# Patient Record
Sex: FEMALE | Race: WHITE
[De-identification: names, ages, dates, MRNs, and addresses within clinical notes are randomized per-mention and may not be internally consistent; named-entity substitution may affect disease eponyms.]

---

## 2019-03-12 ENCOUNTER — HOSPITAL ENCOUNTER (INPATIENT)
Dept: HOSPITAL 8 - ED | Age: 34
LOS: 9 days | Discharge: HOME | DRG: 644 | End: 2019-03-21
Attending: HOSPITALIST | Admitting: HOSPITALIST
Payer: MEDICAID

## 2019-03-12 VITALS — HEIGHT: 65 IN | BODY MASS INDEX: 28.03 KG/M2 | WEIGHT: 168.21 LBS

## 2019-03-12 DIAGNOSIS — F25.9: ICD-10-CM

## 2019-03-12 DIAGNOSIS — Z79.899: ICD-10-CM

## 2019-03-12 DIAGNOSIS — R45.851: ICD-10-CM

## 2019-03-12 DIAGNOSIS — I50.9: ICD-10-CM

## 2019-03-12 DIAGNOSIS — E05.90: Primary | ICD-10-CM

## 2019-03-12 DIAGNOSIS — F31.9: ICD-10-CM

## 2019-03-12 DIAGNOSIS — Z88.0: ICD-10-CM

## 2019-03-12 DIAGNOSIS — Z81.8: ICD-10-CM

## 2019-03-12 DIAGNOSIS — F41.9: ICD-10-CM

## 2019-03-12 LAB
ALBUMIN SERPL-MCNC: 3.4 G/DL (ref 3.4–5)
ALP SERPL-CCNC: 91 U/L (ref 45–117)
ALT SERPL-CCNC: 24 U/L (ref 12–78)
ANION GAP SERPL CALC-SCNC: 9 MMOL/L (ref 5–15)
APAP SERPL-MCNC: < 2 MCG/ML (ref 10–30)
BASOPHILS # BLD AUTO: 0.03 X10^3/UL (ref 0–0.1)
BASOPHILS NFR BLD AUTO: 0 % (ref 0–1)
BILIRUB SERPL-MCNC: 0.3 MG/DL (ref 0.2–1)
CALCIUM SERPL-MCNC: 9.2 MG/DL (ref 8.5–10.1)
CHLORIDE SERPL-SCNC: 109 MMOL/L (ref 98–107)
CREAT SERPL-MCNC: 0.76 MG/DL (ref 0.55–1.02)
EOSINOPHIL # BLD AUTO: 0.29 X10^3/UL (ref 0–0.4)
EOSINOPHIL NFR BLD AUTO: 2 % (ref 1–7)
ERYTHROCYTE [DISTWIDTH] IN BLOOD BY AUTOMATED COUNT: 11.7 % (ref 9.6–15.2)
EST. AVERAGE GLUCOSE BLD GHB EST-MCNC: 91 MG/DL (ref 0–126)
HBA1C MFR BLD: 4.8 % (ref 4.2–6.3)
LYMPHOCYTES # BLD AUTO: 2.54 X10^3/UL (ref 1–3.4)
LYMPHOCYTES NFR BLD AUTO: 22 % (ref 22–44)
MCH RBC QN AUTO: 30.2 PG (ref 27–34.8)
MCHC RBC AUTO-ENTMCNC: 34.3 G/DL (ref 32.4–35.8)
MCV RBC AUTO: 88.2 FL (ref 80–100)
MD: NO
MONOCYTES # BLD AUTO: 0.58 X10^3/UL (ref 0.2–0.8)
MONOCYTES NFR BLD AUTO: 5 % (ref 2–9)
NEUTROPHILS # BLD AUTO: 8.25 X10^3/UL (ref 1.8–6.8)
NEUTROPHILS NFR BLD AUTO: 71 % (ref 42–75)
PLATELET # BLD AUTO: 365 X10^3/UL (ref 130–400)
PMV BLD AUTO: 8.3 FL (ref 7.4–10.4)
PROT SERPL-MCNC: 7.1 G/DL (ref 6.4–8.2)
RBC # BLD AUTO: 3.97 X10^6/UL (ref 3.82–5.3)
T4 FREE SERPL-MCNC: 2.56 NG/DL (ref 0.76–1.46)
VANCOMYCIN TROUGH SERPL-MCNC: 2.3 MG/DL (ref 2.8–20)

## 2019-03-12 PROCEDURE — 80053 COMPREHEN METABOLIC PANEL: CPT

## 2019-03-12 PROCEDURE — 36415 COLL VENOUS BLD VENIPUNCTURE: CPT

## 2019-03-12 PROCEDURE — 84703 CHORIONIC GONADOTROPIN ASSAY: CPT

## 2019-03-12 PROCEDURE — 85025 COMPLETE CBC W/AUTO DIFF WBC: CPT

## 2019-03-12 PROCEDURE — 83036 HEMOGLOBIN GLYCOSYLATED A1C: CPT

## 2019-03-12 PROCEDURE — 82565 ASSAY OF CREATININE: CPT

## 2019-03-12 PROCEDURE — 84445 ASSAY OF TSI GLOBULIN: CPT

## 2019-03-12 PROCEDURE — 80178 ASSAY OF LITHIUM: CPT

## 2019-03-12 PROCEDURE — 80329 ANALGESICS NON-OPIOID 1 OR 2: CPT

## 2019-03-12 PROCEDURE — 83520 IMMUNOASSAY QUANT NOS NONAB: CPT

## 2019-03-12 PROCEDURE — 80307 DRUG TEST PRSMV CHEM ANLYZR: CPT

## 2019-03-12 PROCEDURE — A9516 IODINE I-123 SOD IODIDE MIC: HCPCS

## 2019-03-12 PROCEDURE — 84481 FREE ASSAY (FT-3): CPT

## 2019-03-12 PROCEDURE — 84443 ASSAY THYROID STIM HORMONE: CPT

## 2019-03-12 PROCEDURE — C9898 INPNT STAY RADIOLABELED ITEM: HCPCS

## 2019-03-12 PROCEDURE — 78013 THYROID IMAGING W/BLOOD FLOW: CPT

## 2019-03-12 PROCEDURE — 93306 TTE W/DOPPLER COMPLETE: CPT

## 2019-03-12 PROCEDURE — 93005 ELECTROCARDIOGRAM TRACING: CPT

## 2019-03-12 PROCEDURE — 84439 ASSAY OF FREE THYROXINE: CPT

## 2019-03-12 PROCEDURE — 76536 US EXAM OF HEAD AND NECK: CPT

## 2019-03-12 PROCEDURE — G0480 DRUG TEST DEF 1-7 CLASSES: HCPCS

## 2019-03-12 PROCEDURE — 99285 EMERGENCY DEPT VISIT HI MDM: CPT

## 2019-03-12 PROCEDURE — 83735 ASSAY OF MAGNESIUM: CPT

## 2019-03-12 RX ADMIN — PROPRANOLOL HYDROCHLORIDE SCH MG: 20 TABLET ORAL at 22:00

## 2019-03-12 RX ADMIN — LITHIUM CARBONATE SCH MG: 300 CAPSULE, GELATIN COATED ORAL at 22:39

## 2019-03-12 RX ADMIN — PROPRANOLOL HYDROCHLORIDE SCH MG: 20 TABLET ORAL at 18:00

## 2019-03-12 RX ADMIN — LOXAPINE SCH MG: 25 CAPSULE ORAL at 22:39

## 2019-03-12 RX ADMIN — ENOXAPARIN SODIUM SCH MG: 40 INJECTION SUBCUTANEOUS at 16:00

## 2019-03-12 RX ADMIN — SODIUM CHLORIDE SCH MLS/HR: 0.9 INJECTION, SOLUTION INTRAVENOUS at 22:40

## 2019-03-12 RX ADMIN — TOPIRAMATE SCH MG: 25 TABLET, FILM COATED ORAL at 22:39

## 2019-03-12 RX ADMIN — METHIMAZOLE SCH MG: 5 TABLET ORAL at 18:00

## 2019-03-12 RX ADMIN — DIVALPROEX SODIUM SCH MG: 250 TABLET, EXTENDED RELEASE ORAL at 22:39

## 2019-03-12 RX ADMIN — BENZTROPINE MESYLATE SCH MG: 1 TABLET ORAL at 22:39

## 2019-03-12 NOTE — NUR
PT. HAS BEEN MEDICATED PER MAR.  PT. REFUSIGN IV AND STATING "I WILL JUST DRINK 
WATER, I DON'T BELIEVE I NEED THAT AT THIS TIME."  PT. DOES HAVE CARDIAC 
MONITOR IN PLACE AND UNHOOKS IT AND HOOKS IT BACK UP WHEN GOING TO THE BR.  PT. 
AMBUALATE IN [place] WITH STEADY GAIT AND HAS BEEN COOPERATIVE WITH THIS NURSE 
THUS FAR.  PT. WAS GIVEN A PILLOW PER REQUEST.  PT. BRUSHED TEETH AND GAVE 
TOOTHBRUSH/PASTE BACK TO SITTER FOR SECUREMENT.  VS WERE UPDATED AND STABLE.  
PT. DENIES ANY OTHER NEEDS.  SITTER IS IN [place] FOR OBS.

## 2019-03-12 NOTE — NUR
BIBA on Legal 2000 from [facility] walk-in clinic for med clearance, pt reports SI 
("I want to swallow glass, my meds need to be changed but my Dr won't do 
anything"), AAO x4, pt reports she is med compliant; hx schitzoaffective, 
bipolar, panic d/o; pt change dinto gown, responds approp to staff, NAD, 
comfort measures provided, pt in safe environment, awaiting sitter, will 
continue to monitor frequently; personal belongings in bags x2 placed in 
locker; urine sample sent to lab.

## 2019-03-12 NOTE — NUR
pt upright on gurney awake, calm & comfortable, responds approp to staff, NAD, 
comfort measures provided, awaiting meal tray, pt remains in safe environment, 
awaiting sitter, will continue to monitor frequently.

## 2019-03-12 NOTE — NUR
placed call to dr. berumen, pts depakote med is different from home, at home 
she takes 1250mg at night, order is only for 250mg. dr berumen wants to keep 
dose at 250. pt also requesting medication to help her sleep but md declined.

## 2019-03-13 VITALS — DIASTOLIC BLOOD PRESSURE: 77 MMHG | SYSTOLIC BLOOD PRESSURE: 111 MMHG

## 2019-03-13 VITALS — DIASTOLIC BLOOD PRESSURE: 64 MMHG | SYSTOLIC BLOOD PRESSURE: 118 MMHG

## 2019-03-13 LAB
ALBUMIN SERPL-MCNC: 2.9 G/DL (ref 3.4–5)
ALP SERPL-CCNC: 77 U/L (ref 45–117)
ALT SERPL-CCNC: 21 U/L (ref 12–78)
ANION GAP SERPL CALC-SCNC: 5 MMOL/L (ref 5–15)
BASOPHILS # BLD AUTO: 0.02 X10^3/UL (ref 0–0.1)
BASOPHILS NFR BLD AUTO: 0 % (ref 0–1)
BILIRUB SERPL-MCNC: 0.2 MG/DL (ref 0.2–1)
CALCIUM SERPL-MCNC: 8.6 MG/DL (ref 8.5–10.1)
CHLORIDE SERPL-SCNC: 112 MMOL/L (ref 98–107)
CREAT SERPL-MCNC: 0.66 MG/DL (ref 0.55–1.02)
EOSINOPHIL # BLD AUTO: 0.3 X10^3/UL (ref 0–0.4)
EOSINOPHIL NFR BLD AUTO: 3 % (ref 1–7)
ERYTHROCYTE [DISTWIDTH] IN BLOOD BY AUTOMATED COUNT: 11.6 % (ref 9.6–15.2)
LYMPHOCYTES # BLD AUTO: 3.29 X10^3/UL (ref 1–3.4)
LYMPHOCYTES NFR BLD AUTO: 33 % (ref 22–44)
MCH RBC QN AUTO: 30.4 PG (ref 27–34.8)
MCHC RBC AUTO-ENTMCNC: 34.4 G/DL (ref 32.4–35.8)
MCV RBC AUTO: 88.4 FL (ref 80–100)
MD: NO
MONOCYTES # BLD AUTO: 0.74 X10^3/UL (ref 0.2–0.8)
MONOCYTES NFR BLD AUTO: 7 % (ref 2–9)
NEUTROPHILS # BLD AUTO: 5.68 X10^3/UL (ref 1.8–6.8)
NEUTROPHILS NFR BLD AUTO: 57 % (ref 42–75)
PLATELET # BLD AUTO: 328 X10^3/UL (ref 130–400)
PMV BLD AUTO: 8.1 FL (ref 7.4–10.4)
PROT SERPL-MCNC: 5.9 G/DL (ref 6.4–8.2)
RBC # BLD AUTO: 3.53 X10^6/UL (ref 3.82–5.3)

## 2019-03-13 RX ADMIN — METHIMAZOLE SCH MG: 5 TABLET ORAL at 12:31

## 2019-03-13 RX ADMIN — LOXAPINE SCH MG: 25 CAPSULE ORAL at 12:32

## 2019-03-13 RX ADMIN — LITHIUM CARBONATE SCH MG: 300 CAPSULE, GELATIN COATED ORAL at 20:55

## 2019-03-13 RX ADMIN — LITHIUM CARBONATE SCH MG: 300 CAPSULE, GELATIN COATED ORAL at 12:31

## 2019-03-13 RX ADMIN — ENOXAPARIN SODIUM SCH MG: 40 INJECTION SUBCUTANEOUS at 16:00

## 2019-03-13 RX ADMIN — PROPRANOLOL HYDROCHLORIDE SCH MG: 20 TABLET ORAL at 22:00

## 2019-03-13 RX ADMIN — DIVALPROEX SODIUM SCH MG: 250 TABLET, EXTENDED RELEASE ORAL at 20:44

## 2019-03-13 RX ADMIN — LOXAPINE SCH MG: 25 CAPSULE ORAL at 20:21

## 2019-03-13 RX ADMIN — PROPRANOLOL HYDROCHLORIDE SCH MG: 20 TABLET ORAL at 16:10

## 2019-03-13 RX ADMIN — BENZTROPINE MESYLATE SCH MG: 1 TABLET ORAL at 12:30

## 2019-03-13 RX ADMIN — SODIUM CHLORIDE SCH MLS/HR: 0.9 INJECTION, SOLUTION INTRAVENOUS at 05:14

## 2019-03-13 RX ADMIN — PROPRANOLOL HYDROCHLORIDE SCH MG: 20 TABLET ORAL at 12:32

## 2019-03-13 RX ADMIN — TOPIRAMATE SCH MG: 25 TABLET, FILM COATED ORAL at 12:31

## 2019-03-13 RX ADMIN — TOPIRAMATE SCH MG: 25 TABLET, FILM COATED ORAL at 20:45

## 2019-03-13 RX ADMIN — SODIUM CHLORIDE SCH MLS/HR: 0.9 INJECTION, SOLUTION INTRAVENOUS at 18:34

## 2019-03-13 RX ADMIN — BENZTROPINE MESYLATE SCH MG: 1 TABLET ORAL at 20:45

## 2019-03-13 RX ADMIN — LOXAPINE SCH MG: 25 CAPSULE ORAL at 20:44

## 2019-03-13 NOTE — NUR
PT RESTING CALMLY IN BED WITH EYES CLOSED.  NO NOTED DISSTRESS.  NO STATED 
NEEDS.  SITTER AT DOOR.  RESP EVEN AND NON-LABORED.

## 2019-03-13 NOTE — NUR
PT RESTING CALMLY IN BED WITH EYES CLOSED.  NO STATED NEEDS.  SITTER AT DOOR.  
RESP EVEN AND NON-LABORED.

## 2019-03-13 NOTE — NUR
PT RESTING CALMLY IN BED UNDER BED SHEET.  NO NOTED DISSTRESS.  NO STATED 
NEEDS.  SITTER AT DOOR.  RESP EVEN AND NON-LABORED.

## 2019-03-14 VITALS — SYSTOLIC BLOOD PRESSURE: 115 MMHG | DIASTOLIC BLOOD PRESSURE: 77 MMHG

## 2019-03-14 VITALS — DIASTOLIC BLOOD PRESSURE: 68 MMHG | SYSTOLIC BLOOD PRESSURE: 104 MMHG

## 2019-03-14 LAB
T4 FREE SERPL-MCNC: 2.11 NG/DL (ref 0.76–1.46)
TSH SERPL-ACNC: < 0.005 MIU/L (ref 0.36–3.74)

## 2019-03-14 RX ADMIN — SODIUM CHLORIDE SCH MLS/HR: 0.9 INJECTION, SOLUTION INTRAVENOUS at 19:42

## 2019-03-14 RX ADMIN — SODIUM CHLORIDE SCH MLS/HR: 0.9 INJECTION, SOLUTION INTRAVENOUS at 07:54

## 2019-03-14 RX ADMIN — DIVALPROEX SODIUM SCH MG: 250 TABLET, EXTENDED RELEASE ORAL at 20:48

## 2019-03-14 RX ADMIN — PROPRANOLOL HYDROCHLORIDE SCH MG: 20 TABLET ORAL at 20:48

## 2019-03-14 RX ADMIN — ENOXAPARIN SODIUM SCH MG: 40 INJECTION SUBCUTANEOUS at 16:00

## 2019-03-14 RX ADMIN — METHIMAZOLE SCH MG: 5 TABLET ORAL at 08:09

## 2019-03-14 RX ADMIN — TOPIRAMATE SCH MG: 25 TABLET, FILM COATED ORAL at 08:09

## 2019-03-14 RX ADMIN — LOXAPINE SCH MG: 25 CAPSULE ORAL at 20:47

## 2019-03-14 RX ADMIN — LITHIUM CARBONATE SCH MG: 300 CAPSULE, GELATIN COATED ORAL at 20:46

## 2019-03-14 RX ADMIN — PROPRANOLOL HYDROCHLORIDE SCH MG: 20 TABLET ORAL at 15:00

## 2019-03-14 RX ADMIN — TOPIRAMATE SCH MG: 25 TABLET, FILM COATED ORAL at 20:48

## 2019-03-14 RX ADMIN — PROPRANOLOL HYDROCHLORIDE SCH MG: 20 TABLET ORAL at 06:17

## 2019-03-14 RX ADMIN — BENZTROPINE MESYLATE SCH MG: 1 TABLET ORAL at 20:47

## 2019-03-14 RX ADMIN — BENZTROPINE MESYLATE SCH MG: 1 TABLET ORAL at 08:08

## 2019-03-14 RX ADMIN — LITHIUM CARBONATE SCH MG: 300 CAPSULE, GELATIN COATED ORAL at 08:09

## 2019-03-14 RX ADMIN — LOXAPINE SCH MG: 25 CAPSULE ORAL at 08:08

## 2019-03-14 RX ADMIN — LOXAPINE SCH MG: 25 CAPSULE ORAL at 15:00

## 2019-03-15 VITALS — DIASTOLIC BLOOD PRESSURE: 80 MMHG | SYSTOLIC BLOOD PRESSURE: 117 MMHG

## 2019-03-15 VITALS — SYSTOLIC BLOOD PRESSURE: 112 MMHG | DIASTOLIC BLOOD PRESSURE: 77 MMHG

## 2019-03-15 VITALS — SYSTOLIC BLOOD PRESSURE: 100 MMHG | DIASTOLIC BLOOD PRESSURE: 65 MMHG

## 2019-03-15 RX ADMIN — LOXAPINE SCH MG: 25 CAPSULE ORAL at 20:48

## 2019-03-15 RX ADMIN — SODIUM CHLORIDE SCH MLS/HR: 0.9 INJECTION, SOLUTION INTRAVENOUS at 10:34

## 2019-03-15 RX ADMIN — TOPIRAMATE SCH MG: 25 TABLET, FILM COATED ORAL at 08:59

## 2019-03-15 RX ADMIN — ENOXAPARIN SODIUM SCH MG: 40 INJECTION SUBCUTANEOUS at 16:35

## 2019-03-15 RX ADMIN — PROPRANOLOL HYDROCHLORIDE SCH MG: 20 TABLET ORAL at 06:35

## 2019-03-15 RX ADMIN — SODIUM CHLORIDE SCH MLS/HR: 0.9 INJECTION, SOLUTION INTRAVENOUS at 22:38

## 2019-03-15 RX ADMIN — DIVALPROEX SODIUM SCH MG: 250 TABLET, EXTENDED RELEASE ORAL at 20:49

## 2019-03-15 RX ADMIN — PROPRANOLOL HYDROCHLORIDE SCH MG: 20 TABLET ORAL at 20:49

## 2019-03-15 RX ADMIN — PROPRANOLOL HYDROCHLORIDE SCH MG: 20 TABLET ORAL at 14:07

## 2019-03-15 RX ADMIN — LOXAPINE SCH MG: 25 CAPSULE ORAL at 14:07

## 2019-03-15 RX ADMIN — BENZTROPINE MESYLATE SCH MG: 1 TABLET ORAL at 09:00

## 2019-03-15 RX ADMIN — BENZTROPINE MESYLATE SCH MG: 1 TABLET ORAL at 20:49

## 2019-03-15 RX ADMIN — TOPIRAMATE SCH MG: 25 TABLET, FILM COATED ORAL at 20:49

## 2019-03-15 RX ADMIN — METHIMAZOLE SCH MG: 5 TABLET ORAL at 08:59

## 2019-03-15 RX ADMIN — NICOTINE SCH PATCH: 14 PATCH, EXTENDED RELEASE TRANSDERMAL at 11:51

## 2019-03-15 RX ADMIN — LITHIUM CARBONATE SCH MG: 300 CAPSULE, GELATIN COATED ORAL at 20:48

## 2019-03-15 RX ADMIN — LOXAPINE SCH MG: 25 CAPSULE ORAL at 09:00

## 2019-03-15 RX ADMIN — NORGESTIMATE AND ETHINYL ESTRADIOL SCH EACH: KIT at 09:15

## 2019-03-15 RX ADMIN — LITHIUM CARBONATE SCH MG: 300 CAPSULE, GELATIN COATED ORAL at 09:00

## 2019-03-16 VITALS — DIASTOLIC BLOOD PRESSURE: 79 MMHG | SYSTOLIC BLOOD PRESSURE: 118 MMHG

## 2019-03-16 VITALS — DIASTOLIC BLOOD PRESSURE: 71 MMHG | SYSTOLIC BLOOD PRESSURE: 111 MMHG

## 2019-03-16 VITALS — SYSTOLIC BLOOD PRESSURE: 114 MMHG | DIASTOLIC BLOOD PRESSURE: 80 MMHG

## 2019-03-16 VITALS — DIASTOLIC BLOOD PRESSURE: 66 MMHG | SYSTOLIC BLOOD PRESSURE: 97 MMHG

## 2019-03-16 LAB
T4 FREE SERPL-MCNC: 2.31 NG/DL (ref 0.76–1.46)
TSH SERPL-ACNC: < 0.005 MIU/L (ref 0.36–3.74)

## 2019-03-16 RX ADMIN — LITHIUM CARBONATE SCH MG: 300 CAPSULE, GELATIN COATED ORAL at 20:11

## 2019-03-16 RX ADMIN — NICOTINE SCH PATCH: 14 PATCH, EXTENDED RELEASE TRANSDERMAL at 12:06

## 2019-03-16 RX ADMIN — ENOXAPARIN SODIUM SCH MG: 40 INJECTION SUBCUTANEOUS at 15:09

## 2019-03-16 RX ADMIN — TOPIRAMATE SCH MG: 25 TABLET, FILM COATED ORAL at 20:09

## 2019-03-16 RX ADMIN — TOPIRAMATE SCH MG: 25 TABLET, FILM COATED ORAL at 08:24

## 2019-03-16 RX ADMIN — LOXAPINE SCH MG: 25 CAPSULE ORAL at 20:11

## 2019-03-16 RX ADMIN — LITHIUM CARBONATE SCH MG: 300 CAPSULE, GELATIN COATED ORAL at 08:25

## 2019-03-16 RX ADMIN — SODIUM CHLORIDE SCH MLS/HR: 0.9 INJECTION, SOLUTION INTRAVENOUS at 19:58

## 2019-03-16 RX ADMIN — LOXAPINE SCH MG: 25 CAPSULE ORAL at 08:24

## 2019-03-16 RX ADMIN — NORGESTIMATE AND ETHINYL ESTRADIOL SCH EACH: KIT at 08:25

## 2019-03-16 RX ADMIN — BENZTROPINE MESYLATE SCH MG: 1 TABLET ORAL at 20:11

## 2019-03-16 RX ADMIN — PROPRANOLOL HYDROCHLORIDE SCH MG: 20 TABLET ORAL at 20:10

## 2019-03-16 RX ADMIN — LOXAPINE SCH MG: 25 CAPSULE ORAL at 13:06

## 2019-03-16 RX ADMIN — METHIMAZOLE SCH MG: 5 TABLET ORAL at 08:24

## 2019-03-16 RX ADMIN — PROPRANOLOL HYDROCHLORIDE SCH MG: 20 TABLET ORAL at 08:24

## 2019-03-16 RX ADMIN — DIVALPROEX SODIUM SCH MG: 250 TABLET, EXTENDED RELEASE ORAL at 20:12

## 2019-03-16 RX ADMIN — BENZTROPINE MESYLATE SCH MG: 1 TABLET ORAL at 08:24

## 2019-03-16 RX ADMIN — SODIUM CHLORIDE SCH MLS/HR: 0.9 INJECTION, SOLUTION INTRAVENOUS at 13:03

## 2019-03-16 RX ADMIN — PROPRANOLOL HYDROCHLORIDE SCH MG: 20 TABLET ORAL at 13:06

## 2019-03-17 VITALS — SYSTOLIC BLOOD PRESSURE: 118 MMHG | DIASTOLIC BLOOD PRESSURE: 79 MMHG

## 2019-03-17 VITALS — SYSTOLIC BLOOD PRESSURE: 103 MMHG | DIASTOLIC BLOOD PRESSURE: 66 MMHG

## 2019-03-17 VITALS — SYSTOLIC BLOOD PRESSURE: 104 MMHG | DIASTOLIC BLOOD PRESSURE: 70 MMHG

## 2019-03-17 VITALS — DIASTOLIC BLOOD PRESSURE: 76 MMHG | SYSTOLIC BLOOD PRESSURE: 113 MMHG

## 2019-03-17 LAB
CREAT SERPL-MCNC: 0.62 MG/DL (ref 0.55–1.02)
T4 FREE SERPL-MCNC: 1.99 NG/DL (ref 0.76–1.46)
TSH SERPL-ACNC: < 0.005 MIU/L (ref 0.36–3.74)

## 2019-03-17 RX ADMIN — TOPIRAMATE SCH MG: 25 TABLET, FILM COATED ORAL at 20:38

## 2019-03-17 RX ADMIN — NORGESTIMATE AND ETHINYL ESTRADIOL SCH EACH: KIT at 08:57

## 2019-03-17 RX ADMIN — ENOXAPARIN SODIUM SCH MG: 40 INJECTION SUBCUTANEOUS at 15:58

## 2019-03-17 RX ADMIN — SODIUM CHLORIDE SCH MLS/HR: 0.9 INJECTION, SOLUTION INTRAVENOUS at 15:54

## 2019-03-17 RX ADMIN — DIVALPROEX SODIUM SCH MG: 250 TABLET, EXTENDED RELEASE ORAL at 20:38

## 2019-03-17 RX ADMIN — NICOTINE SCH PATCH: 14 PATCH, EXTENDED RELEASE TRANSDERMAL at 12:55

## 2019-03-17 RX ADMIN — PROPRANOLOL HYDROCHLORIDE SCH MG: 20 TABLET ORAL at 08:56

## 2019-03-17 RX ADMIN — PROPRANOLOL HYDROCHLORIDE SCH MG: 20 TABLET ORAL at 20:38

## 2019-03-17 RX ADMIN — BENZTROPINE MESYLATE SCH MG: 1 TABLET ORAL at 08:57

## 2019-03-17 RX ADMIN — LITHIUM CARBONATE SCH MG: 300 CAPSULE, GELATIN COATED ORAL at 20:39

## 2019-03-17 RX ADMIN — LOXAPINE SCH MG: 25 CAPSULE ORAL at 08:56

## 2019-03-17 RX ADMIN — LOXAPINE SCH MG: 25 CAPSULE ORAL at 13:49

## 2019-03-17 RX ADMIN — BENZTROPINE MESYLATE SCH MG: 1 TABLET ORAL at 20:39

## 2019-03-17 RX ADMIN — PROPRANOLOL HYDROCHLORIDE SCH MG: 20 TABLET ORAL at 13:49

## 2019-03-17 RX ADMIN — LOXAPINE SCH MG: 25 CAPSULE ORAL at 20:38

## 2019-03-17 RX ADMIN — TOPIRAMATE SCH MG: 25 TABLET, FILM COATED ORAL at 08:57

## 2019-03-17 RX ADMIN — METHIMAZOLE SCH MG: 5 TABLET ORAL at 08:57

## 2019-03-17 RX ADMIN — LITHIUM CARBONATE SCH MG: 300 CAPSULE, GELATIN COATED ORAL at 08:57

## 2019-03-18 VITALS — SYSTOLIC BLOOD PRESSURE: 89 MMHG | DIASTOLIC BLOOD PRESSURE: 58 MMHG

## 2019-03-18 VITALS — DIASTOLIC BLOOD PRESSURE: 64 MMHG | SYSTOLIC BLOOD PRESSURE: 99 MMHG

## 2019-03-18 VITALS — SYSTOLIC BLOOD PRESSURE: 119 MMHG | DIASTOLIC BLOOD PRESSURE: 84 MMHG

## 2019-03-18 VITALS — SYSTOLIC BLOOD PRESSURE: 109 MMHG | DIASTOLIC BLOOD PRESSURE: 40 MMHG

## 2019-03-18 VITALS — DIASTOLIC BLOOD PRESSURE: 77 MMHG | SYSTOLIC BLOOD PRESSURE: 111 MMHG

## 2019-03-18 LAB
ALBUMIN SERPL-MCNC: 3.2 G/DL (ref 3.4–5)
ALP SERPL-CCNC: 86 U/L (ref 45–117)
ALT SERPL-CCNC: 22 U/L (ref 12–78)
ANION GAP SERPL CALC-SCNC: 5 MMOL/L (ref 5–15)
BASOPHILS # BLD AUTO: 0.03 X10^3/UL (ref 0–0.1)
BASOPHILS NFR BLD AUTO: 0 % (ref 0–1)
BILIRUB SERPL-MCNC: 0.3 MG/DL (ref 0.2–1)
CALCIUM SERPL-MCNC: 9.1 MG/DL (ref 8.5–10.1)
CHLORIDE SERPL-SCNC: 112 MMOL/L (ref 98–107)
CREAT SERPL-MCNC: 0.68 MG/DL (ref 0.55–1.02)
EOSINOPHIL # BLD AUTO: 0.25 X10^3/UL (ref 0–0.4)
EOSINOPHIL NFR BLD AUTO: 3 % (ref 1–7)
ERYTHROCYTE [DISTWIDTH] IN BLOOD BY AUTOMATED COUNT: 11.4 % (ref 9.6–15.2)
LYMPHOCYTES # BLD AUTO: 2.7 X10^3/UL (ref 1–3.4)
LYMPHOCYTES NFR BLD AUTO: 28 % (ref 22–44)
MCH RBC QN AUTO: 29.4 PG (ref 27–34.8)
MCHC RBC AUTO-ENTMCNC: 33.5 G/DL (ref 32.4–35.8)
MCV RBC AUTO: 87.7 FL (ref 80–100)
MD: NO
MONOCYTES # BLD AUTO: 0.52 X10^3/UL (ref 0.2–0.8)
MONOCYTES NFR BLD AUTO: 5 % (ref 2–9)
NEUTROPHILS # BLD AUTO: 6.09 X10^3/UL (ref 1.8–6.8)
NEUTROPHILS NFR BLD AUTO: 64 % (ref 42–75)
PLATELET # BLD AUTO: 386 X10^3/UL (ref 130–400)
PMV BLD AUTO: 7.9 FL (ref 7.4–10.4)
PROT SERPL-MCNC: 6.7 G/DL (ref 6.4–8.2)
RBC # BLD AUTO: 3.96 X10^6/UL (ref 3.82–5.3)
T4 FREE SERPL-MCNC: 2.32 NG/DL (ref 0.76–1.46)
TSH SERPL-ACNC: < 0.005 MIU/L (ref 0.36–3.74)

## 2019-03-18 RX ADMIN — TOPIRAMATE SCH MG: 25 TABLET, FILM COATED ORAL at 08:53

## 2019-03-18 RX ADMIN — DIVALPROEX SODIUM SCH MG: 250 TABLET, EXTENDED RELEASE ORAL at 20:18

## 2019-03-18 RX ADMIN — LOXAPINE SCH MG: 25 CAPSULE ORAL at 20:20

## 2019-03-18 RX ADMIN — TOPIRAMATE SCH MG: 25 TABLET, FILM COATED ORAL at 20:19

## 2019-03-18 RX ADMIN — BENZTROPINE MESYLATE SCH MG: 1 TABLET ORAL at 08:50

## 2019-03-18 RX ADMIN — METHIMAZOLE SCH MG: 5 TABLET ORAL at 08:51

## 2019-03-18 RX ADMIN — NICOTINE SCH PATCH: 14 PATCH, EXTENDED RELEASE TRANSDERMAL at 13:00

## 2019-03-18 RX ADMIN — LITHIUM CARBONATE SCH MG: 300 CAPSULE, GELATIN COATED ORAL at 08:52

## 2019-03-18 RX ADMIN — ENOXAPARIN SODIUM SCH MG: 40 INJECTION SUBCUTANEOUS at 16:00

## 2019-03-18 RX ADMIN — LITHIUM CARBONATE SCH MG: 300 CAPSULE, GELATIN COATED ORAL at 20:21

## 2019-03-18 RX ADMIN — LOXAPINE SCH MG: 25 CAPSULE ORAL at 14:56

## 2019-03-18 RX ADMIN — PROPRANOLOL HYDROCHLORIDE SCH MG: 20 TABLET ORAL at 08:52

## 2019-03-18 RX ADMIN — LOXAPINE SCH MG: 25 CAPSULE ORAL at 08:48

## 2019-03-18 RX ADMIN — SODIUM CHLORIDE SCH MLS/HR: 0.9 INJECTION, SOLUTION INTRAVENOUS at 18:34

## 2019-03-18 RX ADMIN — NORGESTIMATE AND ETHINYL ESTRADIOL SCH EACH: KIT at 08:54

## 2019-03-18 RX ADMIN — PROPRANOLOL HYDROCHLORIDE SCH MG: 20 TABLET ORAL at 16:27

## 2019-03-18 RX ADMIN — SODIUM CHLORIDE SCH MLS/HR: 0.9 INJECTION, SOLUTION INTRAVENOUS at 05:14

## 2019-03-18 RX ADMIN — PROPRANOLOL HYDROCHLORIDE SCH MG: 20 TABLET ORAL at 20:19

## 2019-03-18 RX ADMIN — BENZTROPINE MESYLATE SCH MG: 1 TABLET ORAL at 20:19

## 2019-03-19 VITALS — DIASTOLIC BLOOD PRESSURE: 75 MMHG | SYSTOLIC BLOOD PRESSURE: 114 MMHG

## 2019-03-19 VITALS — SYSTOLIC BLOOD PRESSURE: 96 MMHG | DIASTOLIC BLOOD PRESSURE: 60 MMHG

## 2019-03-19 VITALS — DIASTOLIC BLOOD PRESSURE: 65 MMHG | SYSTOLIC BLOOD PRESSURE: 101 MMHG

## 2019-03-19 VITALS — DIASTOLIC BLOOD PRESSURE: 58 MMHG | SYSTOLIC BLOOD PRESSURE: 95 MMHG

## 2019-03-19 VITALS — DIASTOLIC BLOOD PRESSURE: 73 MMHG | SYSTOLIC BLOOD PRESSURE: 105 MMHG

## 2019-03-19 RX ADMIN — LOXAPINE SCH MG: 25 CAPSULE ORAL at 14:41

## 2019-03-19 RX ADMIN — NORGESTIMATE AND ETHINYL ESTRADIOL SCH EACH: KIT at 08:20

## 2019-03-19 RX ADMIN — PROPRANOLOL HYDROCHLORIDE SCH MG: 20 TABLET ORAL at 05:36

## 2019-03-19 RX ADMIN — BENZTROPINE MESYLATE SCH MG: 1 TABLET ORAL at 08:18

## 2019-03-19 RX ADMIN — LITHIUM CARBONATE SCH MG: 300 CAPSULE, GELATIN COATED ORAL at 08:20

## 2019-03-19 RX ADMIN — PROPRANOLOL HYDROCHLORIDE SCH MG: 10 TABLET ORAL at 14:41

## 2019-03-19 RX ADMIN — NICOTINE SCH PATCH: 14 PATCH, EXTENDED RELEASE TRANSDERMAL at 12:31

## 2019-03-19 RX ADMIN — LITHIUM CARBONATE SCH MG: 300 CAPSULE, GELATIN COATED ORAL at 23:55

## 2019-03-19 RX ADMIN — LOXAPINE SCH MG: 25 CAPSULE ORAL at 08:19

## 2019-03-19 RX ADMIN — DIVALPROEX SODIUM SCH MG: 250 TABLET, EXTENDED RELEASE ORAL at 20:01

## 2019-03-19 RX ADMIN — SODIUM CHLORIDE SCH MLS/HR: 0.9 INJECTION, SOLUTION INTRAVENOUS at 20:03

## 2019-03-19 RX ADMIN — DOCUSATE SODIUM PRN MG: 100 CAPSULE, LIQUID FILLED ORAL at 20:01

## 2019-03-19 RX ADMIN — TOPIRAMATE SCH MG: 25 TABLET, FILM COATED ORAL at 08:19

## 2019-03-19 RX ADMIN — BENZTROPINE MESYLATE SCH MG: 1 TABLET ORAL at 20:01

## 2019-03-19 RX ADMIN — SODIUM CHLORIDE SCH MLS/HR: 0.9 INJECTION, SOLUTION INTRAVENOUS at 07:54

## 2019-03-19 RX ADMIN — LOXAPINE SCH MG: 25 CAPSULE ORAL at 20:01

## 2019-03-19 RX ADMIN — METHIMAZOLE SCH MG: 5 TABLET ORAL at 08:18

## 2019-03-19 RX ADMIN — TOPIRAMATE SCH MG: 25 TABLET, FILM COATED ORAL at 20:01

## 2019-03-19 RX ADMIN — PROPRANOLOL HYDROCHLORIDE SCH MG: 10 TABLET ORAL at 23:57

## 2019-03-19 RX ADMIN — ENOXAPARIN SODIUM SCH MG: 40 INJECTION SUBCUTANEOUS at 15:42

## 2019-03-20 VITALS — DIASTOLIC BLOOD PRESSURE: 63 MMHG | SYSTOLIC BLOOD PRESSURE: 94 MMHG

## 2019-03-20 VITALS — SYSTOLIC BLOOD PRESSURE: 113 MMHG | DIASTOLIC BLOOD PRESSURE: 78 MMHG

## 2019-03-20 VITALS — DIASTOLIC BLOOD PRESSURE: 66 MMHG | SYSTOLIC BLOOD PRESSURE: 95 MMHG

## 2019-03-20 VITALS — DIASTOLIC BLOOD PRESSURE: 60 MMHG | SYSTOLIC BLOOD PRESSURE: 95 MMHG

## 2019-03-20 VITALS — DIASTOLIC BLOOD PRESSURE: 81 MMHG | SYSTOLIC BLOOD PRESSURE: 117 MMHG

## 2019-03-20 LAB — CREAT SERPL-MCNC: 0.65 MG/DL (ref 0.55–1.02)

## 2019-03-20 RX ADMIN — DOCUSATE SODIUM PRN MG: 100 CAPSULE, LIQUID FILLED ORAL at 11:18

## 2019-03-20 RX ADMIN — LOXAPINE SCH MG: 25 CAPSULE ORAL at 09:09

## 2019-03-20 RX ADMIN — LOXAPINE SCH MG: 25 CAPSULE ORAL at 21:11

## 2019-03-20 RX ADMIN — ENOXAPARIN SODIUM SCH MG: 40 INJECTION SUBCUTANEOUS at 16:00

## 2019-03-20 RX ADMIN — PROPRANOLOL HYDROCHLORIDE SCH MG: 10 TABLET ORAL at 06:14

## 2019-03-20 RX ADMIN — LOXAPINE SCH MG: 25 CAPSULE ORAL at 14:46

## 2019-03-20 RX ADMIN — NICOTINE SCH PATCH: 14 PATCH, EXTENDED RELEASE TRANSDERMAL at 14:46

## 2019-03-20 RX ADMIN — BENZTROPINE MESYLATE SCH MG: 1 TABLET ORAL at 09:09

## 2019-03-20 RX ADMIN — SODIUM CHLORIDE SCH MLS/HR: 0.9 INJECTION, SOLUTION INTRAVENOUS at 10:34

## 2019-03-20 RX ADMIN — LITHIUM CARBONATE SCH MG: 300 CAPSULE, GELATIN COATED ORAL at 09:08

## 2019-03-20 RX ADMIN — LITHIUM CARBONATE SCH MG: 300 CAPSULE, GELATIN COATED ORAL at 21:12

## 2019-03-20 RX ADMIN — PROPRANOLOL HYDROCHLORIDE SCH MG: 10 TABLET ORAL at 21:12

## 2019-03-20 RX ADMIN — PROPRANOLOL HYDROCHLORIDE SCH MG: 10 TABLET ORAL at 14:46

## 2019-03-20 RX ADMIN — NORGESTIMATE AND ETHINYL ESTRADIOL SCH EACH: KIT at 11:18

## 2019-03-20 RX ADMIN — METHIMAZOLE SCH MG: 5 TABLET ORAL at 09:08

## 2019-03-20 RX ADMIN — TOPIRAMATE SCH MG: 25 TABLET, FILM COATED ORAL at 21:11

## 2019-03-20 RX ADMIN — TOPIRAMATE SCH MG: 25 TABLET, FILM COATED ORAL at 09:09

## 2019-03-20 RX ADMIN — BENZTROPINE MESYLATE SCH MG: 1 TABLET ORAL at 21:12

## 2019-03-20 RX ADMIN — DIVALPROEX SODIUM SCH MG: 250 TABLET, EXTENDED RELEASE ORAL at 21:11

## 2019-03-21 VITALS — SYSTOLIC BLOOD PRESSURE: 94 MMHG | DIASTOLIC BLOOD PRESSURE: 58 MMHG

## 2019-03-21 RX ADMIN — PROPRANOLOL HYDROCHLORIDE SCH MG: 10 TABLET ORAL at 14:19

## 2019-03-21 RX ADMIN — DOCUSATE SODIUM PRN MG: 100 CAPSULE, LIQUID FILLED ORAL at 12:05

## 2019-03-21 RX ADMIN — LOXAPINE SCH MG: 25 CAPSULE ORAL at 08:07

## 2019-03-21 RX ADMIN — NICOTINE SCH PATCH: 14 PATCH, EXTENDED RELEASE TRANSDERMAL at 12:05

## 2019-03-21 RX ADMIN — NORGESTIMATE AND ETHINYL ESTRADIOL SCH EACH: KIT at 08:06

## 2019-03-21 RX ADMIN — TOPIRAMATE SCH MG: 25 TABLET, FILM COATED ORAL at 08:06

## 2019-03-21 RX ADMIN — LITHIUM CARBONATE SCH MG: 300 CAPSULE, GELATIN COATED ORAL at 08:07

## 2019-03-21 RX ADMIN — BENZTROPINE MESYLATE SCH MG: 1 TABLET ORAL at 08:07

## 2019-03-21 RX ADMIN — LOXAPINE SCH MG: 25 CAPSULE ORAL at 14:19

## 2019-03-21 RX ADMIN — PROPRANOLOL HYDROCHLORIDE SCH MG: 10 TABLET ORAL at 08:06

## 2019-03-21 RX ADMIN — METHIMAZOLE SCH MG: 5 TABLET ORAL at 08:07

## 2019-04-06 ENCOUNTER — HOSPITAL ENCOUNTER (EMERGENCY)
Facility: MEDICAL CENTER | Age: 34
End: 2019-04-06
Attending: EMERGENCY MEDICINE
Payer: MEDICAID

## 2019-04-06 VITALS
HEART RATE: 79 BPM | TEMPERATURE: 98.1 F | RESPIRATION RATE: 16 BRPM | SYSTOLIC BLOOD PRESSURE: 147 MMHG | DIASTOLIC BLOOD PRESSURE: 73 MMHG | WEIGHT: 160 LBS | BODY MASS INDEX: 25.05 KG/M2

## 2019-04-06 DIAGNOSIS — R44.3 HALLUCINATIONS: ICD-10-CM

## 2019-04-06 LAB
AMPHET UR QL SCN: NEGATIVE
BARBITURATES UR QL SCN: NEGATIVE
BENZODIAZ UR QL SCN: NEGATIVE
BZE UR QL SCN: NEGATIVE
CANNABINOIDS UR QL SCN: NEGATIVE
METHADONE UR QL SCN: NEGATIVE
OPIATES UR QL SCN: NEGATIVE
OXYCODONE UR QL SCN: NEGATIVE
PCP UR QL SCN: NEGATIVE
POC BREATHALIZER: 0 PERCENT (ref 0–0.01)
PROPOXYPH UR QL SCN: NEGATIVE
TSH SERPL DL<=0.005 MIU/L-ACNC: <0.005 UIU/ML (ref 0.38–5.33)

## 2019-04-06 PROCEDURE — 99284 EMERGENCY DEPT VISIT MOD MDM: CPT

## 2019-04-06 PROCEDURE — 90791 PSYCH DIAGNOSTIC EVALUATION: CPT

## 2019-04-06 PROCEDURE — 80307 DRUG TEST PRSMV CHEM ANLYZR: CPT

## 2019-04-06 PROCEDURE — 84443 ASSAY THYROID STIM HORMONE: CPT

## 2019-04-06 PROCEDURE — 302970 POC BREATHALIZER: Performed by: EMERGENCY MEDICINE
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
## 2019-04-06 NOTE — ED NOTES
"Pt brought in by EMS, per EMS pt c/o visual hallucinations and SI x 2 months. Pt states she has been compliant with medications for schizophrenia but states \"I keep asking my psychiatrist to change them because they do not work.\"     Pt states visual hallucinations are \"demons talking to me, getting into my face.\" Denies command visual hallucinations but states after she done seeing them she feels SI, denies specific plan. Pt scores high risk on CCSR.     Pt cooperative.   "

## 2019-04-07 NOTE — ED NOTES
Pt refused to sign d/c paperwork or have d/c VS. Pt escorted out of ED by security. Took all belongings.

## 2019-04-07 NOTE — ED NOTES
"Pt yelling at sitter who is watching room 20. Pt stating \"are you a zombie?\" Continues to yell at her. Pt told to stop yelling and talking to sitter by this RN, pt states \"What are you going to do?\" Security called.   "

## 2019-04-07 NOTE — ED NOTES
"Pt up to nurses station, states \"can I get some worm poison?\" pt than states \"I need something to de-worm.\"   "
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
## 2019-04-22 ENCOUNTER — HOSPITAL ENCOUNTER (INPATIENT)
Dept: HOSPITAL 8 - ED | Age: 34
LOS: 7 days | Discharge: PSYCHIATRIC OR SUBSTANCE USE FACILITY | DRG: 918 | End: 2019-04-29
Attending: HOSPITALIST | Admitting: HOSPITALIST
Payer: MEDICAID

## 2019-04-22 VITALS — HEIGHT: 66 IN | BODY MASS INDEX: 25.51 KG/M2 | WEIGHT: 158.73 LBS

## 2019-04-22 DIAGNOSIS — E05.90: ICD-10-CM

## 2019-04-22 DIAGNOSIS — F25.9: ICD-10-CM

## 2019-04-22 DIAGNOSIS — F31.9: ICD-10-CM
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
## 2019-04-22 DIAGNOSIS — Z63.8: ICD-10-CM

## 2019-04-22 DIAGNOSIS — T56.892A: Primary | ICD-10-CM

## 2019-04-22 LAB
ALBUMIN SERPL-MCNC: 3.9 G/DL (ref 3.4–5)
ANION GAP SERPL CALC-SCNC: 7 MMOL/L (ref 5–15)
APAP SERPL-MCNC: < 2 MCG/ML (ref 10–30)
BASOPHILS # BLD AUTO: 0.02 X10^3/UL (ref 0–0.1)
BASOPHILS NFR BLD AUTO: 0 % (ref 0–1)
CALCIUM SERPL-MCNC: 9.6 MG/DL (ref 8.5–10.1)
CHLORIDE SERPL-SCNC: 109 MMOL/L (ref 98–107)
CREAT SERPL-MCNC: 0.61 MG/DL (ref 0.55–1.02)
EOSINOPHIL # BLD AUTO: 0.27 X10^3/UL (ref 0–0.4)
EOSINOPHIL NFR BLD AUTO: 2 % (ref 1–7)
ERYTHROCYTE [DISTWIDTH] IN BLOOD BY AUTOMATED COUNT: 12.5 % (ref 9.6–15.2)
LYMPHOCYTES # BLD AUTO: 3.92 X10^3/UL (ref 1–3.4)
LYMPHOCYTES NFR BLD AUTO: 28 % (ref 22–44)
MCH RBC QN AUTO: 29.4 PG (ref 27–34.8)
MCHC RBC AUTO-ENTMCNC: 34 G/DL (ref 32.4–35.8)
MCV RBC AUTO: 86.4 FL (ref 80–100)
MD: NO
MONOCYTES # BLD AUTO: 0.95 X10^3/UL (ref 0.2–0.8)
MONOCYTES NFR BLD AUTO: 7 % (ref 2–9)
NEUTROPHILS # BLD AUTO: 9.1 X10^3/UL (ref 1.8–6.8)
NEUTROPHILS NFR BLD AUTO: 64 % (ref 42–75)
PLATELET # BLD AUTO: 450 X10^3/UL (ref 130–400)
PMV BLD AUTO: 7.8 FL (ref 7.4–10.4)
RBC # BLD AUTO: 4.33 X10^6/UL (ref 3.82–5.3)
VANCOMYCIN TROUGH SERPL-MCNC: 2.3 MG/DL (ref 2.8–20)

## 2019-04-22 PROCEDURE — 84439 ASSAY OF FREE THYROXINE: CPT

## 2019-04-22 PROCEDURE — 36415 COLL VENOUS BLD VENIPUNCTURE: CPT

## 2019-04-22 PROCEDURE — 80048 BASIC METABOLIC PNL TOTAL CA: CPT

## 2019-04-22 PROCEDURE — 84443 ASSAY THYROID STIM HORMONE: CPT

## 2019-04-22 PROCEDURE — 80329 ANALGESICS NON-OPIOID 1 OR 2: CPT

## 2019-04-22 PROCEDURE — 82040 ASSAY OF SERUM ALBUMIN: CPT

## 2019-04-22 PROCEDURE — 99285 EMERGENCY DEPT VISIT HI MDM: CPT

## 2019-04-22 PROCEDURE — 84703 CHORIONIC GONADOTROPIN ASSAY: CPT

## 2019-04-22 PROCEDURE — 80178 ASSAY OF LITHIUM: CPT

## 2019-04-22 PROCEDURE — 80164 ASSAY DIPROPYLACETIC ACD TOT: CPT

## 2019-04-22 PROCEDURE — 85025 COMPLETE CBC W/AUTO DIFF WBC: CPT

## 2019-04-22 PROCEDURE — 80307 DRUG TEST PRSMV CHEM ANLYZR: CPT

## 2019-04-22 PROCEDURE — G0480 DRUG TEST DEF 1-7 CLASSES: HCPCS

## 2019-04-22 SDOH — SOCIAL STABILITY - SOCIAL INSECURITY: OTHER SPECIFIED PROBLEMS RELATED TO PRIMARY SUPPORT GROUP: Z63.8

## 2019-04-23 VITALS — SYSTOLIC BLOOD PRESSURE: 110 MMHG | DIASTOLIC BLOOD PRESSURE: 67 MMHG

## 2019-04-23 VITALS — SYSTOLIC BLOOD PRESSURE: 109 MMHG | DIASTOLIC BLOOD PRESSURE: 70 MMHG

## 2019-04-23 VITALS — DIASTOLIC BLOOD PRESSURE: 76 MMHG | SYSTOLIC BLOOD PRESSURE: 121 MMHG

## 2019-04-23 LAB
T4 FREE SERPL-MCNC: 1.59 NG/DL (ref 0.76–1.46)
TSH SERPL-ACNC: < 0.005 MIU/L (ref 0.36–3.74)

## 2019-04-23 RX ADMIN — LOXAPINE SCH MG: 25 CAPSULE ORAL at 08:20

## 2019-04-23 RX ADMIN — OLANZAPINE PRN MG: 5 TABLET, FILM COATED ORAL at 20:26

## 2019-04-23 RX ADMIN — LITHIUM CARBONATE SCH MG: 300 TABLET, FILM COATED, EXTENDED RELEASE ORAL at 20:10

## 2019-04-23 RX ADMIN — BENZTROPINE MESYLATE SCH MG: 1 TABLET ORAL at 20:11

## 2019-04-23 RX ADMIN — METHIMAZOLE SCH MG: 5 TABLET ORAL at 08:33

## 2019-04-23 RX ADMIN — PROPRANOLOL HYDROCHLORIDE SCH MG: 10 TABLET ORAL at 12:10

## 2019-04-23 RX ADMIN — MELATONIN TAB 3 MG SCH MG: 3 TAB at 20:11

## 2019-04-23 RX ADMIN — BENZTROPINE MESYLATE SCH MG: 1 TABLET ORAL at 08:20

## 2019-04-23 RX ADMIN — TOPIRAMATE SCH MG: 25 TABLET, FILM COATED ORAL at 09:27

## 2019-04-23 RX ADMIN — PROPRANOLOL HYDROCHLORIDE SCH MG: 10 TABLET ORAL at 17:33

## 2019-04-23 RX ADMIN — NICOTINE SCH PATCH: 14 PATCH, EXTENDED RELEASE TRANSDERMAL at 20:10

## 2019-04-23 RX ADMIN — LOXAPINE SCH MG: 25 CAPSULE ORAL at 20:11

## 2019-04-23 RX ADMIN — PROPRANOLOL HYDROCHLORIDE SCH MG: 10 TABLET ORAL at 08:18

## 2019-04-23 RX ADMIN — TOPIRAMATE SCH MG: 25 TABLET, FILM COATED ORAL at 20:10

## 2019-04-23 RX ADMIN — OLANZAPINE PRN MG: 5 TABLET, FILM COATED ORAL at 15:10

## 2019-04-24 VITALS — SYSTOLIC BLOOD PRESSURE: 124 MMHG | DIASTOLIC BLOOD PRESSURE: 83 MMHG

## 2019-04-24 RX ADMIN — MELATONIN TAB 3 MG SCH MG: 3 TAB at 20:08

## 2019-04-24 RX ADMIN — OLANZAPINE PRN MG: 5 TABLET, FILM COATED ORAL at 18:52

## 2019-04-24 RX ADMIN — LITHIUM CARBONATE SCH MG: 300 TABLET, FILM COATED, EXTENDED RELEASE ORAL at 20:22

## 2019-04-24 RX ADMIN — TOPIRAMATE SCH MG: 25 TABLET, FILM COATED ORAL at 08:48

## 2019-04-24 RX ADMIN — TOPIRAMATE SCH MG: 25 TABLET, FILM COATED ORAL at 20:09

## 2019-04-24 RX ADMIN — LITHIUM CARBONATE SCH MG: 300 CAPSULE, GELATIN COATED ORAL at 08:47

## 2019-04-24 RX ADMIN — BENZTROPINE MESYLATE SCH MG: 1 TABLET ORAL at 20:09

## 2019-04-24 RX ADMIN — PROPRANOLOL HYDROCHLORIDE SCH MG: 10 TABLET ORAL at 18:22

## 2019-04-24 RX ADMIN — METHIMAZOLE SCH MG: 5 TABLET ORAL at 08:53

## 2019-04-24 RX ADMIN — OLANZAPINE PRN MG: 5 TABLET, FILM COATED ORAL at 08:59

## 2019-04-24 RX ADMIN — DIVALPROEX SODIUM SCH MG: 250 TABLET, DELAYED RELEASE ORAL at 20:09

## 2019-04-24 RX ADMIN — LOXAPINE SCH MG: 25 CAPSULE ORAL at 08:47

## 2019-04-24 RX ADMIN — PROPRANOLOL HYDROCHLORIDE SCH MG: 10 TABLET ORAL at 08:46

## 2019-04-24 RX ADMIN — BENZTROPINE MESYLATE SCH MG: 1 TABLET ORAL at 08:46

## 2019-04-24 RX ADMIN — PROPRANOLOL HYDROCHLORIDE SCH MG: 10 TABLET ORAL at 12:24

## 2019-04-24 RX ADMIN — LOXAPINE SCH MG: 25 CAPSULE ORAL at 20:23

## 2019-04-24 RX ADMIN — NICOTINE SCH PATCH: 14 PATCH, EXTENDED RELEASE TRANSDERMAL at 10:45

## 2019-04-25 VITALS — DIASTOLIC BLOOD PRESSURE: 71 MMHG | SYSTOLIC BLOOD PRESSURE: 103 MMHG

## 2019-04-25 RX ADMIN — LITHIUM CARBONATE SCH MG: 300 CAPSULE, GELATIN COATED ORAL at 09:06

## 2019-04-25 RX ADMIN — LOXAPINE SCH MG: 25 CAPSULE ORAL at 19:57

## 2019-04-25 RX ADMIN — LOXAPINE SCH MG: 25 CAPSULE ORAL at 08:51

## 2019-04-25 RX ADMIN — TOPIRAMATE SCH MG: 25 TABLET, FILM COATED ORAL at 08:51

## 2019-04-25 RX ADMIN — PROPRANOLOL HYDROCHLORIDE SCH MG: 10 TABLET ORAL at 08:51

## 2019-04-25 RX ADMIN — PROPRANOLOL HYDROCHLORIDE SCH MG: 10 TABLET ORAL at 16:56

## 2019-04-25 RX ADMIN — BENZTROPINE MESYLATE SCH MG: 1 TABLET ORAL at 19:58

## 2019-04-25 RX ADMIN — DIVALPROEX SODIUM SCH MG: 500 TABLET, DELAYED RELEASE ORAL at 08:55

## 2019-04-25 RX ADMIN — OLANZAPINE PRN MG: 5 TABLET, FILM COATED ORAL at 19:59

## 2019-04-25 RX ADMIN — TOPIRAMATE SCH MG: 25 TABLET, FILM COATED ORAL at 19:58

## 2019-04-25 RX ADMIN — NICOTINE SCH PATCH: 14 PATCH, EXTENDED RELEASE TRANSDERMAL at 12:08

## 2019-04-25 RX ADMIN — BENZTROPINE MESYLATE SCH MG: 1 TABLET ORAL at 08:55

## 2019-04-25 RX ADMIN — LOXAPINE SCH MG: 25 CAPSULE ORAL at 09:00

## 2019-04-25 RX ADMIN — DIVALPROEX SODIUM SCH MG: 250 TABLET, DELAYED RELEASE ORAL at 19:59

## 2019-04-25 RX ADMIN — LITHIUM CARBONATE SCH MG: 300 TABLET, FILM COATED, EXTENDED RELEASE ORAL at 19:58

## 2019-04-25 RX ADMIN — MELATONIN TAB 3 MG SCH MG: 3 TAB at 19:59

## 2019-04-25 RX ADMIN — METHIMAZOLE SCH MG: 5 TABLET ORAL at 09:00

## 2019-04-25 RX ADMIN — PROPRANOLOL HYDROCHLORIDE SCH MG: 10 TABLET ORAL at 11:47

## 2019-04-26 VITALS — SYSTOLIC BLOOD PRESSURE: 100 MMHG | DIASTOLIC BLOOD PRESSURE: 69 MMHG

## 2019-04-26 RX ADMIN — LOXAPINE SCH MG: 25 CAPSULE ORAL at 20:33

## 2019-04-26 RX ADMIN — NICOTINE SCH PATCH: 14 PATCH, EXTENDED RELEASE TRANSDERMAL at 12:54

## 2019-04-26 RX ADMIN — LITHIUM CARBONATE SCH MG: 300 CAPSULE, GELATIN COATED ORAL at 08:27

## 2019-04-26 RX ADMIN — OLANZAPINE PRN MG: 5 TABLET, FILM COATED ORAL at 20:32

## 2019-04-26 RX ADMIN — TOPIRAMATE SCH MG: 25 TABLET, FILM COATED ORAL at 20:31

## 2019-04-26 RX ADMIN — TOPIRAMATE SCH MG: 25 TABLET, FILM COATED ORAL at 08:24

## 2019-04-26 RX ADMIN — BENZTROPINE MESYLATE SCH MG: 1 TABLET ORAL at 20:32

## 2019-04-26 RX ADMIN — BENZTROPINE MESYLATE SCH MG: 1 TABLET ORAL at 08:26

## 2019-04-26 RX ADMIN — MELATONIN TAB 3 MG SCH MG: 3 TAB at 20:32

## 2019-04-26 RX ADMIN — LOXAPINE SCH MG: 25 CAPSULE ORAL at 08:27

## 2019-04-26 RX ADMIN — PROPRANOLOL HYDROCHLORIDE SCH MG: 10 TABLET ORAL at 08:26

## 2019-04-26 RX ADMIN — PROPRANOLOL HYDROCHLORIDE SCH MG: 10 TABLET ORAL at 17:47

## 2019-04-26 RX ADMIN — LITHIUM CARBONATE SCH MG: 300 TABLET, FILM COATED, EXTENDED RELEASE ORAL at 20:32

## 2019-04-26 RX ADMIN — DIVALPROEX SODIUM SCH MG: 250 TABLET, DELAYED RELEASE ORAL at 20:33

## 2019-04-26 RX ADMIN — METHIMAZOLE SCH MG: 5 TABLET ORAL at 08:24

## 2019-04-26 RX ADMIN — DIVALPROEX SODIUM SCH MG: 500 TABLET, DELAYED RELEASE ORAL at 08:24

## 2019-04-26 RX ADMIN — PROPRANOLOL HYDROCHLORIDE SCH MG: 10 TABLET ORAL at 12:50

## 2019-04-27 VITALS — SYSTOLIC BLOOD PRESSURE: 118 MMHG | DIASTOLIC BLOOD PRESSURE: 80 MMHG

## 2019-04-27 VITALS — DIASTOLIC BLOOD PRESSURE: 68 MMHG | SYSTOLIC BLOOD PRESSURE: 104 MMHG

## 2019-04-27 RX ADMIN — LITHIUM CARBONATE SCH MG: 300 TABLET, FILM COATED, EXTENDED RELEASE ORAL at 20:14

## 2019-04-27 RX ADMIN — PROPRANOLOL HYDROCHLORIDE SCH MG: 10 TABLET ORAL at 17:38

## 2019-04-27 RX ADMIN — OLANZAPINE PRN MG: 5 TABLET, FILM COATED ORAL at 17:42

## 2019-04-27 RX ADMIN — LOXAPINE SCH MG: 25 CAPSULE ORAL at 20:13

## 2019-04-27 RX ADMIN — LOXAPINE SCH MG: 25 CAPSULE ORAL at 08:55

## 2019-04-27 RX ADMIN — NICOTINE SCH PATCH: 14 PATCH, EXTENDED RELEASE TRANSDERMAL at 12:00

## 2019-04-27 RX ADMIN — PROPRANOLOL HYDROCHLORIDE SCH MG: 10 TABLET ORAL at 08:54

## 2019-04-27 RX ADMIN — LITHIUM CARBONATE SCH MG: 300 CAPSULE, GELATIN COATED ORAL at 08:53

## 2019-04-27 RX ADMIN — DIVALPROEX SODIUM SCH MG: 250 TABLET, DELAYED RELEASE ORAL at 20:14

## 2019-04-27 RX ADMIN — NICOTINE SCH PATCH: 14 PATCH, EXTENDED RELEASE TRANSDERMAL at 12:57

## 2019-04-27 RX ADMIN — OLANZAPINE PRN MG: 5 TABLET, FILM COATED ORAL at 18:38

## 2019-04-27 RX ADMIN — BENZTROPINE MESYLATE SCH MG: 1 TABLET ORAL at 08:54

## 2019-04-27 RX ADMIN — TOPIRAMATE SCH MG: 25 TABLET, FILM COATED ORAL at 08:54

## 2019-04-27 RX ADMIN — TOPIRAMATE SCH MG: 25 TABLET, FILM COATED ORAL at 20:14

## 2019-04-27 RX ADMIN — METHIMAZOLE SCH MG: 5 TABLET ORAL at 08:55

## 2019-04-27 RX ADMIN — BENZTROPINE MESYLATE SCH MG: 1 TABLET ORAL at 20:15

## 2019-04-27 RX ADMIN — MELATONIN TAB 3 MG SCH MG: 3 TAB at 20:14

## 2019-04-27 RX ADMIN — PROPRANOLOL HYDROCHLORIDE SCH MG: 10 TABLET ORAL at 12:56

## 2019-04-27 RX ADMIN — DIVALPROEX SODIUM SCH MG: 500 TABLET, DELAYED RELEASE ORAL at 08:54

## 2019-04-28 RX ADMIN — TOPIRAMATE SCH MG: 25 TABLET, FILM COATED ORAL at 20:22

## 2019-04-28 RX ADMIN — OLANZAPINE PRN MG: 5 TABLET, FILM COATED ORAL at 17:54

## 2019-04-28 RX ADMIN — TOPIRAMATE SCH MG: 25 TABLET, FILM COATED ORAL at 10:07

## 2019-04-28 RX ADMIN — LOXAPINE SCH MG: 25 CAPSULE ORAL at 10:04

## 2019-04-28 RX ADMIN — LITHIUM CARBONATE SCH MG: 300 TABLET, FILM COATED, EXTENDED RELEASE ORAL at 20:25

## 2019-04-28 RX ADMIN — PROPRANOLOL HYDROCHLORIDE SCH MG: 10 TABLET ORAL at 12:13

## 2019-04-28 RX ADMIN — DIVALPROEX SODIUM SCH MG: 250 TABLET, DELAYED RELEASE ORAL at 20:25

## 2019-04-28 RX ADMIN — PROPRANOLOL HYDROCHLORIDE SCH MG: 10 TABLET ORAL at 16:50

## 2019-04-28 RX ADMIN — DIVALPROEX SODIUM SCH MG: 500 TABLET, DELAYED RELEASE ORAL at 10:04

## 2019-04-28 RX ADMIN — PROPRANOLOL HYDROCHLORIDE SCH MG: 10 TABLET ORAL at 10:04

## 2019-04-28 RX ADMIN — BENZTROPINE MESYLATE SCH MG: 1 TABLET ORAL at 20:25

## 2019-04-28 RX ADMIN — MELATONIN TAB 3 MG SCH MG: 3 TAB at 20:25

## 2019-04-28 RX ADMIN — BENZTROPINE MESYLATE SCH MG: 1 TABLET ORAL at 10:04

## 2019-04-28 RX ADMIN — NICOTINE SCH PATCH: 14 PATCH, EXTENDED RELEASE TRANSDERMAL at 12:13

## 2019-04-28 RX ADMIN — LOXAPINE SCH MG: 25 CAPSULE ORAL at 20:22

## 2019-04-28 RX ADMIN — LITHIUM CARBONATE SCH MG: 300 CAPSULE, GELATIN COATED ORAL at 10:05

## 2019-04-29 VITALS — SYSTOLIC BLOOD PRESSURE: 118 MMHG | DIASTOLIC BLOOD PRESSURE: 80 MMHG

## 2019-04-29 VITALS — DIASTOLIC BLOOD PRESSURE: 76 MMHG | SYSTOLIC BLOOD PRESSURE: 106 MMHG

## 2019-04-29 RX ADMIN — LOXAPINE SCH MG: 25 CAPSULE ORAL at 10:39

## 2019-04-29 RX ADMIN — NICOTINE SCH PATCH: 14 PATCH, EXTENDED RELEASE TRANSDERMAL at 10:39

## 2019-04-29 RX ADMIN — PROPRANOLOL HYDROCHLORIDE SCH MG: 10 TABLET ORAL at 12:21

## 2019-04-29 RX ADMIN — PROPRANOLOL HYDROCHLORIDE SCH MG: 10 TABLET ORAL at 16:27

## 2019-04-29 RX ADMIN — DIVALPROEX SODIUM SCH MG: 500 TABLET, DELAYED RELEASE ORAL at 10:39

## 2019-04-29 RX ADMIN — PROPRANOLOL HYDROCHLORIDE SCH MG: 10 TABLET ORAL at 10:40

## 2019-04-29 RX ADMIN — METHIMAZOLE SCH MG: 5 TABLET ORAL at 16:27

## 2019-04-29 RX ADMIN — BENZTROPINE MESYLATE SCH MG: 1 TABLET ORAL at 10:39

## 2019-04-29 RX ADMIN — LITHIUM CARBONATE SCH MG: 300 CAPSULE, GELATIN COATED ORAL at 10:40

## 2019-04-29 RX ADMIN — METHIMAZOLE SCH MG: 5 TABLET ORAL at 10:39

## 2019-04-29 RX ADMIN — TOPIRAMATE SCH MG: 25 TABLET, FILM COATED ORAL at 10:47

## 2019-06-02 ENCOUNTER — HOSPITAL ENCOUNTER (EMERGENCY)
Dept: HOSPITAL 8 - ED | Age: 34
Discharge: LEFT BEFORE DISCHARGE | End: 2019-06-02
Payer: MEDICAID

## 2019-06-02 DIAGNOSIS — Z53.21: Primary | ICD-10-CM

## 2019-06-05 ENCOUNTER — HOSPITAL ENCOUNTER (INPATIENT)
Dept: HOSPITAL 8 - 3E | Age: 34
LOS: 3 days | Discharge: HOME | DRG: 885 | End: 2019-06-08
Attending: PSYCHIATRY & NEUROLOGY | Admitting: PSYCHIATRY & NEUROLOGY
Payer: MEDICAID

## 2019-06-05 VITALS — WEIGHT: 159.17 LBS | HEIGHT: 67 IN | BODY MASS INDEX: 24.98 KG/M2

## 2019-06-05 VITALS — DIASTOLIC BLOOD PRESSURE: 65 MMHG | SYSTOLIC BLOOD PRESSURE: 96 MMHG

## 2019-06-05 DIAGNOSIS — Z88.8: ICD-10-CM

## 2019-06-05 DIAGNOSIS — F32.9: ICD-10-CM

## 2019-06-05 DIAGNOSIS — Z91.19: ICD-10-CM

## 2019-06-05 DIAGNOSIS — D72.829: ICD-10-CM

## 2019-06-05 DIAGNOSIS — F25.0: Primary | ICD-10-CM

## 2019-06-05 DIAGNOSIS — R45.851: ICD-10-CM

## 2019-06-05 DIAGNOSIS — Z79.899: ICD-10-CM

## 2019-06-05 DIAGNOSIS — Z88.0: ICD-10-CM

## 2019-06-05 DIAGNOSIS — F17.200: ICD-10-CM

## 2019-06-05 PROCEDURE — 71045 X-RAY EXAM CHEST 1 VIEW: CPT

## 2019-06-06 VITALS — DIASTOLIC BLOOD PRESSURE: 71 MMHG | SYSTOLIC BLOOD PRESSURE: 106 MMHG

## 2019-06-06 VITALS — SYSTOLIC BLOOD PRESSURE: 114 MMHG | DIASTOLIC BLOOD PRESSURE: 76 MMHG

## 2019-06-06 RX ADMIN — OXCARBAZEPINE SCH MG: 150 TABLET, FILM COATED ORAL at 09:35

## 2019-06-06 RX ADMIN — NICOTINE SCH PATCH: 14 PATCH, EXTENDED RELEASE TRANSDERMAL at 09:33

## 2019-06-06 RX ADMIN — ARIPIPRAZOLE SCH MG: 10 TABLET ORAL at 09:36

## 2019-06-06 RX ADMIN — OXCARBAZEPINE SCH MG: 150 TABLET, FILM COATED ORAL at 21:55

## 2019-06-07 VITALS — DIASTOLIC BLOOD PRESSURE: 86 MMHG | SYSTOLIC BLOOD PRESSURE: 120 MMHG

## 2019-06-07 VITALS — DIASTOLIC BLOOD PRESSURE: 63 MMHG | SYSTOLIC BLOOD PRESSURE: 98 MMHG

## 2019-06-07 RX ADMIN — ARIPIPRAZOLE SCH MG: 10 TABLET ORAL at 08:57

## 2019-06-07 RX ADMIN — OXCARBAZEPINE SCH MG: 150 TABLET, FILM COATED ORAL at 08:57

## 2019-06-07 RX ADMIN — OXCARBAZEPINE SCH MG: 150 TABLET, FILM COATED ORAL at 20:21

## 2019-06-07 RX ADMIN — NICOTINE SCH PATCH: 14 PATCH, EXTENDED RELEASE TRANSDERMAL at 09:02

## 2019-06-08 VITALS — SYSTOLIC BLOOD PRESSURE: 159 MMHG | DIASTOLIC BLOOD PRESSURE: 105 MMHG

## 2019-06-08 RX ADMIN — OXCARBAZEPINE SCH MG: 150 TABLET, FILM COATED ORAL at 08:50

## 2019-06-08 RX ADMIN — NICOTINE SCH PATCH: 14 PATCH, EXTENDED RELEASE TRANSDERMAL at 08:50

## 2019-06-08 RX ADMIN — ARIPIPRAZOLE SCH MG: 10 TABLET ORAL at 08:50

## 2019-06-30 ENCOUNTER — HOSPITAL ENCOUNTER (EMERGENCY)
Dept: HOSPITAL 8 - ED | Age: 34
Discharge: LEFT BEFORE DISCHARGE | End: 2019-06-30
Payer: MEDICAID

## 2019-06-30 VITALS — HEIGHT: 64 IN | BODY MASS INDEX: 29.81 KG/M2 | WEIGHT: 174.61 LBS

## 2019-06-30 VITALS — SYSTOLIC BLOOD PRESSURE: 125 MMHG | DIASTOLIC BLOOD PRESSURE: 86 MMHG

## 2019-06-30 DIAGNOSIS — Z53.21: Primary | ICD-10-CM

## 2019-06-30 LAB — HCG UR SG: 1.01 (ref 1–1.03)

## 2019-06-30 PROCEDURE — 80307 DRUG TEST PRSMV CHEM ANLYZR: CPT

## 2019-06-30 PROCEDURE — 81025 URINE PREGNANCY TEST: CPT
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
## 2019-07-09 ENCOUNTER — HOSPITAL ENCOUNTER (EMERGENCY)
Facility: MEDICAL CENTER | Age: 34
End: 2019-07-18
Attending: EMERGENCY MEDICINE
Payer: MEDICAID

## 2019-07-09 DIAGNOSIS — F20.9 SCHIZOPHRENIA, UNSPECIFIED TYPE (HCC): ICD-10-CM

## 2019-07-09 DIAGNOSIS — F29 PSYCHOSIS, UNSPECIFIED PSYCHOSIS TYPE (HCC): ICD-10-CM

## 2019-07-09 LAB
AMPHET UR QL SCN: NEGATIVE
BARBITURATES UR QL SCN: NEGATIVE
BENZODIAZ UR QL SCN: NEGATIVE
BZE UR QL SCN: NEGATIVE
CANNABINOIDS UR QL SCN: NEGATIVE
HCG UR QL: NEGATIVE
METHADONE UR QL SCN: NEGATIVE
OPIATES UR QL SCN: NEGATIVE
OXYCODONE UR QL SCN: NEGATIVE
PCP UR QL SCN: NEGATIVE
POC BREATHALIZER: 0 PERCENT (ref 0–0.01)
PROPOXYPH UR QL SCN: NEGATIVE
SP GR UR REFRACTOMETRY: 1.01

## 2019-07-09 PROCEDURE — 90791 PSYCH DIAGNOSTIC EVALUATION: CPT

## 2019-07-09 PROCEDURE — 700111 HCHG RX REV CODE 636 W/ 250 OVERRIDE (IP): Performed by: EMERGENCY MEDICINE

## 2019-07-09 PROCEDURE — 99285 EMERGENCY DEPT VISIT HI MDM: CPT

## 2019-07-09 PROCEDURE — 96372 THER/PROPH/DIAG INJ SC/IM: CPT

## 2019-07-09 PROCEDURE — 81025 URINE PREGNANCY TEST: CPT

## 2019-07-09 PROCEDURE — 302970 POC BREATHALIZER: Performed by: EMERGENCY MEDICINE

## 2019-07-09 PROCEDURE — 80307 DRUG TEST PRSMV CHEM ANLYZR: CPT

## 2019-07-09 RX ORDER — ARIPIPRAZOLE 20 MG/1
20 TABLET ORAL DAILY
COMMUNITY
End: 2019-07-18

## 2019-07-09 RX ORDER — BENZTROPINE MESYLATE 0.5 MG/1
0.5 TABLET ORAL 2 TIMES DAILY
COMMUNITY
End: 2021-01-20

## 2019-07-09 RX ORDER — LITHIUM CARBONATE 300 MG/1
900 TABLET, FILM COATED, EXTENDED RELEASE ORAL
COMMUNITY
End: 2021-01-20

## 2019-07-09 RX ORDER — MIRTAZAPINE 15 MG/1
15 TABLET, FILM COATED ORAL
COMMUNITY
End: 2019-07-18

## 2019-07-09 RX ORDER — LORAZEPAM 2 MG/ML
2 INJECTION INTRAMUSCULAR ONCE
Status: COMPLETED | OUTPATIENT
Start: 2019-07-09 | End: 2019-07-09

## 2019-07-09 RX ORDER — HALOPERIDOL 5 MG/ML
5 INJECTION INTRAMUSCULAR ONCE
Status: COMPLETED | OUTPATIENT
Start: 2019-07-09 | End: 2019-07-09

## 2019-07-09 RX ORDER — PROPRANOLOL HYDROCHLORIDE 10 MG/1
10 TABLET ORAL 3 TIMES DAILY
COMMUNITY
End: 2020-12-09

## 2019-07-09 RX ORDER — TOPIRAMATE 100 MG/1
100 TABLET, FILM COATED ORAL 2 TIMES DAILY
COMMUNITY
End: 2020-12-09

## 2019-07-09 RX ORDER — ZIPRASIDONE MESYLATE 20 MG/ML
20 INJECTION, POWDER, LYOPHILIZED, FOR SOLUTION INTRAMUSCULAR ONCE
Status: COMPLETED | OUTPATIENT
Start: 2019-07-09 | End: 2019-07-09

## 2019-07-09 RX ORDER — DIPHENHYDRAMINE HYDROCHLORIDE 50 MG/ML
50 INJECTION INTRAMUSCULAR; INTRAVENOUS ONCE
Status: DISCONTINUED | OUTPATIENT
Start: 2019-07-09 | End: 2019-07-09

## 2019-07-09 RX ORDER — DIPHENHYDRAMINE HYDROCHLORIDE 50 MG/ML
50 INJECTION INTRAMUSCULAR; INTRAVENOUS ONCE
Status: COMPLETED | OUTPATIENT
Start: 2019-07-09 | End: 2019-07-09

## 2019-07-09 RX ORDER — DIVALPROEX SODIUM 500 MG/1
1000 TABLET, EXTENDED RELEASE ORAL 2 TIMES DAILY
COMMUNITY
End: 2019-07-18

## 2019-07-09 RX ADMIN — ZIPRASIDONE MESYLATE 20 MG: 20 INJECTION, POWDER, LYOPHILIZED, FOR SOLUTION INTRAMUSCULAR at 18:18

## 2019-07-09 RX ADMIN — LORAZEPAM 2 MG: 2 INJECTION INTRAMUSCULAR; INTRAVENOUS at 13:45

## 2019-07-09 RX ADMIN — HALOPERIDOL LACTATE 5 MG: 5 INJECTION, SOLUTION INTRAMUSCULAR at 13:44

## 2019-07-09 RX ADMIN — DIPHENHYDRAMINE HYDROCHLORIDE 50 MG: 50 INJECTION INTRAMUSCULAR; INTRAVENOUS at 13:46
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
## 2019-07-09 NOTE — DISCHARGE PLANNING
Alert Team  Noted consult order.  Pt not currently ready for evaluation.    Pt will need an alcohol level, either breathalizer or BAL.     She will need to be medically cleared per the ERP.     Bedside RN to call AT once pt ready for consult.

## 2019-07-09 NOTE — ED NOTES
Pt is slamming door and running to the restroom, with out telling RN. PT notified that she is not to use the restroom without an escort.
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
## 2019-07-09 NOTE — ED NOTES
Med Rec complete per [facility]  Allergies Reviewed  No ABX filled in the last 14 days    Pt unwilling to participate interview    Medications on med rec prescribed and filled 5/21/2019  Pt will not say if she uses another pharmacy or when she last took medications.
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
## 2019-07-09 NOTE — ED NOTES
For staff safety, all unnecessary medical equipment removed from room.     Belongings checked by security and placed into locker 5, pt is in hospital gown.    Denied SI/HI

## 2019-07-09 NOTE — ED NOTES
Charge notified for need of sitter, as pt will run to the restroom next to her room without notifying staff and locking the door. Pt as been notified several times that she is to tell the nurse that she is to use the restroom and wait until she is escorted to the restroom.

## 2019-07-10 LAB
ALBUMIN SERPL BCP-MCNC: 3.9 G/DL (ref 3.2–4.9)
ALBUMIN/GLOB SERPL: 1.5 G/DL
ALP SERPL-CCNC: 82 U/L (ref 30–99)
ALT SERPL-CCNC: 10 U/L (ref 2–50)
ANION GAP SERPL CALC-SCNC: 9 MMOL/L (ref 0–11.9)
AST SERPL-CCNC: 14 U/L (ref 12–45)
BASOPHILS # BLD AUTO: 0.4 % (ref 0–1.8)
BASOPHILS # BLD: 0.06 K/UL (ref 0–0.12)
BILIRUB SERPL-MCNC: 0.3 MG/DL (ref 0.1–1.5)
BUN SERPL-MCNC: 20 MG/DL (ref 8–22)
CALCIUM SERPL-MCNC: 9.2 MG/DL (ref 8.5–10.5)
CHLORIDE SERPL-SCNC: 109 MMOL/L (ref 96–112)
CO2 SERPL-SCNC: 21 MMOL/L (ref 20–33)
CREAT SERPL-MCNC: 0.9 MG/DL (ref 0.5–1.4)
EOSINOPHIL # BLD AUTO: 0.28 K/UL (ref 0–0.51)
EOSINOPHIL NFR BLD: 2 % (ref 0–6.9)
ERYTHROCYTE [DISTWIDTH] IN BLOOD BY AUTOMATED COUNT: 47.5 FL (ref 35.9–50)
GLOBULIN SER CALC-MCNC: 2.6 G/DL (ref 1.9–3.5)
GLUCOSE SERPL-MCNC: 99 MG/DL (ref 65–99)
HCT VFR BLD AUTO: 38.5 % (ref 37–47)
HGB BLD-MCNC: 12.2 G/DL (ref 12–16)
IMM GRANULOCYTES # BLD AUTO: 0.05 K/UL (ref 0–0.11)
IMM GRANULOCYTES NFR BLD AUTO: 0.4 % (ref 0–0.9)
LYMPHOCYTES # BLD AUTO: 3.74 K/UL (ref 1–4.8)
LYMPHOCYTES NFR BLD: 26.3 % (ref 22–41)
MCH RBC QN AUTO: 29.3 PG (ref 27–33)
MCHC RBC AUTO-ENTMCNC: 31.7 G/DL (ref 33.6–35)
MCV RBC AUTO: 92.5 FL (ref 81.4–97.8)
MONOCYTES # BLD AUTO: 0.89 K/UL (ref 0–0.85)
MONOCYTES NFR BLD AUTO: 6.3 % (ref 0–13.4)
NEUTROPHILS # BLD AUTO: 9.22 K/UL (ref 2–7.15)
NEUTROPHILS NFR BLD: 64.6 % (ref 44–72)
NRBC # BLD AUTO: 0 K/UL
NRBC BLD-RTO: 0 /100 WBC
PLATELET # BLD AUTO: 319 K/UL (ref 164–446)
PMV BLD AUTO: 9.8 FL (ref 9–12.9)
POTASSIUM SERPL-SCNC: 3.7 MMOL/L (ref 3.6–5.5)
PROT SERPL-MCNC: 6.5 G/DL (ref 6–8.2)
RBC # BLD AUTO: 4.16 M/UL (ref 4.2–5.4)
SODIUM SERPL-SCNC: 139 MMOL/L (ref 135–145)
T4 FREE SERPL-MCNC: 0.53 NG/DL (ref 0.53–1.43)
TSH SERPL DL<=0.005 MIU/L-ACNC: 13.17 UIU/ML (ref 0.38–5.33)
VIT B12 SERPL-MCNC: 514 PG/ML (ref 211–911)
WBC # BLD AUTO: 14.2 K/UL (ref 4.8–10.8)

## 2019-07-10 PROCEDURE — 90792 PSYCH DIAG EVAL W/MED SRVCS: CPT | Performed by: PSYCHIATRY & NEUROLOGY

## 2019-07-10 PROCEDURE — 700111 HCHG RX REV CODE 636 W/ 250 OVERRIDE (IP): Performed by: EMERGENCY MEDICINE

## 2019-07-10 PROCEDURE — 700111 HCHG RX REV CODE 636 W/ 250 OVERRIDE (IP)

## 2019-07-10 PROCEDURE — 96372 THER/PROPH/DIAG INJ SC/IM: CPT | Mod: XU

## 2019-07-10 PROCEDURE — 700111 HCHG RX REV CODE 636 W/ 250 OVERRIDE (IP): Performed by: PSYCHIATRY & NEUROLOGY

## 2019-07-10 PROCEDURE — 96374 THER/PROPH/DIAG INJ IV PUSH: CPT

## 2019-07-10 PROCEDURE — 84439 ASSAY OF FREE THYROXINE: CPT

## 2019-07-10 PROCEDURE — 96375 TX/PRO/DX INJ NEW DRUG ADDON: CPT

## 2019-07-10 PROCEDURE — 93005 ELECTROCARDIOGRAM TRACING: CPT | Performed by: PSYCHIATRY & NEUROLOGY

## 2019-07-10 PROCEDURE — 36415 COLL VENOUS BLD VENIPUNCTURE: CPT

## 2019-07-10 PROCEDURE — 82607 VITAMIN B-12: CPT

## 2019-07-10 PROCEDURE — 84443 ASSAY THYROID STIM HORMONE: CPT

## 2019-07-10 PROCEDURE — A9270 NON-COVERED ITEM OR SERVICE: HCPCS | Performed by: PSYCHIATRY & NEUROLOGY

## 2019-07-10 PROCEDURE — 700102 HCHG RX REV CODE 250 W/ 637 OVERRIDE(OP): Performed by: PSYCHIATRY & NEUROLOGY

## 2019-07-10 PROCEDURE — 85025 COMPLETE CBC W/AUTO DIFF WBC: CPT

## 2019-07-10 PROCEDURE — 80053 COMPREHEN METABOLIC PANEL: CPT

## 2019-07-10 RX ORDER — HALOPERIDOL 5 MG/ML
5 INJECTION INTRAMUSCULAR ONCE
Status: COMPLETED | OUTPATIENT
Start: 2019-07-10 | End: 2019-07-10

## 2019-07-10 RX ORDER — HALOPERIDOL 5 MG/ML
5 INJECTION INTRAMUSCULAR EVERY 6 HOURS PRN
Status: DISCONTINUED | OUTPATIENT
Start: 2019-07-10 | End: 2019-07-18 | Stop reason: HOSPADM

## 2019-07-10 RX ORDER — LORAZEPAM 2 MG/ML
2 INJECTION INTRAMUSCULAR EVERY 4 HOURS PRN
Status: DISCONTINUED | OUTPATIENT
Start: 2019-07-10 | End: 2019-07-18 | Stop reason: HOSPADM

## 2019-07-10 RX ORDER — DIPHENHYDRAMINE HYDROCHLORIDE 50 MG/ML
50 INJECTION INTRAMUSCULAR; INTRAVENOUS ONCE
Status: COMPLETED | OUTPATIENT
Start: 2019-07-10 | End: 2019-07-10

## 2019-07-10 RX ORDER — LORAZEPAM 2 MG/ML
2 INJECTION INTRAMUSCULAR ONCE
Status: COMPLETED | OUTPATIENT
Start: 2019-07-10 | End: 2019-07-10

## 2019-07-10 RX ORDER — HALOPERIDOL 5 MG/1
5 TABLET ORAL EVERY 6 HOURS PRN
Status: DISCONTINUED | OUTPATIENT
Start: 2019-07-10 | End: 2019-07-18 | Stop reason: HOSPADM

## 2019-07-10 RX ORDER — LORAZEPAM 2 MG/1
2 TABLET ORAL EVERY 4 HOURS PRN
Status: DISCONTINUED | OUTPATIENT
Start: 2019-07-10 | End: 2019-07-18 | Stop reason: HOSPADM

## 2019-07-10 RX ORDER — MIRTAZAPINE 15 MG/1
15 TABLET, FILM COATED ORAL
Status: DISCONTINUED | OUTPATIENT
Start: 2019-07-10 | End: 2019-07-18 | Stop reason: HOSPADM

## 2019-07-10 RX ORDER — ARIPIPRAZOLE 10 MG/1
20 TABLET ORAL DAILY
Status: DISCONTINUED | OUTPATIENT
Start: 2019-07-10 | End: 2019-07-12

## 2019-07-10 RX ORDER — DIVALPROEX SODIUM 500 MG/1
500 TABLET, DELAYED RELEASE ORAL EVERY 12 HOURS
Status: DISCONTINUED | OUTPATIENT
Start: 2019-07-10 | End: 2019-07-16

## 2019-07-10 RX ADMIN — HALOPERIDOL 5 MG: 5 TABLET ORAL at 14:18

## 2019-07-10 RX ADMIN — LORAZEPAM 2 MG: 2 INJECTION INTRAMUSCULAR; INTRAVENOUS at 23:21

## 2019-07-10 RX ADMIN — LORAZEPAM 2 MG: 2 INJECTION INTRAMUSCULAR; INTRAVENOUS at 15:17

## 2019-07-10 RX ADMIN — LORAZEPAM 2 MG: 2 INJECTION INTRAMUSCULAR; INTRAVENOUS at 07:33

## 2019-07-10 RX ADMIN — DIVALPROEX SODIUM 500 MG: 500 TABLET, DELAYED RELEASE ORAL at 18:00

## 2019-07-10 RX ADMIN — HALOPERIDOL LACTATE 5 MG: 5 INJECTION, SOLUTION INTRAMUSCULAR at 07:33

## 2019-07-10 RX ADMIN — LORAZEPAM 2 MG: 2 TABLET ORAL at 14:18

## 2019-07-10 RX ADMIN — DIPHENHYDRAMINE HYDROCHLORIDE 50 MG: 50 INJECTION INTRAMUSCULAR; INTRAVENOUS at 07:34

## 2019-07-10 RX ADMIN — DIPHENHYDRAMINE HYDROCHLORIDE 50 MG: 50 INJECTION INTRAMUSCULAR; INTRAVENOUS at 15:17

## 2019-07-10 RX ADMIN — ARIPIPRAZOLE 20 MG: 10 TABLET ORAL at 14:18

## 2019-07-10 RX ADMIN — MIRTAZAPINE 15 MG: 15 TABLET, FILM COATED ORAL at 21:52

## 2019-07-10 NOTE — PSYCHIATRY
"BRIEF PSYCHIATRIC CONSULT NOTE: patient seen, full note to follow.    Pt has disorganized TP, with paranoid delusions. Her mood is labile, and has been aggressive/agitated, threatening to staff. Pt became very loud during evaluation, and refused to further participate in eval, screaming \"get out!\".    Schizophrenia by hx  Amphetamine use dso, remission unspecified (UDS neg)  Opioid use dso, remission unspecified (UDS neg)    -Legal Hold:extended  -Sitter:yes, recommned  due to aggressive behavior towards staff   -Restrictions:   -phone:no   -visitors:no   -personal items: no   -finger foods required: yes   -personal/undergarments clothes:no     -Orders Placed:restart Abilify 20mg PO daily for psychosis, Depakote 500mg PO BID for mood (pregnancy test negative), and remeron 15mg Po QHS for mood/insoamina(home med)  -Assessment: Schizophrenia by hx, currently grossly psychotic, unable to care for herself, danger to self and others.   -Plan:continue to follow            "

## 2019-07-10 NOTE — DISCHARGE PLANNING
Alert Team  Pt noted to be irritable when asked by bedside RN about her apparent urinary frequency, (pt had been frequently to this bathroom this morning.)  Pt seemed to not recall that she had just been to the bathroom 5 minutes prior to asking again.  Pt noted to swear at bedside RN and become even more irritable when told the door needed to stay open a crack for safety.    Pt waiting for a bed at [facility].  There are approx 5 other [facility] patients ahead of her on our waiting list.  Unknown what spot on the master list at [facility].

## 2019-07-10 NOTE — ED PROVIDER NOTES
ED Provider Note    Patient denies any medical complaints.  Her vital signs are stable.  After being told that I was not releasing her she became very agitated and started yelling at me.  Medication orders have already been placed by the psychiatry team who is seen the patient.  We will continue with plan    308pm addendum  Patient began to escalate yelling with erratic behavior.  She required additional sedation.  I ordered Ativan and Benadryl.  Patient received oral Haldol shortly before escalation.
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
## 2019-07-10 NOTE — ED NOTES
Pt resting comfortably in bed. Breathing is even and unlabored. No s/s of distress noted. Sitter at bedside.

## 2019-07-10 NOTE — ED NOTES
Received report from Dayne DAN. Pt care responsibilities assumed. Pt resting in bed. Sitter at bedside.

## 2019-07-10 NOTE — ED NOTES
Security called to bedside for med assist. 3 security officers at bedside. Pt medicated per ERP order for aggressive behavior.

## 2019-07-10 NOTE — ED NOTES
Received report and assumed care. Pt agitated and threatening to staff. Continuously comes in and out of room screaming.

## 2019-07-10 NOTE — ED NOTES
Continues to come out of room and yell at staff and scream in her room. Limits set and asked to stay in room with door shut for safety. Currently in room however continue to scream.

## 2019-07-10 NOTE — ED NOTES
"Ambu;ated pt to bathroom and back to room. Pt calling this RN a \"Fucking cunt\" and continuously shutting bathroom door despite instructions to leave door cracked per policy.   "

## 2019-07-10 NOTE — ED NOTES
Pt verbally aggressive towards staff, Pt jumped out of bed aggressively when staff tried to enter room to give her coffee that she requested. Pt making racial slurs towards sitters. ERP notified, orders received.

## 2019-07-11 LAB — EKG IMPRESSION: NORMAL

## 2019-07-11 PROCEDURE — A9270 NON-COVERED ITEM OR SERVICE: HCPCS | Performed by: PSYCHIATRY & NEUROLOGY

## 2019-07-11 PROCEDURE — 96372 THER/PROPH/DIAG INJ SC/IM: CPT

## 2019-07-11 PROCEDURE — 99282 EMERGENCY DEPT VISIT SF MDM: CPT | Mod: GC | Performed by: PSYCHIATRY & NEUROLOGY

## 2019-07-11 PROCEDURE — 700111 HCHG RX REV CODE 636 W/ 250 OVERRIDE (IP): Performed by: PSYCHIATRY & NEUROLOGY

## 2019-07-11 PROCEDURE — 700102 HCHG RX REV CODE 250 W/ 637 OVERRIDE(OP): Performed by: PSYCHIATRY & NEUROLOGY

## 2019-07-11 RX ORDER — LEVOTHYROXINE SODIUM 0.1 MG/1
100 TABLET ORAL
Status: DISCONTINUED | OUTPATIENT
Start: 2019-07-11 | End: 2019-07-18 | Stop reason: HOSPADM

## 2019-07-11 RX ADMIN — DIVALPROEX SODIUM 500 MG: 500 TABLET, DELAYED RELEASE ORAL at 17:21

## 2019-07-11 RX ADMIN — DIVALPROEX SODIUM 500 MG: 500 TABLET, DELAYED RELEASE ORAL at 09:27

## 2019-07-11 RX ADMIN — HALOPERIDOL LACTATE 5 MG: 5 INJECTION, SOLUTION INTRAMUSCULAR at 12:47

## 2019-07-11 RX ADMIN — LORAZEPAM 2 MG: 2 TABLET ORAL at 17:21

## 2019-07-11 RX ADMIN — ARIPIPRAZOLE 20 MG: 10 TABLET ORAL at 06:00

## 2019-07-11 RX ADMIN — LORAZEPAM 2 MG: 2 INJECTION INTRAMUSCULAR; INTRAVENOUS at 12:47

## 2019-07-11 ASSESSMENT — ENCOUNTER SYMPTOMS
FEVER: 0
BLURRED VISION: 0
CONSTIPATION: 0
SHORTNESS OF BREATH: 0
CHILLS: 0
ABDOMINAL PAIN: 0
HEARTBURN: 0
DIZZINESS: 0
HALLUCINATIONS: 1
COUGH: 0
SORE THROAT: 0

## 2019-07-11 NOTE — ED NOTES
Pt appears to be resting quietly on gurney, occasionally repositions self . Even, easy respirations noted. Does not appear to be distressed, will continue to monitor. Sitter in direct observation.
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
## 2019-07-11 NOTE — ED NOTES
Pt laying on gurney quietly, sitter outside door in direct observation. Will continue to monitor.

## 2019-07-11 NOTE — ED NOTES
Pt outside room yelling for additional food and coffee, stating she wants to be discharged. Security escorted Pt back to room. This RN explained to Pt no more additional food will be provided until breakfast, no additional coffee but water okay. Pt continues to yell that he hasn't had any coffee or food. Security at door.

## 2019-07-11 NOTE — ED NOTES
Pt continues to reposition herself occasionally, NAD noted. Resting quietly on gurney, will continue to monitor.

## 2019-07-11 NOTE — ED NOTES
Given orange juice and assisted with going to the bathroom and back to room. Sitter remains outside of room watching pt at all times.

## 2019-07-11 NOTE — ED NOTES
"Pt up to bathroom, pt cursing at staff states \"you are fucking plastic get the fuck out of my way\". Pt slammed bathroom door, opened door on pt, pt states \"you fucking ugly bitch\" pt now to room.   "

## 2019-07-11 NOTE — ED NOTES
Pt appears to be resting quietly. Even, easy respirations noted. Does not appear to be distressed, will continue to monitor. Sitter outside Pt door in direct observation.

## 2019-07-11 NOTE — ED NOTES
"Medicated Pt per MAR. Pt reusing EKG, states \"the last time you did that I was diagnosed with cancer and you couldn't cure it.\" Tried to explain to Pt it was not that kind if test, Pt continued to yell no and I refuse.   "

## 2019-07-11 NOTE — ED PROVIDER NOTES
ED Provider Note Addendum    Scribed for Christopher Stockton M.D. by Pk Estes on 7/11/2019 at 1:08 PM.     This is an addendum to the note on Tierney Mayer.  For further details and full chart information, see patient's initial note.       6:08 AM - I discussed the patient's case with Dr. Garcia ([facility]) who will transfer care of the patient to me at this time.        1:08 PM - The patient has done well during my period of observation. They are resting comfortably. They continue to await transfer to an inpatient psychiatric facility.     Disposition:  Patient will be transferred to an appropriate psychiatric facility in stable condition for further psychiatric care and evaluation.  Patient will be placed under the care of my partner awaiting transfer.    FINAL IMPRESSION  1. Schizophrenia, unspecified type (HCC)    2. Psychosis, unspecified psychosis type (HCC)         IPk (Scribe), am scribing for, and in the presence of, Christopher Stockton M.D..    Electronically signed by: Pk Estes (Scribe), 7/11/2019    IChristopher M.D. personally performed the services described in this documentation, as scribed by Pk Estes in my presence, and it is both accurate and complete.    The note accurately reflects work and decisions made by me.  Christopher Stockton  7/11/2019  1:48 PM

## 2019-07-11 NOTE — ED PROVIDER NOTES
11:12 PM  I was called by the RN who reports that the patient is once again agitated, coming out of her room, demanding meals and something to drink every hour.  RN requested to give the patient something for her agitation.  Patient has received Haldol.  Patient has declined/refused EKG.  I recommended giving the patient Ativan.

## 2019-07-11 NOTE — ED NOTES
Pt repositions self occasionally, appears to be resting quietly. Even, easy respirations noted. Does not appear to be distressed, will continue to monitor.

## 2019-07-11 NOTE — ED NOTES
Patient's home medications have been reviewed by the pharmacy team.     Evaluated in the ER for acute psychosis and agitation.     No past medical history on file.    Patient's Medications   New Prescriptions    No medications on file   Previous Medications    ARIPIPRAZOLE (ABILIFY) 20 MG TABLET    Take 20 mg by mouth every day.    BENZTROPINE (COGENTIN) 0.5 MG TAB    Take 0.5 mg by mouth 2 times a day.    DIVALPROEX ER (DEPAKOTE ER) 500 MG TABLET SR 24 HR    Take 1,000 mg by mouth 2 Times a Day.    LITHIUM CR (LITHOBID) 300 MG TAB CR    Take 900 mg by mouth every bedtime.    MIRTAZAPINE (REMERON) 15 MG TAB    Take 15 mg by mouth every bedtime.    PROPRANOLOL (INDERAL) 10 MG TAB    Take 10 mg by mouth 3 times a day.    TOPIRAMATE (TOPAMAX) 100 MG TAB    Take 100 mg by mouth 2 times a day.   Modified Medications    No medications on file   Discontinued Medications    No medications on file        7/10/2019 16:38   TSH 13.170 (H)       A:  Medications do not appear to be contributing to current complaints. Patient is not treated for hypothyroidism.       Plan:  Patient has been evaluated by psychiatry and restarted on Abilify, Mirtazepine and Depakote. Will discuss thyroid supplementation with ERP.    Cari West
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
## 2019-07-11 NOTE — ED NOTES
"Pt standing at door demanding additional food. Explained to Pt that she was given dinner and a snack, may consider additional snacks later if remains calm and cooperative. Pt states \"I have not eaten in 3 days!\" Explained to Pt she was given dinner earlier tonight, Pt states \"okay you are old and sassy.\" Upon which she slammed her door.  "

## 2019-07-11 NOTE — ED NOTES
"Pt once again refusing EKG, pt requesting more water given multiple cups throughout the night and at breakfast. Pt states \"well im hallucinating water\".   "

## 2019-07-11 NOTE — ED NOTES
"Gave Pt cup of coffee with evening dose of medication. Pt has removed wrist band, unable to locate. New band printed. Pt states \"these meds make me so tired, just when I start to wake up I get more.\" Pt cooperative and took medication without difficulty.   "

## 2019-07-12 PROCEDURE — 700102 HCHG RX REV CODE 250 W/ 637 OVERRIDE(OP): Performed by: PSYCHIATRY & NEUROLOGY

## 2019-07-12 PROCEDURE — A9270 NON-COVERED ITEM OR SERVICE: HCPCS | Performed by: PSYCHIATRY & NEUROLOGY

## 2019-07-12 PROCEDURE — 700111 HCHG RX REV CODE 636 W/ 250 OVERRIDE (IP): Performed by: PSYCHIATRY & NEUROLOGY

## 2019-07-12 PROCEDURE — A9270 NON-COVERED ITEM OR SERVICE: HCPCS | Performed by: EMERGENCY MEDICINE

## 2019-07-12 PROCEDURE — 96372 THER/PROPH/DIAG INJ SC/IM: CPT

## 2019-07-12 PROCEDURE — 99283 EMERGENCY DEPT VISIT LOW MDM: CPT | Mod: GC | Performed by: PSYCHIATRY & NEUROLOGY

## 2019-07-12 PROCEDURE — 700102 HCHG RX REV CODE 250 W/ 637 OVERRIDE(OP): Performed by: EMERGENCY MEDICINE

## 2019-07-12 RX ORDER — ARIPIPRAZOLE 10 MG/1
25 TABLET ORAL DAILY
Status: DISCONTINUED | OUTPATIENT
Start: 2019-07-13 | End: 2019-07-18 | Stop reason: HOSPADM

## 2019-07-12 RX ADMIN — LEVOTHYROXINE SODIUM 100 MCG: 100 TABLET ORAL at 06:14

## 2019-07-12 RX ADMIN — LORAZEPAM 2 MG: 2 INJECTION INTRAMUSCULAR; INTRAVENOUS at 02:49

## 2019-07-12 RX ADMIN — DIVALPROEX SODIUM 500 MG: 500 TABLET, DELAYED RELEASE ORAL at 17:52

## 2019-07-12 RX ADMIN — LORAZEPAM 2 MG: 2 INJECTION INTRAMUSCULAR; INTRAVENOUS at 13:26

## 2019-07-12 RX ADMIN — ARIPIPRAZOLE 20 MG: 10 TABLET ORAL at 06:14

## 2019-07-12 RX ADMIN — DIVALPROEX SODIUM 500 MG: 500 TABLET, DELAYED RELEASE ORAL at 06:14

## 2019-07-12 RX ADMIN — HALOPERIDOL LACTATE 5 MG: 5 INJECTION, SOLUTION INTRAMUSCULAR at 13:26

## 2019-07-12 NOTE — DISCHARGE PLANNING
ALERT team  note:  34 year old female admitted 7/9/19, legal hold, inability to care for self; Medicaid FFS insurance plan; pt cont delusional, with impaired judgment and insight; cont impulsive, verbally agitated towards staff; received Ativan 2 mg IM this AM at 0249, Depakote  mg PO and Abilify 20 mg PO this AM at 0614; evaluated by [facility] ED psychiatry staff, legal hold cont; alteration in thoughts r/t psychosis, cont; [facility]

## 2019-07-12 NOTE — ED NOTES
Pt restless, continues to be verbally aggressive with staff. Medicated per MAR for aggression. Agreed to vital signs. VSS on room air. Provided pt juice. No other needs at this time.

## 2019-07-12 NOTE — ED NOTES
Pt sleeping on gurney. Observed even, unlabored chest rise and fall. 1: sitter within direct view of pt.

## 2019-07-12 NOTE — ED NOTES
Pt sleeping in supine position. Observed even unlabored rise and fall of chest. 1:1 sitter within direct view of pt.

## 2019-07-12 NOTE — ED NOTES
Pt out of room asking for electric razor. Pt educated on why electric razor is not available, no evidence of learning. Asked to go back to room, pt refusing. Asking why she has to go back to room and why she cannot be sent somewhere else such a intermediate. Pt educated on poc, again no evidence of learning.     Attempted to redirect pt back to room. Pt started to walk back to room while this RN escorted her back. At this time pt slammed door on RN while being verbally abusive and threatening.     Security called to bedside.
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
## 2019-07-12 NOTE — ED NOTES
"Pt requesting shower. Currently polite and cooperative. Spoke to security who states will try and find someone to escort pt to shower with ED tech.   Pt also requesting \"clean room\" and clean linens.     Psych currently at bedside. Will have room cleaned.   "

## 2019-07-12 NOTE — ED PROVIDER NOTES
ED Provider Note    I was asked by prior provider to follow-up with laboratory studies that were performed.  Patient does have a slightly elevated TSH however normal thyroxine level.  Consistent with subclinical hypothyroidism.  No indication for emergency interventions at this time.  Should follow-up with a primary care physician regarding her abnormal TSH findings.  In the meantime, will await transfer to inpatient psychiatric facility for further management.      1. Schizophrenia, unspecified type (HCC)    2. Psychosis, unspecified psychosis type (HCC)

## 2019-07-12 NOTE — ED NOTES
"Refusing medication, vital signs. Continues to yell at staff to \"get out\". Sitter within direct view of pt  "

## 2019-07-12 NOTE — ED NOTES
"Pt out of room stating \"my watch was left in the shower.\" pt did not have a watch on this AM. Told pt this and pt continued to argue with this RN that she had a watch on and wants it back. Pt redirected back to room.   "

## 2019-07-12 NOTE — ED NOTES
Pt given toothbrush/paste and comb. Pt to bathroom. While brushing teeth pt vomited in sink.     Toothbrush/paste and comb given back by pt, placed into bag at nurses station. Pt back to martina davila 1:1.

## 2019-07-12 NOTE — ED NOTES
Pt sleeping in right lateral side position. Observed even, unlabored rise and fall of chest. NAD. 1:1 sitter within direct view of pt.

## 2019-07-12 NOTE — ED NOTES
Pt arguing that she has not eaten any meals. Pt given box for 3rd meal of the day and multiple snacks.

## 2019-07-12 NOTE — ED NOTES
"Pt resting on gurney. Occasionally paces throughout room. Yelled at RN to \"leave me alone\". Refusing vital signs. 1:1 sitter within direct view of pt, situated in pt doorway  "

## 2019-07-12 NOTE — ED NOTES
"Pt refusing VS. Tells this RN, \"I just want to eat and I want to know where my watch is.\" Again pt educated that she was not wearing a watch this AM. Pt continues to argue with this RN about watch. Pt than states \"ok just get out.\"     Pt updated that lunch should be here in approx. 30 minutes to one hour.   "

## 2019-07-12 NOTE — ED NOTES
"This RN went into room to medicate pt for agitation. Pt initially refusing. Security remains at bedside.     Pt asked repeatedly to move blanket so this RN can give her an IM shot. Pt states \"why don't you just suck some issa and put it in your mouth!\" pt than proceeded to move blanket so this writer could give medication.     Pt given medication. Pt currently lying in stretcher. Sitter 1:1.   "

## 2019-07-12 NOTE — ED NOTES
Pt sleeping on gurney. Observed even, unlabored chest rise and fall. 1:1 sitter within direct view of pt.

## 2019-07-13 PROCEDURE — 700102 HCHG RX REV CODE 250 W/ 637 OVERRIDE(OP): Performed by: EMERGENCY MEDICINE

## 2019-07-13 PROCEDURE — A9270 NON-COVERED ITEM OR SERVICE: HCPCS | Performed by: PSYCHIATRY & NEUROLOGY

## 2019-07-13 PROCEDURE — 700111 HCHG RX REV CODE 636 W/ 250 OVERRIDE (IP): Performed by: PSYCHIATRY & NEUROLOGY

## 2019-07-13 PROCEDURE — 700102 HCHG RX REV CODE 250 W/ 637 OVERRIDE(OP): Performed by: PSYCHIATRY & NEUROLOGY

## 2019-07-13 PROCEDURE — 96372 THER/PROPH/DIAG INJ SC/IM: CPT

## 2019-07-13 PROCEDURE — 99283 EMERGENCY DEPT VISIT LOW MDM: CPT | Performed by: PSYCHIATRY & NEUROLOGY

## 2019-07-13 PROCEDURE — 700102 HCHG RX REV CODE 250 W/ 637 OVERRIDE(OP): Performed by: STUDENT IN AN ORGANIZED HEALTH CARE EDUCATION/TRAINING PROGRAM

## 2019-07-13 PROCEDURE — A9270 NON-COVERED ITEM OR SERVICE: HCPCS | Performed by: STUDENT IN AN ORGANIZED HEALTH CARE EDUCATION/TRAINING PROGRAM

## 2019-07-13 PROCEDURE — A9270 NON-COVERED ITEM OR SERVICE: HCPCS | Performed by: EMERGENCY MEDICINE

## 2019-07-13 RX ADMIN — LORAZEPAM 2 MG: 2 TABLET ORAL at 18:01

## 2019-07-13 RX ADMIN — HALOPERIDOL LACTATE 5 MG: 5 INJECTION, SOLUTION INTRAMUSCULAR at 03:35

## 2019-07-13 RX ADMIN — HALOPERIDOL 5 MG: 5 TABLET ORAL at 12:24

## 2019-07-13 RX ADMIN — LORAZEPAM 2 MG: 2 TABLET ORAL at 12:24

## 2019-07-13 RX ADMIN — HALOPERIDOL 5 MG: 5 TABLET ORAL at 18:01

## 2019-07-13 RX ADMIN — DIVALPROEX SODIUM 500 MG: 500 TABLET, DELAYED RELEASE ORAL at 18:46

## 2019-07-13 RX ADMIN — LORAZEPAM 2 MG: 2 INJECTION INTRAMUSCULAR; INTRAVENOUS at 03:35

## 2019-07-13 RX ADMIN — DIVALPROEX SODIUM 500 MG: 500 TABLET, DELAYED RELEASE ORAL at 06:30

## 2019-07-13 RX ADMIN — LEVOTHYROXINE SODIUM 100 MCG: 100 TABLET ORAL at 06:30

## 2019-07-13 RX ADMIN — ARIPIPRAZOLE 25 MG: 10 TABLET ORAL at 06:30

## 2019-07-13 NOTE — ED PROVIDER NOTES
ED Provider Note    Patient continues her medications, with some level of agitation.  She was seen this morning by psychiatry, plan to continue medications and is awaiting transfer to psychiatric facility when available.

## 2019-07-13 NOTE — ED NOTES
Pt given meal tray. Pt remains anxious frequently pacing in halls. Redirects to room. Observer at [place].

## 2019-07-13 NOTE — ED NOTES
Pt becoming increasingly agitated, refusing to return to room after using restroom, grabbed sitters arm. Security to bedside, pt allowing IM injections with security at bedside.

## 2019-07-13 NOTE — ED NOTES
Pt woke and given meal tray she became very agitated yelling at staff for waking her. apologized to pt and informed her we will not wake her for her lunch meal tray. Pt returned to bed and sitter at [place].

## 2019-07-13 NOTE — ED NOTES
Pt increasingly agitated pacing to bathroom frequently. She is agreeable to take oral medications see mar. Pt given hot meal. Observer remains at [place].

## 2019-07-13 NOTE — DISCHARGE PLANNING
"Alert Team    Patient pacing inside room at time of rounding; this clinician explained the legal hold process to the patient, however it was unclear if the patient understood as she appeared confused, making delusional statements in regards to staff, stating \"The people here are haunting me\". The patient is currently on a legal hold; Alert Team to continue to monitor as patient awaits transfer to an inpatient psychiatric facility.   "

## 2019-07-13 NOTE — ED NOTES
"Pt refusing vital signs. Allows BP cuff to be placed, but yells \"I'm done!\" when it starts inflating. Skin pink, warm, dry.   "

## 2019-07-13 NOTE — ED NOTES
RN hourly rounding. Pt resting comfortably, respirations even and non-labored, no requests at this time.

## 2019-07-14 LAB — VALPROATE SERPL-MCNC: 40.7 UG/ML (ref 50–100)

## 2019-07-14 PROCEDURE — 80164 ASSAY DIPROPYLACETIC ACD TOT: CPT

## 2019-07-14 PROCEDURE — 700111 HCHG RX REV CODE 636 W/ 250 OVERRIDE (IP): Performed by: PSYCHIATRY & NEUROLOGY

## 2019-07-14 PROCEDURE — A9270 NON-COVERED ITEM OR SERVICE: HCPCS | Performed by: STUDENT IN AN ORGANIZED HEALTH CARE EDUCATION/TRAINING PROGRAM

## 2019-07-14 PROCEDURE — A9270 NON-COVERED ITEM OR SERVICE: HCPCS | Performed by: EMERGENCY MEDICINE

## 2019-07-14 PROCEDURE — A9270 NON-COVERED ITEM OR SERVICE: HCPCS | Performed by: PSYCHIATRY & NEUROLOGY

## 2019-07-14 PROCEDURE — 96372 THER/PROPH/DIAG INJ SC/IM: CPT

## 2019-07-14 PROCEDURE — 36415 COLL VENOUS BLD VENIPUNCTURE: CPT

## 2019-07-14 PROCEDURE — 700102 HCHG RX REV CODE 250 W/ 637 OVERRIDE(OP): Performed by: EMERGENCY MEDICINE

## 2019-07-14 PROCEDURE — 700102 HCHG RX REV CODE 250 W/ 637 OVERRIDE(OP): Performed by: STUDENT IN AN ORGANIZED HEALTH CARE EDUCATION/TRAINING PROGRAM

## 2019-07-14 PROCEDURE — 700102 HCHG RX REV CODE 250 W/ 637 OVERRIDE(OP): Performed by: PSYCHIATRY & NEUROLOGY

## 2019-07-14 RX ADMIN — HALOPERIDOL LACTATE 5 MG: 5 INJECTION, SOLUTION INTRAMUSCULAR at 16:32

## 2019-07-14 RX ADMIN — LORAZEPAM 2 MG: 2 INJECTION INTRAMUSCULAR; INTRAVENOUS at 16:32

## 2019-07-14 RX ADMIN — HALOPERIDOL 5 MG: 5 TABLET ORAL at 10:40

## 2019-07-14 RX ADMIN — DIVALPROEX SODIUM 500 MG: 500 TABLET, DELAYED RELEASE ORAL at 07:40

## 2019-07-14 RX ADMIN — LORAZEPAM 2 MG: 2 TABLET ORAL at 10:40

## 2019-07-14 RX ADMIN — ARIPIPRAZOLE 25 MG: 10 TABLET ORAL at 06:46

## 2019-07-14 RX ADMIN — LEVOTHYROXINE SODIUM 100 MCG: 100 TABLET ORAL at 06:47

## 2019-07-14 NOTE — ED NOTES
Pt requesting Seroquel, pt told Doctor would be notified, ERP would like to hold off on more meds, pt got abilify this morning and Haldol at 10:40. Pt moved to room 37 for television and to keep pt out of the busy area by 33.

## 2019-07-14 NOTE — ED NOTES
Pt walked out of room, asking to weigh herself and for a comb. Pt given comb to brush hair and scale brought so pt could weigh herself.

## 2019-07-14 NOTE — ED NOTES
Pt agitated up in [place] yelling at staff, demanding to leave. Security to bedside to assist. Pt agreeable to oral medications. Medicated as ordered.

## 2019-07-14 NOTE — ED NOTES
"Pt requesting to use the phone again. Pt told she does not have phone privileges because she tried to run away today. Pt said \"but that was yesterday\" pt told that it was in fact today. Pt would like us to call her mother at [phone] (Valerie) and ask her if she can take [employer] birthday shopping in the next couple of weeks when she gets out of here because her birthday passed in April.  "

## 2019-07-14 NOTE — DISCHARGE PLANNING
ALERT team  note:  34 year old female admitted 7/9/19, legal hold, inability to care for self; Medicaid FFS insurance plan; pt cont delusional, with impaired judgment and insight; cont impulsive, verbally agitated towards staff; demanding to leave, security at bedside; pt  received  Abilify 20 mg PO this AM.Depakote  mg PO this evening, Haldol 5 mg PO and Ativan 2 mg PO tonight at 1801; evaluated by [facility] ED psychiatry staff, legal hold cont; alteration in thoughts r/t psychosis, cont; [facility]

## 2019-07-14 NOTE — ED NOTES
Assumed care of pt. Pt up at door, ambulating around room, easily redirectable back to bed. 1:1 sitter in place.

## 2019-07-14 NOTE — DISCHARGE PLANNING
"Alert Team: Patient now in [address]. Patient is very agitated and has been given haldol and ativan about 50 minutes ago. Patient is requesting to go to the restroom.  Rambling speech about \"wanting to eat peoples heads\".  Remains psychotic and will wait for bed at mental [facility] hospital.   "

## 2019-07-14 NOTE — ED NOTES
Break RN- pt up out of bed, walked to BR,  Pt informed someone in BR and needed to wait in room.  Pt back to room, lunch given.

## 2019-07-14 NOTE — ED NOTES
Pt given Haldol, and Ativan injection in left vastis lateralis, due to increasing agitation, and lack of response from the PO ativan and haldol given earlier today.

## 2019-07-14 NOTE — ED NOTES
Pt continues to sleep on stretcher, rr even and unlabored. Moving at will for comfort. 1:1 sitter remains in place.

## 2019-07-14 NOTE — ED NOTES
Pt continues to sleep on stretcher, moving at will for comfort. rr even and unlabored. 1:1 sitter in place.
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
## 2019-07-14 NOTE — ED NOTES
Pt is requesting to use the phone, there is no note in the chart indicating that pt has phone privileges, pt has attempted to elope once today, and has gone to the bathroom without telling anyone she is leaving the room 3 times today, phone privileges are not warranted at this time.

## 2019-07-14 NOTE — ED NOTES
Depakote was refused by pt earlier this morning, blood draw was also refused. Pt allowed this nurse to take blood and took medication without incident.

## 2019-07-14 NOTE — ED NOTES
Pt attempted to leave, New sitter assigned. Pt wants to go, but she is on a hold. Pt brought back to room by security.
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
## 2019-07-14 NOTE — ED PROVIDER NOTES
ER Provider Addendum Note     Scribed for Altaf Hall M.D. by Matthias May. 2019, 6:39 AM.    This is an addendum to the note on Tierney Mayer. For further details and full chart entry, see the previously signed ED Provider Note written by Dr. Askew ([facility]).     DIAGNOSTIC STUDIES:  Labs:     MEDICAL DECISION MAKIN:20 AM - Patient's case was transferred into my care. See previously signed note for further details.     Disposition:  Patient will be transferred to an appropriate psychiatric facility in stable condition for further psychiatric care and evaluation.  Patient will be placed under the care of my partner awaiting transfer.    FINAL IMPRESSION   1. Schizophrenia, unspecified type (HCC)    2. Psychosis, unspecified psychosis type (HCC)         Matthias DONG (Chika), am scribing for, and in the presence of, Altaf Hall M.D..    Electronically signed by: Matthias May (Yosiibsanjeev), 2019    Altaf DONG M.D. personally performed the services described in this documentation, as scribed by Matthias May in my presence, and it is both accurate and complete.    The note accurately reflects work and decisions made by me.  Altaf Hall  2019  1:02 PM

## 2019-07-14 NOTE — DISCHARGE PLANNING
Alert Team    Patient lying in bed resting at time of rounding; The patient presented as drowsy (Per chart history she was medicated earlier in the evening for agitation) and with calm behaviors. Patient understands she is on a legal hold at this time, noting she is aware of the transfer process to an inpatient psychiatric facility; legal hold extended by psychiatry on 7/13/2019. Alert Team to continue to monitor, nothing further to add at this time.

## 2019-07-14 NOTE — ED NOTES
Pt requested shower prior to breakfast, coffee was provided, security and tech available, so pt showered. Bed remade, and mess on floor cleaned.

## 2019-07-14 NOTE — ED NOTES
"Pt refusing depakote at this time. States \"I don't need my blood drawn, i'm just going to stop taking it.\" cooperative with vitals and other morning meds.  "

## 2019-07-15 LAB
ANION GAP SERPL CALC-SCNC: 10 MMOL/L (ref 0–11.9)
BUN SERPL-MCNC: 15 MG/DL (ref 8–22)
CALCIUM SERPL-MCNC: 8.7 MG/DL (ref 8.5–10.5)
CHLORIDE SERPL-SCNC: 106 MMOL/L (ref 96–112)
CO2 SERPL-SCNC: 23 MMOL/L (ref 20–33)
CREAT SERPL-MCNC: 0.84 MG/DL (ref 0.5–1.4)
GLUCOSE SERPL-MCNC: 136 MG/DL (ref 65–99)
OSMOLALITY UR: 461 MOSM/KG H2O (ref 300–900)
POTASSIUM SERPL-SCNC: 3.8 MMOL/L (ref 3.6–5.5)
SODIUM SERPL-SCNC: 139 MMOL/L (ref 135–145)
SODIUM UR-SCNC: 49 MMOL/L

## 2019-07-15 PROCEDURE — A9270 NON-COVERED ITEM OR SERVICE: HCPCS | Performed by: STUDENT IN AN ORGANIZED HEALTH CARE EDUCATION/TRAINING PROGRAM

## 2019-07-15 PROCEDURE — 84300 ASSAY OF URINE SODIUM: CPT

## 2019-07-15 PROCEDURE — 83935 ASSAY OF URINE OSMOLALITY: CPT

## 2019-07-15 PROCEDURE — 700102 HCHG RX REV CODE 250 W/ 637 OVERRIDE(OP): Performed by: EMERGENCY MEDICINE

## 2019-07-15 PROCEDURE — 700102 HCHG RX REV CODE 250 W/ 637 OVERRIDE(OP): Performed by: STUDENT IN AN ORGANIZED HEALTH CARE EDUCATION/TRAINING PROGRAM

## 2019-07-15 PROCEDURE — 700102 HCHG RX REV CODE 250 W/ 637 OVERRIDE(OP): Performed by: PSYCHIATRY & NEUROLOGY

## 2019-07-15 PROCEDURE — 96372 THER/PROPH/DIAG INJ SC/IM: CPT

## 2019-07-15 PROCEDURE — 80048 BASIC METABOLIC PNL TOTAL CA: CPT

## 2019-07-15 PROCEDURE — 99283 EMERGENCY DEPT VISIT LOW MDM: CPT | Mod: GC | Performed by: PSYCHIATRY & NEUROLOGY

## 2019-07-15 PROCEDURE — A9270 NON-COVERED ITEM OR SERVICE: HCPCS | Performed by: EMERGENCY MEDICINE

## 2019-07-15 PROCEDURE — A9270 NON-COVERED ITEM OR SERVICE: HCPCS | Performed by: PSYCHIATRY & NEUROLOGY

## 2019-07-15 PROCEDURE — 700111 HCHG RX REV CODE 636 W/ 250 OVERRIDE (IP): Performed by: PSYCHIATRY & NEUROLOGY

## 2019-07-15 RX ADMIN — LORAZEPAM 2 MG: 2 INJECTION INTRAMUSCULAR; INTRAVENOUS at 20:17

## 2019-07-15 RX ADMIN — HALOPERIDOL 5 MG: 5 TABLET ORAL at 02:55

## 2019-07-15 RX ADMIN — ARIPIPRAZOLE 25 MG: 10 TABLET ORAL at 07:57

## 2019-07-15 RX ADMIN — LEVOTHYROXINE SODIUM 100 MCG: 100 TABLET ORAL at 07:57

## 2019-07-15 RX ADMIN — MIRTAZAPINE 15 MG: 15 TABLET, FILM COATED ORAL at 20:16

## 2019-07-15 RX ADMIN — LORAZEPAM 2 MG: 2 INJECTION INTRAMUSCULAR; INTRAVENOUS at 15:50

## 2019-07-15 RX ADMIN — LORAZEPAM 2 MG: 2 TABLET ORAL at 02:55

## 2019-07-15 RX ADMIN — DIVALPROEX SODIUM 500 MG: 500 TABLET, DELAYED RELEASE ORAL at 07:57

## 2019-07-15 RX ADMIN — HALOPERIDOL LACTATE 5 MG: 5 INJECTION, SOLUTION INTRAMUSCULAR at 20:17

## 2019-07-15 RX ADMIN — DIVALPROEX SODIUM 500 MG: 500 TABLET, DELAYED RELEASE ORAL at 20:16

## 2019-07-15 RX ADMIN — LORAZEPAM 2 MG: 2 INJECTION INTRAMUSCULAR; INTRAVENOUS at 11:18

## 2019-07-15 RX ADMIN — HALOPERIDOL LACTATE 5 MG: 5 INJECTION, SOLUTION INTRAMUSCULAR at 11:18

## 2019-07-15 NOTE — DISCHARGE PLANNING
Alert Team  Confirmed with psychiatry that pt is not to have visitors or phone calls per the legal hold protocol.  She still remains too labile and easily agitated to allow any exceptions to the hold.

## 2019-07-15 NOTE — ED NOTES
START OF SHIFT- Report received from Mile DAN . White board updated. Pt updated on POC. Pending: transfer. PT in no distress at this time. Will continue to monitor.

## 2019-07-15 NOTE — PSYCHIATRY
"PSYCHIATRIC FOLLOW-UP:(established)  *Reason for admission:      Acute Psychosis and Agitation  *Legal Hold Status on Admission:   L2K  Supervising Psychiatrist:       Dr. Lemus     *HPI:    Patient is a 34 year old female with a history of schizophrenia who presented to the ED with acute psychosis and agitation. She reports feeling \"Not great\" today. She was screaming much of the night and continues to be aggressive with staff, requiring frequent PRN medication for sedation. She denies SI, saying \"That was a teenager thing that went away.\" Denies HI. She has been drinking water frequently and also urinating more than 1x/hour. This morning she tried to pee in a cup on the floor of her room and missed, but did clean up the urine from the floor. She continues to endorse delusional paranoia about the motives of staff. Denies AH or VH. She continues to take her scheduled medications and denies side effects. She is AAOx2 today, cannot guess the date, and is unable to remember how she got here. She continues to struggle with describing an appropriate plan if she was discharged.     Medical ROS (as pertinent):                    Review of Systems   Constitutional: Negative for chills and fever.   HENT: Negative for sore throat.    Eyes: Negative for blurred vision.   Respiratory: Negative for cough and shortness of breath.    Cardiovascular: Negative for chest pain.   Gastrointestinal: Negative for abdominal pain, constipation and heartburn.   Genitourinary: Negative for dysuria.   Musculoskeletal: Negative for joint pain.   Skin: Negative for rash.   Neurological: Negative for dizziness.   Psychiatric/Behavioral: Negative for suicidal ideas.      *Psychiatric Examination:  Vitals:    07/15/19 0954   BP: 114/73   Pulse: 88   Resp: 18   Temp: 37.1 °C (98.8 °F)   SpO2: 99%     General Appearance:  Appears older than her stated age, unkempt, but fair hygiene.  Abnormal Movements: No tremor, dystonia, or dyskinesia " "present.  Gait and Posture: Normal gait.  Speech: Regular rate and rhythm, not pressured, paucity of spontaneous speech.  Thought processes: Tangential.  Associations:  Loosening of associations, no clang or neologisms.  Abnormal or Psychotic Thoughts: Denies AH, VH or paranoia. Delusions and disorganized thinking continue.  Judgement and Insight: Poor / Poor  Orientation: AAOx2. Patient does not know the date.  Recent and Remote Memory: grossly impaired.  Attention Span and Concentration: Impaired.  Language: fluent in English.  Fund of Knowledge: adequate.  Mood and Affect: \"Not Good,\" congruent with affect, Labile, alternating between expansive and irritable, dysthymic, frequently agitated.   SI/HI: Denies SI and HI.  Current Facility-Administered Medications   Medication Dose Route Frequency Provider Last Rate Last Dose   • ARIPiprazole (ABILIFY) tablet 25 mg  25 mg Oral DAILY Salvador Mireles M.D.   25 mg at 07/15/19 0757   • levothyroxine (SYNTHROID) tablet 100 mcg  100 mcg Oral AM  Jose Larkin M.D.   100 mcg at 07/15/19 0757   • divalproex (DEPAKOTE) delayed-release tablet 500 mg  500 mg Oral Q12HRS Wendy Lemus M.D.   500 mg at 07/15/19 0757   • mirtazapine (REMERON) tablet 15 mg  15 mg Oral QHS Wendy Lemus M.D.   Stopped at 07/13/19 2100   • haloperidol lactate (HALDOL) injection 5 mg  5 mg Intramuscular Q6HRS PRSYLVIA Lemus M.D.   5 mg at 07/14/19 1632    Or   • haloperidol (HALDOL) tablet 5 mg  5 mg Oral Q6HRS PRN Wendy Lemus M.D.   5 mg at 07/15/19 0255   • LORazepam (ATIVAN) injection 2 mg  2 mg Intramuscular Q4HRS PRN Wendy Lemus M.D.   2 mg at 07/14/19 1632    Or   • LORazepam (ATIVAN) tablet 2 mg  2 mg Oral Q4HRS PRN Wendy Lemus M.D.   2 mg at 07/15/19 0255     Current Outpatient Prescriptions   Medication Sig Dispense Refill   • aripiprazole (ABILIFY) 20 MG tablet Take 20 mg by mouth every day.     • benztropine (COGENTIN) 0.5 MG " Tab Take 0.5 mg by mouth 2 times a day.     • mirtazapine (REMERON) 15 MG Tab Take 15 mg by mouth every bedtime.     • divalproex ER (DEPAKOTE ER) 500 MG TABLET SR 24 HR Take 1,000 mg by mouth 2 Times a Day.     • topiramate (TOPAMAX) 100 MG Tab Take 100 mg by mouth 2 times a day.     • propranolol (INDERAL) 10 MG Tab Take 10 mg by mouth 3 times a day.     • lithium CR (LITHOBID) 300 MG Tab CR Take 900 mg by mouth every bedtime.           EKG from 7/11:  QTc was 462    Valproic Acid level from 7/14: 40.7      *ASSESSMENT:    Patient is a 34 year old female with a history of schizophrenia who presented to the ED with acute psychosis and agitation. Patient continues to be acutely psychotic with paranoid delusions, disorganized thinking, and frequent aggression requiring PRN sedation. Patient continues to be at risk of safety to self and others and should remain on legal hold. She is also displaying polyuria and polydipsia, possibly due [facility]. She will need inpatient psychiatric hospitalization and is awaiting transfer.     Dx:  Schizophrenia by history.  Polysubstance use disorder by history.   R/O SIADH     PLAN:  1. Patient currently on a legal hold due to danger to self or others.  2. Continue Abilify to 25mg PO qam for psychosis.  3. Continue Depakote 500mg PO BID for mood. Valproic acid level on 7/14 was 40.7. Will consider increasing to 500mg PO qam and 750mg PO qhs if polydipsia and polyuria resolve.  4. Continue Mirtazapine 15 mg PO qhs for mood and insomnia.  5. Continue Benztropine .5mg PO BID PRN for EPS.  6. Continue PRNs for agitation.  7. Labs pending for polyuria and polydipsia, including Chem7, Urine sodium and urine osmolality.  8. Will transfer patient to inpatient hospital.  9. Will continue to follow.     Thank You for the consult.     Patient was seen on rounds with Dr. Lemus, Attending Psychiatrist, and treatment plan was discussed.

## 2019-07-15 NOTE — ED PROVIDER NOTES
ED Provider Note    2:22 PM-this is a 34-year-old female on a legal hold.  I discussed the case with psychiatry.  They have reevaluated the patient today.  Legal hold is still in effect.  She is complaining of polydipsia and polyuria.  Chemistries and serum osmole's will be ordered by the psychiatry team.  ERP to follow-up on the results if abnormal.

## 2019-07-15 NOTE — ED NOTES
Pt has been taking her cup and continually drinking water from the sink with sugar packs from her coffee.  Cup was taken from room. Pt was told she can not go to the bathroom every 10 mins. Pt then peed on the floor in her room then cleaned it up with paper towel.

## 2019-07-15 NOTE — ED NOTES
Pt pacing in room, talking to self, asking for food and coffee, pt given decaf coffee and a new blanket

## 2019-07-15 NOTE — CONSULTS
"This consult order was completed 7/9/19.  Pt continues to await inpt placement at [facility].  There are, unfortunately, over a dozen people on their waiting list before her.  Ordered 24hr I+O d/t complaints of frequent urination.  She continues to have mood lability and is easily agitated when staff is confrontational in any way.  Per PM AT, pt responds better to a \"kill them with kindness\" approach rather than an authoritative tone.  Pt urinated on the floor in her room when the unit bathroom was occupied.  She did, however, clean it up without prompting from staff.  Sitter noted that pt filling up cups of water in her room and drinking multiple cups.  Requested sitter notify RN of all liquid intake.  Requested RN limit intake if pt appears to be hyperhydrating.  "

## 2019-07-15 NOTE — ED NOTES
Assumed care of patient, pt resting in bed with eyes closed, breathing even and unlabored, no s/s of distressed noted, sitter at bedside

## 2019-07-15 NOTE — ED NOTES
Pt awake and speaking unintelligible, pt ambulated with this nurse to restroom, direct observation, ambulated back to room, pt pacing in room

## 2019-07-15 NOTE — DISCHARGE PLANNING
Alert Team    Patient lying in bed asleep at time of rounding; Per chart history and nursing staff, patient continues to present with agitation, psychosis and confusion, requiring sedation earlier in the day. Patient continues on an extended legal hold while awaiting transfer to an inpatient psychiatric facility.

## 2019-07-15 NOTE — ED NOTES
Pt resting in bed with eyes closed, breathing even and unlabored, no s/s of distress noted, sitter at bedside

## 2019-07-16 PROCEDURE — A9270 NON-COVERED ITEM OR SERVICE: HCPCS | Performed by: STUDENT IN AN ORGANIZED HEALTH CARE EDUCATION/TRAINING PROGRAM

## 2019-07-16 PROCEDURE — 700102 HCHG RX REV CODE 250 W/ 637 OVERRIDE(OP): Performed by: EMERGENCY MEDICINE

## 2019-07-16 PROCEDURE — 96372 THER/PROPH/DIAG INJ SC/IM: CPT

## 2019-07-16 PROCEDURE — 700102 HCHG RX REV CODE 250 W/ 637 OVERRIDE(OP): Performed by: PSYCHIATRY & NEUROLOGY

## 2019-07-16 PROCEDURE — 700102 HCHG RX REV CODE 250 W/ 637 OVERRIDE(OP): Performed by: STUDENT IN AN ORGANIZED HEALTH CARE EDUCATION/TRAINING PROGRAM

## 2019-07-16 PROCEDURE — A9270 NON-COVERED ITEM OR SERVICE: HCPCS | Performed by: EMERGENCY MEDICINE

## 2019-07-16 PROCEDURE — A9270 NON-COVERED ITEM OR SERVICE: HCPCS | Performed by: PSYCHIATRY & NEUROLOGY

## 2019-07-16 PROCEDURE — 99283 EMERGENCY DEPT VISIT LOW MDM: CPT | Mod: GC | Performed by: PSYCHIATRY & NEUROLOGY

## 2019-07-16 PROCEDURE — 700111 HCHG RX REV CODE 636 W/ 250 OVERRIDE (IP): Performed by: PSYCHIATRY & NEUROLOGY

## 2019-07-16 RX ORDER — DIVALPROEX SODIUM 500 MG/1
500 TABLET, DELAYED RELEASE ORAL
Status: DISCONTINUED | OUTPATIENT
Start: 2019-07-17 | End: 2019-07-18 | Stop reason: HOSPADM

## 2019-07-16 RX ADMIN — DIVALPROEX SODIUM 500 MG: 500 TABLET, DELAYED RELEASE ORAL at 08:47

## 2019-07-16 RX ADMIN — LEVOTHYROXINE SODIUM 100 MCG: 100 TABLET ORAL at 08:48

## 2019-07-16 RX ADMIN — DIVALPROEX SODIUM 750 MG: 500 TABLET, DELAYED RELEASE ORAL at 18:57

## 2019-07-16 RX ADMIN — ARIPIPRAZOLE 25 MG: 10 TABLET ORAL at 08:47

## 2019-07-16 RX ADMIN — LORAZEPAM 2 MG: 2 INJECTION INTRAMUSCULAR; INTRAVENOUS at 08:54

## 2019-07-16 RX ADMIN — MIRTAZAPINE 15 MG: 15 TABLET, FILM COATED ORAL at 20:24

## 2019-07-16 RX ADMIN — LORAZEPAM 2 MG: 2 INJECTION INTRAMUSCULAR; INTRAVENOUS at 20:27

## 2019-07-16 RX ADMIN — HALOPERIDOL LACTATE 5 MG: 5 INJECTION, SOLUTION INTRAMUSCULAR at 08:54

## 2019-07-16 NOTE — ED TRIAGE NOTES
Pt found to be clogging the toilet with paper towels, pads, etc. Pt asked to use the commode from now on. Pt urinated approx 450ml of urine.

## 2019-07-16 NOTE — ED NOTES
Pt extremely agitated and pacing in the room, pt ambulated to the restroom with direct observation by this nurse, pt has pressure speech and needed redirected multiple times, pt ambulated back to room with this nurse, pt appears anxious and is laughing at no stimuli, sitter at bedside

## 2019-07-16 NOTE — PSYCHIATRY
BRIEF PSYCHIATRIC CONSULT NOTE: patient seen, full note to follow.  Labs reviewed. 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 Will continue to monitor.     -Legal Hold:extended  -Sitter:yes  -Restrictions:   -phone:no   -visitors:no   -personal items: no   -finger foods required: yes   -personal/undergarments clothes:no    -Orders Placed: yes  -Assessment:Schizophrenia by hx  -Plan:Increase depakote to 500/750mg for mood  Continue abilify 25mg Po daily for psychosis  Continue remeron 15mg PO QHS for insomnia/mood  Continue levothyroxine  Continue PRN for severe agitation/agresive behavior/threat to self or others.   Please transfer pt to inpatient psychiatric hospital when bed is available  Will continue to follow

## 2019-07-16 NOTE — DISCHARGE PLANNING
Legal Hold    Referral: Legal Hold Court     Intervention: Pt presented for legal hold meeting with .  advised pt will meet with court MD's via telemedicine monitor to contest the legal hold.      Plan: Pt will present to telemedicine mental health to meet with court physicians 7/17. Will call bedside RN once time has been determined
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
## 2019-07-16 NOTE — ED PROVIDER NOTES
ED Provider Note Addendum    Scribed for Reynold Lama DO by Pk Estes on 7/16/2019 at 10:48 AM.     This is an addendum to the note on Tierney Mayer.  For further details and full chart information, see patient's initial note.       6:48 AM - I discussed the patient's case with Dr. Riddle ([facility]) who will transfer care of the patient to me at this time.        10:48 AM - Patient evaluated by myself.  Patient is resting comfortably at this time with no complaints.  Patient did become agitated at approximately 9:00 this morning resulting in Ativan, Haldol and Benadryl ministration.  She is now resting comfortably.    Disposition:  Patient will be transferred to an appropriate psychiatric facility in stable condition for further psychiatric care and evaluation.  Patient will be placed under the care of my partner awaiting transfer.    FINAL IMPRESSION  1. Schizophrenia, unspecified type (HCC)    2. Psychosis, unspecified psychosis type (HCC)         IPk (Chika)florencia scribing for, and in the presence of, Reynold Lama DO.    Electronically signed by: Pk Estes (Scribe), 7/16/2019    IReynold DO personally performed the services described in this documentation, as scribed by Pk Estes in my presence, and it is both accurate and complete.    The note accurately reflects work and decisions made by me.  Reynold Lama  7/16/2019  10:57 AM
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
## 2019-07-16 NOTE — ED NOTES
Pt was stating to the sitter that she is tired of going to the restroom in the commode, stating that she never put her pads in the toilet. She then repeated this to me. I tried to explain to the patient that arguing would not change her having to use the commode. I was speaking with my hands moving while trying to explain this to her. Soon after the pt told the nurse that I made a motion like I was going to choke her. This was not the case. I made no gestures with a threatening or harmful intent.

## 2019-07-17 PROCEDURE — A9270 NON-COVERED ITEM OR SERVICE: HCPCS | Performed by: STUDENT IN AN ORGANIZED HEALTH CARE EDUCATION/TRAINING PROGRAM

## 2019-07-17 PROCEDURE — A9270 NON-COVERED ITEM OR SERVICE: HCPCS | Performed by: EMERGENCY MEDICINE

## 2019-07-17 PROCEDURE — 700102 HCHG RX REV CODE 250 W/ 637 OVERRIDE(OP): Performed by: STUDENT IN AN ORGANIZED HEALTH CARE EDUCATION/TRAINING PROGRAM

## 2019-07-17 PROCEDURE — 700102 HCHG RX REV CODE 250 W/ 637 OVERRIDE(OP): Performed by: EMERGENCY MEDICINE

## 2019-07-17 PROCEDURE — 700102 HCHG RX REV CODE 250 W/ 637 OVERRIDE(OP): Performed by: PSYCHIATRY & NEUROLOGY

## 2019-07-17 PROCEDURE — A9270 NON-COVERED ITEM OR SERVICE: HCPCS | Performed by: PSYCHIATRY & NEUROLOGY

## 2019-07-17 RX ADMIN — LORAZEPAM 2 MG: 2 TABLET ORAL at 08:23

## 2019-07-17 RX ADMIN — LEVOTHYROXINE SODIUM 100 MCG: 100 TABLET ORAL at 06:09

## 2019-07-17 RX ADMIN — DIVALPROEX SODIUM 500 MG: 500 TABLET, DELAYED RELEASE ORAL at 12:57

## 2019-07-17 RX ADMIN — DIVALPROEX SODIUM 750 MG: 500 TABLET, DELAYED RELEASE ORAL at 17:58

## 2019-07-17 RX ADMIN — HALOPERIDOL 5 MG: 5 TABLET ORAL at 08:23

## 2019-07-17 RX ADMIN — LORAZEPAM 2 MG: 2 TABLET ORAL at 17:56

## 2019-07-17 RX ADMIN — ARIPIPRAZOLE 25 MG: 10 TABLET ORAL at 06:09

## 2019-07-17 RX ADMIN — HALOPERIDOL 5 MG: 5 TABLET ORAL at 17:55

## 2019-07-17 NOTE — PSYCHIATRY
"PSYCHIATRIC FOLLOW-UP:(established)  *Reason for admission:      Acute Psychosis and Agitation  *Legal Hold Status on Admission:   L2K  Supervising Psychiatrist:       Dr. Lemus     *HPI:    Patient is a 34 year old female with a history of schizophrenia who presented to the ED with acute psychosis and agitation. She reports feeling \"Good\" today. However, she reports VH of hearing her mother's and sister's voices in her head talking to her about \"plotting child molesters\" and said they may be \"out to get me.\" Paranoia and delusions of staff continue. She continues to be labile, irritable, and agressive with staff, requiring frequent PRN medication for sedation. She denies SI and denies HI. She had been displaying polydipsia, and her polyuria has improved with fluid restriction. She denies VH. She cried at times during interview and appears to respond to internal stimuli. She continues to take her scheduled medications and denies side effects. She is AAOx2 today, cannot guess the date, and is unable to remember how she got here. She continues to struggle with describing an appropriate plan if she was discharged.     Medical ROS (as pertinent):                    Review of Systems   Constitutional: Negative for chills and fever.   HENT: Negative for sore throat.    Eyes: Negative for blurred vision.   Respiratory: Negative for cough and shortness of breath.    Cardiovascular: Negative for chest pain.   Gastrointestinal: Negative for abdominal pain, constipation and heartburn.   Genitourinary: Negative for dysuria.   Musculoskeletal: Negative for joint pain.   Skin: Negative for rash.   Neurological: Negative for dizziness.   Psychiatric/Behavioral: Negative for suicidal ideas.      *Psychiatric Examination:  Vitals:    07/16/19 1726   BP: 116/75   Pulse: 81   Resp: 15   Temp: 36.4 °C (97.5 °F)   SpO2:      General Appearance:  Appears older than her stated age, unkempt, but fair hygiene. Crying at times.  Abnormal " "Movements: No tremor, dystonia, or dyskinesia present.  Gait and Posture: Normal gait.  Speech: Regular rate and rhythm, not pressured, paucity of spontaneous speech.  Thought processes: Tangential.  Associations:  Loosening of associations, no clang or neologisms.  Abnormal or Psychotic Thoughts: Denies AH. Endorses VH of mother and sister talking in her head about child molesters and exhibits paranoia. Delusions and disorganized thinking continue. Appears to respond to internal stimuli.  Judgement and Insight: Poor / Poor  Orientation: AAOx2. Patient does not know the date.  Recent and Remote Memory: grossly impaired.  Attention Span and Concentration: Impaired.  Language: fluent in English.  Fund of Knowledge: adequate.  Mood and Affect: \"Good,\" incongruent with affect, Labile, alternating between expansive, irritable, and dysthymic, frequently agitated.   SI/HI: Denies SI and HI.    Current Facility-Administered Medications   Medication Dose Route Frequency Provider Last Rate Last Dose   • [START ON 7/17/2019] divalproex (DEPAKOTE) delayed-release tablet 500 mg  500 mg Oral QDAY with Breakfast Salvador Mireles M.D.       • divalproex (DEPAKOTE) delayed-release tablet 750 mg  750 mg Oral Q EVENING Salvador Mireles M.D.       • ARIPiprazole (ABILIFY) tablet 25 mg  25 mg Oral DAILY Salvador Mireles M.D.   25 mg at 07/16/19 0847   • levothyroxine (SYNTHROID) tablet 100 mcg  100 mcg Oral AM ES Jose Larkin M.D.   100 mcg at 07/16/19 0848   • mirtazapine (REMERON) tablet 15 mg  15 mg Oral QHS Wendy Lemus M.D.   15 mg at 07/15/19 2016   • haloperidol lactate (HALDOL) injection 5 mg  5 mg Intramuscular Q6HRS PRN Wendy Lemus M.D.   5 mg at 07/16/19 0854    Or   • haloperidol (HALDOL) tablet 5 mg  5 mg Oral Q6HRS PRN Wendy Lemus M.D.   5 mg at 07/15/19 0255   • LORazepam (ATIVAN) injection 2 mg  2 mg Intramuscular Q4HRS PRN SHA CantuD.   2 mg at 07/16/19 0854    Or "   • LORazepam (ATIVAN) tablet 2 mg  2 mg Oral Q4HRS PRN Wendy Lemus M.D.   2 mg at 07/15/19 0255     Current Outpatient Prescriptions   Medication Sig Dispense Refill   • aripiprazole (ABILIFY) 20 MG tablet Take 20 mg by mouth every day.     • benztropine (COGENTIN) 0.5 MG Tab Take 0.5 mg by mouth 2 times a day.     • mirtazapine (REMERON) 15 MG Tab Take 15 mg by mouth every bedtime.     • divalproex ER (DEPAKOTE ER) 500 MG TABLET SR 24 HR Take 1,000 mg by mouth 2 Times a Day.     • topiramate (TOPAMAX) 100 MG Tab Take 100 mg by mouth 2 times a day.     • propranolol (INDERAL) 10 MG Tab Take 10 mg by mouth 3 times a day.     • lithium CR (LITHOBID) 300 MG Tab CR Take 900 mg by mouth every bedtime.          EKG from 7/11:  QTc was 462     Valproic Acid level from 7/14: 40.7     Labs from 7/15:  Component Value Ref Range & Units Status   Sodium 139  135 - 145 mmol/L Final   Potassium 3.8  3.6 - 5.5 mmol/L Final   Chloride 106  96 - 112 mmol/L Final   Co2 23  20 - 33 mmol/L Final   Glucose 136   65 - 99 mg/dL Final   Bun 15  8 - 22 mg/dL Final   Creatinine 0.84  0.50 - 1.40 mg/dL Final   Calcium 8.7  8.5 - 10.5 mg/dL Final   Anion Gap 10.0  0.0 - 11.9 Final     Osmolality Urine 461      Sodium, Urine -per volume 49  mmol/L      *ASSESSMENT:    Patient is a 34 year old female with a history of schizophrenia who presented to the ED with acute psychosis and agitation. Patient continues to be acutely psychotic with paranoid delusions, disorganized thinking, and frequent aggression requiring PRN sedation. Patient's polyuria is likely due to polydipsia and has improved with fluid restriction. Likely not SIADH given eunatremia. Patient continues to be at risk of safety to self and others and should remain on legal hold. She will need inpatient psychiatric hospitalization and is awaiting transfer.     Dx:  Schizophrenia by history.  Polysubstance use disorder by history.   R/O SIADH     PLAN:  1. Patient currently on  a legal hold due to danger to self or others.  2. Continue Abilify to 25mg PO qam for psychosis.  3. Increase Depakote to 500mg PO qam and 750mg PO qpm for mood. Valproic acid level on 7/14 was 40.7.   4. Continue Mirtazapine 15 mg PO qhs for mood and insomnia.  5. Continue Benztropine .5mg PO BID PRN for EPS.  6. Continue PRNs for agitation.  7. Continue to monitor fluid intake to limit polydipsia.  8. Will transfer patient to inpatient hospital.  9. Will continue to follow.     Thank You for the consult.     Patient was seen on rounds with Dr. Lemus, Attending Psychiatrist, and treatment plan was discussed.

## 2019-07-17 NOTE — ED NOTES
Pt was provided a shower. She brushed her teeth, washed her hair and body. Was given a new gown and underwear. Pt refused to have her linen changed.

## 2019-07-17 NOTE — ED NOTES
Assumed care. Patient asked for comb for hair. Given. Patient reoriented to time & informed that breakfast will be served in a few hours. Sitter present.
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
## 2019-07-17 NOTE — ED NOTES
Patient resting in bed, sleeping, no signs of pain or distress, unlabored breathing noted, repositons self occasionally, sitter in line of sight performing direct observation, bed in low position, no cough noted, on room air, frequent rounding performed, safety room features in place, will continue to assess patient.

## 2019-07-17 NOTE — ED NOTES
Patient resting in bed, sleeping, no signs of pain or distress, repositions self occasionally, sitter in hallway performing direct observation, on room air, unlabored breathing noted, safety room features in place, frequent rounding performed, will continue to assess patient.

## 2019-07-17 NOTE — ED NOTES
Patient resting in bed, sleeping, no signs of pain or distress, unlabored breathing noted, repositons self occasionally, sitter in line of sight performing direct observation, bed in low position, no cough noted, on room air, frequent rounding performed, safety room features in place.

## 2019-07-17 NOTE — DISCHARGE PLANNING
Medical Social Work:    SW Notified by Elida Foy  Legal Hold [facility] that Pt was released from her Legal Hold from the Court.    SW has notified [facility] that Pt hold was DC and she will not be [facility].     Transportation with [facility] was cancelled . DAYSI spoke to Doe. RN updated that Pt was DC'd from her hold but can not be discharged from Hospital until the court papers are received.     Original Legal Hold placed back into Pt chart.

## 2019-07-17 NOTE — DISCHARGE PLANNING
Legal Hold    Referral: Legal Hold Court     Intervention: Pt met with court MD's via telemedicine monitor to contest the legal hold.     Court MD's released pt from legal hold. Pt is aware that cannot leave the hospital until Stipulation and Order releasing from hold is received. (24-48 hrs)

## 2019-07-17 NOTE — ED NOTES
Patient resting in bed, sleeping, no signs of pain or distress, unlabored breathing noted on room air, sitter in hallway performing direct observation, repositions self occasionally, bed in low position, safety room features in place, no cough noted, frequent rounding performed, will continue to assess patient.

## 2019-07-17 NOTE — ED NOTES
Patient requested to speak with nurse, broke glasses while sleeping, one arm snapped, offered to fix glasses with tape, patient refused, patient now sleeping.

## 2019-07-17 NOTE — ED NOTES
Patient yelling out, repeatedly asking same questions. Still following commands. Given prns for agitation. Sitter present.

## 2019-07-17 NOTE — DISCHARGE PLANNING
Spoke to  Xander regarding patient's meeting with the court doctors today. Patient has been scheduled to meet with court doctors via telemedicine at the [address] at 1335. Let NI Serrano know of upcoming meeting this afternoon.

## 2019-07-18 VITALS
BODY MASS INDEX: 28.04 KG/M2 | HEART RATE: 105 BPM | RESPIRATION RATE: 22 BRPM | OXYGEN SATURATION: 98 % | DIASTOLIC BLOOD PRESSURE: 76 MMHG | SYSTOLIC BLOOD PRESSURE: 133 MMHG | HEIGHT: 68 IN | WEIGHT: 185 LBS | TEMPERATURE: 97.5 F

## 2019-07-18 PROCEDURE — 700102 HCHG RX REV CODE 250 W/ 637 OVERRIDE(OP): Performed by: STUDENT IN AN ORGANIZED HEALTH CARE EDUCATION/TRAINING PROGRAM

## 2019-07-18 PROCEDURE — A9270 NON-COVERED ITEM OR SERVICE: HCPCS | Performed by: EMERGENCY MEDICINE

## 2019-07-18 PROCEDURE — A9270 NON-COVERED ITEM OR SERVICE: HCPCS | Performed by: STUDENT IN AN ORGANIZED HEALTH CARE EDUCATION/TRAINING PROGRAM

## 2019-07-18 PROCEDURE — 700102 HCHG RX REV CODE 250 W/ 637 OVERRIDE(OP): Performed by: EMERGENCY MEDICINE

## 2019-07-18 RX ORDER — MIRTAZAPINE 15 MG/1
15 TABLET, FILM COATED ORAL
Qty: 30 TAB | Refills: 0 | Status: SHIPPED | OUTPATIENT
Start: 2019-07-18 | End: 2021-10-29

## 2019-07-18 RX ORDER — DIVALPROEX SODIUM 500 MG/1
500 TABLET, DELAYED RELEASE ORAL
Qty: 28 TAB | Refills: 0 | Status: SHIPPED | OUTPATIENT
Start: 2019-07-19 | End: 2021-10-29

## 2019-07-18 RX ORDER — ARIPIPRAZOLE 5 MG/1
25 TABLET ORAL DAILY
Qty: 30 TAB | Refills: 0 | Status: SHIPPED | OUTPATIENT
Start: 2019-07-19 | End: 2021-10-29

## 2019-07-18 RX ORDER — DIVALPROEX SODIUM 250 MG/1
750 TABLET, DELAYED RELEASE ORAL EVERY EVENING
Qty: 28 TAB | Refills: 0 | Status: SHIPPED | OUTPATIENT
Start: 2019-07-18 | End: 2019-07-18

## 2019-07-18 RX ORDER — DIVALPROEX SODIUM 500 MG/1
500 TABLET, DELAYED RELEASE ORAL
Qty: 30 TAB | Refills: 0 | Status: SHIPPED | OUTPATIENT
Start: 2019-07-19 | End: 2019-07-18

## 2019-07-18 RX ORDER — DIVALPROEX SODIUM 250 MG/1
750 TABLET, DELAYED RELEASE ORAL EVERY EVENING
Qty: 90 TAB | Refills: 0 | Status: SHIPPED | OUTPATIENT
Start: 2019-07-18 | End: 2021-10-29

## 2019-07-18 RX ORDER — DIVALPROEX SODIUM 250 MG/1
750 TABLET, DELAYED RELEASE ORAL EVERY EVENING
Qty: 30 TAB | Refills: 0 | Status: SHIPPED | OUTPATIENT
Start: 2019-07-18 | End: 2019-07-18

## 2019-07-18 RX ADMIN — LEVOTHYROXINE SODIUM 100 MCG: 100 TABLET ORAL at 06:20

## 2019-07-18 RX ADMIN — ARIPIPRAZOLE 25 MG: 10 TABLET ORAL at 06:20

## 2019-07-18 NOTE — ED NOTES
Patient returned from court. To stay until released by court, which could be later on today or tomorrow per Alert team.

## 2019-07-18 NOTE — ED NOTES
Pt coming out of room requesting eliazar crackers and juice.    Provided to pt and informed again about fluid restriction and no more food or drink until breakfast.  Pt verbalized understanding and back to room.

## 2019-07-18 NOTE — ED NOTES
"Pt up to sink and drinking water out of sink.  Cups removed from room and pt educated on fluid restriction again.  RN offered to empty commode in room and pt declined \"It's not full\".  As RN closing door pt reports \"stop touching my private parts\"  Pt in room and door closed.   "

## 2019-07-18 NOTE — ED NOTES
Report rec'd from NI Pacheco.  Pt requesting coffee, provided and discussed fluid restriction with pt and pt verbalized understanding.  Pt back to room.

## 2019-07-18 NOTE — ED NOTES
Patient continues to stand at doorway and ask for food and drinks. Informed that dinner will be served around 7pm. Sitter present.

## 2019-07-18 NOTE — ED NOTES
All belongings returned. Pt dressed self. Pt left ambulatory with [facility] rep. D/C papers reviewed and given to rep along with rx.

## 2019-07-18 NOTE — ED PROVIDER NOTES
ED Provider Note    Patient continues to await transfer to an inpatient psychiatric facility.  She has been cooperative with nursing staff and sleeping during the time I was assigned to her care.  No further complaints at this time    July 17, 2019 at 10:56 PM

## 2019-07-18 NOTE — ED NOTES
Pt resting in [facility].  No needs voiced at this time.    Sitter at bedside.

## 2019-07-18 NOTE — ED NOTES
Pt coming out of room again and requesting a shower.  Pt informed again that showers are in the afternoon.  Pt updated on time again and back to room.

## 2019-07-18 NOTE — DISCHARGE PLANNING
ALERT team  note:  34 year old female admitted 7/9/19, legal hold, inability to care for self; Medicaid FFS insurance plan; per court decision today, legal hold DC'd by the court, but pt will remain in the ED until legal order received from the court; this was explained to pt several times as she became verbally agitated towards staff, demanding to leave the ED immediately; able to redirect behaviors with verbal prompting and redirection given by staff and PRN medication given to pt;  pt received Abilify 25 mg PO today at 0609, Depakote  mg PO today at 1257, Haldol 5 mg PO and Ativan 2 mg PO today at 1755; alteration in thoughts r/t psychosis, resolving; pt to DC when court legal hold DC paperwork in pt's chart

## 2019-07-18 NOTE — ED PROVIDER NOTES
"ED Provider Note Addendum    Scribed for Reynold Lama DO by Pk Estes on 7/18/2019 at 10:04 AM.     This is an addendum to the note on Tierney Mayer.  For further details and full chart information, see patient's initial note.       6:04 AM - I discussed the patient's case with Dr. Vargas ([facility]) who will transfer care of the patient to me at this time.        11:17 AM - Patient has done well under my care. They have been consulted with [employer] who has cleared them for discharge. Patient has been established for outpatient care. Patient has been provided with these written instructions and strict return precautions for any new or worsening symptoms. Signed discontinuation of legal hold.     Discharge vitals: /79   Pulse 66   Temp 36.4 °C (97.5 °F) (Temporal)   Resp 16   Ht 1.727 m (5' 8\")   Wt 83.9 kg (185 lb)   SpO2 99%   BMI 28.13 kg/m²      The patient will return for new or worsening symptoms and is stable at the time of discharge.    The patient is referred to a primary physician for blood pressure management, diabetic screening, and for all other preventative health concerns.    DISPOSITION:  Patient will be discharged home in stable condition.    FOLLOW UP:  [facility], Emergency Dept  [address] [facility] [address] [address] [phone]    If symptoms worsen      FINAL IMPRESSION  1. Schizophrenia, unspecified type (HCC)    2. Psychosis, unspecified psychosis type (HCC)         Pk DONG (florencia Pickering scribing for, and in the presence of, Reynold Lama DO.    Electronically signed by: Pk Estes (Scribe), 7/18/2019    Reynold DONG DO personally performed the services described in this documentation, as scribed by Pk Estes in my presence, and it is both accurate and complete.    The note accurately reflects work and decisions made by me.  Reynold Lama  7/18/2019  2:56 PM      "

## 2019-07-18 NOTE — DISCHARGE PLANNING
ALERT team  note:  34 year old female admitted 7/9/19, legal hold, inability to care for self; Medicaid FFS insurance plan; per court decision today, legal hold DC'd by the court and pt to DC today to self; pt with organized thoughts and behaviors; denies SI, HI, self-harm ideation; writer RN reviewed community MH resources with pt, including [facility], with written information given; pt to f/u at [facility] outpt MH services today, with transport by [facility]; pt verbalized understanding  Referral information faxed to [facility], fax [phone]

## 2019-07-18 NOTE — DISCHARGE PLANNING
Received Stipulation and Order from the court releasing pt from legal hold. Gave copy to Alert Team MARYAN Pryor.    Removed pt from legal hold as of 1009 at 7/18.

## 2019-07-18 NOTE — ED NOTES
"Pt coming out of room, yelling at staff and reporting that we are not telling her the right time.  Pt demanding food and water and pt again informed of fluid restriction and no more food or drink until breakfast at 930, pt continue to yell at RN.  Calling RN names and saying \"I'm wasting away and you are starving me, I'm a size 3 or 5 and used to be size 12,\"  Pt again educated on fluid restriction and breakfast will be here at 930.  Pt continuing to yell about leaving here and that \"you told me that the paper work will be here at 900 and I will be able to leave.\"  Pt again told that the time and pt continues to yell at staff refusing to go back into her room, security called for assistance.  Pt back to room.    "

## 2019-07-18 NOTE — ED NOTES
Pt requesting to change her underware and wash her face.  Pt escorted to restroom.  Pt back to room.

## 2019-07-18 NOTE — ED NOTES
Set up with security pt shower at 1200 prior to transportation to [facility] at 1330.

## 2019-07-18 NOTE — ED NOTES
Paperwork rec'd from court, pt up for discharge after transportation set-up for transfer to [facility] for safe discharge.    Alert team working on transportation.  Sitter remains at bedside.

## 2019-07-18 NOTE — ED NOTES
Called alert team concerning [facility] transfer transportation, reports that they are running late and will be here in 10-15 mins.  Lunch tray provided.

## 2019-07-18 NOTE — ED NOTES
Pt coming out of room and requesting more food and juice.  And requesting a shower.    Pt again informed of no more food or juice and informed that showers are in the afternoon.    Per  pt legal possibly dc'd when pt went to court yesterday, no paper work at this time in pt chart.  Pt aware of court decision and need for paperwork for discharge, pt verbalized understanding.  Pt back to room.

## 2019-07-18 NOTE — ED NOTES
"Pt refusing to take Depakote, pt reports \"I feel like I'm overdosing on it.\"  Pt resting in [place], no other needs voiced.  Sitter at bedside.  "

## 2019-07-18 NOTE — ED NOTES
Pt updated on time again per request and requesting a shower.  Pt informed that showers are in the afternoon.  Pt back to room.

## 2019-07-22 ENCOUNTER — HOSPITAL ENCOUNTER (EMERGENCY)
Facility: MEDICAL CENTER | Age: 34
End: 2019-07-29
Attending: EMERGENCY MEDICINE
Payer: MEDICAID

## 2019-07-22 DIAGNOSIS — F22 DELUSION (HCC): ICD-10-CM

## 2019-07-22 DIAGNOSIS — F30.9 MANIA (HCC): ICD-10-CM

## 2019-07-22 LAB
AMPHET UR QL SCN: NEGATIVE
ANION GAP SERPL CALC-SCNC: 8 MMOL/L (ref 0–11.9)
BARBITURATES UR QL SCN: NEGATIVE
BENZODIAZ UR QL SCN: NEGATIVE
BUN SERPL-MCNC: 19 MG/DL (ref 8–22)
BZE UR QL SCN: NEGATIVE
CALCIUM SERPL-MCNC: 8.9 MG/DL (ref 8.5–10.5)
CANNABINOIDS UR QL SCN: NEGATIVE
CHLORIDE SERPL-SCNC: 107 MMOL/L (ref 96–112)
CO2 SERPL-SCNC: 21 MMOL/L (ref 20–33)
CREAT SERPL-MCNC: 0.72 MG/DL (ref 0.5–1.4)
ERYTHROCYTE [DISTWIDTH] IN BLOOD BY AUTOMATED COUNT: 47.5 FL (ref 35.9–50)
GLUCOSE SERPL-MCNC: 99 MG/DL (ref 65–99)
HCG SERPL QL: NEGATIVE
HCT VFR BLD AUTO: 42.2 % (ref 37–47)
HGB BLD-MCNC: 13.4 G/DL (ref 12–16)
LITHIUM SERPL-MCNC: 0.9 MMOL/L (ref 0.6–1.2)
MCH RBC QN AUTO: 29.6 PG (ref 27–33)
MCHC RBC AUTO-ENTMCNC: 31.8 G/DL (ref 33.6–35)
MCV RBC AUTO: 93.2 FL (ref 81.4–97.8)
METHADONE UR QL SCN: NEGATIVE
OPIATES UR QL SCN: NEGATIVE
OXYCODONE UR QL SCN: NEGATIVE
PCP UR QL SCN: NEGATIVE
PLATELET # BLD AUTO: 314 K/UL (ref 164–446)
PMV BLD AUTO: 10.2 FL (ref 9–12.9)
POC BREATHALIZER: 0 PERCENT (ref 0–0.01)
POTASSIUM SERPL-SCNC: 3.9 MMOL/L (ref 3.6–5.5)
PROPOXYPH UR QL SCN: NEGATIVE
RBC # BLD AUTO: 4.53 M/UL (ref 4.2–5.4)
SODIUM SERPL-SCNC: 136 MMOL/L (ref 135–145)
VALPROATE SERPL-MCNC: 17.9 UG/ML (ref 50–100)
WBC # BLD AUTO: 13.5 K/UL (ref 4.8–10.8)

## 2019-07-22 PROCEDURE — 80164 ASSAY DIPROPYLACETIC ACD TOT: CPT

## 2019-07-22 PROCEDURE — 700111 HCHG RX REV CODE 636 W/ 250 OVERRIDE (IP): Performed by: EMERGENCY MEDICINE

## 2019-07-22 PROCEDURE — 99284 EMERGENCY DEPT VISIT MOD MDM: CPT | Performed by: PSYCHIATRY & NEUROLOGY

## 2019-07-22 PROCEDURE — 85027 COMPLETE CBC AUTOMATED: CPT

## 2019-07-22 PROCEDURE — 302970 POC BREATHALIZER

## 2019-07-22 PROCEDURE — 84703 CHORIONIC GONADOTROPIN ASSAY: CPT

## 2019-07-22 PROCEDURE — 99285 EMERGENCY DEPT VISIT HI MDM: CPT

## 2019-07-22 PROCEDURE — 302970 POC BREATHALIZER: Performed by: EMERGENCY MEDICINE

## 2019-07-22 PROCEDURE — 80307 DRUG TEST PRSMV CHEM ANLYZR: CPT

## 2019-07-22 PROCEDURE — 96372 THER/PROPH/DIAG INJ SC/IM: CPT

## 2019-07-22 PROCEDURE — 90791 PSYCH DIAGNOSTIC EVALUATION: CPT

## 2019-07-22 PROCEDURE — 84443 ASSAY THYROID STIM HORMONE: CPT

## 2019-07-22 PROCEDURE — 80178 ASSAY OF LITHIUM: CPT

## 2019-07-22 PROCEDURE — 80048 BASIC METABOLIC PNL TOTAL CA: CPT

## 2019-07-22 RX ORDER — TOPIRAMATE 100 MG/1
100 TABLET, FILM COATED ORAL 2 TIMES DAILY
COMMUNITY
End: 2021-03-19

## 2019-07-22 RX ORDER — DIPHENHYDRAMINE HYDROCHLORIDE 50 MG/ML
25 INJECTION INTRAMUSCULAR; INTRAVENOUS ONCE
Status: COMPLETED | OUTPATIENT
Start: 2019-07-22 | End: 2019-07-22

## 2019-07-22 RX ORDER — ARIPIPRAZOLE 10 MG/1
5 TABLET ORAL DAILY
Status: DISCONTINUED | OUTPATIENT
Start: 2019-07-22 | End: 2019-07-23

## 2019-07-22 RX ORDER — BENZTROPINE MESYLATE 0.5 MG/1
0.5 TABLET ORAL 2 TIMES DAILY
COMMUNITY
End: 2020-12-09

## 2019-07-22 RX ORDER — LORAZEPAM 2 MG/ML
2 INJECTION INTRAMUSCULAR ONCE
Status: COMPLETED | OUTPATIENT
Start: 2019-07-22 | End: 2019-07-22

## 2019-07-22 RX ORDER — BENZTROPINE MESYLATE 0.5 MG/1
0.5 TABLET ORAL 2 TIMES DAILY
Status: DISCONTINUED | OUTPATIENT
Start: 2019-07-22 | End: 2019-07-22

## 2019-07-22 RX ORDER — LITHIUM CARBONATE 450 MG
900 TABLET, EXTENDED RELEASE ORAL
Status: DISCONTINUED | OUTPATIENT
Start: 2019-07-22 | End: 2019-07-22

## 2019-07-22 RX ORDER — LORAZEPAM 2 MG/ML
1 INJECTION INTRAMUSCULAR
Status: DISCONTINUED | OUTPATIENT
Start: 2019-07-22 | End: 2019-07-23

## 2019-07-22 RX ORDER — ARIPIPRAZOLE 5 MG/1
5 TABLET ORAL DAILY
COMMUNITY
End: 2020-12-09

## 2019-07-22 RX ORDER — LITHIUM CARBONATE 450 MG
900 TABLET, EXTENDED RELEASE ORAL
COMMUNITY
End: 2021-01-20

## 2019-07-22 RX ORDER — PROPRANOLOL HYDROCHLORIDE 10 MG/1
10 TABLET ORAL 3 TIMES DAILY
COMMUNITY
End: 2021-03-19

## 2019-07-22 RX ORDER — TOPIRAMATE 100 MG/1
100 TABLET, FILM COATED ORAL 2 TIMES DAILY
Status: DISCONTINUED | OUTPATIENT
Start: 2019-07-22 | End: 2019-07-29 | Stop reason: HOSPADM

## 2019-07-22 RX ORDER — HALOPERIDOL 5 MG/ML
5 INJECTION INTRAMUSCULAR ONCE
Status: COMPLETED | OUTPATIENT
Start: 2019-07-22 | End: 2019-07-22

## 2019-07-22 RX ORDER — PROPRANOLOL HYDROCHLORIDE 10 MG/1
10 TABLET ORAL 3 TIMES DAILY
Status: DISCONTINUED | OUTPATIENT
Start: 2019-07-22 | End: 2019-07-26

## 2019-07-22 RX ORDER — LITHIUM CARBONATE 300 MG
900 TABLET ORAL
COMMUNITY
End: 2019-07-22

## 2019-07-22 RX ORDER — MIRTAZAPINE 15 MG/1
15 TABLET, FILM COATED ORAL NIGHTLY
Status: DISCONTINUED | OUTPATIENT
Start: 2019-07-22 | End: 2019-07-29 | Stop reason: HOSPADM

## 2019-07-22 RX ORDER — BENZTROPINE MESYLATE 0.5 MG/1
0.5 TABLET ORAL 2 TIMES DAILY PRN
Status: DISCONTINUED | OUTPATIENT
Start: 2019-07-22 | End: 2019-07-29 | Stop reason: HOSPADM

## 2019-07-22 RX ORDER — DIVALPROEX SODIUM 250 MG/1
750 TABLET, DELAYED RELEASE ORAL
COMMUNITY
End: 2020-12-09

## 2019-07-22 RX ORDER — DIVALPROEX SODIUM 500 MG/1
500 TABLET, DELAYED RELEASE ORAL EVERY MORNING
Status: DISCONTINUED | OUTPATIENT
Start: 2019-07-22 | End: 2019-07-23

## 2019-07-22 RX ORDER — MIRTAZAPINE 15 MG/1
15 TABLET, FILM COATED ORAL NIGHTLY
COMMUNITY
End: 2020-12-09

## 2019-07-22 RX ORDER — DIVALPROEX SODIUM 500 MG/1
500 TABLET, DELAYED RELEASE ORAL EVERY MORNING
COMMUNITY
End: 2020-12-09

## 2019-07-22 RX ADMIN — DIPHENHYDRAMINE HYDROCHLORIDE 25 MG: 50 INJECTION INTRAMUSCULAR; INTRAVENOUS at 06:03

## 2019-07-22 RX ADMIN — HALOPERIDOL LACTATE 5 MG: 5 INJECTION, SOLUTION INTRAMUSCULAR at 17:38

## 2019-07-22 RX ADMIN — LORAZEPAM 2 MG: 2 INJECTION INTRAMUSCULAR; INTRAVENOUS at 06:02

## 2019-07-22 RX ADMIN — LORAZEPAM 2 MG: 2 INJECTION INTRAMUSCULAR; INTRAVENOUS at 17:38

## 2019-07-22 RX ADMIN — DIPHENHYDRAMINE HYDROCHLORIDE 25 MG: 50 INJECTION INTRAMUSCULAR; INTRAVENOUS at 17:39

## 2019-07-22 RX ADMIN — HALOPERIDOL LACTATE 5 MG: 5 INJECTION, SOLUTION INTRAMUSCULAR at 06:03

## 2019-07-22 ASSESSMENT — LIFESTYLE VARIABLES
EVER FELT BAD OR GUILTY ABOUT YOUR DRINKING: NO
TOTAL SCORE: 0
CONSUMPTION TOTAL: INCOMPLETE
HAVE YOU EVER FELT YOU SHOULD CUT DOWN ON YOUR DRINKING: NO
HAVE PEOPLE ANNOYED YOU BY CRITICIZING YOUR DRINKING: NO
TOTAL SCORE: 0
TOTAL SCORE: 0
EVER HAD A DRINK FIRST THING IN THE MORNING TO STEADY YOUR NERVES TO GET RID OF A HANGOVER: NO
DO YOU DRINK ALCOHOL: YES

## 2019-07-22 NOTE — ED NOTES
Patient's home medications have been reviewed by the pharmacy team.     Seen in the ED for bizarre behavior.     Past Medical History:   Diagnosis Date   • Hypertension    • Psychiatric disorder     Schizophrenia       Patient's Medications   New Prescriptions    No medications on file   Previous Medications    ARIPIPRAZOLE (ABILIFY) 5 MG TABLET    Take 5 mg by mouth every day.    BENZTROPINE (COGENTIN) 0.5 MG TAB    Take 0.5 mg by mouth 2 times a day.    DIVALPROEX (DEPAKOTE) 250 MG TABLET DELAYED RESPONSE    Take 750 mg by mouth every bedtime.    DIVALPROEX (DEPAKOTE) 500 MG TABLET DELAYED RESPONSE    Take 500 mg by mouth every morning.    LITHIUM CR (ESKALITH CR) 450 MG TAB CR    Take 900 mg by mouth every bedtime.    MIRTAZAPINE (REMERON) 15 MG TAB    Take 15 mg by mouth every evening.    PROPRANOLOL (INDERAL) 10 MG TAB    Take 10 mg by mouth 3 times a day.    TOPIRAMATE (TOPAMAX) 100 MG TAB    Take 100 mg by mouth 2 times a day.   Modified Medications    No medications on file   Discontinued Medications    ASENAPINE MALEATE (SAPHRIS) 10 MG SL TAB    Place 20 mg under tongue every bedtime.    LITHIUM (ESKALITH) 300 MG TAB    Take 900 mg by mouth every bedtime.    LITHIUM CR (ESKALITH CR) 450 MG TAB CR    Take 450 mg by mouth 2 times a day.    MEMANTINE (NAMENDA) 10 MG TAB    Take 10 mg by mouth 2 times a day.          A:  Medications prescribed by psychiatry team during recent visit 7/19/19.     Medications do not appear to be contributing to current complaints. Unclear how compliant patient is with medications; however, lithium level is 0.9 showing some compliance.         P:    Home medications have been reordered.        Cari West

## 2019-07-22 NOTE — ED NOTES
Security called to bedside to switch restraint location. Restraints switched to right wrist and left ankle. Pt cont to be agitated and restless. Sitter 1:1 at bedside

## 2019-07-22 NOTE — ED NOTES
Med rec completed historically from visit on 7/9/19  Pt unable to participate in an interview    Note from med rec on 7/9/19:  Med Rec complete per [facility]  Allergies Reviewed  No ABX filled in the last 14 days     Pt unwilling to participate interview     Medications on med rec prescribed and filled 5/21/2019  Pt will not say if she uses another pharmacy or when she last took medications.

## 2019-07-22 NOTE — ED NOTES
Pt agitated. Pt restless in bed. Dr. Gansert notified of aggressive and violent history and cont agitation. OK to continue violent restraint order. Sitter 1:1 at bedside

## 2019-07-22 NOTE — ED NOTES
"Pt cont to stay agitated and restless. Pt \"I do not want to be in here. Get me out of here.\" For safety reasons of staff for past violence and cont agitation, restraint order to be renewed. Sitter 1:1 at bedside  "

## 2019-07-22 NOTE — ED PROVIDER NOTES
"ED Provider Note    Scribed for Christopher Stockton M.D. by Pk Estes. 7/22/2019  5:29 AM    Primary care provider: Pcp Pt States None  Means of arrival: EMS  History obtained from: patient  History limited by: poor historian    CHIEF COMPLAINT  Chief Complaint   Patient presents with   • Psych Eval       HPI  Tierney Mayer is a 34 y.o. female who presents to the Emergency Department for psychiatric evaluation. Patient reports that she is in the Emergency Department because she \"had a miscarriage of a full term baby a few minutes ago\". She remains tangential and delusional throughout the encounter. Patient reports that she gets medication injections every 3 weeks.     Further HPI cannot be obtained as the patient is a poor historian.      REVIEW OF SYSTEMS  Pertinent positives include psychiatric evaluation.     See HPI for further details.     Further ROS cannot be obtained as the patient is a poor historian.     PAST MEDICAL HISTORY   has a past medical history of Hypertension and Psychiatric disorder.    SURGICAL HISTORY   has a past surgical history that includes pelviscopy (2/22/2011) and incision and drainage general (2/22/2011).    SOCIAL HISTORY  Social History   Substance Use Topics   • Smoking status: Current Every Day Smoker     Packs/day: 1.00     Types: Cigarettes   • Smokeless tobacco: Never Used      Comment: knows she is suppose to quit but hasn't   • Alcohol use No      History   Drug Use No       FAMILY HISTORY  None noted    CURRENT MEDICATIONS  Home Medications     Reviewed by Amanda Matson R.N. (Registered Nurse) on 07/22/19 at 0504  Med List Status: <None>   Medication Last Dose Status   Asenapine Maleate (SAPHRIS) 10 MG SL Tab  Active   lithium CR (ESKALITH CR) 450 MG Tab CR  Active   memantine (NAMENDA) 10 MG Tab  Active                ALLERGIES  Allergies   Allergen Reactions   • Aripiprazole      Twitches and paranoia.   • Clonazepam      Pt reports getting a stroke.  Per " historical: Twitches and paranoia.   • Invega Fidencioenna [Paliperidone Palmitate]    • Olanzapine      Pt reports worsening and threatening voices.   • Paliperidone      Twitches and paranoia .       PHYSICAL EXAM  VITAL SIGNS: /89   Pulse 89   Temp 36.9 °C (98.4 °F) (Temporal)   Resp 18   Wt 72.6 kg (160 lb)   SpO2 94%   BMI 25.05 kg/m²     Nursing note and vitals reviewed.  Constitutional: Well-developed and well-nourished. Moderate distress.   HENT: Head is normocephalic and atraumatic. Oropharynx is clear and moist without exudate or erythema.   Eyes: Pupils are equal, round, and reactive to light. Conjunctiva are normal.   Cardiovascular: Normal rate and regular rhythm. No murmur heard. Normal radial pulses.  Pulmonary/Chest: Breath sounds normal. No wheezes or rales.   Abdominal: Soft and non-tender. No distention    Musculoskeletal: Extremities exhibit normal range of motion without edema or tenderness.   Neurological: Awake, alert. No focal deficits noted.  Skin: Skin is warm and dry. No rash.   Psychiatric: Delusional, tangential, flights of ideas.     DIAGNOSTIC STUDIES / PROCEDURES    LABS  Results for orders placed or performed during the hospital encounter of 07/22/19   URINE DRUG SCREEN   Result Value Ref Range    Amphetamines Urine Negative Negative    Barbiturates Negative Negative    Benzodiazepines Negative Negative    Cocaine Metabolite Negative Negative    Methadone Negative Negative    Opiates Negative Negative    Oxycodone Negative Negative    Phencyclidine -Pcp Negative Negative    Propoxyphene Negative Negative    Cannabinoid Metab Negative Negative   BASIC METABOLIC PANEL   Result Value Ref Range    Sodium 136 135 - 145 mmol/L    Potassium 3.9 3.6 - 5.5 mmol/L    Chloride 107 96 - 112 mmol/L    Co2 21 20 - 33 mmol/L    Glucose 99 65 - 99 mg/dL    Bun 19 8 - 22 mg/dL    Creatinine 0.72 0.50 - 1.40 mg/dL    Calcium 8.9 8.5 - 10.5 mg/dL    Anion Gap 8.0 0.0 - 11.9   CBC WITHOUT  DIFFERENTIAL   Result Value Ref Range    WBC 13.5 (H) 4.8 - 10.8 K/uL    RBC 4.53 4.20 - 5.40 M/uL    Hemoglobin 13.4 12.0 - 16.0 g/dL    Hematocrit 42.2 37.0 - 47.0 %    MCV 93.2 81.4 - 97.8 fL    MCH 29.6 27.0 - 33.0 pg    MCHC 31.8 (L) 33.6 - 35.0 g/dL    RDW 47.5 35.9 - 50.0 fL    Platelet Count 314 164 - 446 K/uL    MPV 10.2 9.0 - 12.9 fL   HCG QUAL SERUM   Result Value Ref Range    Beta-Hcg Qualitative Serum Negative Negative   LITHIUM   Result Value Ref Range    Lithium 0.9 0.6 - 1.2 mmol/L   ESTIMATED GFR   Result Value Ref Range    GFR If African American >60 >60 mL/min/1.73 m 2    GFR If Non African American >60 >60 mL/min/1.73 m 2   POC BREATHALIZER   Result Value Ref Range    POC Breathalizer 0.000 0.00 - 0.01 Percent   All labs reviewed by me.    RADIOLOGY  No orders to display   The radiologist's interpretation of all radiological studies have been reviewed by me.    COURSE & MEDICAL DECISION MAKING  Nursing notes, VS, PMSFHx reviewed in chart.     Review of past medical records shows the patient was last seen in the ED 4/6/19 for psychiatric evaluation. She is prescribed Lithium.    5:29 AM - Patient seen and examined at bedside. Ordered HCG qual serum, BMP, CBC with differential, Urine drug screen, POC breathalyzer, Lithium to evaluate her symptoms. The differential diagnoses include but are not limited to: psychosis, delusional, shaila    5:49 AM - Patient is becoming violent with staff. Ordered for Haldol 5 mg, Ativan 2 mg, Benadryl 25 mg, and violent restraints.     6:36 AM - Patient has been evaluated by psychiatry and is recommended for legal hold. I signed the legal 2000.       DISPOSITION:  Patient will be transferred to an appropriate psychiatric facility in stable condition for further psychiatric care and evaluation. Patient will be placed under the care of my partner awaiting transfer.    FINAL IMPRESSION  1. Shaila (HCC)    2. Delusion (HCC)          IPk (Chika), am  scribing for, and in the presence of, Christopher Stockton M.D..    Electronically signed by: Pk Estes (Scribe), 7/22/2019    I, Christopher Stockton M.D. personally performed the services described in this documentation, as scribed by Pk Estes in my presence, and it is both accurate and complete. C    The note accurately reflects work and decisions made by me.  Christopher Stockton  7/22/2019  12:36 PM

## 2019-07-22 NOTE — DISCHARGE PLANNING
ALERT team  note:  34 year old female admitted this AM, legal hold, inability to care for self; with increased physical aggression, agitation towards [facility] ED staff, wanting to leave; security at bedside and place in restraints; received IM Haldol 5 mg, Benadryl 25 mg, Ativan 2 mg today at 0603; evaluated by [facility] ED psychiatry team, legal hold cont; alteration in thoughts r/t psychosis, cont; [facility]    Legal hold to [facility] ED Jackie TAVAREZ, today, at 1345, initiated today at 0606

## 2019-07-22 NOTE — PSYCHIATRY
"PSYCHIATRIC INTAKE EVALUATION    *Reason for admission:  Delusion and inability to care for self.                  *Reason for consult:       Delusion and inability to care for self.  *Requesting Physician/APN: Dr. Stokcton        Supervising Psychiatrist:     Dr. Lopez     Legal Hold on admission: L2K           *Chief Complaint: \"I just had a miscarriage.\"      *HPI (includes Psychiatric ROS):   Patient is a 34 year old female with a history of Schizophrenia and multiple hospital admissions who presented to the ED with delusions and inability to care for self. She presented to the ED emotionally labile and anxious, believing that she had been pregnant. Patient was recently in the ED from 7/09 to 7/18 with a similar presentation and was discharged to [facility] at that time. She has two charts that will be merged, second MRN# [id number]. She was somnolent during this interview as she had been given PRN sedation for agitation at 6am today. He is known to be a poor historian. Unable to complete Psych ROS. She did continue to endorse that belief that she was recently pregnant and miscarried.        *Medical Review Of Symptoms (not dx conditions):       Review of Systems   Unable to perform ROS: Mental status change       All other systems reviewed and are negative.       *Psychiatric Examination:   Vitals:    07/22/19 0929   BP: 105/62   Pulse: 81   Resp: 18   Temp: 36.5 °C (97.7 °F)   SpO2: 99%     General Appearance: Patient appears older than her stated age, unkempt, poor hygiene, somnolent and difficult to arouse.  Abnormal Movements: No tremor, dyskinesia, or dystonia noted.  Gait and Posture: Unable to assess.  Speech: Paucity of spontaneous speech, slow rate, normal volume.  Thought Process: Disorganized.  Associations: Loose Associations present, no clang or neologisms.  Abnormal or Psychotic Thoughts: Delusions present, patient believes that she recently miscarried a child. Unable to assess VH or AH.  Judgement " and Insight: Poor / Judgement.  Orientation: AAOx2.  Recent and Remote Memory: Grossly Impaired.  Attention Span and Concentration: Grossly Impaired.  Language: Fluent in English.  Fund of Knowledge: Adequate.  Mood and Affect: Unable to assess mood, affect is anxious, dysthymic, Labile.  SI/HI: Unable to assess.     *PAST MEDICAL/PSYCH/FAMILY/SOCIAL(as reported by patient):       *medical hx:        TBI: Unable to assess.  SZ: Unable to assess.  Stroke:  Unable to assess.  Past Medical History:   Diagnosis Date   • Hypertension    • Psychiatric disorder     Schizophrenia     Past Surgical History:   Procedure Laterality Date   • PELVISCOPY  2/22/2011    Performed by BABITA SHEETS at SURGERY SAME DAY [facility] ORS   • INCISION AND DRAINAGE GENERAL  2/22/2011    Performed by BABITA SHEETS at SURGERY SAME DAY [facility] ORS        *psychiatric hx:   SAs: Unable to assess.  Guns: Unable to assess.  Hx of Violence:Unable to assess.  Hospitalizations: Unable to assess.  Med Hx:Unable to assess.  Dx Hx:Unable to assess.  Other:     *family Psych hx:       *social hx: Unable to assess.  Alcohol: Unable to assess.  Drugs:Unable to assess.     *MEDICAL HX: labs, MARS, medications, etc were reviewed. Only those findings of potential interest to psychiatry are noted below:    *Current Medical issues:        *Allergies:  Allergies   Allergen Reactions   • Aripiprazole      Twitches and paranoia.   • Clonazepam      Pt reports getting a stroke.  Per historical: Twitches and paranoia.   • Invega Sustenna [Paliperidone Palmitate]    • Olanzapine      Pt reports worsening and threatening voices.   • Paliperidone      Twitches and paranoia .     *Current Medications:    Current Facility-Administered Medications:   •  benztropine (COGENTIN) tablet 0.5 mg, 0.5 mg, Oral, BID, Christopher Stockton M.D.  •  divalproex (DEPAKOTE) delayed-release tablet 750 mg, 750 mg, Oral, QHS, Christopher Stockton M.D.  •  divalproex (DEPAKOTE)  delayed-release tablet 500 mg, 500 mg, Oral, QAM, Christopher Stockton M.D.  •  lithium CR (ESKALITH CR) tablet 900 mg, 900 mg, Oral, QHS, Christopher Stockton M.D.  •  mirtazapine (REMERON) tablet 15 mg, 15 mg, Oral, Nightly, Christopher Stockton M.D.  •  propranolol (INDERAL) tablet 10 mg, 10 mg, Oral, TID, Christopher Stockton M.D.  •  topiramate (TOPAMAX) tablet 100 mg, 100 mg, Oral, BID, Christopher Stockton M.D.  •  ARIPiprazole (ABILIFY) tablet 5 mg, 5 mg, Oral, DAILY, Christopher Stockton M.D.    Current Outpatient Prescriptions:   •  aripiprazole (ABILIFY) 5 MG tablet, Take 5 mg by mouth every day., Disp: , Rfl:   •  benztropine (COGENTIN) 0.5 MG Tab, Take 0.5 mg by mouth 2 times a day., Disp: , Rfl:   •  divalproex (DEPAKOTE) 250 MG Tablet Delayed Response, Take 750 mg by mouth every bedtime., Disp: , Rfl:   •  divalproex (DEPAKOTE) 500 MG Tablet Delayed Response, Take 500 mg by mouth every morning., Disp: , Rfl:   •  mirtazapine (REMERON) 15 MG Tab, Take 15 mg by mouth every evening., Disp: , Rfl:   •  propranolol (INDERAL) 10 MG Tab, Take 10 mg by mouth 3 times a day., Disp: , Rfl:   •  topiramate (TOPAMAX) 100 MG Tab, Take 100 mg by mouth 2 times a day., Disp: , Rfl:   •  lithium CR (ESKALITH CR) 450 MG Tab CR, Take 900 mg by mouth every bedtime., Disp: , Rfl:   *EKG:  QTc from 7/11/2019 was 462  *Imaging: N/A   EEG:  N/A     *Labs:  Recent Labs      07/22/19   0540   WBC  13.5*   RBC  4.53   HEMOGLOBIN  13.4   HEMATOCRIT  42.2   MCV  93.2   MCH  29.6   RDW  47.5   PLATELETCT  314   MPV  10.2     Lab Results   Component Value Date/Time    SODIUM 136 07/22/2019 05:40 AM    POTASSIUM 3.9 07/22/2019 05:40 AM    CHLORIDE 107 07/22/2019 05:40 AM    CO2 21 07/22/2019 05:40 AM    GLUCOSE 99 07/22/2019 05:40 AM    BUN 19 07/22/2019 05:40 AM    CREATININE 0.72 07/22/2019 05:40 AM           Lab Results  Component Value Date/Time   BREATHALIZER 0.000 07/22/2019 0511     No components found for: BLOODALCOHOL     Lab  Results  Component Value Date/Time   AMPHUR Negative 07/22/2019 0516   BARBSURINE Negative 07/22/2019 0516   BENZODIAZU Negative 07/22/2019 0516   COCAINEMET Negative 07/22/2019 0516   METHADONE Negative 07/22/2019 0516   ECSTASY Negative 10/26/2015 0415   OPIATES Negative 07/22/2019 0516   OXYCODN Negative 07/22/2019 0516   PCPURINE Negative 07/22/2019 0516   PROPOXY Negative 07/22/2019 0516   CANNABINOID Negative 07/22/2019 0516     No results found for: TSH, FREET4      *ASSESSMENT:  Patient is a 34 year old female with a history of Schizophrenia and multiple hospital admissions who presented to the ED with delusions and inability to care for self. She continues to be delusional and appears to be unable to care for herself. Will order a depakote level to assess her medication adherence. Lithium should be discontinued at this time as she had polydipsia during last hospitalization and may not be capable of maintaining proper levels. Will continue to assess when less somnolent.      Dx:  Unspecified psychosis.  Schizophrenia by history.  R/O BPAD with psychotic features.  R/O Schizoaffective    PLAN:  1. Continue legal hold as patient presents an inability to care for herself.  2. Continue Depakote 500mg PO qam and Depakote 750mg PO qhs for mood stabilization.  3. Valproic acid level is pending, patient does report that she has been taking her medications before admission.  4. Continue Abilify 5mg PO qam for psychosis, will increase if well tolerated as patient was discharged on 25mg on 7/18.  5. Continue Mirtazapine for sleep.  6. Discontinued Lithium as patient had polydipsia during last hospitalization, currently on two other mood stabilizers.  7. Continue 1:1 observation for safety.  8. Restrict personal effects.  9. Will transfer to inpatient psychiatric hospitalization.  10. Will continue to follow.   Thank You for the Consult.    Patient was seen on Rounds with Dr. Lopez, Attending Psychiatrist, and  treatment plan was discussed.

## 2019-07-22 NOTE — DISCHARGE PLANNING
Medical Social Work    Referral: Legal Hold    Intervention: Legal Hold Paperwork given to SW by Life Skills RN Taty Pryor    Legal Hold Initiated: Date: 07/22/19 Time: 0606    Patient’s Insurance Listed on Face Sheet: Medicaid FFS    Referrals sent to: [facility]    This referral contains the following information:  1) Face sheet __x__  2) Page 1 and Page 2 of Legal Hold __x__  3) Alert Team Assessment/Psych Assessment __x__  4) Head to toe physical exam _x___  5) Urine Drug Screen __x__  6) Blood Alcohol __x__  7) Vital signs __x__  8) Pregnancy test when applicable _x__  9) Medications list _x___    Plan: Patient will transfer to mental health facility once acceptance is obtained.

## 2019-07-22 NOTE — ED NOTES
"Pt agitated and restless. Pt states \"Get me out of these fucking restraints.\" pt notified on safety awareness and educated on why restraints need to be on at this time. Sitter at bedside. Cont to monitor.  "

## 2019-07-22 NOTE — ED NOTES
"Pt assisted to bed pan. Pt agitated. Pt stated \"When am I going to [place]. I want to get out of here.\" martina 1:1 at bedside. Martina stated \"Pt told me that she wanted to walk to bathroom and then get up out of here.\" pt cont to stay in restraints.  "

## 2019-07-22 NOTE — DISCHARGE PLANNING
Pt is asleep in two point restraints.arousable but quickly back to sleep.remains on close observation and not taking po psy meds at this time.

## 2019-07-22 NOTE — CONSULTS
"[facility]   TRIAGE ASSESSMENT    Name: Tierney Mayer  MRN: [id number]  : 1985  Age: 34 y.o.  Date of assessment: 2019  PCP: Pcp Pt States None  Persons in attendance: Patient    CHIEF COMPLAINT/PRESENTING ISSUE (as stated by Tierney Mayer): The patient presents as a 34 year-old female, BIBA for impulsive, bizarre behaviors alongside delusional/paranoid statements. The patient reports \"I am 3 years pregnant\" among other nonsensical statements. The patient was unable to answer the majority of the questions asked during the evaluation, becoming angry at times (screaming at this writer) and then shifting into laughter moments later. The patient was noted as easily agitated, screaming and threatening ER staff when asked benign questions. It is unclear at this time if she is experiencing hallucinations as the patient stated \"I like the catacombs\" when asked if she was experiencing any; The patient has a history of schizophrenia as well as SI and has been hospitalized numerous times at various inpatient psychiatric facilities in the area; she denies SI/HI at this time, however she presents with safety concerns regarding ability to care for self due to her aggressive behaviors and altered mental state.   Chief Complaint   Patient presents with   • Psych Eval        CURRENT LIVING SITUATION/SOCIAL SUPPORT: Per chart history, the patient was living at the [facility] and was seeing Dr. Jimenez at [facility] (Psychiatrist) and had a , Silke, there as well according to the last consult note written on 2019.     BEHAVIORAL HEALTH TREATMENT HISTORY  Does patient/parent report a history of prior behavioral health treatment for patient?   Yes:  The patient presents with numerous admits to [facility] and [facility] historically; she has been previously diagnosed with Schizophrenia. The patient was able to report that she is currently taking Abilify and Loxapine as prescribed; however " it is unclear if the patient has been compliant with her medication as this has been a well documented issue historically.         SAFETY ASSESSMENT - SELF  Does patient acknowledge current or past symptoms of dangerousness to self? no  Does parent/significant other report patient has current or past symptoms of dangerousness to self? N\A  Does presenting problem suggest symptoms of dangerousness to self? The patient denies SI, however she presents as a danger to self due to her altered mental state and capacity to care for self.    SAFETY ASSESSMENT - OTHERS  Does patient acknowledge current or past symptoms of aggressive behavior or risk to others? No, however patient is noted as having a history of aggressive behavior towards staff that has required sedation and restraints in the past.   Does parent/significant other report patient has current or past symptoms of aggressive behavior or risk to others?  N\A  Does presenting problem suggest symptoms of dangerousness to others? No    Crisis Safety Plan completed and copy given to patient? N\A    ABUSE/NEGLECT SCREENING  Does patient report feeling “unsafe” in his/her home, or afraid of anyone?  no  Does patient report any history of physical, sexual, or emotional abuse?  no  Does parent or significant other report any of the above? no  Is there evidence of neglect by self?  no  Is there evidence of neglect by a caregiver? no  Does the patient/parent report any history of CPS/APS/police involvement related to suspected abuse/neglect or domestic violence? no  Based on the information provided during the current assessment, is a mandated report of suspected abuse/neglect being made?  No    SUBSTANCE USE SCREENING  Yes:  Sam all substances used in the past 30 days:      Last Use Amount   []   Alcohol     []   Marijuana     []   Heroin     []   Prescription Opioids  (used without prescription, for    recreation, or in excess of prescribed amount)     []   Other  "Prescription  (used without prescription, for    recreation, or in excess of prescribed amount)     []   Cocaine      []   Methamphetamine     []   \"\" drugs (ectasy, MDMA)     []   Other substances        UDS results: Negative  Breathalyzer results: 0.00    What consequences does the patient associate with any of the above substance use and or addictive behaviors? None    Risk factors for detox (check all that apply):  []  Seizures   []  Diaphoretic (sweating)   []  Tremors   []  Hallucinations   []  Increased blood pressure   []  Decreased blood pressure   []  Other   []  None      [] Patient education on risk factors for detoxification and instructed to return to ER as needed.      MENTAL STATUS   Participation: Limited verbal participation, Guarded, Defensive and Resistant  Grooming: Disheveled  Orientation: Alert and Evidence of delusions present  Behavior: Agitated, Tense and Unusual behaviors noted  Eye contact: Indirect  Mood: Anxious, Angry and Irritable  Affect: Labile, Sad, Anxious, Tearful, Angry and Happy  Thought process: Flight of ideas  Thought content: Evidence of delusion and Paranoia  Speech: Loud and Rapid  Perception: Evidence of auditory hallucination  Memory:  Poor memory for chronology of events  Insight: Poor  Judgment:  Poor  Other:    Collateral information:   Source:  [] Significant other present in person:   [] Significant other by telephone  [] [facility]   [x] [facility] Nursing Staff  [x] [facility]  [] Other:     [] Unable to complete full assessment due to:  [] Acute intoxication  [] Patient declined to participate/engage  [] Patient verbally unresponsive  [] Significant cognitive deficits  [x] Significant perceptual distortions or behavioral disorganization  [] Other:      CLINICAL IMPRESSIONS:  Primary:  Schizophrenia  Secondary:         IDENTIFIED NEEDS/PLAN:  [Trigger DISPOSITION list for any items marked]    [x]  Imminent safety risk - self [] Imminent " safety risk - others   []  Acute substance withdrawal [x]  Psychosis/Impaired reality testing   []  Mood/anxiety []  Substance use/Addictive behavior   [x]  Maladaptive behaviro []  Parent/child conflict   []  Family/Couples conflict []  Biomedical   []  Housing []  Financial   []   Legal  Occupational/Educational   []  Domestic violence []  Other:     Disposition: Refer to Legal Hold    Does patient express agreement with the above plan? no    Referral appointment(s) scheduled? no    Alert team only:   I have discussed findings and recommendations with Dr. Stockton who is in agreement with these recommendations.     Referral information sent to the following community providers :    If applicable : Referred to : [facility] for legal hold follow up at (time): Legal hold currently pending certification/medical clearance by STELLA Greco  7/22/2019

## 2019-07-22 NOTE — ED NOTES
"Pt tried to elope, security called. Pt brought back near room. Pt yelling out & extremely aggressive, angry to staff saying \"I am not staying you! you take me to penitentiary or let me leave now!\" When security arrived pt continued to refuse to leave hallway & return to room. Alert team stated pt is being placed on legal hold. Due to pt continued tries to leave, restraints initiated for pt safety. MD to order IM medications.   "

## 2019-07-22 NOTE — ED NOTES
"Pt escalating in agitation & aggression- becoming uncooperative with ED staff. Pt getting herself dressed, ripped off monitor cords, screaming 'I'm not talking to you dead corpses, I can't have babies. Get this dead baby off of me!\" Pt then asking \"how do you talk to fire.\" To notify MD. Ghosh at bedside. Jef with Alert team at bedside.   "

## 2019-07-22 NOTE — ED NOTES
"Pt sleeping. When entering room to assess pt, pt wakes up and screams \"Get me out of here.\" pt cont to stay agitated toward staff. Sitter 1:1 at bedside  "

## 2019-07-22 NOTE — ED TRIAGE NOTES
"Pt BIBA stating \"I am 3 years pregnant\" and then asking \"did I give birth yet?!\" pt screaming out that she is in labor. pt acting impulsive & bizarre. Hx DID, anxiety, bipolar, drug related psychosis. Denies SI/HI  "

## 2019-07-22 NOTE — ED NOTES
Pt restless. resp equal unlabored. Pt demanding things by yelling out the door at staff. Sitter 1:1 at bedside. Cont to have restraints.

## 2019-07-23 LAB — TSH SERPL DL<=0.005 MIU/L-ACNC: 4.15 UIU/ML (ref 0.38–5.33)

## 2019-07-23 PROCEDURE — 96374 THER/PROPH/DIAG INJ IV PUSH: CPT

## 2019-07-23 PROCEDURE — 99282 EMERGENCY DEPT VISIT SF MDM: CPT | Mod: GC | Performed by: PSYCHIATRY & NEUROLOGY

## 2019-07-23 PROCEDURE — 700102 HCHG RX REV CODE 250 W/ 637 OVERRIDE(OP): Performed by: PSYCHIATRY & NEUROLOGY

## 2019-07-23 PROCEDURE — 700102 HCHG RX REV CODE 250 W/ 637 OVERRIDE(OP): Performed by: EMERGENCY MEDICINE

## 2019-07-23 PROCEDURE — 700111 HCHG RX REV CODE 636 W/ 250 OVERRIDE (IP): Performed by: EMERGENCY MEDICINE

## 2019-07-23 PROCEDURE — 700101 HCHG RX REV CODE 250: Performed by: EMERGENCY MEDICINE

## 2019-07-23 PROCEDURE — 700102 HCHG RX REV CODE 250 W/ 637 OVERRIDE(OP): Performed by: STUDENT IN AN ORGANIZED HEALTH CARE EDUCATION/TRAINING PROGRAM

## 2019-07-23 PROCEDURE — A9270 NON-COVERED ITEM OR SERVICE: HCPCS | Performed by: STUDENT IN AN ORGANIZED HEALTH CARE EDUCATION/TRAINING PROGRAM

## 2019-07-23 PROCEDURE — A9270 NON-COVERED ITEM OR SERVICE: HCPCS | Performed by: EMERGENCY MEDICINE

## 2019-07-23 PROCEDURE — A9270 NON-COVERED ITEM OR SERVICE: HCPCS | Performed by: PSYCHIATRY & NEUROLOGY

## 2019-07-23 RX ORDER — HALOPERIDOL 5 MG/ML
5 INJECTION INTRAMUSCULAR EVERY 6 HOURS PRN
Status: DISCONTINUED | OUTPATIENT
Start: 2019-07-23 | End: 2019-07-29 | Stop reason: HOSPADM

## 2019-07-23 RX ORDER — LEVOTHYROXINE SODIUM 0.1 MG/1
100 TABLET ORAL
Status: DISCONTINUED | OUTPATIENT
Start: 2019-07-23 | End: 2019-07-23

## 2019-07-23 RX ORDER — HALOPERIDOL 5 MG/1
5 TABLET ORAL EVERY 6 HOURS PRN
Status: DISCONTINUED | OUTPATIENT
Start: 2019-07-23 | End: 2019-07-29 | Stop reason: HOSPADM

## 2019-07-23 RX ORDER — ARIPIPRAZOLE 10 MG/1
25 TABLET ORAL DAILY
Status: DISCONTINUED | OUTPATIENT
Start: 2019-07-24 | End: 2019-07-26

## 2019-07-23 RX ORDER — LORAZEPAM 2 MG/ML
2 INJECTION INTRAMUSCULAR EVERY 4 HOURS PRN
Status: DISCONTINUED | OUTPATIENT
Start: 2019-07-23 | End: 2019-07-24

## 2019-07-23 RX ORDER — LORAZEPAM 2 MG/1
2 TABLET ORAL EVERY 4 HOURS PRN
Status: DISCONTINUED | OUTPATIENT
Start: 2019-07-23 | End: 2019-07-24

## 2019-07-23 RX ADMIN — TOPIRAMATE 100 MG: 100 TABLET, FILM COATED ORAL at 08:46

## 2019-07-23 RX ADMIN — LORAZEPAM 2 MG: 2 TABLET ORAL at 13:06

## 2019-07-23 RX ADMIN — PROPRANOLOL HYDROCHLORIDE 10 MG: 10 TABLET ORAL at 12:42

## 2019-07-23 RX ADMIN — DIVALPROEX SODIUM 750 MG: 500 TABLET, DELAYED RELEASE ORAL at 17:15

## 2019-07-23 RX ADMIN — ARIPIPRAZOLE 5 MG: 10 TABLET ORAL at 08:46

## 2019-07-23 RX ADMIN — HALOPERIDOL 5 MG: 5 TABLET ORAL at 13:05

## 2019-07-23 RX ADMIN — LORAZEPAM 1 MG: 2 INJECTION INTRAMUSCULAR; INTRAVENOUS at 01:32

## 2019-07-23 RX ADMIN — PROPRANOLOL HYDROCHLORIDE 10 MG: 10 TABLET ORAL at 08:46

## 2019-07-23 NOTE — ED NOTES
"Pt refused ativan and stated \"I'm already asleep, leave me alone\". Will continue to monitor. Pt currently resting quietly. Sitter in direct view of pt.   "

## 2019-07-23 NOTE — ED PROVIDER NOTES
ED Provider Note Addendum    Scribed for Christopher Stockton M.D. by Pk Estes on 7/23/2019 at 8:59 AM.     This is an addendum to the note on Tierney Mayer.  For further details and full chart information, see patient's initial note.       5:59 AM - I discussed the patient's case with Dr. Perez ([facility]) who will transfer care of the patient to me at this time.        10:00 AM - The patient has done well during my period of observation. They are resting comfortably. They continue to await transfer to an inpatient psychiatric facility.      Disposition:  Patient will be transferred to an appropriate psychiatric facility in stable condition for further psychiatric care and evaluation.  Patient will be placed under the care of my partner awaiting transfer.    FINAL IMPRESSION  1. Shaila (HCC)    2. Delusion (HCC)         I, Pk Estes (Scribe), am scribing for, and in the presence of, Christopher Stockton M.D..    Electronically signed by: Pk Estes (Scribe), 7/23/2019    IChristopher M.D. personally performed the services described in this documentation, as scribed by Pk Estes in my presence, and it is both accurate and complete.    The note accurately reflects work and decisions made by me.  Christopher Stockton  7/23/2019  11:16 AM

## 2019-07-23 NOTE — ED NOTES
Pt is resting comfortably after meal and coffee      Sitter in direct visual     Pt is refusing all mediation

## 2019-07-23 NOTE — DISCHARGE PLANNING
Alert Team  Pt irritable with staff but cooperative.  Noted to be responding to internal stimuli while alone in her room.

## 2019-07-23 NOTE — ED NOTES
Pt up to the BR with steady gait.  Pt agitated and appears to be aggressive toward staff.  Pt moved to GRN 30.

## 2019-07-23 NOTE — ED NOTES
Report rec'd from Dian DAN.  Patient care assumed at this time.  Pt sleeping, respirations even/unlabored.  Sitter outside the room for 1:1 observation.

## 2019-07-23 NOTE — ED NOTES
Pt screaming in the room. Went into room and pt extremely agitated and yelling at staff. Pt peed on the floor next to the bed. MD notified. Pt refusing oral meds.

## 2019-07-23 NOTE — ED NOTES
Pt agreeable to take medications w/ offer of snack/coffee.  Patient medicated per MAR.  Pt calm/cooperative but rambling.

## 2019-07-23 NOTE — ED NOTES
Pt began to get anxious and verbally aggressive. Dr. Perez was notified and I was verbally ordered to give 1 mg of Ativan PRN if necessary.

## 2019-07-23 NOTE — ED NOTES
"security called to bedside to assist w medication administration. Pt arguing and yelling that she has not been fed and no one has helped her use the bathroom. Informed pt I have assisted her to bedpan 3+ times and she received a lunch box a few hours ago. Pt yelling \"this never happened.\" pt medicated.  "

## 2019-07-23 NOTE — PSYCHIATRY
"PSYCHIATRIC FOLLOW-UP:(established)  *Reason for admission:      Acute Psychosis and Agitation  *Legal Hold Status on Admission:   L2K  Supervising Psychiatrist:       Dr. Lemus     *HPI:    Patient is a 34 year old female with a history of schizophrenia who presented to the ED with acute psychosis and agitation. She reports feeling \"Good\" today. She continues to be acutely psychotic, with delusion of a full term  two days ago. She said \"People keep touching my vagina.\" \"My mom is possessing me. And so is Tala Yang. They need to find their own bodies.\" She was agitated yesterday and required ativan for sedation. She denies AH or VH today. She reports \"some\" suicidal ideation with no plan. Denies HI. She is AAOx2 today, cannot guess the date, and is unable to remember how she got here. She refused her pm medications last night, but would not say why. She continues to struggle with describing an appropriate plan if she was discharged.     Medical ROS (as pertinent):                    Review of Systems   Constitutional: Negative for chills and fever.   HENT: Negative for sore throat.    Eyes: Negative for blurred vision.   Respiratory: Negative for cough and shortness of breath.    Cardiovascular: Negative for chest pain.   Gastrointestinal: Negative for abdominal pain, constipation and heartburn.   Genitourinary: Negative for dysuria.   Musculoskeletal: Negative for joint pain.   Skin: Negative for rash.   Neurological: Negative for dizziness.   Psychiatric/Behavioral: Negative for suicidal ideas.      *Psychiatric Examination:  Vitals:    19 0800   BP: 106/76   Pulse: 91   Resp: 14   Temp: 36.8 °C (98.2 °F)   SpO2: 98%     General Appearance:  Appears older than her stated age, unkempt, but fair hygiene. Limited eye contact.  Abnormal Movements: No tremor, dystonia, or dyskinesia present.  Gait and Posture: Normal gait.  Speech: Regular rate and rhythm, not pressured, paucity of spontaneous " "speech.  Thought processes: Tangential.  Associations:  Loosening of associations, no clang or neologisms.  Abnormal or Psychotic Thoughts: Denies AH and VH. Delusions and disorganized thinking continue \"People keep touching my vagina.\" \"My mom is possessing me. And so is Tala Yang. They need to find their own bodies.\"  Judgement and Insight: Poor / Poor  Orientation: AAOx2. Patient does not know the date or how she got here.  Recent and Remote Memory: grossly impaired.  Attention Span and Concentration: Impaired.  Language: fluent in English.  Fund of Knowledge: adequate.  Mood and Affect: \"Good,\" incongruent with affect,  irritable, and dysthymic.   SI/HI: Endorses SI and denies HI.     Recent Labs      07/22/19   0540   SODIUM  136   POTASSIUM  3.9   CHLORIDE  107   CO2  21   GLUCOSE  99   BUN  19       EKG from 7/11:  QTc was 462     Valproic Acid level from 7/22: 17.9        *ASSESSMENT:    Patient is a 34 year old female with a history of schizophrenia who presented to the ED with acute psychosis and agitation. Patient continues to be acutely psychotic with paranoid delusions, disorganized thinking, and frequent aggression requiring PRN sedation. Patient continues to be at risk of safety to self and others and should remain on legal hold. She will need inpatient psychiatric hospitalization and is awaiting transfer.     Dx:  Psychotic Disorder Unspecified  Schizophrenia by history.  Polysubstance use disorder by history.      PLAN:  1. Patient currently on a legal hold due to danger to self.  2. Increase Abilify to 25mg PO qam for psychosis.  3. Continue Depakote 750mg PO BID for mood. Valproic acid level on 7/22 was 17.9.   4. Continue Mirtazapine 15 mg PO qhs for mood and insomnia.  5. Continue Benztropine .5mg PO BID PRN for EPS.  6. Continue PRN Haldol and Ativan for agitation.  7. Consider Levothyroxine 100mcg, TSH pending.   8. Will transfer patient to inpatient hospital.  9. Will continue to " follow.     Thank You for the consult.     Patient was seen on rounds with Dr. Lemus, Attending Psychiatrist, and treatment plan was discussed.

## 2019-07-23 NOTE — ED NOTES
Patient demanding to use the bathroom, screaming at security and staff angry that bathroom door couldn't be shut all of the way (which she knows is our protocol).  On way back to her room, she was verbally threatening to staff.  Commode and toilet paper placed in her room to avoid her having to leave and reducing the chance of her becoming physical with staff.

## 2019-07-23 NOTE — ED NOTES
Pt assisted to bed pan. New sheets put on bed w albert. Instructed pt to ask for assistance to pee each time and to not pee in bed. Notified pt she has sitter outside room at all times. Sitter 1:1 at bedside.

## 2019-07-23 NOTE — ED NOTES
Pt up and down to BR, gait steady.  Pt given juice and crackers.  Pt still yelling and aggressive but directable at this time.

## 2019-07-23 NOTE — ED NOTES
Pt becoming increasingly aggressive, slamming door into frame repeatedly.  Pt talked into taking PO PRN medication.
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
## 2019-07-23 NOTE — ED NOTES
Pt verbally made an agreement to be respectful if she was able to use the rest room and receive some food. Restraints were taken off pt but sitter is in direct view of pt one on one. Will continue to monitor. Steady gait to restroom to urinate

## 2019-07-24 PROCEDURE — 700102 HCHG RX REV CODE 250 W/ 637 OVERRIDE(OP): Performed by: PSYCHIATRY & NEUROLOGY

## 2019-07-24 PROCEDURE — 304561 HCHG STAT O2

## 2019-07-24 PROCEDURE — 700102 HCHG RX REV CODE 250 W/ 637 OVERRIDE(OP): Performed by: EMERGENCY MEDICINE

## 2019-07-24 PROCEDURE — 700101 HCHG RX REV CODE 250: Performed by: EMERGENCY MEDICINE

## 2019-07-24 PROCEDURE — 700102 HCHG RX REV CODE 250 W/ 637 OVERRIDE(OP): Performed by: STUDENT IN AN ORGANIZED HEALTH CARE EDUCATION/TRAINING PROGRAM

## 2019-07-24 PROCEDURE — 99282 EMERGENCY DEPT VISIT SF MDM: CPT | Mod: GC | Performed by: PSYCHIATRY & NEUROLOGY

## 2019-07-24 PROCEDURE — A9270 NON-COVERED ITEM OR SERVICE: HCPCS | Performed by: EMERGENCY MEDICINE

## 2019-07-24 PROCEDURE — A9270 NON-COVERED ITEM OR SERVICE: HCPCS | Performed by: PSYCHIATRY & NEUROLOGY

## 2019-07-24 PROCEDURE — A9270 NON-COVERED ITEM OR SERVICE: HCPCS | Performed by: STUDENT IN AN ORGANIZED HEALTH CARE EDUCATION/TRAINING PROGRAM

## 2019-07-24 RX ORDER — CLONAZEPAM 0.5 MG/1
0.5 TABLET ORAL 2 TIMES DAILY
Status: DISCONTINUED | OUTPATIENT
Start: 2019-07-24 | End: 2019-07-29 | Stop reason: HOSPADM

## 2019-07-24 RX ADMIN — PROPRANOLOL HYDROCHLORIDE 10 MG: 10 TABLET ORAL at 12:00

## 2019-07-24 RX ADMIN — HALOPERIDOL 5 MG: 5 TABLET ORAL at 12:02

## 2019-07-24 RX ADMIN — CLONAZEPAM 0.5 MG: 0.5 TABLET ORAL at 18:35

## 2019-07-24 RX ADMIN — ARIPIPRAZOLE 25 MG: 10 TABLET ORAL at 05:22

## 2019-07-24 RX ADMIN — LORAZEPAM 2 MG: 2 TABLET ORAL at 05:23

## 2019-07-24 RX ADMIN — DIVALPROEX SODIUM 750 MG: 500 TABLET, DELAYED RELEASE ORAL at 17:40

## 2019-07-24 RX ADMIN — PROPRANOLOL HYDROCHLORIDE 10 MG: 10 TABLET ORAL at 17:40

## 2019-07-24 RX ADMIN — TOPIRAMATE 100 MG: 100 TABLET, FILM COATED ORAL at 17:40

## 2019-07-24 RX ADMIN — MIRTAZAPINE 15 MG: 15 TABLET, FILM COATED ORAL at 22:21

## 2019-07-24 RX ADMIN — HALOPERIDOL 5 MG: 5 TABLET ORAL at 12:05

## 2019-07-24 NOTE — ED NOTES
Walked with pt to restroom and then walked a few laps around ER corridors.  Pt says it feels good to walk.  Now to shower with ED tech and . Pt has been very pleasant and cooperative at this moment.

## 2019-07-24 NOTE — ED NOTES
Pt has been getting water from the sink, throwing water at window, water all over the floor.  Yells that she is thristy.  Cup taken away for inappropriate behavior.

## 2019-07-24 NOTE — ED NOTES
Pt appears to be sleeping with eyes closed, resp even and unlabored.  Sitter is providing direct observation.

## 2019-07-24 NOTE — PSYCHIATRY
"BRIEF PSYCHIATRIC CONSULT NOTE: patient seen, full note to follow.  On Approach pt yelled \"What are you doing? Get out!\" Pt was irritable, and refused to participate in evaluation with this provider, stating she has not slept since she arrived.   As per chart review, pt has been refusing her medications, but took Abilify 25mg PO this moring.     Dx: Schizophrenia by hx    Plan:  Legal hold extended  Continue current regimen. Will continue to titrate her meds as tolerated.  Encourage patient to take her medications  Please transfer pt to inpatient psychiatric hospital when bed is available    Will continue to follow          "

## 2019-07-24 NOTE — DISCHARGE PLANNING
Alert Team  Pt continues to present with irritability, lability, frequently defiant with staff and verbally aggressive.  She awaits a bed at [facility].

## 2019-07-24 NOTE — ED NOTES
Pt now awake, eating breakfast and is requesting to speak to her doctor.  States she had been sleeping for the first time in 2 weeks.

## 2019-07-24 NOTE — ED PROVIDER NOTES
ED Provider Note Addendum    Scribed for Christopher Stockton M.D. by Pk Estes on 7/24/2019 at 10:06 AM.     This is an addendum to the note on Tierney Mayer.  For further details and full chart information, see patient's initial note.       4:06 AM - I discussed the patient's case with Dr. Perez ([facility]) who will transfer care of the patient to me at this time.        10:07 AM  - The patient has done well during my period of observation. They are resting comfortably. They continue to await transfer to an inpatient psychiatric facility.     Disposition:  Patient will be transferred to an appropriate psychiatric facility in stable condition for further psychiatric care and evaluation.  Patient will be placed under the care of my partner awaiting transfer.    FINAL IMPRESSION  1. Shaila (HCC)    2. Delusion (HCC)         I, Pk Estes (Scribe), am scribing for, and in the presence of, Christopher Stockton M.D..    Electronically signed by: Pk Estes (Scribe), 7/24/2019    IChristopher M.D. personally performed the services described in this documentation, as scribed by Pk Estes in my presence, and it is both accurate and complete.    The note accurately reflects work and decisions made by me.  Christopher Stockton  7/24/2019  11:15 AM

## 2019-07-24 NOTE — ED NOTES
Pt woke up and ambulated to the rest room. Pt became agitated by the security that was surrounding her door for another patient.

## 2019-07-24 NOTE — PSYCHIATRY
"PSYCHIATRIC FOLLOW-UP:(established)  *Reason for admission:      Acute Psychosis and Agitation  *Legal Hold Status on Admission:   L2K  Supervising Psychiatrist:     Dr. Lemus     *HPI:    Patient is a 34 year old female with a history of schizophrenia who presented to the ED with acute psychosis and agitation. She reports feeling \"Not too good\" today. Patient was uncooperative with interview and refused to answer many questions. She refused scheduled medications yesterday and this morning. She continues to be frequently agitated and requiring PRN Ativan for sedation. She moved her bed into the corner of her room, and has trash scattered about. She frequently asks to talk to staff. She continues to be acutely psychotic, with delusion. She denies AH or VH today. She denies SI or HI. She refused her pm medications last night, but would not say why. She continues to struggle with describing an appropriate plan if she was discharged.     Medical ROS (as pertinent):                    Review of Systems   Constitutional: Negative for chills and fever.   HENT: Negative for sore throat.    Eyes: Negative for blurred vision.   Respiratory: Negative for cough and shortness of breath.    Cardiovascular: Negative for chest pain.   Gastrointestinal: Negative for abdominal pain, constipation and heartburn.   Genitourinary: Negative for dysuria.   Musculoskeletal: Negative for joint pain.   Skin: Negative for rash.   Neurological: Negative for dizziness.   Psychiatric/Behavioral: Negative for suicidal ideas.      *Psychiatric Examination:  Vitals:    07/24/19 0650   BP: 126/82   Pulse: (!) 108   Resp: (!) 38   Temp:    SpO2: 97%       General Appearance:  Appears older than her stated age, unkempt, but fair hygiene. Limited eye contact.  Abnormal Movements: No tremor, dystonia, or dyskinesia present.  Gait and Posture: Normal gait.  Speech: Regular rate and rhythm, not pressured, paucity of spontaneous speech.  Thought " "processes: Tangential.  Associations:  Loosening of associations, no clang or neologisms.  Abnormal or Psychotic Thoughts: Denies AH and VH. Delusions and disorganized thinking continue \"People keep touching my vagina.\" \"My mom is possessing me. And so is Tala Yang. They need to find their own bodies.\"  Judgement and Insight: Poor / Poor  Orientation: AAOx2. Patient does not know the date or how she got here.  Recent and Remote Memory: grossly impaired.  Attention Span and Concentration: Impaired.  Language: fluent in English.  Fund of Knowledge: adequate.  Mood and Affect: \"Not too good,\" congruent with affect,  irritable, and dysthymic.   SI/HI: Denies SI and denies HI.         Recent Labs      07/22/19   0540   SODIUM  136   POTASSIUM  3.9   CHLORIDE  107   CO2  21   GLUCOSE  99   BUN  19      EKG from 7/11:  QTc was 462     Valproic Acid level from 7/22: 17.9        *ASSESSMENT:    Patient is a 34 year old female with a history of schizophrenia who presented to the ED with acute psychosis and agitation. Patient continues to be acutely psychotic with paranoid delusions, disorganized thinking, and frequent aggression requiring PRN sedation. Since she has been receiving PRN ativan regularly, she should be switched to scheduled klonopin BID. Will consider adding Risperidone if psychosis continues. Patient continues to be at risk of safety to self and others and should remain on legal hold. She will need inpatient psychiatric hospitalization and is awaiting transfer.     Dx:  Psychotic Disorder Unspecified  Schizophrenia by history.  Polysubstance use disorder by history.      PLAN:  1. Patient currently on a legal hold due to danger to self, extended today.  2. Continue Abilify to 25mg PO qam for psychosis.  3. Continue Depakote 750mg PO BID for mood. Valproic acid level on 7/22 was 17.9.   4. Continue Mirtazapine 15 mg PO qhs for mood and insomnia.  5. Continue Benztropine .5mg PO BID PRN for EPS.  6. Continue " PRN Haldol  for violence or extreme agitation.  7. Started Clonazepam .5mg BID for anxiety and agitation.  8. Discontinue Ativan PRN.  9. Consider Levothyroxine 100mcg, TSH pending.   10. Will transfer patient to inpatient hospital.  11. Will continue to follow.     Thank You for the consult.     Patient was seen on rounds with Dr. Lemus, Attending Psychiatrist, and treatment plan was discussed.

## 2019-07-24 NOTE — ED NOTES
Steady gait to restroom pt requesting more food and coffee. Very argumentative. But easily redirected.

## 2019-07-25 PROCEDURE — 700101 HCHG RX REV CODE 250: Performed by: EMERGENCY MEDICINE

## 2019-07-25 PROCEDURE — A9270 NON-COVERED ITEM OR SERVICE: HCPCS | Performed by: STUDENT IN AN ORGANIZED HEALTH CARE EDUCATION/TRAINING PROGRAM

## 2019-07-25 PROCEDURE — A9270 NON-COVERED ITEM OR SERVICE: HCPCS | Performed by: PSYCHIATRY & NEUROLOGY

## 2019-07-25 PROCEDURE — 99282 EMERGENCY DEPT VISIT SF MDM: CPT | Mod: GC | Performed by: PSYCHIATRY & NEUROLOGY

## 2019-07-25 PROCEDURE — 700102 HCHG RX REV CODE 250 W/ 637 OVERRIDE(OP): Performed by: EMERGENCY MEDICINE

## 2019-07-25 PROCEDURE — A9270 NON-COVERED ITEM OR SERVICE: HCPCS | Performed by: EMERGENCY MEDICINE

## 2019-07-25 PROCEDURE — 700102 HCHG RX REV CODE 250 W/ 637 OVERRIDE(OP): Performed by: STUDENT IN AN ORGANIZED HEALTH CARE EDUCATION/TRAINING PROGRAM

## 2019-07-25 PROCEDURE — 700102 HCHG RX REV CODE 250 W/ 637 OVERRIDE(OP): Performed by: PSYCHIATRY & NEUROLOGY

## 2019-07-25 RX ADMIN — DIVALPROEX SODIUM 750 MG: 500 TABLET, DELAYED RELEASE ORAL at 18:08

## 2019-07-25 RX ADMIN — PROPRANOLOL HYDROCHLORIDE 10 MG: 10 TABLET ORAL at 13:13

## 2019-07-25 RX ADMIN — TOPIRAMATE 100 MG: 100 TABLET, FILM COATED ORAL at 18:09

## 2019-07-25 RX ADMIN — PROPRANOLOL HYDROCHLORIDE 10 MG: 10 TABLET ORAL at 06:34

## 2019-07-25 RX ADMIN — CLONAZEPAM 0.5 MG: 0.5 TABLET ORAL at 18:08

## 2019-07-25 RX ADMIN — MIRTAZAPINE 15 MG: 15 TABLET, FILM COATED ORAL at 22:57

## 2019-07-25 RX ADMIN — ARIPIPRAZOLE 25 MG: 10 TABLET ORAL at 06:34

## 2019-07-25 RX ADMIN — DIVALPROEX SODIUM 750 MG: 500 TABLET, DELAYED RELEASE ORAL at 07:32

## 2019-07-25 RX ADMIN — HALOPERIDOL 5 MG: 5 TABLET ORAL at 07:32

## 2019-07-25 RX ADMIN — CLONAZEPAM 0.5 MG: 0.5 TABLET ORAL at 06:34

## 2019-07-25 RX ADMIN — PROPRANOLOL HYDROCHLORIDE 10 MG: 10 TABLET ORAL at 18:08

## 2019-07-25 RX ADMIN — TOPIRAMATE 100 MG: 100 TABLET, FILM COATED ORAL at 07:33

## 2019-07-25 NOTE — ED NOTES
Pt awake in room, commode emptied,  Asked for and was given 1 cup of ice water, agrees verbally to behave with water, which is given with caution as pt has history of drinking too much.

## 2019-07-25 NOTE — ED NOTES
Pt has been up and down from [facility] to [facility] for last hour,  Yelling to see her doctor.

## 2019-07-25 NOTE — ED NOTES
"ERP at bedside, pt yelling \"stop pissing in my food\" \"get out\".  Then goes back to sleep after MD leaves room.   "
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
## 2019-07-25 NOTE — ED NOTES
"Pt yelling and refusing to take the rest of am medication, \"you're trying to overdose me\", \"I need breakfast\", \"you're starving me\"- pt only agrees to take meds if food given,  Once she takes medication, rolls over on gurney and covers self with blanket.  Sitter in view of pt,  Will cont to monitor.   "

## 2019-07-25 NOTE — ED NOTES
Breakfast given, pt requests that it be placed on floor, pt cont to rest on gurney with eyes closed

## 2019-07-25 NOTE — ED PROVIDER NOTES
ED PROVIDER NOTE    Scribed for Saurabh Fernandes M.D. by Hardy Khoury. 7/25/2019, 9:01 AM.    This is an addendum to the note on Tierney Mayer. For further details and full chart entry, see the previously signed ED Provider Note written by Dr. Shukla (ERP).      9:01 AM - I discussed the patient's case with Dr. Shukla (ERP) who will transfer care of the patient to me at this time.        9:18 AM - The patient's continuing management included a reevaluation of the patient. The patient denies complaint, but would like me to leave the room. She does not wish to be evaluated.     FINAL IMPRESSION   Acute psychosis       I, Hardy Khoury (Scribe), am scribing for, and in the presence of, Saurabh Fernandes M.D..    Electronically signed by: Hardy Khoury (Scribe), 7/25/2019    ISaurabh M.D. personally performed the services described in this documentation, as scribed by Hardy Khoury in my presence, and it is both accurate and complete.    The note accurately reflects work and decisions made by me.  Saurabh Fernandes  7/25/2019  12:20 PM

## 2019-07-25 NOTE — ED NOTES
No assessment changes.  Pt continually up to door or [facility] then back to [facility].  Pt now requesting juice- given

## 2019-07-25 NOTE — ED NOTES
Pt given evening dose of remeron, taken without conflict.  Pt went back to bed after, denies current needs.
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
## 2019-07-25 NOTE — ED NOTES
Pt took morning meds and allowed vitals to be taken, with intermittent yelling and tangential rambling.  After meds is now resting quietly.

## 2019-07-25 NOTE — ED NOTES
Viral Gastroenteritis (Child)    Most diarrhea and vomiting in children is caused by a virus. This is called viral gastroenteritis. Many people call it the stomach flu, but it has nothing to do with influenza. This virus affects the stomach and intestinal tract. It usually lasts 2 to 7 days. Diarrhea means passing loose watery stools 3 or more times a day.  Your child may also have these symptoms:  · Abdominal pain and cramping  · Nausea  · Vomiting  · Loss of bowel control  · Fever and chills  · Bloody stools  The main danger from this illness is dehydration. This is the loss of too much water and minerals from the body. When this occurs, body fluids must be replaced. This can be done with oral rehydration solution. Oral rehydration solution is available at drugstores and most grocery stores.  Antibiotics are not effective for this illness.  Home care  Follow all instructions given by your childs healthcare provider.  If giving medicines to your child:  · Dont give over-the-counter diarrhea medicines unless your childs healthcare provider tells you to.  · You can use acetaminophen or ibuprofen to control pain and fever. Or, you can use other medicine as prescribed.  · Dont give aspirin to anyone under 18 years of age who has a fever. This may cause liver damage and a life-threatening condition called Reye syndrome.  To prevent the spread of illness:  · Remember that washing with soap and water and using alcohol-based  is the best way to prevent the spread of infection.  · Wash your hands before and after caring for your sick child.  · Clean the toilet after each use.  · Dispose of soiled diapers in a sealed container.  · Keep your child out of day care until he or she is cleared by the healthcare provider.  · Wash your hands before and after preparing food.  · Wash your hands and utensils after using cutting boards, countertops and knives that have been in contact with raw foods.  · Keep uncooked  Pt resting in bed, breathing regular. Watch in place.    meats away from cooked and ready-to-eat foods.  · Keep in mind that people with diarrhea or vomiting should not prepare food for others.  Giving liquids and food  The main goal while treating vomiting or diarrhea is to prevent dehydration. This is done by giving small amounts of liquids often.  · Keep in mind that liquids are more important than food right now. Give small amounts of liquids at a time, especially if your child is having stomach cramps or vomiting.  · For diarrhea: If you are giving milk to your child and the diarrhea is not going away, stop the milk. In some cases, milk can make diarrhea worse. If that happens, use oral rehydration solution instead. Do not give apple juice, soda, or other sweetened drinks. Drinks with sugar can make diarrhea worse.  · For vomiting: Begin with oral rehydration solution at room temperature. Give 1 teaspoon (5 ml) every 1 to 2 minutes. Even if your child vomits, continue to give the solution. Much of the liquid will be absorbed, despite the vomiting. After 2 hours with no vomiting, begin with small amounts of milk or formula and other fluids. Increase the amount as tolerated. Do not give your child plain water, milk, formula, or other liquids until vomiting stops. As vomiting decreases, try giving larger amounts of oral rehydration solution. Space this out with more time in between. Continue this until your child is making urine and is no longer thirsty (has no interest in drinking). After 4 hours with no vomiting, restart solid foods. After 24 hours with no vomiting, resume a normal diet.  · You can resume your child's normal diet over time as he or she feels better. Dont force your child to eat, especially if he or she is having stomach pain or cramping. Dont feed your child large amounts at a time, even if he or she is hungry. This can make your child feel worse. You can give your child more food over time if he or she can tolerate it. Foods you can give include  cereal, mashed potatoes, applesauce, mashed bananas, crackers, dry toast, rice, oatmeal, bread, noodles, pretzels, soups with rice or noodles, and cooked vegetables.  · If the symptoms come back, go back to a simple diet or clear liquids.  Follow-up care  Follow up with your childs healthcare provider, or as advised. If a stool sample was taken or cultures were done, call the healthcare provider for the results as instructed.  Call 911  Call 911 if your child has any of these symptoms:  · Trouble breathing  · Confusion  · Extreme drowsiness or trouble walking  · Loss of consciousness  · Rapid heart rate  · Chest pain  · Stiff neck  · Seizure  When to seek medical advice  Call your childs healthcare provider right away if any of these occur:  · Abdominal pain that gets worse  · Constant lower right abdominal pain  · Repeated vomiting after the first 2 hours on liquids  · Occasional vomiting for more than 24 hours  · Continued severe diarrhea for more than 24 hours  · Blood in vomit or stool  · Reduced oral intake  · Dark urine or no urine for 6 to 8 hours in older children, 4 to 6 hours for babies and young children  · Fussiness or crying that cannot be soothed  · Unusual drowsiness  · New rash  · More than 8 diarrhea stools within 8 hours  · Diarrhea lasts more than 10 days  · A child 2 years or older has a fever for more than 3 days  · A child of any age has repeated fevers above 104°F (40°C)  Date Last Reviewed: 12/13/2015  © 3855-1070 [employer]. [address] [place] [address], [place] [address]. All rights reserved. This information is not intended as a substitute for professional medical care. Always follow your healthcare professional's instructions.

## 2019-07-25 NOTE — ED NOTES
Water has been turned off to the sink in her room.  Pt denies she is drinking from sink but has been observed to frequently getting water from there.

## 2019-07-26 PROCEDURE — A9270 NON-COVERED ITEM OR SERVICE: HCPCS | Performed by: EMERGENCY MEDICINE

## 2019-07-26 PROCEDURE — A9270 NON-COVERED ITEM OR SERVICE: HCPCS | Performed by: PSYCHIATRY & NEUROLOGY

## 2019-07-26 PROCEDURE — 99282 EMERGENCY DEPT VISIT SF MDM: CPT | Mod: GC | Performed by: PSYCHIATRY & NEUROLOGY

## 2019-07-26 PROCEDURE — 700102 HCHG RX REV CODE 250 W/ 637 OVERRIDE(OP): Performed by: EMERGENCY MEDICINE

## 2019-07-26 PROCEDURE — A9270 NON-COVERED ITEM OR SERVICE: HCPCS | Performed by: STUDENT IN AN ORGANIZED HEALTH CARE EDUCATION/TRAINING PROGRAM

## 2019-07-26 PROCEDURE — 700101 HCHG RX REV CODE 250: Performed by: EMERGENCY MEDICINE

## 2019-07-26 PROCEDURE — 700102 HCHG RX REV CODE 250 W/ 637 OVERRIDE(OP): Performed by: STUDENT IN AN ORGANIZED HEALTH CARE EDUCATION/TRAINING PROGRAM

## 2019-07-26 PROCEDURE — 700102 HCHG RX REV CODE 250 W/ 637 OVERRIDE(OP): Performed by: PSYCHIATRY & NEUROLOGY

## 2019-07-26 RX ORDER — HALOPERIDOL 5 MG/1
5 TABLET ORAL ONCE
Status: COMPLETED | OUTPATIENT
Start: 2019-07-26 | End: 2019-07-26

## 2019-07-26 RX ORDER — PROPRANOLOL HYDROCHLORIDE 10 MG/1
10 TABLET ORAL 3 TIMES DAILY
Status: DISCONTINUED | OUTPATIENT
Start: 2019-07-26 | End: 2019-07-29 | Stop reason: HOSPADM

## 2019-07-26 RX ORDER — ARIPIPRAZOLE 10 MG/1
30 TABLET ORAL DAILY
Status: DISCONTINUED | OUTPATIENT
Start: 2019-07-27 | End: 2019-07-29 | Stop reason: HOSPADM

## 2019-07-26 RX ADMIN — CLONAZEPAM 0.5 MG: 0.5 TABLET ORAL at 06:40

## 2019-07-26 RX ADMIN — CLONAZEPAM 0.5 MG: 0.5 TABLET ORAL at 19:07

## 2019-07-26 RX ADMIN — PROPRANOLOL HYDROCHLORIDE 10 MG: 10 TABLET ORAL at 06:41

## 2019-07-26 RX ADMIN — PROPRANOLOL HYDROCHLORIDE 10 MG: 10 TABLET ORAL at 19:07

## 2019-07-26 RX ADMIN — DIVALPROEX SODIUM 750 MG: 500 TABLET, DELAYED RELEASE ORAL at 19:07

## 2019-07-26 RX ADMIN — ARIPIPRAZOLE 25 MG: 10 TABLET ORAL at 06:39

## 2019-07-26 RX ADMIN — TOPIRAMATE 100 MG: 100 TABLET, FILM COATED ORAL at 19:07

## 2019-07-26 RX ADMIN — HALOPERIDOL 5 MG: 5 TABLET ORAL at 10:45

## 2019-07-26 RX ADMIN — TOPIRAMATE 100 MG: 100 TABLET, FILM COATED ORAL at 06:41

## 2019-07-26 RX ADMIN — DIVALPROEX SODIUM 750 MG: 500 TABLET, DELAYED RELEASE ORAL at 06:40

## 2019-07-26 RX ADMIN — HALOPERIDOL 5 MG: 5 TABLET ORAL at 15:14

## 2019-07-26 RX ADMIN — MIRTAZAPINE 15 MG: 15 TABLET, FILM COATED ORAL at 21:54

## 2019-07-26 NOTE — ED NOTES
Patient ambulating around room.  Bedside commode emptied, given warm blankets.  Patient denies needs.

## 2019-07-26 NOTE — PSYCHIATRY
"PSYCHIATRIC FOLLOW-UP:(established)   *Reason for admission:   Acute Psychosis and Agitation   *Legal Hold Status on Admission:     L2K   Supervising Psychiatrist:          Dr. Lemus     *HPI:   Patient is a 35 y/o female with a history of schizophrenia who presented to the ED with acute psychosis and agitation. She is lying in bed with trash all over the floor. She reports feeling \"I need to go to court\" today. Patient was cooperative with interview and claimed that she did not sleep well due to the distractions. She took all of her scheduled medications this morning. She denies AVH, SI, and HI. She denies any side effects from medications. She refused vital signs initially this morning. She is frequently intrusive and approaching staff, perseverative on going to court. Fluids have been restricted and she is no longer having polyuria.    Medical ROS (as pertinent):                     Review of systems:   Constitutional: negative for chills and fever.   HEENT: negative for sore throat   Eyes: negative for blurred vision   Respiratory: negative for cough and SOB   GI: negative for abdominal pain, nausea, vomiting or diarrhea   Neuro: negative for dizziness     *Psychiatric Examination:     Vitals:   07/26/19 1024   BP: 120/85   Pulse: 87   Resp: 18   Temp:   SpO2: 95%     General Appearance:  Appears older than her stated age, unkempt, but fair hygiene. Limited eye contact.   Abnormal Movements: No tremor, dystonia, or dyskinesia present.   Gait and Posture: Normal gait.   Speech: increased rate and volume. Pressured   Thought processes: Tangential.   Associations:  Loosening of associations, no clang or neologisms.   Abnormal or Psychotic Thoughts: Denies AVH. Delusions and disorganized thinking continue   Judgement and Insight: Poor / Poor   Recent and Remote Memory: grossly impaired.   Attention Span and Concentration: Impaired.   Language: fluent in English.   Fund of Knowledge: adequate.   Mood and Affect: " "\"I need to go to court,\"   irritable, expansive, and dysthymic, labile   SI/HI: Denies SI and denies HI.     Current Facility-Administered Medications   Medication Dose Route Frequency Provider Last Rate Last Dose   • propranolol (INDERAL) tablet 10 mg  10 mg Oral TID Salvador Mireles M.D.       • [START ON 7/27/2019] ARIPiprazole (ABILIFY) tablet 30 mg  30 mg Oral DAILY Salvador Mireles M.D.       • clonazePAM (KLONOPIN) tablet 0.5 mg  0.5 mg Oral BID Salvador Mireles M.D.   0.5 mg at 07/26/19 0640   • divalproex (DEPAKOTE) delayed-release tablet 750 mg  750 mg Oral Q12HRS Melvi Rosales M.D.   750 mg at 07/26/19 0640   • haloperidol (HALDOL) tablet 5 mg  5 mg Oral Q6HRS PRN Salvador Mireles M.D.   5 mg at 07/26/19 1045    Or   • haloperidol lactate (HALDOL) injection 5 mg  5 mg Intramuscular Q6HRS PRN Salvador Mireles M.D.       • mirtazapine (REMERON) tablet 15 mg  15 mg Oral Nightly Christopher Stockton M.D.   15 mg at 07/25/19 2257   • topiramate (TOPAMAX) tablet 100 mg  100 mg Oral BID Christopher Stockton M.D.   100 mg at 07/26/19 0641   • benztropine (COGENTIN) tablet 0.5 mg  0.5 mg Oral BID PRN Salvador Mireles M.D.         Current Outpatient Prescriptions   Medication Sig Dispense Refill   • aripiprazole (ABILIFY) 5 MG tablet Take 5 mg by mouth every day.     • benztropine (COGENTIN) 0.5 MG Tab Take 0.5 mg by mouth 2 times a day.     • divalproex (DEPAKOTE) 250 MG Tablet Delayed Response Take 750 mg by mouth every bedtime.     • divalproex (DEPAKOTE) 500 MG Tablet Delayed Response Take 500 mg by mouth every morning.     • mirtazapine (REMERON) 15 MG Tab Take 15 mg by mouth every evening.     • propranolol (INDERAL) 10 MG Tab Take 10 mg by mouth 3 times a day.     • topiramate (TOPAMAX) 100 MG Tab Take 100 mg by mouth 2 times a day.     • lithium CR (ESKALITH CR) 450 MG Tab CR Take 900 mg by mouth every bedtime.            *ASSESSMENT:  Patient is a 34 year old female with a history of " schizophrenia who presented to the ED with acute psychosis and agitation. Patient continues to be acutely psychotic with paranoid delusions, disorganized thinking, and frequent aggression requiring PRN sedation. Since then she has been receiving klonopin BID for the past two days. Patient continues to be at risk of safety to self and others and should remain on legal hold. She will need inpatient psychiatric hospitalization and is awaiting transfer.     Dx:   Psychotic Disorder Unspecified   Schizophrenia by history.   Polysubstance use disorder by history.     PLAN:   1. Patient currently on a legal hold due to danger to self, extended today.   2. Increase Abilify to 30mg PO qam for psychosis.   3. Continue Depakote 750mg PO BID for mood. Valproic acid level on 7/22 was 17.9, will retest next week.   4. Continue Mirtazapine 15 mg PO qhs for mood and insomnia.   5. Continue Benztropine .5mg PO BID PRN for EPS.   6. Continue PRN Haldol  for violence or extreme agitation.   7. Continue Clonazepam .5mg BID for anxiety and agitation.   8. Continue Propranolol for anxiety, but hold if blood pressure is below 110/70.   10. Will transfer patient to inpatient hospital when bed is available.   11. Continue to restrict water for possible polydipsia.   12. Will continue to follow.       Thank You for the consult.       Patient seen on rounds with Dr. Lemus, Attending Psychiatrist, and treatment plan discussed.

## 2019-07-26 NOTE — ED NOTES
Patient sleeping in bed, equal rise and fall of the chest, sitter in place, will continue to monitor

## 2019-07-26 NOTE — ED NOTES
Received report from Randolph DAN    Patient sleeping in bed, equal rise and fall of the chest.  Sitter in place, room free of hazards.

## 2019-07-26 NOTE — DISCHARGE PLANNING
Resting quietly at this time. Has continued to present with delusional and paranoid thinking. Cooperative and needing redirection through the evening. Continues to be monitored. Plan to place in a psychiatric facility.

## 2019-07-26 NOTE — ED PROVIDER NOTES
ED Provider Note    Patient seen at the bedside.  Signed out from nighttime ER P.  She is also been seen by psychiatry, still acutely psychotic, will not be cleared from her hold.  No events under my care.  Patient care will be signed out to oncoming ERP at the close of my shift.

## 2019-07-26 NOTE — ED NOTES
Patient took medication per MAR, patient was uncooperative however compliant.  Upon asking patient permission to take vitals signs patient began to scream and yell at this RN.
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
## 2019-07-26 NOTE — ED NOTES
Shower completed wo incident. Pt compliant with instructions. Pt requesting that for further showers she would like a female ED tech. Pt returned to [facility]. Pts NI Khoury notified of pts request.

## 2019-07-26 NOTE — ED NOTES
"Patient requesting medication. Pt states \" I feel like my brain is racing \". ED provider notified  "

## 2019-07-26 NOTE — ED PROVIDER NOTES
ED PROVIDER NOTE    Scribed for SANKET Hernandez* by Hardy Khoury. 7/26/2019, 3:03 PM.    This is an addendum to the note on Tierney Mayer. For further details and full chart entry, see the previously signed ED Provider Note written by Dr. Stockton (ERP).      2:00 PM - I discussed the patient's case with Dr. Saleh (ERP) who will transfer care of the patient to me at this time.        3:06 PM - The patient's continuing management included treating her with Haldol 5mg.       I, Hardy Khoury (Scribe), am scribing for, and in the presence of, Reynold Lama DO    Electronically signed by: Hardy Khoury (Scribe), 7/26/2019    Reynold DONG DO personally performed the services described in this documentation, as scribed by Hardy Khoury in my presence, and it is both accurate and complete.    The note accurately reflects work and decisions made by me.  Reynold Lama  7/26/2019  8:39 PM

## 2019-07-26 NOTE — ED NOTES
Patient medicated with PRN Haldol as ordered. Trashed picked up from room. Bedside commode emptied.

## 2019-07-27 PROCEDURE — 700102 HCHG RX REV CODE 250 W/ 637 OVERRIDE(OP): Performed by: STUDENT IN AN ORGANIZED HEALTH CARE EDUCATION/TRAINING PROGRAM

## 2019-07-27 PROCEDURE — 700102 HCHG RX REV CODE 250 W/ 637 OVERRIDE(OP): Performed by: EMERGENCY MEDICINE

## 2019-07-27 PROCEDURE — A9270 NON-COVERED ITEM OR SERVICE: HCPCS | Performed by: EMERGENCY MEDICINE

## 2019-07-27 PROCEDURE — 700101 HCHG RX REV CODE 250: Performed by: EMERGENCY MEDICINE

## 2019-07-27 PROCEDURE — A9270 NON-COVERED ITEM OR SERVICE: HCPCS | Performed by: STUDENT IN AN ORGANIZED HEALTH CARE EDUCATION/TRAINING PROGRAM

## 2019-07-27 PROCEDURE — 700102 HCHG RX REV CODE 250 W/ 637 OVERRIDE(OP): Performed by: PSYCHIATRY & NEUROLOGY

## 2019-07-27 PROCEDURE — A9270 NON-COVERED ITEM OR SERVICE: HCPCS | Performed by: PSYCHIATRY & NEUROLOGY

## 2019-07-27 RX ORDER — LORAZEPAM 1 MG/1
1 TABLET ORAL ONCE
Status: COMPLETED | OUTPATIENT
Start: 2019-07-27 | End: 2019-07-27

## 2019-07-27 RX ADMIN — TOPIRAMATE 100 MG: 100 TABLET, FILM COATED ORAL at 18:34

## 2019-07-27 RX ADMIN — CLONAZEPAM 0.5 MG: 0.5 TABLET ORAL at 18:32

## 2019-07-27 RX ADMIN — LORAZEPAM 1 MG: 1 TABLET ORAL at 14:51

## 2019-07-27 RX ADMIN — ARIPIPRAZOLE 30 MG: 10 TABLET ORAL at 09:24

## 2019-07-27 RX ADMIN — BENZTROPINE MESYLATE 0.5 MG: 0.5 TABLET ORAL at 20:39

## 2019-07-27 RX ADMIN — PROPRANOLOL HYDROCHLORIDE 10 MG: 10 TABLET ORAL at 09:25

## 2019-07-27 RX ADMIN — CLONAZEPAM 0.5 MG: 0.5 TABLET ORAL at 09:25

## 2019-07-27 RX ADMIN — DIVALPROEX SODIUM 500 MG: 500 TABLET, DELAYED RELEASE ORAL at 18:34

## 2019-07-27 RX ADMIN — TOPIRAMATE 100 MG: 100 TABLET, FILM COATED ORAL at 09:25

## 2019-07-27 RX ADMIN — HALOPERIDOL 5 MG: 5 TABLET ORAL at 09:25

## 2019-07-27 RX ADMIN — HALOPERIDOL 5 MG: 5 TABLET ORAL at 20:39

## 2019-07-27 RX ADMIN — PROPRANOLOL HYDROCHLORIDE 10 MG: 10 TABLET ORAL at 13:49

## 2019-07-27 RX ADMIN — MIRTAZAPINE 15 MG: 15 TABLET, FILM COATED ORAL at 20:39
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
## 2019-07-27 NOTE — ED NOTES
"Pt requesting ativan states she feels \"her mind racing\". Medicated as ordered. Pt more cooperative this afternoon.   Observer remains at [facility]on.   "
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
## 2019-07-27 NOTE — ED NOTES
Pt continues to sleep comfortably. Pt is in no distress. Pt respirations are equal and non labored.

## 2019-07-27 NOTE — ED NOTES
RN at bedside; Pt refusing all needs at this time. Pt rolled over and returned to sleeping RN will continue to monitor

## 2019-07-27 NOTE — ED NOTES
Pt asleep on gurney; respirations are equal and non labored. Rn will continue to monitor.    Sitter within view of patient for pt safety

## 2019-07-27 NOTE — ED NOTES
Pt asleep on gurney; respirations are equal and non labored.     Sitter within view of pt room for pt safety

## 2019-07-27 NOTE — ED NOTES
Pt given meal tray. Voiding without difficulty. Pt easily irritable and verbally aggressive with staff. Will cont to monitor. Observer at [place]on.

## 2019-07-27 NOTE — ED NOTES
Observer remains at carson. Pt ambulates to bathroom steady gait. Agitated yells out intermittently.

## 2019-07-27 NOTE — ED PROVIDER NOTES
ED PROVIDER NOTE    Scribed for Nithya Wilkerson M.D. by Alok Villegas. 7/27/2019, 9:40 PM.    This is an addendum to the note on Tierney Mayer. For further details and full chart entry, see the previously signed ED Provider Note written by Dr. Stockton (ERP).      9:40 PM - I discussed the patient's case with Dr. Stockton (ERP) who will transfer care of the patient to me at this time.    2:40 PM I reevaluated the patient at bedside. She does not have any medical complaints, still awaiting transfer. She is cooperating. Nurse reports that she occasionally refuses to have her blood pressure taken. Her blood pressure has been stable and she is taking her blood pressure medication.     2:56 PM.  Patient was requesting an Ativan she felt a little bit agitated.  She had Haldol earlier this morning and has not had any issues throughout the day.  She has a listed allergy clonazepam but it does not sound like a true allergy.  She is taken Ativan in the past and tolerated without problems.    FINAL IMPRESSION   Shaila  Delusion     I, Alok Villegas (Scribe), am scribing for, and in the presence of, Nithya Wilkerson M.D..    Electronically signed by: Alok Villegas (Scribe), 7/27/2019    INithya M.D. personally performed the services described in this documentation, as scribed by Alok Villegas in my presence, and it is both accurate and complete.    The note accurately reflects work and decisions made by me.  Nithya Wilkerson  7/27/2019  2:56 PM

## 2019-07-27 NOTE — ED NOTES
Pt irritable when woke for am medications. Yells at staff to leave. Will attempt to medicate once pt awake. Sitter at [place]

## 2019-07-27 NOTE — ED NOTES
RN at bedside; Pt asleep with equal non labored respirations.     Sitter within view of Pt room for Pt safety

## 2019-07-27 NOTE — ED NOTES
Pt resting on gurney with eyes closed occasionally changing positions on gurney. Pt is in no distress and has no needs at this time.

## 2019-07-27 NOTE — ED NOTES
RN at bedside to clear food tray. Pt states she has no needs at this time. Lights dimmed for pt comfort.     Sitter within site of pt for pt safety.

## 2019-07-28 PROCEDURE — A9270 NON-COVERED ITEM OR SERVICE: HCPCS | Performed by: PSYCHIATRY & NEUROLOGY

## 2019-07-28 PROCEDURE — 700101 HCHG RX REV CODE 250: Performed by: EMERGENCY MEDICINE

## 2019-07-28 PROCEDURE — 700102 HCHG RX REV CODE 250 W/ 637 OVERRIDE(OP): Performed by: STUDENT IN AN ORGANIZED HEALTH CARE EDUCATION/TRAINING PROGRAM

## 2019-07-28 PROCEDURE — 700102 HCHG RX REV CODE 250 W/ 637 OVERRIDE(OP): Performed by: PSYCHIATRY & NEUROLOGY

## 2019-07-28 PROCEDURE — A9270 NON-COVERED ITEM OR SERVICE: HCPCS | Performed by: STUDENT IN AN ORGANIZED HEALTH CARE EDUCATION/TRAINING PROGRAM

## 2019-07-28 RX ADMIN — TOPIRAMATE 100 MG: 100 TABLET, FILM COATED ORAL at 18:00

## 2019-07-28 RX ADMIN — TOPIRAMATE 100 MG: 100 TABLET, FILM COATED ORAL at 10:04

## 2019-07-28 RX ADMIN — ARIPIPRAZOLE 30 MG: 10 TABLET ORAL at 10:04

## 2019-07-28 RX ADMIN — CLONAZEPAM 0.5 MG: 0.5 TABLET ORAL at 10:04

## 2019-07-28 RX ADMIN — DIVALPROEX SODIUM 750 MG: 500 TABLET, DELAYED RELEASE ORAL at 10:04

## 2019-07-28 RX ADMIN — DIVALPROEX SODIUM 750 MG: 500 TABLET, DELAYED RELEASE ORAL at 18:00

## 2019-07-28 RX ADMIN — CLONAZEPAM 0.5 MG: 0.5 TABLET ORAL at 18:00

## 2019-07-28 NOTE — ED NOTES
Pt resting in bed with eyes open, ambulated patient to restroom with direct supervision by this nurse, pt ambulated back from restroom with this nurse, pt requesting juice, nurse advised patient she can have juice with her medication, pt verbalized understanding
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
## 2019-07-28 NOTE — DISCHARGE PLANNING
MSW tried to call [facility] for update. Answering service believes no one is in admissions today for update. MSW will continue to follow.

## 2019-07-28 NOTE — ED PROVIDER NOTES
ED provider note  Addendum    Scribed for Reynold Lama DO by Ishan rAteaga on 7/28/2019 at 9:22 AM.      This is an addendum to the note on Tierney Mayer.  For further details and full chart information, see patient's initial note.        9:22 AM - I discussed the patient's case with Dr. Perez ([facility]) who will transfer care of the patient to me at this time.         9:26 AM  - Patient evaluated by myself.  Patient is resting comfortably at this time with no complaints. She is excited for breakfast.     Disposition:  Patient will be transferred to an appropriate psychiatric facility in stable condition for further psychiatric care and evaluation.  Patient will be placed under the care of my partner awaiting transfer.     FINAL IMPRESSION  1. Shaila (HCC)    2. Delusion (HCC)           Ishan DONG (Scribe), am scribing for, and in the presence of, Reynold Lama DO.     Electronically signed by: Ishan Arteaga (Scribe), 7/28/2019     Reynold DONG DO personally performed the services described in this documentation, as scribed by Ishan Arteaga in my presence, and it is both accurate and complete.     The note accurately reflects work and decisions made by me.  Reynold Lama  7/28/2019  10:14 AM

## 2019-07-28 NOTE — DISCHARGE PLANNING
ALERT team  note:   34 year old female admitted 7/22/19, legal hold, inability to care for self; pt alert, oriented x 4; with decreased insight, judgment; entitled, intrusive behaviors towards staff, demanding; yelling at staff throughout the day; attending to hygiene, showered;  received Topamax 100 mg PO today at 0925, Klonopin 0.5 mg PO today at 0925, Propranolol 10 mg PO today at 1349, Abilify 30 mg PO today at 0924, Haldol 5 mg PO today at 0925, Ativan 1 mg PO today at 1451; alteration in thoughts r/t psychosis, cont; [facility]

## 2019-07-28 NOTE — ED NOTES
Assumed care of patient, pt resting in bed with eyes closed, breathing even and unlabored, easily arousable to voice, no s/s of distress noted, pt requested a snack, a box lunch with no plastic utensils, pt resting in bed now, sitter at bedside

## 2019-07-29 VITALS
HEART RATE: 91 BPM | DIASTOLIC BLOOD PRESSURE: 83 MMHG | RESPIRATION RATE: 16 BRPM | BODY MASS INDEX: 25.11 KG/M2 | OXYGEN SATURATION: 97 % | TEMPERATURE: 98.2 F | WEIGHT: 160 LBS | SYSTOLIC BLOOD PRESSURE: 118 MMHG | HEIGHT: 67 IN

## 2019-07-29 PROCEDURE — 700102 HCHG RX REV CODE 250 W/ 637 OVERRIDE(OP): Performed by: PSYCHIATRY & NEUROLOGY

## 2019-07-29 PROCEDURE — A9270 NON-COVERED ITEM OR SERVICE: HCPCS | Performed by: STUDENT IN AN ORGANIZED HEALTH CARE EDUCATION/TRAINING PROGRAM

## 2019-07-29 PROCEDURE — 99283 EMERGENCY DEPT VISIT LOW MDM: CPT | Mod: GC | Performed by: PSYCHIATRY & NEUROLOGY

## 2019-07-29 PROCEDURE — 700102 HCHG RX REV CODE 250 W/ 637 OVERRIDE(OP): Performed by: STUDENT IN AN ORGANIZED HEALTH CARE EDUCATION/TRAINING PROGRAM

## 2019-07-29 PROCEDURE — A9270 NON-COVERED ITEM OR SERVICE: HCPCS | Performed by: PSYCHIATRY & NEUROLOGY

## 2019-07-29 PROCEDURE — 700101 HCHG RX REV CODE 250: Performed by: EMERGENCY MEDICINE

## 2019-07-29 RX ADMIN — DIVALPROEX SODIUM 750 MG: 500 TABLET, DELAYED RELEASE ORAL at 11:24

## 2019-07-29 RX ADMIN — ARIPIPRAZOLE 30 MG: 10 TABLET ORAL at 11:23

## 2019-07-29 RX ADMIN — TOPIRAMATE 100 MG: 100 TABLET, FILM COATED ORAL at 11:26

## 2019-07-29 RX ADMIN — PROPRANOLOL HYDROCHLORIDE 10 MG: 10 TABLET ORAL at 11:26

## 2019-07-29 RX ADMIN — CLONAZEPAM 0.5 MG: 0.5 TABLET ORAL at 11:25

## 2019-07-29 NOTE — ED NOTES
Pt sleeping in bed, in no apparent distress, with even and unlabored breaths. Will continue to monitor, sitter in view of pt.

## 2019-07-29 NOTE — ED NOTES
Pt still sleeping in bed in view of sitter, with even and unlabored breaths. No signs of distress noted. Will continue to monitor.   Tobacco Use    Smoking status: Former Smoker     Packs/day: 0.50     Years: 10.00     Pack years: 5.00     Types: Cigarettes     Last attempt to quit: 2017     Years since quittin.3    Smokeless tobacco: Never Used   Substance and Sexual Activity    Alcohol use: Not Currently     Comment: socially    Drug use: No    Sexual activity: Yes     Partners: Male   Lifestyle    Physical activity:     Days per week: Not on file     Minutes per session: Not on file    Stress: Not on file   Relationships    Social connections:     Talks on phone: Not on file     Gets together: Not on file     Attends [employer]ism service: Not on file     Active member of club or organization: Not on file     Attends meetings of clubs or organizations: Not on file     Relationship status: Not on file    Intimate partner violence:     Fear of current or ex partner: Not on file     Emotionally abused: Not on file     Physically abused: Not on file     Forced sexual activity: Not on file   Other Topics Concern    Not on file   Social History Narrative    Not on file       Family History   Problem Relation Age of Onset    Cancer Mother     Arthritis Mother     Diabetes Father     Heart Disease Father     Stroke Father     High Blood Pressure Father        Current Outpatient Medications   Medication Sig Dispense Refill    clonazePAM (KLONOPIN) 0.5 MG tablet Take 1 tablet by mouth 2 times daily as needed for Anxiety for up to 30 days. 60 tablet 0    fluconazole (DIFLUCAN) 150 MG tablet Take 1 tablet every 2 days for 3 doses.  3 tablet 0    traZODone (DESYREL) 50 MG tablet Take 1 tablet by mouth nightly 30 tablet 2    cetirizine (ZYRTEC) 10 MG tablet Take 1 tablet by mouth daily 30 tablet 2    metFORMIN (GLUCOPHAGE-XR) 500 MG extended release tablet Take 1 tablet by mouth every other day 90 tablet 1    diclofenac sodium 1 % GEL Apply 4 g topically 4 times daily 4 Tube 1    naloxone (NARCAN) 4 MG/0.1ML LIQD nasal spray 1 spray by Nasal route as needed for Opioid Reversal 1 each 0    buPROPion (WELLBUTRIN XL) 150 MG extended release tablet Take 1 tablet by mouth every morning 30 tablet 3     No current facility-administered medications for this visit. Nsaids; Pcn [penicillins]; Phentermine; and Topamax [topiramate]    Review of Systems:  General ROS: negative  Psychological ROS: negative  Hematological and Lymphatic ROS: No history of blood clots or bleeding disorder. Respiratory ROS: no cough, shortness of breath, or wheezing  Cardiovascular ROS: no chest pain or dyspnea on exertion  Gastrointestinal ROS: no abdominal pain, change in bowel habits, or black or bloody stools  Genito-Urinary ROS: no dysuria, trouble voiding, or hematuria  Musculoskeletal ROS: negative  Neurological ROS: no TIA or stroke symptoms  Dermatological ROS: negative    VITALS:  Blood pressure 110/70, pulse 97, height 5' 8\" (1.727 m), weight 278 lb 3.2 oz (126.2 kg), SpO2 98 %. Body mass index is 42.3 kg/m². Physical Examination:  General appearance - alert, well appearing, and in no distress  Mental status - alert, oriented to person, place, and time  Neck - Neck is supple, no JVD or carotid bruits. No thyromegaly or adenopathy. Chest - clear to auscultation, no wheezes, rales or rhonchi, symmetric air entry  Heart - normal rate, regular rhythm, normal S1, S2, no murmurs, rubs, clicks or gallops  Abdomen - soft, nontender, nondistended, no masses or organomegaly  Neurological - alert, oriented, normal speech, no focal findings or movement disorder noted  Extremities - peripheral pulses normal, no pedal edema, no clubbing or cyanosis  Skin - normal coloration and turgor, no rashes, no suspicious skin lesions noted      EKG: normal sinus rhythm, nonspecific ST and T waves changes    No orders of the defined types were placed in this encounter. ASSESSMENT:     Diagnosis Orders   1. BMI 40.0-44.9, adult ([facility] [address]     2.  Atypical chest pain - likely MSCK vs vasospasm vs other. PLAN:     Patient will need to continue to follow up with you for their general medical care   As always, aggressive risk factor modification is strongly recommended. We should adhere to the JNC VIII guidelines for HTN management and the NCEP ATP III guidelines for LDL-C management. Cardiac diet is always recommended with low fat, cholesterol, calories and sodium. Continue medications at current doses. Plan for stress echo for further evaluation. Doubt CP is cardiac in nature.

## 2019-07-29 NOTE — ED NOTES
Pt sleeping in bed, in no apparent distress, with even and unlabored breaths. Will continue to monitor. Sitter still in view of pt.

## 2019-07-29 NOTE — DISCHARGE PLANNING
SW [facility] SW re-sending referral for pt and awaiting call back from Yaima carrasquillo/ consuelo MD and time of transfer.

## 2019-07-29 NOTE — ED NOTES
Received report from NI [place]. Upon shift change pt is resting in bed with even and unlabored breaths, in no apparent distress at this time. Sitter in view of pt. Will continue to monitor pt while awaiting further information regarding disposition.

## 2019-07-29 NOTE — ED NOTES
Pt is sleeping in bed, no distress noted. Breaths are even and unlabored. Sitter in view of pt. Will continue to monitor.

## 2019-07-29 NOTE — ED PROVIDER NOTES
ED Provider Note    The patient continues to await transfer for inpatient psychiatric evaluation and treatment.  She refused to take her medicines this morning until she received her breakfast.  I believe at this point she is actually taken her morning medications.  According to the staff she requires constant redirection.  Her vital signs remained normal.  She is ambulatory.  She currently has no acute needs.

## 2019-07-29 NOTE — ED NOTES
Pt sleeping in bed with even and unlabored breaths, in no apparent distress at this time. Sitter is in view of pt, will continue to monitor.

## 2019-07-29 NOTE — ED NOTES
Pt sleeping comfortably in bed, no distress noted. Sitter in view of pt and RN will continue to monitor.

## 2019-07-29 NOTE — ED NOTES
Patient refusing to state real name and birth date. States she does not want her morning medications. Given breakfast tray. Will attempt medications later if patient is cooperative.

## 2019-07-29 NOTE — DISCHARGE PLANNING
Medical Social Work    LSW received call from Yaima carrasquillo/ [facility] stating the pt has been accepted by Dr. Echevarria. LSW called MTM and spoke neida/ Torres for transport authorization. PCS form and Facesheet faxed to [facility]. Transportation arranged w/ Doe @ [facility] for 1400. LSW completed transfer packet and placed original LH in packet and placed on pt's chart. Bedside RN and [facility] notified of 1400 transport time.

## 2019-07-29 NOTE — ED NOTES
Pt sleeping in bed, in no distress. Sitter in view of pt and pt's breaths are even and unlabored. Will continue to monitor.

## 2019-07-29 NOTE — PSYCHIATRY
"PSYCHIATRIC FOLLOW-UP:(established)  *Reason for admission:   Acute Psychosis and Agitation   *Legal Hold Status on Admission:     L2K          *HPI:   Patient is currently grossly psychotic, delusional. She reports being Omayra, but denies her last name being Brown in the context of psychosis.  Pt is claming that she is paying millions of dollars to have services done for her in the hospital. Pt reports that she was pregnant before coming to the hospital and that the sun spoke with her. She then absorbed the baby and has not had any contraction since then. Pt stated that the two sitters outside her room are two Egyptians playing tricks with her. She currently denies any AVH,  reported her mood is \"happy\", and denies any SI/HI. She reported being upset for getting restriction with coffee, juice and water, denies.   Pt wants to contest her hold.      Medical ROS (as pertinent):  Reports chest pain, denies SOB, denies coughing up blood, denies urinary frequency, denies any tremors, rigidity, denies abdominal pain, diarrhea or constipations, denies N/V.                    *Psychiatric Examination:  Vitals:   Vitals:    07/28/19 1847   BP: 121/89   Pulse: 89   Resp: 16   Temp: 36.6 °C (97.9 °F)   SpO2: 96%       General Appearance:  woman, dishelved, wearing hospital gown, calm and cooperative, NAD  Abnormal Movements: none  Gait and Posture:   Speech:slightly pressured  Thought processes:disorganized  Associations: loose associations   Abnormal or Psychotic Thoughts: delusions, denies AVH, preoccupied with getting liquids. Pt is responding to internal stimuli.   Judgement and Insight: poor/poor  Orientation: disoriented to time, and person  Recent and Remote Memory: poor  Attention Span and Concentration: intact  Language: fluid  Fund of Knowledge:  Mood and Affect:happy/incongruent, irritable    SI/HI:denies SI/HI    Current Facility-Administered Medications   Medication Dose Route Frequency Provider Last " Rate Last Dose   • propranolol (INDERAL) tablet 10 mg  10 mg Oral TID Salvador Mireles M.D.   10 mg at 07/29/19 1126   • ARIPiprazole (ABILIFY) tablet 30 mg  30 mg Oral DAILY Salvador Mireles M.D.   30 mg at 07/29/19 1123   • clonazePAM (KLONOPIN) tablet 0.5 mg  0.5 mg Oral BID Salvador Mireles M.D.   0.5 mg at 07/29/19 1125   • divalproex (DEPAKOTE) delayed-release tablet 750 mg  750 mg Oral Q12HRS Melvi Rosales M.D.   750 mg at 07/29/19 1124   • haloperidol (HALDOL) tablet 5 mg  5 mg Oral Q6HRS PRN Salvador Mireles M.D.   5 mg at 07/27/19 2039    Or   • haloperidol lactate (HALDOL) injection 5 mg  5 mg Intramuscular Q6HRS PRN Salvador Mireles M.D.       • mirtazapine (REMERON) tablet 15 mg  15 mg Oral Nightly Christopher Stockton M.D.   Stopped at 07/28/19 2100   • topiramate (TOPAMAX) tablet 100 mg  100 mg Oral BID Christopher Stockton M.D.   100 mg at 07/29/19 1126   • benztropine (COGENTIN) tablet 0.5 mg  0.5 mg Oral BID PRN Salvador Mireles M.D.   0.5 mg at 07/27/19 2039     Current Outpatient Prescriptions   Medication Sig Dispense Refill   • aripiprazole (ABILIFY) 5 MG tablet Take 5 mg by mouth every day.     • benztropine (COGENTIN) 0.5 MG Tab Take 0.5 mg by mouth 2 times a day.     • divalproex (DEPAKOTE) 250 MG Tablet Delayed Response Take 750 mg by mouth every bedtime.     • divalproex (DEPAKOTE) 500 MG Tablet Delayed Response Take 500 mg by mouth every morning.     • mirtazapine (REMERON) 15 MG Tab Take 15 mg by mouth every evening.     • propranolol (INDERAL) 10 MG Tab Take 10 mg by mouth 3 times a day.     • topiramate (TOPAMAX) 100 MG Tab Take 100 mg by mouth 2 times a day.     • lithium CR (ESKALITH CR) 450 MG Tab CR Take 900 mg by mouth every bedtime.            Assessment: Pt took all her medications this morning. Pt continues to be grossly psychotic and unable to care for herself.     Dx: Schizophrenia, unstable     Plan:  1- Legal hold: extended  2- Continue Abilify 30mg PO daily  for psychosis, Depakote 750mg Po BID for mood, Remeron 15mg po QHS for mood and sleep, Klonopin 0.5mg PO BID for agitation/anxiety.  3- Discussed case with medical ED. Recommend getting an EKG  4- please transfer pt to inpatient hospital when bed is available. Current waiting for a bed at [facility]  5- Will continue to follow      Other:   Sitter: yes   Visitors:no   Phone calls:no   Personal items (specify):     no

## 2019-07-29 NOTE — ED NOTES
Pt sleeping with even and unlabored breaths, in no distress at this time. Sitter in view of pt and RN will continue to monitor.

## 2019-07-29 NOTE — ED NOTES
Pt still sleeping. No distress noted. Breaths even and unlabored. Sitter in view of pt. Will continue to monitor.

## 2019-07-29 NOTE — ED NOTES
Pt resting in bed at this time with even and unlabored breaths, in no apparent distress but some irritation/agitation. Pt has repeatedly gotten up out of bed asking for a snack, a drink, a phone call, a shower, etc. Provided a snack and a juice but explained to pt that a phone call is not an option right now, per ALERT team. Sitter is in view of pt and RN will continue to monitor pt.

## 2019-07-29 NOTE — ED NOTES
Pt sleeping in bed, in no apparent distress, with sitter in view of pt. Will continue to monitor pt.

## 2019-07-29 NOTE — DISCHARGE PLANNING
Notice of transfer filed with the court via [employer], scanned copy of verified notice onto .

## 2019-07-29 NOTE — ED NOTES
"Attempted to get pt's vital signs but she refused saying \"I want breakfast first.\" Told pt that breakfast is on the way but RN needs to get vital signs now and pt responded \"No, just go away and keep your hands off my ass. Oh my gosh, why is my ass so big? Keep your hands off it and just go away.\"  "

## 2019-09-15 ENCOUNTER — APPOINTMENT (OUTPATIENT)
Dept: RADIOLOGY | Facility: MEDICAL CENTER | Age: 34
End: 2019-09-15
Attending: EMERGENCY MEDICINE
Payer: MEDICAID

## 2019-09-15 ENCOUNTER — HOSPITAL ENCOUNTER (EMERGENCY)
Facility: MEDICAL CENTER | Age: 34
End: 2019-09-15
Attending: EMERGENCY MEDICINE
Payer: MEDICAID

## 2019-09-15 VITALS
SYSTOLIC BLOOD PRESSURE: 112 MMHG | HEART RATE: 115 BPM | TEMPERATURE: 97.4 F | RESPIRATION RATE: 16 BRPM | HEIGHT: 68 IN | WEIGHT: 183 LBS | BODY MASS INDEX: 27.74 KG/M2 | DIASTOLIC BLOOD PRESSURE: 82 MMHG | OXYGEN SATURATION: 97 %

## 2019-09-15 DIAGNOSIS — K59.00 CONSTIPATION, UNSPECIFIED CONSTIPATION TYPE: ICD-10-CM

## 2019-09-15 PROCEDURE — 700102 HCHG RX REV CODE 250 W/ 637 OVERRIDE(OP): Performed by: EMERGENCY MEDICINE

## 2019-09-15 PROCEDURE — 74022 RADEX COMPL AQT ABD SERIES: CPT

## 2019-09-15 PROCEDURE — 99285 EMERGENCY DEPT VISIT HI MDM: CPT

## 2019-09-15 PROCEDURE — A9270 NON-COVERED ITEM OR SERVICE: HCPCS | Performed by: EMERGENCY MEDICINE

## 2019-09-15 RX ORDER — BISACODYL 10 MG
10 SUPPOSITORY, RECTAL RECTAL ONCE
Status: COMPLETED | OUTPATIENT
Start: 2019-09-15 | End: 2019-09-15

## 2019-09-15 RX ADMIN — MAGNESIUM CITRATE 296 ML: 1.75 LIQUID ORAL at 16:58

## 2019-09-15 RX ADMIN — BISACODYL 10 MG: 10 SUPPOSITORY RECTAL at 15:57

## 2019-09-15 NOTE — ED TRIAGE NOTES
Chief Complaint   Patient presents with   • Constipation     Has been a patient at [facility].  Reports abd cramping and no BM x 14 days.     Was given milk of mag on 7-29.  Given miralax on 9-9.

## 2019-09-15 NOTE — ED PROVIDER NOTES
ED Provider Note    Scribed for Dr. Hardy Vargas M.D. by Cecilia Padilla. 9/15/2019  2:40 PM    Primary care provider: Pcp Pt States None  Means of arrival: EMS  History obtained from: Patient  History limited by: Limited due to patient being psychotic     CHIEF COMPLAINT  Chief Complaint   Patient presents with   • Constipation     Has been a patient at [facility].  Reports abd cramping and no BM x 14 days.         HPI  Tierney Mayer is a 34 y.o. female who presents to the Emergency Department for evaluation of constipation onset fourteen days ago. No other musculoskeletal injuries are noted at this time. The patient was rambling at bedside. I was unable to obtain any pertinent information about their history due to the patient being psychotic.  Other than the fact that she was constipated    REVIEW OF SYSTEMS  Pertinent positives include constipation. Pertinent negatives include no other musculoskeletal injuries are noted at this time. Unable to obtain any pertinent information about their history due to the patient being psychotic.  See HPI for further details.     PAST MEDICAL HISTORY   has a past medical history of Hypertension and Psychiatric disorder.    SURGICAL HISTORY   has a past surgical history that includes pelviscopy (2/22/2011) and incision and drainage general (2/22/2011).    SOCIAL HISTORY  Social History     Tobacco Use   • Smoking status: Current Every Day Smoker     Packs/day: 1.00     Types: Cigarettes   • Smokeless tobacco: Never Used   • Tobacco comment: knows she is suppose to quit but hasn't   Substance Use Topics   • Alcohol use: No   • Drug use: No      Social History     Substance and Sexual Activity   Drug Use No       FAMILY HISTORY  Not pertinent.     CURRENT MEDICATIONS  Home Medications    **Home medications have not yet been reviewed for this encounter**         ALLERGIES  Allergies   Allergen Reactions   • Clonazepam      Pt reports getting a stroke.  Per historical: Twitches  "and paranoia.   • Invega Sustenna [Paliperidone Palmitate]    • Olanzapine      Pt reports worsening and threatening voices.   • Paliperidone      Twitches and paranoia .       PHYSICAL EXAM  VITAL SIGNS: /80   Pulse (!) 110   Temp 36.3 °C (97.4 °F) (Temporal)   Resp 16   Ht 1.727 m (5' 8\")   Wt 83 kg (183 lb)   SpO2 97%   BMI 27.83 kg/m²     Constitutional: Rambling. Well developed, Well nourished, no distress, Non-toxic appearance.   HENT: Normocephalic, Atraumatic, Bilateral external ears normal   Thorax & Lungs: Non labored breathing   Skin: Warm, Dry  Abdomen: Tender  Rectal: Refused  Extremities:, No edema   Musculoskeletal: No major deformities noted.   Neurologic: Awake, alert. Moves all extremities spontaneously.  Psychiatric: Normal affect, judgement normal.       RADIOLOGY  DX-ABDOMEN COMPLETE WITH AP OR PA CXR   Final Result      No acute abnormalities are noted on single view chest.   No acute abnormalities are noted on abdominal radiographs.   Probable constipation.        The radiologist's interpretation of all radiological studies have been reviewed by me.    COURSE & MEDICAL DECISION MAKING  Pertinent Labs & Imaging studies reviewed. (See chart for details)    2:40 PM - Patient seen and examined at bedside. Patient denied a rectal exam due to her recent consumption of a laxative. Patient will be treated with Dulcolax 10 mg. Ordered DX abdomen complete with AP or PA CXR to evaluate her symptoms. The differential diagnoses include but are not limited to: constipation, fecal impaction, bowel obstruction.     Decision Making:  Patient does not appear to be obstructed, possibly constipated but is able to have a bowel movement here we will give her Dulcolax followed by oral laxatives and return her to psychiatric facility  FINAL IMPRESSION  1. Constipation, unspecified constipation type          I, Cecilia Padilla (Scribe), am scribing for, and in the presence of, Hardy Vargas, " M.D..    Electronically signed by: Cecilia Padilla (Scribe), 9/15/2019    IHardy M.D. personally performed the services described in this documentation, as scribed by Cecilia Padilla in my presence, and it is both accurate and complete. C.     The note accurately reflects work and decisions made by me.  Hardy Vargas  9/15/2019  5:28 PM

## 2019-09-16 NOTE — ED NOTES
PT discharged to [facility]. Transported by [facility]. VSS, slightly tachy. PT verbally aggressive. Discharge paperwork signed.

## 2019-09-16 NOTE — ED NOTES
Pt with multiple trips to ,  Request food- lunch box given.  SW called to arrange transportation back to [facility].  Resting quietly on gurney at this time.

## 2019-09-16 NOTE — ED NOTES
Pt assist to BR with this RN to supervise for safety. Pt had 1 BM. Reported to primary nurse Narda DAN

## 2019-09-16 NOTE — DISCHARGE PLANNING
Call to Darlin with [facility] who has not heard from [facility] yet. She will call with time after hearing from them.    Transfer packet (chart copy, facesheet and legal hold) completed.

## 2019-09-16 NOTE — DISCHARGE PLANNING
Pt ready to return to [facility]. CM spoke to [facility] with report given. Call to [facility] ([facility]ak[facility]) who will arrange transport with [facility].     PCS and Facesheet faxed to [facility] and transport packet printed.

## 2019-09-16 NOTE — ED NOTES
PT up to restroom with steady gait. Updated on POC. Case management reports that [facility] is on their way.

## 2020-08-06 ENCOUNTER — PHARMACY VISIT (OUTPATIENT)
Dept: PHARMACY | Facility: MEDICAL CENTER | Age: 35
End: 2020-08-06
Payer: COMMERCIAL

## 2020-08-06 PROCEDURE — RXMED WILLOW AMBULATORY MEDICATION CHARGE

## 2020-08-06 RX ORDER — FLUPHENAZINE HYDROCHLORIDE 5 MG/1
5 TABLET ORAL EVERY MORNING
Qty: 30 TAB | Refills: 0 | Status: SHIPPED | OUTPATIENT
Start: 2020-08-06 | End: 2021-10-29

## 2020-12-08 ENCOUNTER — HOSPITAL ENCOUNTER (EMERGENCY)
Facility: MEDICAL CENTER | Age: 35
End: 2020-12-09
Attending: EMERGENCY MEDICINE
Payer: MEDICAID

## 2020-12-08 DIAGNOSIS — F22 DELUSIONS (HCC): ICD-10-CM

## 2020-12-08 DIAGNOSIS — F23 ACUTE PSYCHOSIS (HCC): ICD-10-CM

## 2020-12-08 DIAGNOSIS — F29 CHRONIC PSYCHOSIS (HCC): ICD-10-CM

## 2020-12-08 LAB
AMPHET UR QL SCN: NEGATIVE
BARBITURATES UR QL SCN: NEGATIVE
BENZODIAZ UR QL SCN: NEGATIVE
BZE UR QL SCN: NEGATIVE
CANNABINOIDS UR QL SCN: NEGATIVE
ETHANOL BLD-MCNC: <10.1 MG/DL (ref 0–10)
LITHIUM SERPL-MCNC: <0.1 MMOL/L (ref 0.6–1.2)
METHADONE UR QL SCN: NEGATIVE
OPIATES UR QL SCN: NEGATIVE
OXYCODONE UR QL SCN: NEGATIVE
PCP UR QL SCN: NEGATIVE
PROPOXYPH UR QL SCN: NEGATIVE
VALPROATE SERPL-MCNC: 56.7 UG/ML (ref 50–100)

## 2020-12-08 PROCEDURE — 99285 EMERGENCY DEPT VISIT HI MDM: CPT

## 2020-12-08 PROCEDURE — 302970 POC BREATHALIZER: Performed by: EMERGENCY MEDICINE

## 2020-12-08 PROCEDURE — A9270 NON-COVERED ITEM OR SERVICE: HCPCS | Performed by: EMERGENCY MEDICINE

## 2020-12-08 PROCEDURE — 700102 HCHG RX REV CODE 250 W/ 637 OVERRIDE(OP): Performed by: EMERGENCY MEDICINE

## 2020-12-08 PROCEDURE — 80164 ASSAY DIPROPYLACETIC ACD TOT: CPT

## 2020-12-08 PROCEDURE — 80178 ASSAY OF LITHIUM: CPT

## 2020-12-08 PROCEDURE — 80307 DRUG TEST PRSMV CHEM ANLYZR: CPT

## 2020-12-08 PROCEDURE — 81025 URINE PREGNANCY TEST: CPT

## 2020-12-08 RX ORDER — DIVALPROEX SODIUM 500 MG/1
500 TABLET, DELAYED RELEASE ORAL NIGHTLY
Status: DISCONTINUED | OUTPATIENT
Start: 2020-12-08 | End: 2020-12-09 | Stop reason: HOSPADM

## 2020-12-08 RX ORDER — BENZTROPINE MESYLATE 1 MG/1
1 TABLET ORAL ONCE
Status: COMPLETED | OUTPATIENT
Start: 2020-12-08 | End: 2020-12-08

## 2020-12-08 RX ORDER — HALOPERIDOL 5 MG/1
10 TABLET ORAL ONCE
Status: COMPLETED | OUTPATIENT
Start: 2020-12-08 | End: 2020-12-08

## 2020-12-08 RX ADMIN — BENZTROPINE MESYLATE 1 MG: 1 TABLET ORAL at 21:37

## 2020-12-08 RX ADMIN — HALOPERIDOL 10 MG: 5 TABLET ORAL at 18:43

## 2020-12-08 RX ADMIN — DIVALPROEX SODIUM 500 MG: 500 TABLET, DELAYED RELEASE ORAL at 21:37

## 2020-12-08 ASSESSMENT — FIBROSIS 4 INDEX: FIB4 SCORE: 0.52

## 2020-12-09 VITALS
DIASTOLIC BLOOD PRESSURE: 85 MMHG | WEIGHT: 185 LBS | HEART RATE: 96 BPM | BODY MASS INDEX: 28.13 KG/M2 | SYSTOLIC BLOOD PRESSURE: 128 MMHG | OXYGEN SATURATION: 98 % | RESPIRATION RATE: 18 BRPM | TEMPERATURE: 97.7 F

## 2020-12-09 LAB
COVID ORDER STATUS COVID19: NORMAL
FLUAV RNA SPEC QL NAA+PROBE: NEGATIVE
FLUBV RNA SPEC QL NAA+PROBE: NEGATIVE
HCG UR QL: NEGATIVE
RSV RNA SPEC QL NAA+PROBE: NEGATIVE
SARS-COV-2 RNA RESP QL NAA+PROBE: NOTDETECTED
SPECIMEN SOURCE: NORMAL

## 2020-12-09 PROCEDURE — C9803 HOPD COVID-19 SPEC COLLECT: HCPCS | Performed by: EMERGENCY MEDICINE

## 2020-12-09 PROCEDURE — 0241U HCHG SARS-COV-2 COVID-19 NFCT DS RESP RNA 4 TRGT MIC: CPT

## 2020-12-09 NOTE — CONSULTS
Spoke with Ursula Kinsey RN from Alert team, who informed that pt is at her baseline. Group home will be picking up pt today once the legal hold is discontinued.   She is connected to psychiatric services, on [place].   Psychiatry consult canceled.

## 2020-12-09 NOTE — ED PROVIDER NOTES
ED Provider Note    7:20 AM Patient reevaluated.  Patient is on a legal hold, waiting transfer to psychiatric facility when a bed is available.  Please refer to initial note for complete details.  No concerns overnight.  Patient ambulates to the bathroom independently and tolerated a meal.  Medication reconciliation has been reviewed.
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
## 2020-12-09 NOTE — DISCHARGE PLANNING
Alert Team:  GABE Olivier for patient supervised  from ER. Patient's hold decertified by Dr. Cain and set for discharge. Deirdre TAVAREZ aware of plan for any additional questions.

## 2020-12-09 NOTE — DISCHARGE PLANNING
Medical Social Work    Referral: Legal Hold    Intervention: Legal Hold Paperwork given to  by [employer] RN: Brianna    Legal Hold Initiated: Date: 12/08/2020  Time: 1849    Legal Hold faxed: Date: 12/09/2020  Time: 0318    Patient’s Insurance Listed on Face Sheet: Medicaid FFS    Referrals sent to: [facility], [facility]  and [facility]     Plan: Patient will transfer to mental health facility once acceptance is obtained.

## 2020-12-09 NOTE — ED PROVIDER NOTES
ED Provider Note    Patient has been seen by behavioral health and found to be not at risk to self currently, apparently her psychosis is chronic and she is currently in group home for 6 months after inpatient psychiatric stay.  She wandered away, initially unknown that her psychotic condition was chronic.  The group home will be taking her back, recommendation from behavioral health is to discharge into their care.
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
## 2020-12-09 NOTE — DISCHARGE PLANNING
DAYSI contacted Charline [phone].   DAYSI updated her Pt is ready for discharge. She will contact her transportation crew and have them come pick Pt up.     DAYSI will monitor.

## 2020-12-09 NOTE — DISCHARGE PLANNING
Alert Team:    Pt resides in [facility] through [facility] at [address]   Spoke with   is Charline ([phone]) and informed that patient had left  yesterday resulting in missing person's report. Informed Charline that patient was  found wandering and brought into ER where she presented tangential and disorganized. Patient was placed on a legal hold for inability to care for self. Charline reports this is patient's baseline after recent discharge from a lengthy stay at [facility]. Patient is prescribed a HOOVER and is compliant with her medication regime. Charline report  will accept patient back and can coordinate supervised transport back to  upon discharge.     Please call Charline at [phone] if patient's hold is decertified and can arrange transport anytime after noon today.
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
## 2020-12-09 NOTE — ED TRIAGE NOTES
.  Chief Complaint   Patient presents with   • Off Psych Meds   • Hallucinations     Pt found wondering on road by . Having hallucinations that she is being chased by werewolves.   On arrival pt has pressured speech, flight of ideas. Ambulated to bathroom urine collected.     Pt denies recent travel or contact with anyone tested positive for covid 19. Mask applied to patient prior to triage. This RN in ppe prior to encounter.

## 2020-12-25 ENCOUNTER — HOSPITAL ENCOUNTER (EMERGENCY)
Dept: HOSPITAL 8 - ED | Age: 35
LOS: 1 days | Discharge: PSYCHIATRIC OR SUBSTANCE USE FACILITY | End: 2020-12-26
Payer: MEDICAID

## 2020-12-25 VITALS — BODY MASS INDEX: 24.13 KG/M2 | WEIGHT: 150.13 LBS | HEIGHT: 66 IN

## 2020-12-25 DIAGNOSIS — F23: Primary | ICD-10-CM

## 2020-12-25 DIAGNOSIS — Z20.828: ICD-10-CM

## 2020-12-25 DIAGNOSIS — Z90.721: ICD-10-CM

## 2020-12-25 DIAGNOSIS — R45.1: ICD-10-CM

## 2020-12-25 DIAGNOSIS — R41.82: ICD-10-CM

## 2020-12-25 DIAGNOSIS — F17.200: ICD-10-CM

## 2020-12-25 LAB
ALBUMIN SERPL-MCNC: 4.2 G/DL (ref 3.4–5)
ALP SERPL-CCNC: 104 U/L (ref 45–117)
ALT SERPL-CCNC: 23 U/L (ref 12–78)
ANION GAP SERPL CALC-SCNC: 6 MMOL/L (ref 5–15)
BASOPHILS # BLD AUTO: 0.1 X10^3/UL (ref 0–0.1)
BASOPHILS NFR BLD AUTO: 0 % (ref 0–1)
BILIRUB SERPL-MCNC: 0.3 MG/DL (ref 0.2–1)
CALCIUM SERPL-MCNC: 9.5 MG/DL (ref 8.5–10.1)
CHLORIDE SERPL-SCNC: 107 MMOL/L (ref 98–107)
CREAT SERPL-MCNC: 0.77 MG/DL (ref 0.55–1.02)
EOSINOPHIL # BLD AUTO: 0 X10^3/UL (ref 0–0.4)
EOSINOPHIL NFR BLD AUTO: 0 % (ref 1–7)
ERYTHROCYTE [DISTWIDTH] IN BLOOD BY AUTOMATED COUNT: 14 % (ref 9.6–15.2)
LYMPHOCYTES # BLD AUTO: 1.7 X10^3/UL (ref 1–3.4)
LYMPHOCYTES NFR BLD AUTO: 11 % (ref 22–44)
MCH RBC QN AUTO: 29.7 PG (ref 27–34.8)
MCHC RBC AUTO-ENTMCNC: 33.8 G/DL (ref 32.4–35.8)
MD: (no result)
MICROSCOPIC: (no result)
MONOCYTES # BLD AUTO: 0.7 X10^3/UL (ref 0.2–0.8)
MONOCYTES NFR BLD AUTO: 5 % (ref 2–9)
NEUTROPHILS # BLD AUTO: 12.5 X10^3/UL (ref 1.8–6.8)
NEUTROPHILS NFR BLD AUTO: 83 % (ref 42–75)
PLATELET # BLD AUTO: 337 X10^3/UL (ref 130–400)
PMV BLD AUTO: 8 FL (ref 7.4–10.4)
PROT SERPL-MCNC: 7.9 G/DL (ref 6.4–8.2)
RBC # BLD AUTO: 4.74 X10^6/UL (ref 3.82–5.3)
VANCOMYCIN TROUGH SERPL-MCNC: 3.4 MG/DL (ref 2.8–20)

## 2020-12-25 PROCEDURE — 99285 EMERGENCY DEPT VISIT HI MDM: CPT

## 2020-12-25 PROCEDURE — 81001 URINALYSIS AUTO W/SCOPE: CPT

## 2020-12-25 PROCEDURE — 80329 ANALGESICS NON-OPIOID 1 OR 2: CPT

## 2020-12-25 PROCEDURE — 80053 COMPREHEN METABOLIC PANEL: CPT

## 2020-12-25 PROCEDURE — 80307 DRUG TEST PRSMV CHEM ANLYZR: CPT

## 2020-12-25 PROCEDURE — G0480 DRUG TEST DEF 1-7 CLASSES: HCPCS

## 2020-12-25 PROCEDURE — 87086 URINE CULTURE/COLONY COUNT: CPT

## 2020-12-25 PROCEDURE — 71045 X-RAY EXAM CHEST 1 VIEW: CPT

## 2020-12-25 PROCEDURE — 80320 DRUG SCREEN QUANTALCOHOLS: CPT

## 2020-12-25 PROCEDURE — 85025 COMPLETE CBC W/AUTO DIFF WBC: CPT

## 2020-12-25 PROCEDURE — 87635 SARS-COV-2 COVID-19 AMP PRB: CPT

## 2020-12-25 PROCEDURE — 36415 COLL VENOUS BLD VENIPUNCTURE: CPT

## 2020-12-25 PROCEDURE — 84703 CHORIONIC GONADOTROPIN ASSAY: CPT

## 2020-12-25 PROCEDURE — 80299 QUANTITATIVE ASSAY DRUG: CPT

## 2020-12-25 NOTE — NUR
Patient resting comfortably at this time in hospital bed.  Offers no 
complaints.  Resp unlabored and easy.  Sitter present

## 2020-12-25 NOTE — NUR
Report rec'd from day shift nurse Jose.  Patient resting comfortably in hospital 
bed at this time.  Offers no compalints.  Sitter present
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
## 2020-12-25 NOTE — NUR
Pts bed noted to be beeping and making noise, Writer exchanged patient hospital 
bed for a new one.  patient talking but not making sense and just continues to 
laugh.  Sitter present

## 2020-12-26 ENCOUNTER — HOSPITAL ENCOUNTER (INPATIENT)
Dept: HOSPITAL 8 - 3E | Age: 35
LOS: 4 days | Discharge: HOME | DRG: 750 | End: 2020-12-30
Attending: PSYCHIATRY & NEUROLOGY | Admitting: PSYCHIATRY & NEUROLOGY
Payer: MEDICAID

## 2020-12-26 VITALS — BODY MASS INDEX: 31.66 KG/M2 | HEIGHT: 67 IN | WEIGHT: 201.72 LBS

## 2020-12-26 VITALS — DIASTOLIC BLOOD PRESSURE: 92 MMHG | SYSTOLIC BLOOD PRESSURE: 135 MMHG

## 2020-12-26 VITALS — SYSTOLIC BLOOD PRESSURE: 122 MMHG | DIASTOLIC BLOOD PRESSURE: 85 MMHG

## 2020-12-26 VITALS — DIASTOLIC BLOOD PRESSURE: 75 MMHG | SYSTOLIC BLOOD PRESSURE: 116 MMHG

## 2020-12-26 DIAGNOSIS — E66.9: ICD-10-CM

## 2020-12-26 DIAGNOSIS — Z88.8: ICD-10-CM

## 2020-12-26 DIAGNOSIS — Z79.899: ICD-10-CM

## 2020-12-26 DIAGNOSIS — F17.200: ICD-10-CM

## 2020-12-26 DIAGNOSIS — F41.9: ICD-10-CM

## 2020-12-26 DIAGNOSIS — F25.0: Primary | ICD-10-CM

## 2020-12-26 DIAGNOSIS — Z88.0: ICD-10-CM

## 2020-12-26 DIAGNOSIS — Z91.14: ICD-10-CM

## 2020-12-26 DIAGNOSIS — E03.9: ICD-10-CM

## 2020-12-26 PROCEDURE — 87086 URINE CULTURE/COLONY COUNT: CPT

## 2020-12-26 PROCEDURE — 84481 FREE ASSAY (FT-3): CPT

## 2020-12-26 PROCEDURE — 36415 COLL VENOUS BLD VENIPUNCTURE: CPT

## 2020-12-26 PROCEDURE — 85025 COMPLETE CBC W/AUTO DIFF WBC: CPT

## 2020-12-26 PROCEDURE — 80061 LIPID PANEL: CPT

## 2020-12-26 PROCEDURE — 80299 QUANTITATIVE ASSAY DRUG: CPT

## 2020-12-26 PROCEDURE — 93005 ELECTROCARDIOGRAM TRACING: CPT

## 2020-12-26 PROCEDURE — 71045 X-RAY EXAM CHEST 1 VIEW: CPT

## 2020-12-26 PROCEDURE — 80329 ANALGESICS NON-OPIOID 1 OR 2: CPT

## 2020-12-26 PROCEDURE — 84703 CHORIONIC GONADOTROPIN ASSAY: CPT

## 2020-12-26 PROCEDURE — 81001 URINALYSIS AUTO W/SCOPE: CPT

## 2020-12-26 PROCEDURE — 82607 VITAMIN B-12: CPT

## 2020-12-26 PROCEDURE — 84439 ASSAY OF FREE THYROXINE: CPT

## 2020-12-26 PROCEDURE — G0480 DRUG TEST DEF 1-7 CLASSES: HCPCS

## 2020-12-26 PROCEDURE — 80307 DRUG TEST PRSMV CHEM ANLYZR: CPT

## 2020-12-26 PROCEDURE — 87635 SARS-COV-2 COVID-19 AMP PRB: CPT

## 2020-12-26 PROCEDURE — 99285 EMERGENCY DEPT VISIT HI MDM: CPT

## 2020-12-26 PROCEDURE — 84443 ASSAY THYROID STIM HORMONE: CPT

## 2020-12-26 PROCEDURE — 80053 COMPREHEN METABOLIC PANEL: CPT

## 2020-12-26 PROCEDURE — 80320 DRUG SCREEN QUANTALCOHOLS: CPT

## 2020-12-26 RX ADMIN — NICOTINE SCH PATCH: 14 PATCH, EXTENDED RELEASE TRANSDERMAL at 15:03

## 2020-12-26 RX ADMIN — ARIPIPRAZOLE ONE MG: 10 TABLET ORAL at 12:35

## 2020-12-26 RX ADMIN — ARIPIPRAZOLE ONE MG: 10 TABLET ORAL at 12:31

## 2020-12-26 NOTE — NUR
-------------------------------------------------------------------------------

            *** Note undone in [facility] - 12/26/20 at 0847 by CHRISTOPHER ***            

-------------------------------------------------------------------------------

RN at bedside, provider at bedside.

## 2020-12-26 NOTE — NUR
Chica from [facility] called they do not take pt insurance. Pt will have to be self 
pay.

## 2020-12-26 NOTE — NUR
Patient & transport given discharge instructions and they have confirmed that 
they understand the instructions.  Patient transferred to U room 381 via WC, 
report given to Kiana.
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
## 2020-12-26 NOTE — NUR
pt awake, and unable to sleep.  reoriented to location when she has flight of 
words, and paces in room.

## 2020-12-26 NOTE — NUR
PT UPRIGHT ON HOSPITAL BED AWAKE, CALM & COOPERATIVE, OCCAS FLIGHT OF IDEAS BUT 
RESPONDS APPROP TO STAFF QUESTIONS, NAD, COMFORT MEASURES PROVIDED, PT REMAINS 
IN SAFE ENVIRONMENT, SITTER IN VIEW.

## 2020-12-26 NOTE — NUR
PT CONTINUES TO REST ON HOSPITAL BED WITH EYES CLOSED, NAD WITH EQUAL CHEST 
RISE/FALL, NO NEEDS AT THIS TIME, PT REMAINS IN SAFE ENVIRONMENT, SITTER IN 
VIEW.

## 2020-12-26 NOTE — NUR
PT CONTINUES TO CALMLY LAYING ON HOSPITAL BED OCCAS SITTING UP BUT RESPONDS 
APPROP TO STAFF QUESTIONS, NAD, COMFORT MEASURES PROVIDED, PT REMAINS IN SAFE 
ENVIRONMENT, SITTER IN VIEW.

## 2020-12-26 NOTE — NUR
-------------------------------------------------------------------------------

            *** Note undone in ED - 12/26/20 at 1153 by TIM ***            

-------------------------------------------------------------------------------

PT CONTINUES TO CALMLY LAYING ON HOSPITAL BED OCCAS SITTING UP BUT RESPONDS 
APPROP TO STAFF QUESTIONS, NAD, COMFORT MEASURES PROVIDED, PT REMAINS IN SAFE 
ENVIRONMENT, SITTER IN VIEW.

## 2020-12-26 NOTE — NUR
RECEIVED PT FROM [facility]. PT CALMLY LAYING ON HOSPITAL BED WITH EYES CLOSED, NAD 
WITH EQUAL CHEST RISE/FALL, NO NEEDS AT THIS TIME, PT REMAINS IN SAFE 
ENVIRONMENT, SITTER IN VIEW.

## 2020-12-26 NOTE — NUR
received report from RN.  pt calm and cooperative. no complaints at this time.  
juice given, and pt happy.  call light within reach, and sitter at bedside.

## 2020-12-26 NOTE — NUR
Patient was asking for a laxative.  Writer got an order for Senna which is what 
the patient asked for.  Patient then started asking for juice, water, milk and 
coffee.  Writer brought patient cranberry juice.  Patient the stated that she 
can't drink that cause it causes UTI's.  Patient then started rubbing her head 
and stating that writer was in her head and on her lips.  Writer told patient 
that unfortunely there is no other juice in the ER and that writer had just 
taken 2 empty cranberry cups out of the room. Patient took the senna and laid 
back down.

## 2020-12-26 NOTE — NUR
PT SITTING UP ON HOSPITAL BED ASKING FOR "SOMETHING TO HELP ME CALM DOWN"- ERP 
AWARE, RESPONDS APPROP TO STAFF QUESTIONS WITH OCCAS FLIGHT OF IDEAS, NAD, 
COMFORT MEASURES PROVIDED, PT REMAINS IN SAFE ENVIRONMENT, SITTER IN VIEW.

## 2020-12-26 NOTE — NUR
HANNY RN: PACKET FAXED TO [facility], [facility], [facility] AND [facility].

## 2020-12-26 NOTE — NUR
PT SITTING ON EDGE OF HOSPITAL BED- "FEEL A LITTLE CALMER", RESPONDS APPROP TO 
STAFF QUESTIONS WITH OCCAS FLIGHT OF IDEAS, NAD, COMFORT MEASURES PROVIDED INCL 
EXTRA MEAL TRAY ORDERED, PT REMAINS IN SAFE ENVIRONMENT, SITTER IN VIEW.

## 2020-12-26 NOTE — NUR
PT CALMLY LAYING ON HOSPITAL BED WITH EYES CLOSED BUT RESPONDS APPROP TO STAFF 
QUESTIONS, NAD, NO NEEDS AT THIS TIME, PT REMAINS IN SAFE ENVIRONMENT, SITTER 
IN VIEW.

## 2020-12-27 VITALS — DIASTOLIC BLOOD PRESSURE: 91 MMHG | SYSTOLIC BLOOD PRESSURE: 133 MMHG

## 2020-12-27 VITALS — SYSTOLIC BLOOD PRESSURE: 125 MMHG | DIASTOLIC BLOOD PRESSURE: 87 MMHG

## 2020-12-27 RX ADMIN — CARBAMAZEPINE SCH MG: 200 TABLET ORAL at 20:04

## 2020-12-27 RX ADMIN — NICOTINE SCH PATCH: 14 PATCH, EXTENDED RELEASE TRANSDERMAL at 08:55

## 2020-12-27 RX ADMIN — HALOPERIDOL PRN MG: 5 TABLET ORAL at 03:57

## 2020-12-27 RX ADMIN — LURASIDONE HYDROCHLORIDE SCH MG: 20 TABLET, FILM COATED ORAL at 20:04

## 2020-12-27 RX ADMIN — LURASIDONE HYDROCHLORIDE SCH MG: 20 TABLET, FILM COATED ORAL at 12:36

## 2020-12-28 VITALS — SYSTOLIC BLOOD PRESSURE: 113 MMHG | DIASTOLIC BLOOD PRESSURE: 70 MMHG

## 2020-12-28 LAB
BASOPHILS # BLD AUTO: 0.1 X10^3/UL (ref 0–0.1)
BASOPHILS NFR BLD AUTO: 1 % (ref 0–1)
CHOL/HDL RATIO: 2
EOSINOPHIL # BLD AUTO: 0.1 X10^3/UL (ref 0–0.4)
EOSINOPHIL NFR BLD AUTO: 1 % (ref 1–7)
ERYTHROCYTE [DISTWIDTH] IN BLOOD BY AUTOMATED COUNT: 14.7 % (ref 9.6–15.2)
HDL CHOL %: 49 % (ref 28–40)
HDL CHOLESTEROL (DIRECT): 52 MG/DL (ref 40–60)
LDL CHOLESTEROL,CALCULATED: 28 MG/DL (ref 54–169)
LDLC/HDLC SERPL: 0.5 {RATIO} (ref 0.5–3)
LYMPHOCYTES # BLD AUTO: 2.6 X10^3/UL (ref 1–3.4)
LYMPHOCYTES NFR BLD AUTO: 31 % (ref 22–44)
MCH RBC QN AUTO: 30.2 PG (ref 27–34.8)
MCHC RBC AUTO-ENTMCNC: 34.2 G/DL (ref 32.4–35.8)
MD: NO
MONOCYTES # BLD AUTO: 0.6 X10^3/UL (ref 0.2–0.8)
MONOCYTES NFR BLD AUTO: 7 % (ref 2–9)
NEUTROPHILS # BLD AUTO: 5.1 X10^3/UL (ref 1.8–6.8)
NEUTROPHILS NFR BLD AUTO: 60 % (ref 42–75)
PLATELET # BLD AUTO: 307 X10^3/UL (ref 130–400)
PMV BLD AUTO: 7.6 FL (ref 7.4–10.4)
RBC # BLD AUTO: 4.33 X10^6/UL (ref 3.82–5.3)
T4 FREE SERPL-MCNC: 1.19 NG/DL (ref 0.76–1.46)
TRIGL SERPL-MCNC: 128 MG/DL (ref 50–200)
VLDLC SERPL CALC-MCNC: 26 MG/DL (ref 0–25)

## 2020-12-28 RX ADMIN — HALOPERIDOL PRN MG: 5 TABLET ORAL at 15:14

## 2020-12-28 RX ADMIN — CARBAMAZEPINE SCH MG: 200 TABLET ORAL at 20:38

## 2020-12-28 RX ADMIN — NICOTINE SCH PATCH: 14 PATCH, EXTENDED RELEASE TRANSDERMAL at 08:13

## 2020-12-28 RX ADMIN — CARBAMAZEPINE SCH MG: 200 TABLET ORAL at 08:09

## 2020-12-28 RX ADMIN — LURASIDONE HYDROCHLORIDE SCH MG: 20 TABLET, FILM COATED ORAL at 20:38

## 2020-12-28 RX ADMIN — LURASIDONE HYDROCHLORIDE SCH MG: 20 TABLET, FILM COATED ORAL at 08:09

## 2020-12-28 RX ADMIN — ONDANSETRON PRN MG: 4 TABLET, ORALLY DISINTEGRATING ORAL at 13:03

## 2020-12-29 VITALS — DIASTOLIC BLOOD PRESSURE: 86 MMHG | SYSTOLIC BLOOD PRESSURE: 127 MMHG

## 2020-12-29 RX ADMIN — HALOPERIDOL PRN MG: 5 TABLET ORAL at 02:36

## 2020-12-29 RX ADMIN — NICOTINE SCH PATCH: 14 PATCH, EXTENDED RELEASE TRANSDERMAL at 07:46

## 2020-12-29 RX ADMIN — HALOPERIDOL PRN MG: 5 TABLET ORAL at 07:47

## 2020-12-29 RX ADMIN — LURASIDONE HYDROCHLORIDE SCH MG: 20 TABLET, FILM COATED ORAL at 07:47

## 2020-12-29 RX ADMIN — CARBAMAZEPINE SCH MG: 200 TABLET ORAL at 07:47

## 2020-12-29 RX ADMIN — HALOPERIDOL PRN MG: 5 TABLET ORAL at 21:19

## 2020-12-29 RX ADMIN — CARBAMAZEPINE SCH MG: 200 TABLET ORAL at 20:08

## 2020-12-29 RX ADMIN — ONDANSETRON PRN MG: 4 TABLET, ORALLY DISINTEGRATING ORAL at 09:38

## 2020-12-29 RX ADMIN — LURASIDONE HYDROCHLORIDE SCH MG: 20 TABLET, FILM COATED ORAL at 20:07

## 2020-12-30 VITALS — SYSTOLIC BLOOD PRESSURE: 99 MMHG | DIASTOLIC BLOOD PRESSURE: 70 MMHG

## 2020-12-30 RX ADMIN — CARBAMAZEPINE SCH MG: 200 TABLET ORAL at 08:44

## 2020-12-30 RX ADMIN — LURASIDONE HYDROCHLORIDE SCH MG: 20 TABLET, FILM COATED ORAL at 08:44

## 2021-01-14 ENCOUNTER — HOSPITAL ENCOUNTER (EMERGENCY)
Facility: MEDICAL CENTER | Age: 36
End: 2021-01-14
Attending: EMERGENCY MEDICINE
Payer: MEDICAID

## 2021-01-14 VITALS
TEMPERATURE: 98.5 F | WEIGHT: 185 LBS | BODY MASS INDEX: 26.48 KG/M2 | RESPIRATION RATE: 16 BRPM | HEART RATE: 83 BPM | HEIGHT: 70 IN | DIASTOLIC BLOOD PRESSURE: 93 MMHG | OXYGEN SATURATION: 99 % | SYSTOLIC BLOOD PRESSURE: 133 MMHG

## 2021-01-14 DIAGNOSIS — F20.1 DISORGANIZED SCHIZOPHRENIA (HCC): ICD-10-CM

## 2021-01-14 LAB
AMPHET UR QL SCN: NEGATIVE
BARBITURATES UR QL SCN: NEGATIVE
BASOPHILS # BLD AUTO: 0.4 % (ref 0–1.8)
BASOPHILS # BLD: 0.04 K/UL (ref 0–0.12)
BENZODIAZ UR QL SCN: NEGATIVE
BZE UR QL SCN: NEGATIVE
CANNABINOIDS UR QL SCN: NEGATIVE
EOSINOPHIL # BLD AUTO: 0.07 K/UL (ref 0–0.51)
EOSINOPHIL NFR BLD: 0.7 % (ref 0–6.9)
ERYTHROCYTE [DISTWIDTH] IN BLOOD BY AUTOMATED COUNT: 46.1 FL (ref 35.9–50)
ETHANOL BLD-MCNC: <10.1 MG/DL (ref 0–10)
GLUCOSE BLD-MCNC: 73 MG/DL (ref 65–99)
HCG SERPL QL: NEGATIVE
HCT VFR BLD AUTO: 42.7 % (ref 37–47)
HGB BLD-MCNC: 13.9 G/DL (ref 12–16)
IMM GRANULOCYTES # BLD AUTO: 0.03 K/UL (ref 0–0.11)
IMM GRANULOCYTES NFR BLD AUTO: 0.3 % (ref 0–0.9)
LYMPHOCYTES # BLD AUTO: 2.83 K/UL (ref 1–4.8)
LYMPHOCYTES NFR BLD: 26.6 % (ref 22–41)
MCH RBC QN AUTO: 30 PG (ref 27–33)
MCHC RBC AUTO-ENTMCNC: 32.6 G/DL (ref 33.6–35)
MCV RBC AUTO: 92.2 FL (ref 81.4–97.8)
METHADONE UR QL SCN: NEGATIVE
MONOCYTES # BLD AUTO: 0.75 K/UL (ref 0–0.85)
MONOCYTES NFR BLD AUTO: 7 % (ref 0–13.4)
NEUTROPHILS # BLD AUTO: 6.92 K/UL (ref 2–7.15)
NEUTROPHILS NFR BLD: 65 % (ref 44–72)
NRBC # BLD AUTO: 0 K/UL
NRBC BLD-RTO: 0 /100 WBC
OPIATES UR QL SCN: NEGATIVE
OXYCODONE UR QL SCN: NEGATIVE
PCP UR QL SCN: NEGATIVE
PLATELET # BLD AUTO: 387 K/UL (ref 164–446)
PMV BLD AUTO: 10 FL (ref 9–12.9)
PROPOXYPH UR QL SCN: NEGATIVE
RBC # BLD AUTO: 4.63 M/UL (ref 4.2–5.4)
WBC # BLD AUTO: 10.6 K/UL (ref 4.8–10.8)

## 2021-01-14 PROCEDURE — 82077 ASSAY SPEC XCP UR&BREATH IA: CPT

## 2021-01-14 PROCEDURE — 700102 HCHG RX REV CODE 250 W/ 637 OVERRIDE(OP): Performed by: EMERGENCY MEDICINE

## 2021-01-14 PROCEDURE — 85025 COMPLETE CBC W/AUTO DIFF WBC: CPT

## 2021-01-14 PROCEDURE — 99284 EMERGENCY DEPT VISIT MOD MDM: CPT

## 2021-01-14 PROCEDURE — 84703 CHORIONIC GONADOTROPIN ASSAY: CPT

## 2021-01-14 PROCEDURE — 90791 PSYCH DIAGNOSTIC EVALUATION: CPT

## 2021-01-14 PROCEDURE — A9270 NON-COVERED ITEM OR SERVICE: HCPCS | Performed by: EMERGENCY MEDICINE

## 2021-01-14 PROCEDURE — 302970 POC BREATHALIZER: Performed by: EMERGENCY MEDICINE

## 2021-01-14 PROCEDURE — 82962 GLUCOSE BLOOD TEST: CPT

## 2021-01-14 PROCEDURE — 80307 DRUG TEST PRSMV CHEM ANLYZR: CPT

## 2021-01-14 RX ORDER — HALOPERIDOL 5 MG/1
5 TABLET ORAL ONCE
Status: COMPLETED | OUTPATIENT
Start: 2021-01-14 | End: 2021-01-14

## 2021-01-14 RX ADMIN — HALOPERIDOL 5 MG: 5 TABLET ORAL at 13:58

## 2021-01-14 ASSESSMENT — FIBROSIS 4 INDEX: FIB4 SCORE: 0.52

## 2021-01-14 NOTE — ED PROVIDER NOTES
"ED Provider Note    CHIEF COMPLAINT  Chief Complaint   Patient presents with   • Psych Eval       HPI  Tierneyyesenia Mayer is a 35 y.o. female who presents to the emergency room with altered mental status. On chart review history of hypertension and psychiatric disorder. Patient missed the methamphetamine. History otherwise very limited secondary to recurrent altered mental status. Patient is repetitively giggles and seemingly confabulation's words and attempted conversation.    REVIEW OF SYSTEMS  See HPI for further details. All other systems are negative.     PAST MEDICAL HISTORY   has a past medical history of Hypertension and Psychiatric disorder.    SOCIAL HISTORY  Social History     Tobacco Use   • Smoking status: Current Every Day Smoker     Packs/day: 1.00     Types: Cigarettes   • Smokeless tobacco: Never Used   • Tobacco comment: knows she is suppose to quit but hasn't   Substance and Sexual Activity   • Alcohol use: No   • Drug use: No   • Sexual activity: Not on file       SURGICAL HISTORY   has a past surgical history that includes pelviscopy (2/22/2011) and incision and drainage general (2/22/2011).    CURRENT MEDICATIONS  Home Medications    **Home medications have not yet been reviewed for this encounter**         ALLERGIES  Allergies   Allergen Reactions   • Clonazepam      Pt reports getting a stroke.  Per historical: Twitches and paranoia.   • Invega Sustenna [Paliperidone Palmitate]    • Olanzapine      Pt reports worsening and threatening voices.   • Paliperidone      Twitches and paranoia .       PHYSICAL EXAM  VITAL SIGNS: /93   Pulse 83   Temp 36.9 °C (98.5 °F) (Temporal)   Resp 16   Ht 1.778 m (5' 10\")   Wt 83.9 kg (185 lb)   LMP  (LMP Unknown)   SpO2 99%   BMI 26.54 kg/m²  @NEERU[[id number]::@  Pulse ox interpretation: I interpret this pulse ox as normal.  Constitutional: Alert in no apparent distress.  HENT: Normocephalic, Atraumatic, Bilateral external ears normal. Nose normal. "   Eyes: Pupils are equal and reactive.  Heart: Regular rate and rythm, no murmurs.    Lungs: Clear to auscultation bilaterally.  Skin: Warm, Dry, No erythema, No rash.   Neurologic: Alert, Grossly non-focal.   Psychiatric: odd affect, recurrent giggling. No appreciable SI HI. Admits to smoking meth.    Results for orders placed or performed during the hospital encounter of 01/14/21   URINE DRUG SCREEN   Result Value Ref Range    Amphetamines Urine Negative Negative    Barbiturates Negative Negative    Benzodiazepines Negative Negative    Cocaine Metabolite Negative Negative    Methadone Negative Negative    Opiates Negative Negative    Oxycodone Negative Negative    Phencyclidine -Pcp Negative Negative    Propoxyphene Negative Negative    Cannabinoid Metab Negative Negative   DIAGNOSTIC ALCOHOL   Result Value Ref Range    Diagnostic Alcohol <10.1 0.0 - 10.0 mg/dL   CBC WITH DIFFERENTIAL   Result Value Ref Range    WBC 10.6 4.8 - 10.8 K/uL    RBC 4.63 4.20 - 5.40 M/uL    Hemoglobin 13.9 12.0 - 16.0 g/dL    Hematocrit 42.7 37.0 - 47.0 %    MCV 92.2 81.4 - 97.8 fL    MCH 30.0 27.0 - 33.0 pg    MCHC 32.6 (L) 33.6 - 35.0 g/dL    RDW 46.1 35.9 - 50.0 fL    Platelet Count 387 164 - 446 K/uL    MPV 10.0 9.0 - 12.9 fL    Neutrophils-Polys 65.00 44.00 - 72.00 %    Lymphocytes 26.60 22.00 - 41.00 %    Monocytes 7.00 0.00 - 13.40 %    Eosinophils 0.70 0.00 - 6.90 %    Basophils 0.40 0.00 - 1.80 %    Immature Granulocytes 0.30 0.00 - 0.90 %    Nucleated RBC 0.00 /100 WBC    Neutrophils (Absolute) 6.92 2.00 - 7.15 K/uL    Lymphs (Absolute) 2.83 1.00 - 4.80 K/uL    Monos (Absolute) 0.75 0.00 - 0.85 K/uL    Eos (Absolute) 0.07 0.00 - 0.51 K/uL    Baso (Absolute) 0.04 0.00 - 0.12 K/uL    Immature Granulocytes (abs) 0.03 0.00 - 0.11 K/uL    NRBC (Absolute) 0.00 K/uL   BETA-HCG QUALITATIVE SERUM   Result Value Ref Range    Beta-Hcg Qualitative Serum Negative Negative   ACCU-CHEK GLUCOSE   Result Value Ref Range    Glucose - Accu-Ck 73  65 - 99 mg/dL           COURSE & MEDICAL DECISION MAKING  Pertinent Labs & Imaging studies reviewed. (See chart for details)  patient presented emergency room with above complaint. Very difficult history. Likely secondary to psychiatric decompensation. At this point I will have her transferred to her local psychiatric long-term care facility to which the crisis evaluation team is also agreeable to this. Medically cleared at this point.       The patient will return for worsening symptoms and is stable at the time of discharge. The patient verbalizes understanding and will comply.    FINAL IMPRESSION  1. Disorganized schizophrenia (HCC)               Electronically signed by: Saul Vargas M.D., 1/14/2021 1:37 PM

## 2021-01-14 NOTE — ED NOTES
Patient ambulated steady gait to bathroom and provided UA.  Patient medicated per MAR, placed on cardiac monitor.      Patient not blowing enough in POC breathalyzer to read.      Blood draw performed, no complications, compliant and amicable.     Patient requesting food, gave box lunch.     Labs sent.    unknown

## 2021-01-14 NOTE — ED TRIAGE NOTES
"Chief Complaint   Patient presents with   • Psych Eval     Patient brought in by EMS from [facility].  Patient was walking around top less.  Per report patient has not been taking her psych medication.  Patient states she takes Zyprexa however this is not found on her medication list.  Patient answers questions, then will have giggling/laughing fits.      Patient denies SI/HI, denies auditory/visual hallucinations.      Pt is alert and oriented, speaking in full sentences, follows commands and responds appropriately to questions. NAD. Resp are even and unlabored.     Patient and staff wearing appropriate PPE    /89   Pulse 93   Temp 36.9 °C (98.5 °F) (Temporal)   Resp 18   Ht 1.778 m (5' 10\")   Wt 83.9 kg (185 lb)   LMP  (LMP Unknown)   SpO2 100%   BMI 26.54 kg/m²     "

## 2021-01-14 NOTE — PROGRESS NOTES
"      Spiritual Care Note    Patient Information     Patient's Name: Tierney Mayer   MRN: [id number]    YOB: 1985   Age and Gender: 35 y.o. female   Service Area: ED [facility]   Room (and Bed):  [address] [facility]   Ethnicity or Nationality:     Primary Language: English   Episcopalian/Spiritual preference: Gnosticist   Place of Residence: [place]   Family/Friends/Others Present: No   Clinical Team Present: No   Medical Diagnosis(-es)/Procedure(s): Psych Eval   Code Status: No Order    Date of Admission: 1/14/2021   Length of Stay: 0 days        Spiritual Care Provider Information:  Name of Spiritual Care Provider: Brianna Chapman  Title of Spiritual Care Provider: Associate   Phone Number: [phone]  E-mail: [email or web address]  Total time : 10 minutes    Spiritual Screen Results:    Gen Nursing        Palliative Care         Encounter/Request Information  Encounter/Request Type   Visited With: Patient  Nature of the Visit: Initial, On shift  General Visit: Yes  Referral From/ Origin of Request: [place] rounds, Verbal patient    Religous Needs/Values  Episcopalian Needs Visit  Episcopalian Needs: Prayer    Spiritual Assessment     Spiritual Care Encounters    Observations/Symptoms: Anxious    Interaction/Conversation: Pt seemed very anxious and slightly agitated and distracted; she requested prayer but cut the  off assisted through, and said \"thank you\" very curtly at the end while staring fixedly at a point to the side of the end of the bed.    Assessment: Need    Need: Seeking Spiritual Assistance and Support    Interventions: Compassionate presence, prayer.    Outcomes: (Unable to determine.)    Plan: Visit Upon Request    Notes:            "

## 2021-01-14 NOTE — ED NOTES
Patient resting in bed, equal rise and fall of the chest, unlabored breathing, no apparent distress.   Repositions self regularly

## 2021-01-15 NOTE — ED NOTES
The patient has been provided with discharge education and information.  The patient was also provided with instructions on follow up care and return precautions.  The patient verbalizes understanding of discharge instructions, follow up care, and return precautions.  All questions have been answered. .  NAD, A/Ox4, good color and appropriate at time of discharge.  Patient ambulated steady gait with alert team to lobby, to ensure patient gets into MTM ride.

## 2021-01-15 NOTE — CONSULTS
[facility]   TRIAGE ASSESSMENT    Name: Tierney Mayer  MRN: [id number]  : 1985  Age: 35 y.o.  Date of assessment: 2021  PCP: Pcp Pt States None  Persons in attendance: Patient    CHIEF COMPLAINT/PRESENTING ISSUE (as stated by pt, well care staff, rn, erp) Moreno): The 35y female pt presents in the er with ems [place] and acting strange and bizarre. In the er she appears alert, hypomanic and confused. However she is very cooperative and friendly and easily directable. She adamantly in denial of any si or plan and denies any hi or active psychosis (although it appears she has internal stimuli.) She has a hx of bipolar disorder and chronic, long term schizophrenia. She claims she lives in a group home and wants to return. When asked why she was topeless [place] bus station she simple laughed loudly.   Chief Complaint   Patient presents with   • Psych Eval        CURRENT LIVING SITUATION/SOCIAL SUPPORT: This pt lives in a local group home under the supervision of [facility] but in apparent partnership with [facility]. In the past she lived in subsidized hotels but apparently needed better supervision and was moved to a group home. She still does leave unattended from her home at times, such as today. The [facility] staff notes they are seeking out solutions to that problem. This pt has two children ages 6 and 9 that live with her mother. She does stay in touch with that side of her family. She appears to have excellent care at the [facility] group home.    BEHAVIORAL HEALTH TREATMENT HISTORY  Does patient/parent report a history of prior behavioral health treatment for patient?   Yes:  The patient presents with numerous admits to [facility] and [facility]  historically; she has been previously diagnosed with Schizophrenia.(per emr) The patient presently sees Dr Barreto, her psychiatrist at [facility]. Her  [facility] notes that she was recently  discontinued on all her psy meds except injectable Abilify, which is up to date. She appears to have solid social support.         SAFETY ASSESSMENT - SELF  Does patient acknowledge current or past symptoms of dangerousness to self? Yes pt claimed she tried to hang herself in her early 20s twice. But now denies any si.  Does parent/significant other report patient has current or past symptoms of dangerousness to self? N\A  Does presenting problem suggest symptoms of dangerousness to self? This pt presently denies any si or plan of self harm. She denies any access to a firearm. She has no indications of being a safety risk.    SAFETY ASSESSMENT - OTHERS  Does patient acknowledge current or past symptoms of aggressive behavior or risk to others? No, however patient has a history of aggressive behavior towards staff in the past  that has required sedation and restraints in the past.(per emr) However she does not present a risk presently to staff or others. She is compliant and very pleasant, smiling  frequently.   Does parent/significant other report patient has current or past symptoms of aggressive behavior or risk to others?  N\A  Does presenting problem suggest symptoms of dangerousness to others? No does not demonstrate any risk to others and adamantly denies any hi.    Crisis Safety Plan completed and copy given to patient? Yes    ABUSE/NEGLECT SCREENING  Does patient report feeling “unsafe” in his/her home, or afraid of anyone?  no  Does patient report any history of physical, sexual, or emotional abuse?  no  Does parent or significant other report any of the above? no  Is there evidence of neglect by self?  no  Is there evidence of neglect by a caregiver? no  Does the patient/parent report any history of CPS/APS/police involvement related to suspected abuse/neglect or domestic violence? no  Based on the information provided during the current assessment, is a mandated report of suspected abuse/neglect being  "made?  No    SUBSTANCE USE SCREENING  Yes:  Sam all substances used in the past 30 days: pt denies any      Last Use Amount   []   Alcohol     []   Marijuana     []   Heroin     []   Prescription Opioids  (used without prescription, for    recreation, or in excess of prescribed amount)     []   Other Prescription  (used without prescription, for    recreation, or in excess of prescribed amount)     []   Cocaine      []   Methamphetamine     []   \"\" drugs (ectasy, MDMA)     []   Other substances        UDS results: Negative  Breathalyzer results: 0.00    What consequences does the patient associate with any of the above substance use and or addictive behaviors? None    Risk factors for detox (check all that apply):  []  Seizures   []  Diaphoretic (sweating)   []  Tremors   []  Hallucinations   []  Increased blood pressure   []  Decreased blood pressure   []  Other   [x]  None      [] Patient education on risk factors for detoxification and instructed to return to ER as needed.na      MENTAL STATUS   Participation: active verbal participation  Grooming: Disheveled; somewhat  Orientation: alert with response to internal stimuli noted and confused  Behavior: cooperative and pleasant.  Eye contact: good  Mood: energized and hypomanic   Affect: incongruently happy  Thought process: Flight of ideas with internal stimuli  Thought content: confused  Speech: Loud and Rapid  Perception: Evidence of auditory hallucination  Memory:  Poor memory for chronology of events  Insight: adequate(appears baseline)  Judgment: adequate baseline it appears   Other:    Collateral information:   Source:  [] Significant other present in person:   [x] Significant other by telephone  [] [facility]   [x] [facility] Nursing Staff  [x] [facility]  [] Other:     [] Unable to complete full assessment due to:  [] Acute intoxication  [] Patient declined to participate/engage  [] Patient verbally unresponsive  [] Significant " cognitive deficits  [x] Significant perceptual distortions or behavioral disorganization  [] Other:      CLINICAL IMPRESSIONS:  Primary: hypomanic with apparent internal stimuli   Secondary: anxiety disorder        IDENTIFIED NEEDS/PLAN:  [Trigger DISPOSITION list for any items marked]    []  Imminent safety risk - self [] Imminent safety risk - others   []  Acute substance withdrawal [x]  Psychosis/Impaired reality testing   [x]  Mood/anxiety []  Substance use/Addictive behavior   [x]  Maladaptive behaviro []  Parent/child conflict   []  Family/Couples conflict []  Biomedical   [x]  Housing [x]  Financial   []   Legal  Occupational/Educational   []  Domestic violence []  Other:     Disposition: MTM cab back to group home ( at group home confirmed pt arrival)    Does patient express agreement with the above plan? yes    Referral appointment(s) scheduled? no    Alert team only:   I have discussed findings and recommendations with Dr.E Vargas  who is in agreement with these recommendations.35 old female with chronic schizophrenia and hypomania/confusion was discharged back to her group home via MTM without any incident and a safe arrival.     Referral information sent to the following community providers :[facility]  [facility]    If applicable : gus Varghese R.N.  1/14/2021

## 2021-01-15 NOTE — DISCHARGE PLANNING
[facility]  Crisis/Safety Plan    Name:  Tierney Mayer  MRN:  [id number]  Date:  2021    Warning signs that a crisis may be developing for me or I may be at risk:  1)feeling more anxious  2)feeling confined   3)have thoughts or plans to leave the group home without telling staff    Coping strategies I can use on my own (relaxation, physical activity, etc):  1)try to get exercise   2)listen to music   3)watch tv    Ways I can make my environment safe:  1) tell staff how you are feeling  2) stay on meds  3) meet your Dr when scheduled    Things I want to tell myself when I feel a crisis developin) try to stay calm  2) remember there is help out there  3) get help before a crisis develops    People I can contact for support or distraction (and their phone numbers):  1) Valerie Mayer    2) Wilton Champion   3) or use numbers below    If I’m not able to reach my support people, or the above strategies don’t help, I can contact the following professionals, agencies, or hotlines:  1) Crisis Call Center ():  [phone] -OR- [phone]  2) Crisis Text Line ():  Text CARE TO 158972  3)   4)     Sam Varghese R.N.

## 2021-01-19 ENCOUNTER — HOSPITAL ENCOUNTER (EMERGENCY)
Facility: MEDICAL CENTER | Age: 36
End: 2021-01-22
Attending: EMERGENCY MEDICINE
Payer: MEDICAID

## 2021-01-19 DIAGNOSIS — R45.851 SUICIDAL IDEATION: ICD-10-CM

## 2021-01-19 PROCEDURE — 0241U HCHG SARS-COV-2 COVID-19 NFCT DS RESP RNA 4 TRGT MIC: CPT

## 2021-01-19 PROCEDURE — 302970 POC BREATHALIZER: Performed by: EMERGENCY MEDICINE

## 2021-01-19 PROCEDURE — 99285 EMERGENCY DEPT VISIT HI MDM: CPT

## 2021-01-19 ASSESSMENT — FIBROSIS 4 INDEX: FIB4 SCORE: 0.42

## 2021-01-20 LAB
AMPHET UR QL SCN: NEGATIVE
B-HCG SERPL-ACNC: <1 MIU/ML (ref 0–5)
BARBITURATES UR QL SCN: NEGATIVE
BENZODIAZ UR QL SCN: NEGATIVE
BZE UR QL SCN: NEGATIVE
CANNABINOIDS UR QL SCN: NEGATIVE
EKG IMPRESSION: NORMAL
ETHANOL BLD-MCNC: <10.1 MG/DL (ref 0–10)
FLUAV RNA SPEC QL NAA+PROBE: NEGATIVE
FLUBV RNA SPEC QL NAA+PROBE: NEGATIVE
HCG SERPL QL: NEGATIVE
HCG UR QL: NEGATIVE
METHADONE UR QL SCN: NEGATIVE
OPIATES UR QL SCN: NEGATIVE
OXYCODONE UR QL SCN: NEGATIVE
PCP UR QL SCN: NEGATIVE
PROPOXYPH UR QL SCN: NEGATIVE
RSV RNA SPEC QL NAA+PROBE: NEGATIVE
SARS-COV-2 RNA RESP QL NAA+PROBE: NOTDETECTED
SPECIMEN SOURCE: NORMAL

## 2021-01-20 PROCEDURE — 84702 CHORIONIC GONADOTROPIN TEST: CPT

## 2021-01-20 PROCEDURE — 93005 ELECTROCARDIOGRAM TRACING: CPT | Performed by: PSYCHIATRY & NEUROLOGY

## 2021-01-20 PROCEDURE — A9270 NON-COVERED ITEM OR SERVICE: HCPCS | Performed by: EMERGENCY MEDICINE

## 2021-01-20 PROCEDURE — A9270 NON-COVERED ITEM OR SERVICE: HCPCS | Performed by: PSYCHIATRY & NEUROLOGY

## 2021-01-20 PROCEDURE — 99285 EMERGENCY DEPT VISIT HI MDM: CPT | Performed by: PSYCHIATRY & NEUROLOGY

## 2021-01-20 PROCEDURE — 84703 CHORIONIC GONADOTROPIN ASSAY: CPT

## 2021-01-20 PROCEDURE — 80307 DRUG TEST PRSMV CHEM ANLYZR: CPT

## 2021-01-20 PROCEDURE — 700102 HCHG RX REV CODE 250 W/ 637 OVERRIDE(OP): Performed by: EMERGENCY MEDICINE

## 2021-01-20 PROCEDURE — 82077 ASSAY SPEC XCP UR&BREATH IA: CPT

## 2021-01-20 PROCEDURE — 81025 URINE PREGNANCY TEST: CPT

## 2021-01-20 PROCEDURE — 700102 HCHG RX REV CODE 250 W/ 637 OVERRIDE(OP): Performed by: PSYCHIATRY & NEUROLOGY

## 2021-01-20 PROCEDURE — 90791 PSYCH DIAGNOSTIC EVALUATION: CPT

## 2021-01-20 RX ORDER — HALOPERIDOL 5 MG/ML
5 INJECTION INTRAMUSCULAR EVERY 6 HOURS PRN
Status: DISCONTINUED | OUTPATIENT
Start: 2021-01-20 | End: 2021-01-22 | Stop reason: HOSPADM

## 2021-01-20 RX ORDER — LURASIDONE HYDROCHLORIDE 80 MG/1
80 TABLET, FILM COATED ORAL
COMMUNITY
End: 2021-03-19

## 2021-01-20 RX ORDER — DOCUSATE SODIUM 100 MG/1
100 CAPSULE, LIQUID FILLED ORAL 2 TIMES DAILY
Status: SHIPPED | COMMUNITY
End: 2021-10-29

## 2021-01-20 RX ORDER — DIVALPROEX SODIUM 500 MG/1
500 TABLET, DELAYED RELEASE ORAL EVERY 8 HOURS
Status: DISCONTINUED | OUTPATIENT
Start: 2021-01-20 | End: 2021-01-22 | Stop reason: HOSPADM

## 2021-01-20 RX ORDER — DIPHENHYDRAMINE HCL 25 MG
50 TABLET ORAL EVERY 6 HOURS PRN
Status: DISCONTINUED | OUTPATIENT
Start: 2021-01-20 | End: 2021-01-22 | Stop reason: HOSPADM

## 2021-01-20 RX ORDER — LURASIDONE HYDROCHLORIDE 80 MG/1
80 TABLET, FILM COATED ORAL
Status: DISCONTINUED | OUTPATIENT
Start: 2021-01-20 | End: 2021-01-22 | Stop reason: HOSPADM

## 2021-01-20 RX ORDER — ARIPIPRAZOLE 10 MG/1
5 TABLET ORAL ONCE
Status: COMPLETED | OUTPATIENT
Start: 2021-01-20 | End: 2021-01-20

## 2021-01-20 RX ORDER — HALOPERIDOL 5 MG/1
5 TABLET ORAL EVERY 6 HOURS PRN
Status: DISCONTINUED | OUTPATIENT
Start: 2021-01-20 | End: 2021-01-22 | Stop reason: HOSPADM

## 2021-01-20 RX ORDER — DIPHENHYDRAMINE HYDROCHLORIDE 50 MG/ML
50 INJECTION INTRAMUSCULAR; INTRAVENOUS EVERY 6 HOURS PRN
Status: DISCONTINUED | OUTPATIENT
Start: 2021-01-20 | End: 2021-01-22 | Stop reason: HOSPADM

## 2021-01-20 RX ORDER — LISINOPRIL 10 MG/1
10 TABLET ORAL DAILY
Status: SHIPPED | COMMUNITY
End: 2021-10-29

## 2021-01-20 RX ADMIN — DIVALPROEX SODIUM 500 MG: 500 TABLET, DELAYED RELEASE ORAL at 14:14

## 2021-01-20 RX ADMIN — ARIPIPRAZOLE 5 MG: 10 TABLET ORAL at 04:58

## 2021-01-20 RX ADMIN — DIVALPROEX SODIUM 500 MG: 500 TABLET, DELAYED RELEASE ORAL at 21:36

## 2021-01-20 RX ADMIN — LURASIDONE HYDROCHLORIDE 80 MG: 80 TABLET, FILM COATED ORAL at 18:58

## 2021-01-20 ASSESSMENT — ENCOUNTER SYMPTOMS
HEADACHES: 1
HALLUCINATIONS: 1
DEPRESSION: 0
PALPITATIONS: 0
COUGH: 0
INSOMNIA: 0
SHORTNESS OF BREATH: 0
DIARRHEA: 1

## 2021-01-20 ASSESSMENT — LIFESTYLE VARIABLES: SUBSTANCE_ABUSE: 0

## 2021-01-20 NOTE — DISCHARGE PLANNING
Medical Social Work    Referral: Legal Hold    Intervention: Legal Hold Paperwork given to SW by [employer] RN Ursula Parrish    Legal Hold Initiated: Date: 01- Time: 1730    Patient’s Insurance Listed on Face Sheet: Medicaid FFS    Referrals sent to: [facility], [facility], [facility]    This referral contains the following information:  1) Face sheet __x__  2) Page 1 and Page 2 of Legal Hold ___x_  3) Alert Team Assessment/Psych Assessment __x__  4) Head to toe physical exam __x__  5) Urine Drug Screen __x__  6) Blood Alcohol __x__  7) Vital signs __x__  8) Pregnancy test when applicable _xx__  9) Medications list _x___    Plan: Patient will transfer to mental health facility once acceptance is obtained

## 2021-01-20 NOTE — ED NOTES
Patient medicated per MAR. Patient tolerated PO medication well. Security in direct view of patient.

## 2021-01-20 NOTE — DISCHARGE PLANNING
Jennifer from [facility] called and stated admitting  Is declining Pt. He has provided treatment to this Pt.in the past. He feels she is at baseline and will not benefit from acute mental health placement.

## 2021-01-20 NOTE — ED NOTES
PT was unable to blow for long enough to get a breathalyzer.  PT was coached and was still unable to blow long enough

## 2021-01-20 NOTE — ED NOTES
"Patient awake, yelling \"Can I leave, can I go?!\" RN educated patient again, needing to stay in the hospital at this time.  "

## 2021-01-20 NOTE — ED NOTES
Patient given food and water, okay per ERP. Security in direct view of patient, patient keep stating she wants to go home. Patient refusing to complete breathalyzer properly despite education. RN tied with patient 5 times and Day RN tried with patient with no success.

## 2021-01-20 NOTE — DISCHARGE PLANNING
Filed petition to the court via [employer]. Waiting on verified petition.    Received verified petition from the court. Sent copy to [facility], scanned copy into .

## 2021-01-20 NOTE — ED NOTES
"Patient up to bathroom, security at bedside.  Patient yelling at security \"i'm not a mermaid, it's a lie\"  Patient directed back to her room.  "

## 2021-01-20 NOTE — ED NOTES
Patient screaming and yelling, not able to understand what patient is saying. Security re-directed patient to room.

## 2021-01-20 NOTE — CONSULTS
[facility]   TRIAGE ASSESSMENT    Name: Tierney Mayer  MRN: [id number]  : 1985  Age: 35 y.o.  Date of assessment: 2021  PCP: Pcp Pt States None  Persons in attendance: Patient    CHIEF COMPLAINT/PRESENTING ISSUE (as stated by Patient, ER RN, ERP):   Chief Complaint   Patient presents with   • Suicidal Ideation   • Homicidal Ideation      Patient is a 36 y/o female BIB EMS from her  where she was placed on a legal hold for endorsing SI and HI toward her housemates. Patient also demonstrating erratic behavior upon arrival, frequently disrobing, oriented to self, poor historian, unable to articulate why she is here but denies SI/HI at this time. Patient was recently seen by Alert Team on the  for wandering at a bus stop topless and acting strangely. It was noted at the time that her psychiatrist had stopped all oral medications with long acting injectable remaining. Unknown if this is patient's baseline or possible decompensation. Further psychiatric evaluation would be beneficial.     CURRENT LIVING SITUATION/SOCIAL SUPPORT: Patient currently residing in [facility] on [address];  manager Charline #[phone]     BEHAVIORAL HEALTH TREATMENT HISTORY  Does patient/parent report a history of prior behavioral health treatment for patient?   The patient presents with numerous admits to [facility] and [facility]  historically; she has been previously diagnosed with Schizophrenia.(per emr) The patient presently sees Dr Barreto, her psychiatrist at [facility]. EMR notes that she was recently discontinued on all her psy meds except injectable Abilify, unknown date of last administration.    SAFETY ASSESSMENT - SELF  Does patient acknowledge current or past symptoms of dangerousness to self? yes  Does parent/significant other report patient has current or past symptoms of dangerousness to self? N\A  Does presenting problem suggest symptoms of dangerousness to self? This pt presently  "denies any SI or plan of self harm.     SAFETY ASSESSMENT - OTHERS  Does patient acknowledge current or past symptoms of aggressive behavior or risk to others? yes  Does parent/significant other report patient has current or past symptoms of aggressive behavior or risk to others?  N\A  Does presenting problem suggest symptoms of dangerousness to others? Patient denies HI and is redirectable with prompts. EMR documents hx of aggressive behavior.    Crisis Safety Plan completed and copy given to patient? N\A    ABUSE/NEGLECT SCREENING  Does patient report feeling “unsafe” in his/her home, or afraid of anyone?  no  Does patient report any history of physical, sexual, or emotional abuse?  no  Does parent or significant other report any of the above? NA  Is there evidence of neglect by self?  no  Is there evidence of neglect by a caregiver? no  Does the patient/parent report any history of CPS/APS/police involvement related to suspected abuse/neglect or domestic violence? no  Based on the information provided during the current assessment, is a mandated report of suspected abuse/neglect being made?  No    SUBSTANCE USE SCREENING  Yes:  Sam all substances used in the past 30 days: pt denies      Last Use Amount   []   Alcohol     []   Marijuana     []   Heroin     []   Prescription Opioids  (used without prescription, for    recreation, or in excess of prescribed amount)     []   Other Prescription  (used without prescription, for    recreation, or in excess of prescribed amount)     []   Cocaine      []   Methamphetamine     []   \"\" drugs (ectasy, MDMA)     []   Other substances        UDS results: Pt declines  Breathalyzer results: Pt declines    What consequences does the patient associate with any of the above substance use and or addictive behaviors? None    MENTAL STATUS   Participation: Limited verbal participation and Guarded  Grooming: Inappropriate to weather  Orientation: Evidence of delusions " present  Behavior: Unusual behaviors noted  Eye contact: Intense  Mood: Anxious  Affect: Blunted and Labile  Thought process: Tangential, Flight of ideas and Loose associations  Thought content: Evidence of delusion and Paranoia  Speech: Hypotalkative  Perception: Illusions  Memory:  Poor memory for chronology of events  Insight: Poor  Judgment:  Poor  Other:    Collateral information:   Source:  [] Significant other present in person:   [] OC supervisor of [facility] by telephone  [] [facility]   [x] [facility] Nursing Staff  [x] [facility]  [x] Other: ERP    [] Unable to complete full assessment due to:  [] Acute intoxication  [] Patient declined to participate/engage  [] Patient verbally unresponsive  [] Significant cognitive deficits  [x] Significant perceptual distortions or behavioral disorganization  [] Other:      CLINICAL IMPRESSIONS:  Primary:  ALOC  Secondary:  Schizophrenia       IDENTIFIED NEEDS/PLAN:  [Trigger DISPOSITION list for any items marked]    []  Imminent safety risk - self [] Imminent safety risk - others   []  Acute substance withdrawal [x]  Psychosis/Impaired reality testing   [x]  Mood/anxiety []  Substance use/Addictive behavior   [x]  Maladaptive behaviro []  Parent/child conflict   []  Family/Couples conflict []  Biomedical   []  Housing []  Financial   []   Legal  Occupational/Educational   []  Domestic violence []  Other:     Recommended Plan of Care:  Actively being addressed by Legal Hold, [facility] and [place] and 1:1 Observation; elopement risk.     Does patient express agreement with the above plan? no    Referral appointment(s) scheduled? N\A    Alert team only: Patient placed on hold initially from  for SI/HI; patient denies SI/HI but presents with delusional thought content and disorientation with frequent incidences of nudity. ERP is concerned about recent medication changes that maybe effecting patient at present. Patient will stay on legal hold until  further evaluated.   I have discussed findings and recommendations with Dr. Du who is in agreement with these recommendations.     Referral information sent to the following community providers : [facility]    If applicable : Referred  to : Fern for legal hold follow up at [phone]    Spoke with on-call [facility] supervisor-Stefan at [phone] to obtain collateral information regarding patient's behavior in  setting. Stefan reports this tis likely patient's baseline and if she is not displaying any unsafe behaviors [facility] is able to coordinate supervised transport back to  with psychiatric f/u.      Ursula Parrish, RCHARLIE.  1/20/2021

## 2021-01-20 NOTE — ED NOTES
"Attempted multiple times to get patient to provide POC breathalyzer, patient not cooperating with test.  Explained to patient that in order to get her placed she needed to provide a urine and a breathalyzer, patient states \"I don't want to go anywhere, I want to stay here forever\".  "

## 2021-01-20 NOTE — ED NOTES
Med rec updated and complete. Allergies reviewed . Pt currently resides at [facility].  Updated med rec based on information received .      Home pharmacy [facility].

## 2021-01-20 NOTE — DISCHARGE PLANNING
Alert Team:    [facility].

## 2021-01-20 NOTE — ED NOTES
Breakfast provided.  Patient calm and cooperative at this time with staff.   outside of room for patient and staff safety

## 2021-01-20 NOTE — ED NOTES
Patient COVID swab collected and sent with security Assistance, patient yelling and cussing, but COVID swab successful.

## 2021-01-20 NOTE — ED PROVIDER NOTES
"ED Provider  Scribed for Jose Painting D.O. by Luther Levin. 1/19/2021  7:22 PM    Means of arrival:EMS  History obtained from:Patient  History limited by: None    CHIEF COMPLAINT  Chief Complaint   Patient presents with   • Suicidal Ideation   • Homicidal Ideation       \[facility]\""  Tierney Mayer is a 35 y.o. female with a history of depression who presents via EMS with suicidal ideation and homicidal ideation. Per the patient, she \"ate Chaka\"; she was in the shower, found a worm, and ate it. She says she did not take pills or take any other measures to hurt herself today, and that she is compliant with her medications. In the ED now, she currently denies wanting to hurt herself, alcohol use, or drug use. Triage notes that she was at her group home when she said she was going to kill herself and the other members in the home.     REVIEW OF SYSTEMS  See HPI for further details.    PAST MEDICAL HISTORY   has a past medical history of Hypertension and Psychiatric disorder.    SOCIAL HISTORY  Social History     Tobacco Use   • Smoking status: Current Every Day Smoker     Packs/day: 1.00     Types: Cigarettes   • Smokeless tobacco: Never Used   • Tobacco comment: knows she is suppose to quit but hasn't   Substance and Sexual Activity   • Alcohol use: Yes   • Drug use: No   • Sexual activity: Not noted       SURGICAL HISTORY   has a past surgical history that includes pelviscopy (2/22/2011) and incision and drainage general (2/22/2011).    CURRENT MEDICATIONS  Current Outpatient Medications   Medication Instructions   • ARIPiprazole (ABILIFY) 25 mg, Oral, DAILY   • benztropine (COGENTIN) 0.5 mg, Oral, 2 TIMES DAILY   • divalproex (DEPAKOTE) 500 mg, Oral, EVERY MORNING WITH BREAKFAST   • divalproex (DEPAKOTE) 750 mg, Oral, EVERY EVENING   • fluphenazine (PROLIXIN) 5 MG tablet Take 1 Tablet by mouth every morning.   • lithium CR (ESKALITH CR) 900 mg, Oral, EVERY BEDTIME   • lithium CR (LITHOBID) 900 mg, Oral, EVERY " "BEDTIME   • mirtazapine (REMERON) 15 mg, Oral, EVERY BEDTIME   • propranolol (INDERAL) 10 mg, Oral, 3 TIMES DAILY   • topiramate (TOPAMAX) 100 mg, Oral, 2 TIMES DAILY     ALLERGIES  Allergies   Allergen Reactions   • Clonazepam      Pt reports getting a stroke.  Per historical: Twitches and paranoia.   • Invega Sustenna [Paliperidone Palmitate]    • Olanzapine      Pt reports worsening and threatening voices.   • Paliperidone      Twitches and paranoia .       PHYSICAL EXAM  VITAL SIGNS: /86   Pulse 95   Temp 36.9 °C (98.4 °F) (Temporal)   Resp 16   Ht 1.778 m (5' 10\")   Wt 83.9 kg (185 lb)   LMP  (LMP Unknown)   SpO2 99%   BMI 26.54 kg/m²   Constitutional: Alert in no apparent distress.  HENT: No signs of trauma, mucous membranes are moist  Eyes: Conjunctiva normal, Non-icteric.   Neck: Normal range of motion, No tenderness, Supple.  Lymphatic: No lymphadenopathy noted.   Cardiovascular: Regular rate and rhythm, no murmurs.   Thorax & Lungs: Normal breath sounds, No respiratory distress, No wheezing, No chest tenderness.   Abdomen: Bowel sounds normal, Soft, No tenderness, No masses, No pulsatile masses. No peritoneal signs.  Skin: Warm, Dry, normal color.   Back: No bony tenderness, No CVA tenderness.   Extremities: No edema, No tenderness, No cyanosis  Musculoskeletal: Good range of motion in all major joints. No tenderness to palpation or major deformities noted.   Neurologic: Alert and oriented x4, Normal motor function, Normal sensory function, No focal deficits noted.   Psychiatric: Disorganized thoughts. Patient reports suicidal ideation, no plan in place.     DIAGNOSTIC STUDIES / PROCEDURES     LABS  Results for orders placed or performed during the hospital encounter of 01/14/21   URINE DRUG SCREEN   Result Value Ref Range    Amphetamines Urine Negative Negative    Barbiturates Negative Negative    Benzodiazepines Negative Negative    Cocaine Metabolite Negative Negative    Methadone Negative " Negative    Opiates Negative Negative    Oxycodone Negative Negative    Phencyclidine -Pcp Negative Negative    Propoxyphene Negative Negative    Cannabinoid Metab Negative Negative   DIAGNOSTIC ALCOHOL   Result Value Ref Range    Diagnostic Alcohol <10.1 0.0 - 10.0 mg/dL   CBC WITH DIFFERENTIAL   Result Value Ref Range    WBC 10.6 4.8 - 10.8 K/uL    RBC 4.63 4.20 - 5.40 M/uL    Hemoglobin 13.9 12.0 - 16.0 g/dL    Hematocrit 42.7 37.0 - 47.0 %    MCV 92.2 81.4 - 97.8 fL    MCH 30.0 27.0 - 33.0 pg    MCHC 32.6 (L) 33.6 - 35.0 g/dL    RDW 46.1 35.9 - 50.0 fL    Platelet Count 387 164 - 446 K/uL    MPV 10.0 9.0 - 12.9 fL    Neutrophils-Polys 65.00 44.00 - 72.00 %    Lymphocytes 26.60 22.00 - 41.00 %    Monocytes 7.00 0.00 - 13.40 %    Eosinophils 0.70 0.00 - 6.90 %    Basophils 0.40 0.00 - 1.80 %    Immature Granulocytes 0.30 0.00 - 0.90 %    Nucleated RBC 0.00 /100 WBC    Neutrophils (Absolute) 6.92 2.00 - 7.15 K/uL    Lymphs (Absolute) 2.83 1.00 - 4.80 K/uL    Monos (Absolute) 0.75 0.00 - 0.85 K/uL    Eos (Absolute) 0.07 0.00 - 0.51 K/uL    Baso (Absolute) 0.04 0.00 - 0.12 K/uL    Immature Granulocytes (abs) 0.03 0.00 - 0.11 K/uL    NRBC (Absolute) 0.00 K/uL   BETA-HCG QUALITATIVE SERUM   Result Value Ref Range    Beta-Hcg Qualitative Serum Negative Negative   ACCU-CHEK GLUCOSE   Result Value Ref Range    Glucose - Accu-Ck 73 65 - 99 mg/dL     All labs reviewed by me.    COURSE  Pertinent Labs & Imaging studies reviewed. (See chart for details)    I verified that the patient was wearing a mask and I was wearing appropriate PPE every time I entered the room. The patient's mask was on the patient at all times during my encounter except for a brief view of the oropharynx.    7:22 PM - Patient seen and examined at bedside. Discussed plan of care. Ordered for POC breathalyzer and Urine drug screen to evaluate her symptoms. Legal 2000 was initiated for her suicidal ideation    MEDICAL DECISION MAKING  This is a 35 y.o.  female who presents with suicidal ideation, and depression.  Her thoughts are disorganized, and she is suicidal.  The patient had a legal form initiated and is completed by me.  The patient will be held until evaluation by psychiatry.      DISPOSITION:  Transfer to psychiatric facility  FINAL IMPRESSION  1. Suicidal ideation         Luther DONG (Scribe), am scribing for, and in the presence of, Jose Painting D.O..    Electronically signed by: Luther Levin (Scribe), 1/19/2021    IJose D.O. personally performed the services described in this documentation, as scribed by Luther Levin in my presence, and it is both accurate and complete.    E    The note accurately reflects work and decisions made by me.  Jose Painting D.O.  1/19/2021  11:09 PM

## 2021-01-20 NOTE — ED TRIAGE NOTES
"PT BIB EMS per report pt was at her group home and she stated that she was going to kill herself and the other members of the group home.  PT arrives calm and cooperative, incomprehensible speech at time  Chief Complaint   Patient presents with   • Suicidal Ideation   • Homicidal Ideation     /86   Pulse 95   Temp 36.9 °C (98.4 °F) (Temporal)   Resp 16   Ht 1.778 m (5' 10\")   Wt 83.9 kg (185 lb)   SpO2 99%       Security at bedside    "

## 2021-01-20 NOTE — ED PROVIDER NOTES
ED Provider Note Addendum    Scribed for Nithya Wilkerson M.D. by Kaylie Green on 1/20/2021 at 12:10 PM.     This is an addendum to the note on Tierney Mayer.  For further details and full chart information, see patient's initial note.       12:10 PM  - Patient evaluated by myself.  Patient is resting comfortably at this time with no complaints. Patient refuses urinalysis or breathalyzer.  Patient's transfer is being held up because they require alcohol level and pregnancy test before transfer.  I have ordered a blood draw.    12:11 PM - Per nursing staff, the patient has been able to provide blood.  And now agrees to urinalysis.  The patient is now awaiting transfer.     Disposition:  Patient will be transferred to an appropriate psychiatric facility in stable condition for further psychiatric care and evaluation.  Patient will be placed under the care of my partner awaiting transfer.    FINAL IMPRESSION  1. Suicidal ideation         Kaylie DONG (Scribe), am scribing for, and in the presence of, Nithya Wilkerson M.D..    Electronically signed by: Kaylie Green (Scribe), 1/20/2021    Nithya DONG M.D. personally performed the services described in this documentation, as scribed by Kaylie Green in my presence, and it is both accurate and complete.    The note accurately reflects work and decisions made by me.  Nithya Wilkerson M.D.  1/20/2021  12:41 PM

## 2021-01-20 NOTE — ED NOTES
Patient up to bathroom to obtain a urine sample, patient walked in said she urinated but nothing present in hat in toilet.  Patient back to room.

## 2021-01-20 NOTE — ED NOTES
Assumed care of patient at this time from NI Nguyen.  Patient resting in bed comfortably, RR even and unlabored.   outside of room for patient and staff safety.  Will continue to monitor

## 2021-01-20 NOTE — CONSULTS
"PSYCHIATRIC INTAKE EVALUATION    *Reason for admission:    Suicidal ideations and homicidal ideations towards housemates     *Reason for consult:   \" L2 K for inability to care for herself due to ALOC\"  *Requesting Physician/APN: Nithya Wilkerson M.D.        Legal Hold status: On hold    *Chief Complaint::\" I live inside my Nam\"    *HPI (includes Psychiatric ROS):   Patient reports coming to the hospital because \" I live inside my nam.  I feel like a man is raping me.\"  Patient's thought process is very disorganized.  She made multiple nonsensical statements, including leaving inside a doll house, and having alva and crickets has her children.  Patient reports that she hears [place] in [place] talking to her all day.  Overall she is a very poor historian.  Unable to answer most of my questions appropriately.  Currently denies any SI/HI.  Does not remember making those statements at the group home.  Denies acute anxiety.  Reports that she has been eating and sleeping well, with increased energy.  Patient is not able to clearly state her most recent psychotropic regimen, but stated that her psychiatrist recently discontinue some of her medications.  She denies being on Abilify HOOVER at this time.  Patient initially states that she uses marijuana, later denied stating \"I just chew my hair sometimes.\"  Denies using alcohol.  I reviewed [facility] medical record with patient stating that she is most recently on Latuda and Depakote.  She agreed to restart medications.  Patient perseverates on asking to go home.  Easily redirected during interview.       *Medical Review Of Symptoms (not dx conditions):   Review of Systems   Respiratory: Negative for cough and shortness of breath.    Cardiovascular: Negative for chest pain and palpitations.   Gastrointestinal: Positive for diarrhea.   Skin: Negative for rash.   Neurological: Positive for headaches.   Psychiatric/Behavioral: Positive for hallucinations. Negative for " "depression, substance abuse and suicidal ideas. The patient does not have insomnia.        Patient's answers to most of review of symptoms were nonsensical.  For example stated having diarrhea 3 weeks ago and that her last bowel movement was 8 years ago.     *Psychiatric Examination:   Vitals:   Vitals:    01/19/21 1855 01/19/21 1904 01/19/21 2334 01/20/21 0609   BP:  135/86 129/81 138/79   Pulse:  95 98 90   Resp:  16 17 16   Temp:  36.9 °C (98.4 °F)  36.4 °C (97.6 °F)   TempSrc:  Temporal  Temporal   SpO2:  99% 98% 100%   Weight: 83.9 kg (185 lb)      Height: 1.778 m (5' 10\")        Appearance: appears stated age, poor grooming and hygiene, calm, cooperative, good eye contact  Abnormal movements: Slightly agitated  Gait/posture: normal  Speech: Pressured and loud at times  Though process: Very disorganized  Associations:  loose associations  Thought content: +AH, delusions elicited,  responding to internal stimuli,, internally preoccupied.   Judgement and Insight: Poor/poor  Orientation:oriented to person and place only  Recent and Remote Memory: Some impairment, likely due to psychosis  Attention Span and Concentration: intact  Language: fluid   Fund of Knowledge: not tested   Mood and Affect: Affect is labile  SI/HI:denies any active or passive SI/HI              *PAST MEDICAL/PSYCH/FAMILY/SOCIAL(as reported by patient):       *medical hx:    Denies TBI     Past Medical History:   Diagnosis Date   • Hypertension    • Psychiatric disorder     Schizophrenia     Past Surgical History:   Procedure Laterality Date   • PELVISCOPY  2/22/2011    Performed by BABITA SHEETS at SURGERY [facility] DAY [facility] ORS   • INCISION AND DRAINAGE GENERAL  2/22/2011    Performed by BABITA SHEETS at SURGERY SAME DAY [facility] ORS        *psychiatric hx:    Known to our services  History of schizoaffective disorder, bipolar type.  Currently connected with psychiatrist at [facility], who recently discontinued some of her psychotropic " medications.  Has per medical records from [facility] inside physical chart, patient is currently on Latuda 80 mg daily and Depakote 500 mg p.o. 3 times daily.  Patient has a history of multiple previous psychiatric hospitalizations.  Denies history of suicide attempts    Medication history includes Saphris, lithium, Abilify HOOVER, Cogentin, Prolixin, Remeron, Topamax    *family Psych hx: Unable to obtain patient is disorganized     *social hx: Lives in a group home, I was not able to obtain further history due to patient's disorganization  Alcohol: Denied  Drugs: UDS negative on this admission.  History of methamphetamine use disorder, remission unspecified  Opioid use disorder, remission unspecified     *MEDICAL HX: labs, MARS, medications, etc were reviewed. Only those findings of potential interest to psychiatry are noted below:    *Current Medical issues:   see below     *Allergies:  Allergies   Allergen Reactions   • Clonazepam      Pt reports getting a stroke.  Per historical: Twitches and paranoia.   • Invega Sustenna [Paliperidone Palmitate]    • Olanzapine      Pt reports worsening and threatening voices.   • Paliperidone      Twitches and paranoia .      *Current Medications:  No current facility-administered medications for this encounter.     Current Outpatient Medications:   •  fluphenazine (PROLIXIN) 5 MG tablet, Take 1 Tablet by mouth every morning., Disp: 30 Tab, Rfl: 0  •  propranolol (INDERAL) 10 MG Tab, Take 10 mg by mouth 3 times a day., Disp: , Rfl:   •  topiramate (TOPAMAX) 100 MG Tab, Take 100 mg by mouth 2 times a day., Disp: , Rfl:   •  lithium CR (ESKALITH CR) 450 MG Tab CR, Take 900 mg by mouth every bedtime., Disp: , Rfl:   •  ARIPiprazole (ABILIFY) 5 MG tablet, Take 5 Tabs by mouth every day., Disp: 30 Tab, Rfl: 0  •  mirtazapine (REMERON) 15 MG Tab, Take 1 Tab by mouth every bedtime., Disp: 30 Tab, Rfl: 0  •  divalproex (DEPAKOTE) 500 MG Tablet Delayed Response, Take 1 Tab by mouth every  morning with breakfast., Disp: 28 Tab, Rfl: 0  •  divalproex (DEPAKOTE) 250 MG Tablet Delayed Response, Take 3 Tabs by mouth every evening., Disp: 90 Tab, Rfl: 0  •  benztropine (COGENTIN) 0.5 MG Tab, Take 0.5 mg by mouth 2 times a day., Disp: , Rfl:   •  lithium CR (LITHOBID) 300 MG Tab CR, Take 900 mg by mouth every bedtime., Disp: , Rfl:   *ECG: From 7/2019,   *Cranial Imaging: personally reviewed head CT from 2009 for head trauma: Negative   EEG: Not done     *Labs:  Recent Results (from the past 72 hour(s))   COV-2, FLU A/B, AND RSV BY PCR (2-4 HOURS CEPHEID): Collect NP swab in VTM    Collection Time: 01/19/21 10:51 PM    Specimen: Respirate   Result Value Ref Range    Influenza virus A RNA Negative Negative    Influenza virus B, PCR Negative Negative    RSV, PCR Negative Negative    SARS-CoV-2 by PCR NotDetected     SARS-CoV-2 Source NP Swab    BETA-HCG QUANTITATIVE SERUM    Collection Time: 01/20/21 11:41 AM   Result Value Ref Range    Bhcg <1.0 0.0 - 5.0 mIU/mL   DIAGNOSTIC ALCOHOL    Collection Time: 01/20/21 11:41 AM   Result Value Ref Range    Diagnostic Alcohol <10.1 0.0 - 10.0 mg/dL   HCG QUAL SERUM    Collection Time: 01/20/21 11:41 AM   Result Value Ref Range    Beta-Hcg Qualitative Serum Negative Negative   URINE DRUG SCREEN    Collection Time: 01/20/21 11:44 AM   Result Value Ref Range    Barbiturates Negative Negative    Benzodiazepines Negative Negative    Cocaine Metabolite Negative Negative    Methadone Negative Negative    Opiates Negative Negative    Oxycodone Negative Negative    Phencyclidine -Pcp Negative Negative    Propoxyphene Negative Negative    Cannabinoid Metab Negative Negative   BETA-HCG QUALITATIVE URINE    Collection Time: 01/20/21 11:44 AM   Result Value Ref Range    Beta-Hcg Urine Negative Negative       No results for input(s): WBC, RBC, HEMOGLOBIN, HEMATOCRIT, MCV, MCH, RDW, PLATELETCT, MPV, NEUTSPOLYS, LYMPHOCYTES, MONOCYTES, EOSINOPHILS, BASOPHILS, RBCMORPHOLO in  the last 72 hours.  Lab Results   Component Value Date/Time    SODIUM 136 07/22/2019 05:40 AM    POTASSIUM 3.9 07/22/2019 05:40 AM    CHLORIDE 107 07/22/2019 05:40 AM    CO2 21 07/22/2019 05:40 AM    GLUCOSE 99 07/22/2019 05:40 AM    BUN 19 07/22/2019 05:40 AM    CREATININE 0.72 07/22/2019 05:40 AM         Lab Results   Component Value Date/Time    BREATHALIZER 0.000 07/22/2019 0511     No components found for: BLOODALCOHOL   Lab Results   Component Value Date/Time    AMPHUR Negative 01/14/2021 1354    BARBSURINE Negative 01/20/2021 1144    BENZODIAZU Negative 01/20/2021 1144    COCAINEMET Negative 01/20/2021 1144    METHADONE Negative 01/20/2021 1144    ECSTASY Negative 10/26/2015 0415    OPIATES Negative 01/20/2021 1144    OXYCODN Negative 01/20/2021 1144    PCPURINE Negative 01/20/2021 1144    PROPOXY Negative 01/20/2021 1144    CANNABINOID Negative 01/20/2021 1144     Lab Results   Component Value Date/Time    FREET4 0.53 07/10/2019 1638        Assessment: Patient is currently grossly psychotic, unable to care for herself      Dx:  Schizoaffective disorder, bipolar type, unstable    Medical :  History of hypertension  History of constipation  Obesity      Plan:  1- Legal hold: Extended  2- Psychotropic medications:   Per chart review, patient received 1 dose of Abilify 5 mg this morning.  Restart Latuda 80 mg p.o. daily to target psychosis and mood.  Will titrate to maximum dose before switching medication or adding a second antipsychotic.   Restart Depakote 500 mg p.o. 3 times daily for mood stabilization  Obtain Depakote level in 5 days monitor for toxicity  3- Please transfer pt to inpatient psychiatric hospital when bed is available  4-[facility] medical records inside physical chart reviewed.  5-obtain EKG  6- Psychiatry will follow up.     Thank you for the consult.

## 2021-01-20 NOTE — ED NOTES
Patient resting in bed, 1:1 security sitter in direct view of patient.    Patient keeps asking is she can leave. RN re-educated patient that she is on a legal hold and is unable to leave at this time.

## 2021-01-20 NOTE — DISCHARGE PLANNING
Medical Social Work    MSW received a copy of pt's legal hold from Alert Team.  Unable to send mental health referrals as labs are not available (UDS, pregnancy and breathalyzer) as pt is being uncooperative with labs.

## 2021-01-21 PROCEDURE — A9270 NON-COVERED ITEM OR SERVICE: HCPCS | Performed by: PSYCHIATRY & NEUROLOGY

## 2021-01-21 PROCEDURE — 700102 HCHG RX REV CODE 250 W/ 637 OVERRIDE(OP): Performed by: PSYCHIATRY & NEUROLOGY

## 2021-01-21 RX ORDER — LISINOPRIL 10 MG/1
10 TABLET ORAL DAILY
Status: DISCONTINUED | OUTPATIENT
Start: 2021-01-21 | End: 2021-01-22 | Stop reason: HOSPADM

## 2021-01-21 RX ADMIN — DIVALPROEX SODIUM 500 MG: 500 TABLET, DELAYED RELEASE ORAL at 14:49

## 2021-01-21 RX ADMIN — DIVALPROEX SODIUM 500 MG: 500 TABLET, DELAYED RELEASE ORAL at 06:34

## 2021-01-21 RX ADMIN — DIVALPROEX SODIUM 500 MG: 500 TABLET, DELAYED RELEASE ORAL at 22:00

## 2021-01-21 NOTE — ED NOTES
"Pt yelled out \"no, no, no, no, nooo\" \"ahhhh stop eating me please\". Pt randomly yelling out. Security sitter present  "

## 2021-01-21 NOTE — ED NOTES
Pt willing to take her depakote. Provided pt with water. No further needs. VS obtained.     Security sitter present

## 2021-01-21 NOTE — ED NOTES
"Attempted to give pt her morning med and take morning vitals, pt stated to get the \"fuck away from her\".     Will revaluate in 30 minutes. Security sitter present.   "

## 2021-01-21 NOTE — ED NOTES
"Pt yelled out \"get me some fucking coffee\". Explained to pt there was no coffee still. Pt laid back down in bed.    Security sitter present  "

## 2021-01-21 NOTE — ED PROVIDER NOTES
ED Provider Note    signed out to me pending transfer to inpatient psychiatric facility. Patient is on a legal hold for suicidal ideation. Has been medically cleared by prior emergency provider. Patient resting comfortably without needs at this time.      Patient has been comfortable, without complaint throughout my shift. Overall well-appearing. Pending transfer to inpatient psychiatric facility in care will be signed out to the oncoming emergency physician.    Saurabh Almendarez

## 2021-01-21 NOTE — ED NOTES
Ambulated pt to restroom and back with steady gait, no further needs. Security sitter present with pt in view

## 2021-01-21 NOTE — DISCHARGE PLANNING
Malia from [facility] called and stated the Treatment Team has declined Pt. Declining due to fact Pt states she is not suicidal. The Group Home states she is Baseline and can return.

## 2021-01-21 NOTE — ED NOTES
Pt yelling out that she wants coffee. Explained to pt there was no coffee made at this time. Pt escorted by security and RN to use restroom.

## 2021-01-22 VITALS
OXYGEN SATURATION: 96 % | TEMPERATURE: 98.4 F | HEIGHT: 70 IN | DIASTOLIC BLOOD PRESSURE: 81 MMHG | HEART RATE: 74 BPM | BODY MASS INDEX: 26.48 KG/M2 | RESPIRATION RATE: 15 BRPM | WEIGHT: 185 LBS | SYSTOLIC BLOOD PRESSURE: 121 MMHG

## 2021-01-22 PROCEDURE — 700102 HCHG RX REV CODE 250 W/ 637 OVERRIDE(OP): Performed by: PSYCHIATRY & NEUROLOGY

## 2021-01-22 PROCEDURE — A9270 NON-COVERED ITEM OR SERVICE: HCPCS | Performed by: PSYCHIATRY & NEUROLOGY

## 2021-01-22 PROCEDURE — 96372 THER/PROPH/DIAG INJ SC/IM: CPT

## 2021-01-22 PROCEDURE — A9270 NON-COVERED ITEM OR SERVICE: HCPCS | Performed by: EMERGENCY MEDICINE

## 2021-01-22 PROCEDURE — 700102 HCHG RX REV CODE 250 W/ 637 OVERRIDE(OP): Performed by: EMERGENCY MEDICINE

## 2021-01-22 PROCEDURE — 700111 HCHG RX REV CODE 636 W/ 250 OVERRIDE (IP): Performed by: EMERGENCY MEDICINE

## 2021-01-22 RX ORDER — ZIPRASIDONE MESYLATE 20 MG/ML
20 INJECTION, POWDER, LYOPHILIZED, FOR SOLUTION INTRAMUSCULAR ONCE
Status: COMPLETED | OUTPATIENT
Start: 2021-01-22 | End: 2021-01-22

## 2021-01-22 RX ADMIN — ZIPRASIDONE MESYLATE 20 MG: 20 INJECTION, POWDER, LYOPHILIZED, FOR SOLUTION INTRAMUSCULAR at 00:56

## 2021-01-22 RX ADMIN — METFORMIN HYDROCHLORIDE 500 MG: 500 TABLET ORAL at 05:56

## 2021-01-22 RX ADMIN — LISINOPRIL 10 MG: 10 TABLET ORAL at 05:56

## 2021-01-22 RX ADMIN — DIVALPROEX SODIUM 500 MG: 500 TABLET, DELAYED RELEASE ORAL at 05:56

## 2021-01-22 NOTE — DISCHARGE PLANNING
Medical Social Work    Referral: Legal hold Transfer to Mental Health Facility    Intervention: DAYSI received call from Yaima at [facility] stating that they have accepted the patient for admission.     Pt's accepting physcian is Dr. Wale TAVAREZ arranged for transportation to be set up through [facility]    The pt will be picked up at [id number].     DAYSI notified the RN of the departure time as well as accepting facility.     DAYSI created transfer packet and placed on chart.     Plan: Pt will transfer back to [facility] at [id number]

## 2021-01-22 NOTE — ED NOTES
Pt refusing all medications at this time. Pt asking if she can go to the hospital, senior living or home. Attempts to redirect Pt are futile. Pt responding with inappropriate statements. Security remains in direct observation.

## 2021-01-22 NOTE — ED NOTES
Security accompanied this RN into room, asked Pt to  trash and place in waste bin. Pt followed commands.

## 2021-01-22 NOTE — ED NOTES
Patient's home medications have been reviewed by the pharmacy team.     Past Medical History:   Diagnosis Date   • Hypertension    • Psychiatric disorder     Schizophrenia       Patient's Medications   New Prescriptions    No medications on file   Previous Medications    ARIPIPRAZOLE (ABILIFY) 5 MG TABLET    Take 5 Tabs by mouth every day.    DIVALPROEX (DEPAKOTE) 250 MG TABLET DELAYED RESPONSE    Take 3 Tabs by mouth every evening.    DIVALPROEX (DEPAKOTE) 500 MG TABLET DELAYED RESPONSE    Take 1 Tab by mouth every morning with breakfast.    DOCUSATE SODIUM (COLACE) 100 MG CAP    Take 100 mg by mouth 2 times a day.    FLUPHENAZINE (PROLIXIN) 5 MG TABLET    Take 1 Tablet by mouth every morning.    LISINOPRIL (PRINIVIL) 10 MG TAB    Take 10 mg by mouth every day.    LURASIDONE (LATUDA) 80 MG TAB    Take 80 mg by mouth every bedtime.    METFORMIN (GLUCOPHAGE) 500 MG TAB    Take 500 mg by mouth every day.    MIRTAZAPINE (REMERON) 15 MG TAB    Take 1 Tab by mouth every bedtime.    PROPRANOLOL (INDERAL) 10 MG TAB    Take 10 mg by mouth 3 times a day.    TOPIRAMATE (TOPAMAX) 100 MG TAB    Take 100 mg by mouth 2 times a day.   Modified Medications    No medications on file   Discontinued Medications    BENZTROPINE (COGENTIN) 0.5 MG TAB    Take 0.5 mg by mouth 2 times a day.    LITHIUM CR (ESKALITH CR) 450 MG TAB CR    Take 900 mg by mouth every bedtime.    LITHIUM CR (LITHOBID) 300 MG TAB CR    Take 900 mg by mouth every bedtime.          A:  Medications do not appear to be contributing to current complaints.         P:    Home medications have been reordered as appropriate.    Sabine Fuentes, PharmD, BCPS

## 2021-01-22 NOTE — ED NOTES
Two security and two RN's at bedside to administer IM geodon. Pt willingly took injection. Given without difficulty.

## 2021-01-22 NOTE — ED NOTES
Pt up and out of bed several times, escorted to restroom by security. Provided Pt with watered down coffee. Pt laying on gurney laughing intermittently yelling out.

## 2021-01-22 NOTE — ED NOTES
Pt behaving aggressively to staff, yelling from room; pt requesting juice which was provided; pt then screaming that I gave her dead blood instead of juice; pt refusing vitals recheck and meds at this time

## 2021-01-22 NOTE — ED NOTES
Pt out of bed, removed all clothing and standing naked in doorway. Pt asked to return to bed and put clothing on, Pt given milk for following instructions. Pt agreed to take Depakote.

## 2021-01-22 NOTE — ED NOTES
NADEEM here for txfr.  NADEEM provided report, transfer paperwork and 1 bag of pt belongings.  Pt provided treaded slipper socks and warm blanket for ambulance ride.  Pt transferred to [facility] via ambulance with EMS personnel.  Ambulatory from the ED w/ steady gait.   VSS prior to transfer.

## 2021-01-22 NOTE — ED NOTES
Report rec'd from Luke DAN.  Patient care assumed.  Pt sleeping at this time.  Respirations even/unlabored.  Security in place for 1:1 observation.  Plan for pt transfer around  to [facility].

## 2021-01-22 NOTE — ED NOTES
Pt laughing and making insensible statements, Pt up to restroom several times. Security in direct obs.

## 2021-01-22 NOTE — ED NOTES
Pt continues to laugh and make statements not related to events. Pt up to restroom. Pt given small cup of water. Security at bedside.

## 2021-03-19 ENCOUNTER — HOSPITAL ENCOUNTER (EMERGENCY)
Facility: MEDICAL CENTER | Age: 36
End: 2021-03-25
Attending: EMERGENCY MEDICINE
Payer: MEDICAID

## 2021-03-19 DIAGNOSIS — F20.1 DISORGANIZED SCHIZOPHRENIA (HCC): ICD-10-CM

## 2021-03-19 DIAGNOSIS — F23 ACUTE PSYCHOSIS (HCC): ICD-10-CM

## 2021-03-19 LAB
AMPHET UR QL SCN: NEGATIVE
BARBITURATES UR QL SCN: NEGATIVE
BENZODIAZ UR QL SCN: NEGATIVE
BZE UR QL SCN: NEGATIVE
CANNABINOIDS UR QL SCN: NEGATIVE
METHADONE UR QL SCN: NEGATIVE
OPIATES UR QL SCN: NEGATIVE
OXYCODONE UR QL SCN: NEGATIVE
PCP UR QL SCN: NEGATIVE
PROPOXYPH UR QL SCN: NEGATIVE

## 2021-03-19 PROCEDURE — 90791 PSYCH DIAGNOSTIC EVALUATION: CPT

## 2021-03-19 PROCEDURE — 80307 DRUG TEST PRSMV CHEM ANLYZR: CPT

## 2021-03-19 PROCEDURE — 99285 EMERGENCY DEPT VISIT HI MDM: CPT

## 2021-03-19 RX ORDER — HALOPERIDOL 10 MG/1
10 TABLET ORAL 2 TIMES DAILY
Status: SHIPPED | COMMUNITY
End: 2021-10-29

## 2021-03-19 RX ORDER — DOCUSATE SODIUM 100 MG/1
100 CAPSULE, LIQUID FILLED ORAL 2 TIMES DAILY
Status: DISCONTINUED | OUTPATIENT
Start: 2021-03-19 | End: 2021-03-25 | Stop reason: HOSPADM

## 2021-03-19 RX ORDER — MIRTAZAPINE 15 MG/1
15 TABLET, FILM COATED ORAL
Status: DISCONTINUED | OUTPATIENT
Start: 2021-03-19 | End: 2021-03-25 | Stop reason: HOSPADM

## 2021-03-19 RX ORDER — HALOPERIDOL 5 MG/1
10 TABLET ORAL 2 TIMES DAILY
Status: DISCONTINUED | OUTPATIENT
Start: 2021-03-19 | End: 2021-03-25 | Stop reason: HOSPADM

## 2021-03-19 RX ORDER — ARIPIPRAZOLE 10 MG/1
5 TABLET ORAL DAILY
Status: DISCONTINUED | OUTPATIENT
Start: 2021-03-20 | End: 2021-03-20

## 2021-03-19 RX ORDER — DIVALPROEX SODIUM 250 MG/1
250 TABLET, DELAYED RELEASE ORAL EVERY EVENING
Status: DISCONTINUED | OUTPATIENT
Start: 2021-03-20 | End: 2021-03-20

## 2021-03-19 RX ORDER — CHLORPROMAZINE HYDROCHLORIDE 200 MG/1
600 TABLET, FILM COATED ORAL 3 TIMES DAILY
Status: SHIPPED | COMMUNITY
End: 2023-05-11

## 2021-03-19 RX ORDER — DIVALPROEX SODIUM 500 MG/1
500 TABLET, DELAYED RELEASE ORAL EVERY MORNING
Status: DISCONTINUED | OUTPATIENT
Start: 2021-03-20 | End: 2021-03-25 | Stop reason: HOSPADM

## 2021-03-19 ASSESSMENT — FIBROSIS 4 INDEX: FIB4 SCORE: 0.42

## 2021-03-20 PROCEDURE — 700102 HCHG RX REV CODE 250 W/ 637 OVERRIDE(OP): Performed by: PSYCHIATRY & NEUROLOGY

## 2021-03-20 PROCEDURE — A9270 NON-COVERED ITEM OR SERVICE: HCPCS | Performed by: PSYCHIATRY & NEUROLOGY

## 2021-03-20 PROCEDURE — A9270 NON-COVERED ITEM OR SERVICE: HCPCS | Performed by: EMERGENCY MEDICINE

## 2021-03-20 PROCEDURE — 700102 HCHG RX REV CODE 250 W/ 637 OVERRIDE(OP): Performed by: EMERGENCY MEDICINE

## 2021-03-20 PROCEDURE — 99285 EMERGENCY DEPT VISIT HI MDM: CPT | Performed by: PSYCHIATRY & NEUROLOGY

## 2021-03-20 RX ORDER — CHLORPROMAZINE HYDROCHLORIDE 10 MG/1
10 TABLET, FILM COATED ORAL 3 TIMES DAILY PRN
Status: DISCONTINUED | OUTPATIENT
Start: 2021-03-20 | End: 2021-03-20

## 2021-03-20 RX ORDER — DIVALPROEX SODIUM 500 MG/1
1000 TABLET, DELAYED RELEASE ORAL EVERY EVENING
Status: DISCONTINUED | OUTPATIENT
Start: 2021-03-20 | End: 2021-03-25 | Stop reason: HOSPADM

## 2021-03-20 RX ORDER — CHLORPROMAZINE HYDROCHLORIDE 10 MG/1
10 TABLET, FILM COATED ORAL 3 TIMES DAILY
Status: DISCONTINUED | OUTPATIENT
Start: 2021-03-20 | End: 2021-03-25 | Stop reason: HOSPADM

## 2021-03-20 RX ADMIN — DOCUSATE SODIUM 100 MG: 100 CAPSULE ORAL at 06:00

## 2021-03-20 RX ADMIN — DOCUSATE SODIUM 100 MG: 100 CAPSULE ORAL at 19:28

## 2021-03-20 RX ADMIN — METFORMIN HYDROCHLORIDE 500 MG: 500 TABLET ORAL at 11:15

## 2021-03-20 RX ADMIN — HALOPERIDOL 10 MG: 5 TABLET ORAL at 06:00

## 2021-03-20 RX ADMIN — DIVALPROEX SODIUM 1000 MG: 500 TABLET, DELAYED RELEASE ORAL at 19:28

## 2021-03-20 RX ADMIN — MIRTAZAPINE 15 MG: 15 TABLET, FILM COATED ORAL at 00:10

## 2021-03-20 RX ADMIN — DIVALPROEX SODIUM 500 MG: 500 TABLET, DELAYED RELEASE ORAL at 11:15

## 2021-03-20 RX ADMIN — CHLORPROMAZINE HYDROCHLORIDE 10 MG: 10 TABLET, SUGAR COATED ORAL at 11:15

## 2021-03-20 RX ADMIN — DOCUSATE SODIUM 100 MG: 100 CAPSULE ORAL at 00:10

## 2021-03-20 RX ADMIN — HALOPERIDOL 10 MG: 5 TABLET ORAL at 00:10

## 2021-03-20 RX ADMIN — ARIPIPRAZOLE 5 MG: 10 TABLET ORAL at 06:00

## 2021-03-20 RX ADMIN — CHLORPROMAZINE HYDROCHLORIDE 10 MG: 10 TABLET, SUGAR COATED ORAL at 19:28

## 2021-03-20 RX ADMIN — HALOPERIDOL 10 MG: 5 TABLET ORAL at 19:28

## 2021-03-20 NOTE — ED NOTES
Pt seen by ERP.  Well known to Alert Team RN.  Pt remains slightly flat affect but cooperative and redirectable.  15 minute checks in place.

## 2021-03-20 NOTE — ED NOTES
Steady gait and ambulating to shower with security and tech. Pt in even calm temperament. Bed changed and pt given new gown..

## 2021-03-20 NOTE — CONSULTS
"PSYCHIATRIC INTAKE EVALUATION    *Reason for admission:  Sent from [facility] for psychosis.  Pt oriented x3 at time of this RN assessment, but unable to have full conversaiton and speak gibberish most of the time.  Per [facility], pt did receive meds to day.  Says she is SI but no plan           *Reason for consult:  Psychosis and SI  *Requesting Physician/APN:  SANKET Key MD         Legal Hold status:  on legal hold         *Chief Complaint:: gibberish       *HPI (includes Psychiatric ROS):   36 yo female who is so disorganized in speech that it is impossible to determine what she is concerned, or not, about. Occasional she has a coherent sentence but not usually. However she was very clear when she suddenly yelled \"get out of here\". Random yells.    The rest of the information is from the chart.     Notes  3/19: MD: ...to me she states that she does not want to kill herself she just wants to do \"a little self-harm.\"  ....  *Medical Review Of Symptoms (not dx conditions):   ROS      *Psychiatric Examination:   Vitals:   Vitals:    03/19/21 2251 03/19/21 2252 03/19/21 2305 03/20/21 0600   BP:  112/59  116/62   Pulse: 95   87   Resp:    16   Temp:   36.7 °C (98 °F)    TempSrc:   Temporal    SpO2: 97%   98%   Weight:       Height:         General Appearance:  obese, good eye contact, glaring and not able to participate meaningfully  Abnormal Movements: none   Gait and Posture: within normal limits and not observed  Speech: disorganized  Thought Process: normal rate  Associations:   disorganized  Abnormal or Psychotic Thoughts: unable to assess  Judgement and Insight: impaired    Orientation: unable to determine  Recent and Remote Memory: unable to determine  Attention Span and Concentration: appears to focus  Language:fluent  Fund of Knowledge: unable to assess  Mood and Affect: labile  SI/HI:  unable to assess       *PAST MEDICAL/PSYCH/FAMILY/SOCIAL(as reported by patient):       *medical hx:           Past Medical " History:   Diagnosis Date   • Hypertension    • Psychiatric disorder     Schizophrenia     Past Surgical History:   Procedure Laterality Date   • PELVISCOPY  2/22/2011    Performed by BABITA SHEETS at SURGERY SAME DAY [facility] ORS   • INCISION AND DRAINAGE GENERAL  2/22/2011    Performed by BABITA SHEETS at SURGERY SAME DAY [facility] ORS        *psychiatric hx:  Has been seen by the alert team multiple times in the past. AH, alfonso, disorganized, and hx of self harm behaviors such as glass ingestion, water inhalation etc.     SAs:none noted in chart  Hospitalizations:multiple  Med Hx: geodon, zypexa, ativan, trazodone, saphris, llithium, latuda (from chart)  Dx Hx:  per notes: hx of schizoaffective and schizophrenia  Other: polydipsia     *family Psych hx:  unable to obtain     *social hx: per notes: hx of halfway, hx of group homes  Alcohol: unable to obtain   Drugs:unable to obtain     *MEDICAL HX: labs, MARS, medications, etc were reviewed. Only those findings of potential interest to psychiatry are noted below:    *Current Medical issues:   see below     *Allergies:  Allergies   Allergen Reactions   • Clonazepam      Pt reports getting a stroke.  Per historical: Twitches and paranoia.   • Invega Sustenna [Paliperidone Palmitate]    • Olanzapine      Pt reports worsening and threatening voices.   • Paliperidone      Twitches and paranoia .      *Current Medications:    Current Facility-Administered Medications:   •  divalproex (DEPAKOTE) delayed-release tablet 1,000 mg, 1,000 mg, Oral, Q EVENING, Melvi Rosales M.D.  •  haloperidol (HALDOL) tablet 10 mg, 10 mg, Oral, BID, Jagdeep Weber M.D., 10 mg at 03/20/21 0600  •  metFORMIN (GLUCOPHAGE) tablet 500 mg, 500 mg, Oral, DAILY, Jagdeep Weber M.D.  •  mirtazapine (Remeron) tablet 15 mg, 15 mg, Oral, QHS, Jagdeep Weber M.D., 15 mg at 03/20/21 0010  •  docusate sodium (COLACE) capsule 100 mg, 100 mg, Oral, BID, Jagdeep Weber M.D., 100 mg at 03/20/21  0600  •  divalproex (DEPAKOTE) delayed-release tablet 500 mg, 500 mg, Oral, BI, Jagdeep Weber M.D.    Current Outpatient Medications:   •  haloperidol (HALDOL) 10 MG tablet, Take 10 mg by mouth 2 Times a Day., Disp: , Rfl:   •  chlorproMAZINE (THORAZINE) 10 MG Tab, Take 10 mg by mouth 3 times a day., Disp: , Rfl:   •  lisinopril (PRINIVIL) 10 MG Tab, Take 10 mg by mouth every day., Disp: , Rfl:   •  metFORMIN (GLUCOPHAGE) 500 MG Tab, Take 500 mg by mouth every day., Disp: , Rfl:   •  docusate sodium (COLACE) 100 MG Cap, Take 100 mg by mouth 2 times a day., Disp: , Rfl:   •  fluphenazine (PROLIXIN) 5 MG tablet, Take 1 Tablet by mouth every morning. (Patient not taking: Reported on 3/19/2021), Disp: 30 Tab, Rfl: 0  •  ARIPiprazole (ABILIFY) 5 MG tablet, Take 5 Tabs by mouth every day. (Patient not taking: Reported on 3/19/2021), Disp: 30 Tab, Rfl: 0  •  mirtazapine (REMERON) 15 MG Tab, Take 1 Tab by mouth every bedtime. (Patient not taking: Reported on 3/19/2021), Disp: 30 Tab, Rfl: 0  •  divalproex (DEPAKOTE) 500 MG Tablet Delayed Response, Take 1 Tab by mouth every morning with breakfast. (Patient taking differently: Take 1,500 mg by mouth every bedtime.), Disp: 28 Tab, Rfl: 0  •  divalproex (DEPAKOTE) 250 MG Tablet Delayed Response, Take 3 Tabs by mouth every evening. (Patient not taking: Reported on 3/19/2021), Disp: 90 Tab, Rfl: 0  *ECG: personally reviewed QTc  450  *Cranial Imaging: personally reviewed CT neg      *Labs:     Lab Results   Component Value Date/Time    SODIUM 136 07/22/2019 05:40 AM    POTASSIUM 3.9 07/22/2019 05:40 AM    CHLORIDE 107 07/22/2019 05:40 AM    CO2 21 07/22/2019 05:40 AM    GLUCOSE 99 07/22/2019 05:40 AM    BUN 19 07/22/2019 05:40 AM    CREATININE 0.72 07/22/2019 05:40 AM         Lab Results   Component Value Date/Time    BREATHALIZER 0.000 07/22/2019 0511     No components found for: BLOODALCOHOL   Lab Results   Component Value Date/Time    AMPHUR Negative 03/19/2021 7910     "BARBSURINE Negative 03/19/2021 2300    BENZODIAZU Negative 03/19/2021 2300    COCAINEMET Negative 03/19/2021 2300    METHADONE Negative 03/19/2021 2300    ECSTASY Negative 10/26/2015 0415    OPIATES Negative 03/19/2021 2300    OXYCODN Negative 03/19/2021 2300    PCPURINE Negative 03/19/2021 2300    PROPOXY Negative 03/19/2021 2300    CANNABINOID Negative 03/19/2021 2300     Lab Results   Component Value Date/Time    FREET4 0.53 07/10/2019 1638         *ASSESSMENT/RECOMENDATIONS:    1. Schizoaffective disorder  - home meds include haldol 10 mg bid which is continued  -depakote which has been increased to 1500 mg daily  -remeron 15 mg: continues  -abilify 5 mg, dc'd (this dose is ineffective for her psychosis)  -prolixen 5 mg: pt is on haldol so will dc to keep regimen simplified  -also on thorazine 10 mg tid which can be calming: order    2. Medical:  -DMII: metformin \"is a vitamin, I don't like vitamins\"    Legal hold: extended  Observation status: 1:1 sitter  Privileges (while on legal hold):none    *Will Follow  *Thank you for the consult      "

## 2021-03-20 NOTE — ED NOTES
Pt having episodes of yelling erratically in her room.  Sometimes yelling at nursing staff.  Pt remains redirectable.  ERP made aware of behavior change.

## 2021-03-20 NOTE — ED NOTES
Pt to be placed on legal hold per alert Team.  Continues to be confused and speaking in word salad.  Reoriented as appropriate.  She denies SI to Alert Team, and is appropriate for q 15 minute checks.

## 2021-03-20 NOTE — ED PROVIDER NOTES
"ED Provider Note    CHIEF COMPLAINT  Chief Complaint   Patient presents with   • Psych Eval     Sent from [facility] for psychosis.  Pt oriented x3 at time of this RN assessment, but unable to have full conversaiton and speak gibberish most of the time.  Per [facility], pt did receive meds to day.  When asked if she is suicial, she says yes, but cannot define a plan.         HPI  Tierney Mayer is a 35 y.o. female who is sent here for psychiatric evaluation.  She was at [facility], she apparently was not making any sense, was suspected to be acutely psychotic and was sent here for psychiatric valuation.  History of schizophrenia.  The patient made some suicidal comments to nursing staff and other people, to me she states that she does not want to kill herself she just wants to do \"a little self-harm.\"  She otherwise reports no other specific acute complaints.  She denies alcohol use.    REVIEW OF SYSTEMS  Negative for fever, rash, chest pain, dyspnea, abdominal pain, nausea, vomiting, diarrhea, headache, focal weakness, focal numbness, focal tingling, back pain. All other systems are negative.     PAST MEDICAL HISTORY  Past Medical History:   Diagnosis Date   • Hypertension    • Psychiatric disorder     Schizophrenia       FAMILY HISTORY  No family history on file.    SOCIAL HISTORY  Social History     Tobacco Use   • Smoking status: Current Every Day Smoker     Packs/day: 1.00     Types: Cigarettes   • Smokeless tobacco: Never Used   • Tobacco comment: knows she is suppose to quit but hasn't   Substance Use Topics   • Alcohol use: Yes   • Drug use: No       SURGICAL HISTORY  Past Surgical History:   Procedure Laterality Date   • PELVISCOPY  2/22/2011    Performed by BABITA SHEETS at SURGERY SAME DAY [facility] ORS   • INCISION AND DRAINAGE GENERAL  2/22/2011    Performed by BABITA SHEETS at SURGERY SAME DAY [facility] ORS       CURRENT MEDICATIONS  I personally reviewed the medication list in the charting " "documentation.     ALLERGIES  Allergies   Allergen Reactions   • Clonazepam      Pt reports getting a stroke.  Per historical: Twitches and paranoia.   • Invega Sustenna [Paliperidone Palmitate]    • Olanzapine      Pt reports worsening and threatening voices.   • Paliperidone      Twitches and paranoia .       MEDICAL RECORD  I have reviewed patient's medical record and pertinent results are listed above.      PHYSICAL EXAM  VITAL SIGNS: /91   Pulse 95   Temp 37 °C (98.6 °F) (Temporal)   Ht 1.651 m (5' 5\")   Wt 83.9 kg (184 lb 15.5 oz)   SpO2 99%   BMI 30.78 kg/m²    Constitutional: Well appearing patient in no acute distress.  Awake and alert, not toxic nor ill in appearance.  HENT: Normocephalic, no obvious evidence of acute trauma.  Singed eyebrow hairs and frontal scalp hair but no evidence of facial burns otherwise.   Eyes: No scleral icterus. Normal conjunctiva   Neck: Comfortable movement without any obvious restriction in the range of motion.  Cardiovascular: Upon ascultation I appreciate a regular heart rhythm and a normal rate.   Thorax & Lungs: Normal nonlabored respirations. Upon ascultation, there is no obvious chest wall tenderness. I appreciate no wheezing, rhonchi or rales. There is normal air movement.    Abdomen: The abdomen is not visibly distended. Upon palpation, I find it to be without tenderness.  No mass appreciated.  Skin: The exposed portions of skin reveal no obvious rash or other abnormalities.  Extremities/Musculoskeletal: No obvious sign of acute trauma. No asymmetric calf tenderness or edema.   Neurologic: Alert & oriented. No focal deficits observed.   Psychiatric: Some psychomotor agitation appreciated, she answers some questions clearly other times she just rambles on with essentially gibberish.      COURSE & MEDICAL DECISION MAKING  I have reviewed any medical record information, laboratory studies and radiographic results as noted above.    Encounter Summary: This is " a very pleasant 35 y.o. female who unfortunately required evaluation in the emergency department today with acute psychosis in the setting of schizophrenia, I do have concerns about her ability to care for herself.  To me she denies margie suicidal thoughts but has mentioned that to other people.  She will undergo psychiatric evaluation ------- patient has been evaluated by the psychiatric evaluator, deemed appropriate for inpatient psychiatric treatment.  Legal hold has been certified and the patient will be transferred to an inpatient psychiatric facility when a bed becomes available.      DISPOSITION: Transfer to inpatient psychiatric facility      FINAL IMPRESSION  1. Disorganized schizophrenia (HCC)    2. Acute psychosis (HCC)           This dictation was created using voice recognition software. The accuracy of the dictation is limited to the abilities of the software. I expect there may be some errors of grammar and possibly content. The nursing notes were reviewed and certain aspects of this information were incorporated into this note.    Electronically signed by: Enoc Key M.D., 3/19/2021 6:08 PM

## 2021-03-20 NOTE — ED PROVIDER NOTES
ED Provider Note    Patient CARE signed out from nighttime ERP, patient is here on a legal hold for psychosis and suicidal ideations.  Plan is vague.  Little information available from the patient.  No events under my care.  Patient has no requests and no complaints at this time.  She is encouraged to make her needs known.  She has been seen by the alert team.  Await transfer to a psychiatric facility.  Care will be reassigned to my partner at the close of my shift in anticipation of this.

## 2021-03-20 NOTE — ED NOTES
Medicated pt per MAR order.  Pt continues to be agitated at times, yelling erratically at staff.

## 2021-03-20 NOTE — ED TRIAGE NOTES
Chief Complaint   Patient presents with   • Psych Eval     Sent from [facility] for psychosis.  Pt oriented x3 at time of this RN assessment, but unable to have full conversaiton and speak gibberish most of the time.  Per [facility], pt did receive meds to day.  When asked if she is suicial, she says yes, but cannot define a plan.       Pt has flat affect, but is cooperative with nurse.  Per EMS report she has been positive for hallucinations.   Pt speaking in gibberish to RN, not able to answer questions directly, besides orientation questions.       All belongings taken from pt and placed in belongings bag.  Pt in hospital gown.  Pt requesting food.  Explained to pt need for evaluation by ERP.,  Pt agreeable.      Unable to complete Gillett Scale due to her confusion -  15 minute checks in place.

## 2021-03-20 NOTE — DISCHARGE PLANNING
SW received call from Barbie with [facility] updating this SW that they have no current open beds at this time.

## 2021-03-20 NOTE — DISCHARGE PLANNING
Medical Social Work    Referral: Legal Hold    Intervention: Legal Hold Paperwork given to SW by [employer] RN Char    Legal Hold Initiated: Date: 3/19/21 Time: 1956    Patient’s Insurance Listed on Face Sheet: Medicaid FFS    Referrals sent to: [facility], [facility], and [facility]    This referral contains the following information:  1) Face sheet __x__  2) Page 1 and Page 2 of Legal Hold _x___  3) Alert Team Assessment/Psych Assessment __x__  4) Head to toe physical exam __x__  5) Urine Drug Screen __x__  6) Blood Alcohol ____  7) Vital signs __x__  8) Pregnancy test when applicable ___  9) Medications list _x___    Plan: Patient will transfer to mental health facility once acceptance is obtained
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
## 2021-03-20 NOTE — PROGRESS NOTES
Patient's home medications have been reviewed by the pharmacy team.     Past Medical History:   Diagnosis Date   • Hypertension    • Psychiatric disorder     Schizophrenia       Patient's Medications   New Prescriptions    No medications on file   Previous Medications    ARIPIPRAZOLE (ABILIFY) 5 MG TABLET    Take 5 Tabs by mouth every day.    CHLORPROMAZINE (THORAZINE) 10 MG TAB    Take 10 mg by mouth 3 times a day.    DIVALPROEX (DEPAKOTE) 250 MG TABLET DELAYED RESPONSE    Take 3 Tabs by mouth every evening.    DIVALPROEX (DEPAKOTE) 500 MG TABLET DELAYED RESPONSE    Take 1 Tab by mouth every morning with breakfast.    DOCUSATE SODIUM (COLACE) 100 MG CAP    Take 100 mg by mouth 2 times a day.    FLUPHENAZINE (PROLIXIN) 5 MG TABLET    Take 1 Tablet by mouth every morning.    HALOPERIDOL (HALDOL) 10 MG TABLET    Take 10 mg by mouth 2 Times a Day.    LISINOPRIL (PRINIVIL) 10 MG TAB    Take 10 mg by mouth every day.    METFORMIN (GLUCOPHAGE) 500 MG TAB    Take 500 mg by mouth every day.    MIRTAZAPINE (REMERON) 15 MG TAB    Take 1 Tab by mouth every bedtime.   Modified Medications    No medications on file   Discontinued Medications    LURASIDONE (LATUDA) 80 MG TAB    Take 80 mg by mouth every bedtime.    PROPRANOLOL (INDERAL) 10 MG TAB    Take 10 mg by mouth 3 times a day.    TOPIRAMATE (TOPAMAX) 100 MG TAB    Take 100 mg by mouth 2 times a day.          A:  Medications do not appear to be contributing to current complaints.     P:    No recommendations at this time. Home medications have been reordered by as appropriate.      Bird MitchellD, BCPS

## 2021-03-20 NOTE — ED NOTES
Med rec complete per med list provided from [facility]. Pt is unable to participate in interview at this time. Unable to review allergies. Unable to assess abx history.

## 2021-03-21 LAB
SARS-COV+SARS-COV-2 AG RESP QL IA.RAPID: NOTDETECTED
SPECIMEN SOURCE: NORMAL

## 2021-03-21 PROCEDURE — A9270 NON-COVERED ITEM OR SERVICE: HCPCS | Performed by: PSYCHIATRY & NEUROLOGY

## 2021-03-21 PROCEDURE — C9803 HOPD COVID-19 SPEC COLLECT: HCPCS | Performed by: EMERGENCY MEDICINE

## 2021-03-21 PROCEDURE — 99282 EMERGENCY DEPT VISIT SF MDM: CPT | Performed by: PSYCHIATRY & NEUROLOGY

## 2021-03-21 PROCEDURE — 700102 HCHG RX REV CODE 250 W/ 637 OVERRIDE(OP): Performed by: EMERGENCY MEDICINE

## 2021-03-21 PROCEDURE — 700102 HCHG RX REV CODE 250 W/ 637 OVERRIDE(OP): Performed by: PSYCHIATRY & NEUROLOGY

## 2021-03-21 PROCEDURE — 87426 SARSCOV CORONAVIRUS AG IA: CPT

## 2021-03-21 PROCEDURE — A9270 NON-COVERED ITEM OR SERVICE: HCPCS | Performed by: EMERGENCY MEDICINE

## 2021-03-21 RX ADMIN — METFORMIN HYDROCHLORIDE 500 MG: 500 TABLET ORAL at 10:40

## 2021-03-21 RX ADMIN — CHLORPROMAZINE HYDROCHLORIDE 10 MG: 10 TABLET, SUGAR COATED ORAL at 12:00

## 2021-03-21 RX ADMIN — CHLORPROMAZINE HYDROCHLORIDE 10 MG: 10 TABLET, SUGAR COATED ORAL at 10:34

## 2021-03-21 RX ADMIN — HALOPERIDOL 10 MG: 5 TABLET ORAL at 10:35

## 2021-03-21 RX ADMIN — DIVALPROEX SODIUM 500 MG: 500 TABLET, DELAYED RELEASE ORAL at 10:38

## 2021-03-21 RX ADMIN — CHLORPROMAZINE HYDROCHLORIDE 10 MG: 10 TABLET, SUGAR COATED ORAL at 18:00

## 2021-03-21 RX ADMIN — DOCUSATE SODIUM 100 MG: 100 CAPSULE ORAL at 10:39

## 2021-03-21 RX ADMIN — MIRTAZAPINE 15 MG: 15 TABLET, FILM COATED ORAL at 22:27

## 2021-03-21 RX ADMIN — DIVALPROEX SODIUM 1000 MG: 500 TABLET, DELAYED RELEASE ORAL at 18:11

## 2021-03-21 RX ADMIN — HALOPERIDOL 10 MG: 5 TABLET ORAL at 18:11

## 2021-03-21 ASSESSMENT — LIFESTYLE VARIABLES: DOES PATIENT WANT TO STOP DRINKING: CANNOT ASSESS

## 2021-03-21 NOTE — DISCHARGE PLANNING
Medical Social Work    LSW contacted [facility] and spoke w/ Charge NI Montano who states that they do not currently have any open beds. NI Montano also states that they do not have this pt's referral. LSW re-faxed referral.  NI Montano will call if they get any discharges later today.

## 2021-03-21 NOTE — ED NOTES
Pt calm, cooperative,pleasant, respirations e/u. Able to follow commands and directions. Currently denies si/hi/ah/vh. Peripheral pulses strong x4. Skin warm, dry, nml color.  Informed patient of plan of care and at this time verbalizes understanding. Sitter in direct view of patient. Basic needs met, escorted patient to the bathroom, pt walks with strong steady gait. NAD. Vss.     Sitter in direct view of patient.     Will continue to monitor.

## 2021-03-21 NOTE — ED NOTES
Patient calm, cooperative, pleasant. watching TV at this time. resp e/u. Speaks in full clear sentences. Able to move all extremities. Escorted to bathroom with strong steady gait.     Hot Meal Tray given to patient, ate all of her food without any issues.     Pt is in no apparent distress.     SITTER IN DIRECT VIEW OF PATIENT

## 2021-03-21 NOTE — ED NOTES
36 y/o F w/ hx of schizophrenia here on a Legal Hold for being unable to care for self. Pt is sleeping, easily arousable to verbal stimuli. She is calm at this time. resp e/u. No cyanosis, no pallor, skin nml color and dry. Peripheral pulses x4 are strong, 2+. While trying to talk to patient, pt falls asleep. Encouraged patient to make needs known.  No apparent distress at this time. Unable to assess si/hi/ah/vh at this time.   1:1 sitter in direct view of patient/

## 2021-03-21 NOTE — ED NOTES
Pt up to restroom again at this this time. Ambulatory with steady gait. Returned to room. And laid down in bed.

## 2021-03-21 NOTE — ED NOTES
Patient calm, cooperative, pleasant. watching TV at this time. resp e/u. Speaks in full clear sentences. Able to move all extremities. Escorted to bathroom with strong steady gait.   Pt is in no apparent distress.     SITTER IN DIRECT VIEW OF PATIENT

## 2021-03-21 NOTE — ED PROVIDER NOTES
"ED Provider Note    11:38 AM    Subjective:  Patient is awaiting transfer to psychiatric facility.  She has no complaints. Has been given a meal. Has been receiving medications as prescribed.    Objective: /67   Pulse 80   Temp 36.8 °C (98.2 °F) (Tympanic)   Resp 19   Ht 1.651 m (5' 5\")   Wt 83.9 kg (184 lb 15.5 oz)   SpO2 96%   BMI 30.78 kg/m² .  Calm and cooperative. Generally nontoxic. No respiratory distress. No abdominal tenderness. No skin rash. Extremities unremarkable.    Laboratory data was reviewed.    Assesment/Plan:   1.  Schizophrenia-proceed with plan for psychiatric care      Electronically signed by: Arnold Arita M.D., 3/21/2021 11:38 AM    "

## 2021-03-21 NOTE — ED NOTES
Pt assisted to restroom and then back to bed. Pt ate full dinner. No other complaints or requests at this time.

## 2021-03-21 NOTE — ED NOTES
"Patient now awake, asking for coffee. Pt is pleasant, calm, cooperative. Currently denies si/hi/ah/vh. Pt intermittently talking about \"God and how he will rein his wrath upon us\" - pt is easily redirectable. No apparent distress at this time. Will continue to monitor.   Sitter in direct view of patient/.   "

## 2021-03-22 PROCEDURE — 700102 HCHG RX REV CODE 250 W/ 637 OVERRIDE(OP): Performed by: PSYCHIATRY & NEUROLOGY

## 2021-03-22 PROCEDURE — 700102 HCHG RX REV CODE 250 W/ 637 OVERRIDE(OP): Performed by: EMERGENCY MEDICINE

## 2021-03-22 PROCEDURE — A9270 NON-COVERED ITEM OR SERVICE: HCPCS | Performed by: PSYCHIATRY & NEUROLOGY

## 2021-03-22 PROCEDURE — A9270 NON-COVERED ITEM OR SERVICE: HCPCS | Performed by: EMERGENCY MEDICINE

## 2021-03-22 RX ADMIN — CHLORPROMAZINE HYDROCHLORIDE 10 MG: 10 TABLET, SUGAR COATED ORAL at 17:21

## 2021-03-22 RX ADMIN — METFORMIN HYDROCHLORIDE 500 MG: 500 TABLET ORAL at 06:42

## 2021-03-22 RX ADMIN — MIRTAZAPINE 15 MG: 15 TABLET, FILM COATED ORAL at 20:50

## 2021-03-22 RX ADMIN — DIVALPROEX SODIUM 1000 MG: 500 TABLET, DELAYED RELEASE ORAL at 17:21

## 2021-03-22 RX ADMIN — DOCUSATE SODIUM 100 MG: 100 CAPSULE ORAL at 17:21

## 2021-03-22 RX ADMIN — DOCUSATE SODIUM 100 MG: 100 CAPSULE ORAL at 06:42

## 2021-03-22 RX ADMIN — CHLORPROMAZINE HYDROCHLORIDE 10 MG: 10 TABLET, SUGAR COATED ORAL at 12:17

## 2021-03-22 RX ADMIN — HALOPERIDOL 10 MG: 5 TABLET ORAL at 06:42

## 2021-03-22 RX ADMIN — CHLORPROMAZINE HYDROCHLORIDE 10 MG: 10 TABLET, SUGAR COATED ORAL at 06:00

## 2021-03-22 RX ADMIN — HALOPERIDOL 10 MG: 5 TABLET ORAL at 17:21

## 2021-03-22 RX ADMIN — DIVALPROEX SODIUM 500 MG: 500 TABLET, DELAYED RELEASE ORAL at 06:00

## 2021-03-22 NOTE — ED NOTES
Report from NI Koroma. Assumed care of pt, pt resting in gurney w/ eyes closed, respirations even and unlabored. NAD noted. Sitter at bedside.
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
## 2021-03-22 NOTE — ED NOTES
Pt ambulated to the bathroom, pt agitated and yelling at the tech. RN took over and pt was redirectable

## 2021-03-22 NOTE — DISCHARGE PLANNING
Medical Social Work    LSW followed up / [facility] regarding pt's referral status.  Charge RN requesting that referral be re-faxed again.  LSW called and confirmed w/ Jeannine at [facility] that pt's referral was received.  Jeannine states that pt's referral was received; however, they do not have any beds open this evening.

## 2021-03-22 NOTE — ED NOTES
No new changes since previous assessment. Walked to and from bathroom with strong steady gait. NAD noted. resp e/u. Nontoxic appearing.

## 2021-03-22 NOTE — ED PROVIDER NOTES
ED Provider Note    3/22/2021 6:51 AM: Patient signed out to me on legal hold for schizophrenia and inability care for self.  Apparently she came in with some psychosis.  I am told that there have been no adverse events in the past 24 hours.  The patient is somewhat compliant with medications.  Still awaiting transfer to mental health facility.

## 2021-03-22 NOTE — DISCHARGE PLANNING
Filed petition to the court via [employer]. Waiting on verified petition.    Received verified petition from the court. Sent copy to unit MINW.

## 2021-03-22 NOTE — CONSULTS
"PSYCHIATRIC FOLLOW-UP:(established)  *Reason for admission:   Sent from [facility] for psychosis.  Pt oriented x3 at time of this RN assessment, but unable to have full conversaiton and speak gibberish most of the time.  Per [facility], pt did receive meds to day.  Says she is SI but no plan             *Legal Hold Status: on legal hold                *HPI: very happy, laughing, disorganized but not SI. Something about \"[place]....\"           Notes  3/19 RN:  Pt is pleasant, calm, cooperative. Currently denies si/hi/ah/vh. Pt intermittently talking about \"God and how he will rein his wrath upon us\" -    *Psychiatric Examination:   Vitals:   Vitals:    03/21/21 0635 03/21/21 0900 03/21/21 1200 03/21/21 1504   BP: 111/66 111/67 110/79 115/78   Pulse: 81 80 81 76   Resp: 18 19 16 17   Temp:  36.8 °C (98.2 °F) 36.9 °C (98.4 °F)    TempSrc:  Tympanic Temporal    SpO2: 95% 96% 99% 99%   Weight:       Height:         General Appearance:  good eye contact  Abnormal Movements: none   Gait and Posture: not observed  Speech: disorganized  Thought Process: normal rate  Associations:   disorganized  Abnormal or Psychotic Thoughts: delusions  Judgement and Insight: impaired   Orientation: grossly intact  Recent and Remote Memory: grossly intact  Attention Span and Concentration: intact  Language:fluent  Fund of Knowledge: not tested  Mood and Affect: elevated  SI/HI:  suicidal - no and homicidal - no      *ASSESSMENT/RECOMENDATIONS:  1. Schizoaffective disorder  - home meds include haldol 10 mg bid which is continued  -depakote which has been increased to 1500 mg daily  -remeron 15 mg: continues  -abilify 5 mg, dc'd (this dose is ineffective for her psychosis)  -prolixen 5 mg: pt is on haldol so will dc to keep regimen simplified  -also on thorazine 10 mg tid which can be calming: order     2. Medical:  -DMII: metformin \"is a vitamin, I don't like vitamins\"     Legal hold: extended  Observation status: 1:1 sitter  Privileges " (while on legal hold): none      *Will Follow

## 2021-03-22 NOTE — ED NOTES
Pt medicated per MAR, pt resting in gurney w/ eyes closed after medication administration. NAD noted, respirations even and unlabored, sitter at bedside.

## 2021-03-22 NOTE — DISCHARGE PLANNING
DAYSI spoke to Jeannine at [facility].   They are currently at capacity but may be expecting discharges.     She has packet and they will review if they have a discharge.   DAYSI sent updated referral packet to [facility].

## 2021-03-22 NOTE — DISCHARGE PLANNING
Janes from [facility] called and stated they are declining Pt.   Pt was just recently at [facility] so they do not feel Medicaid FFS will reimburse them and they stated they do not feel the Pt Legal Hold documentation will warrant reimbursement from Medicaid FFS.     DAYSI has updated Ravi at [facility] that Pt has been declined by all facilities.    Pt continues to wait for inpatient mental health treatment.

## 2021-03-22 NOTE — ED NOTES
"Attempted to administer pt medication, pt refused. States that she is not taking medication d/t \"abandomnent issues from previous program\" pt educated on medication, continues to refused. Will attempt later, NAD noted, sitter at bedside.   "

## 2021-03-23 PROCEDURE — A9270 NON-COVERED ITEM OR SERVICE: HCPCS | Performed by: EMERGENCY MEDICINE

## 2021-03-23 PROCEDURE — 700102 HCHG RX REV CODE 250 W/ 637 OVERRIDE(OP): Performed by: EMERGENCY MEDICINE

## 2021-03-23 PROCEDURE — A9270 NON-COVERED ITEM OR SERVICE: HCPCS | Performed by: PSYCHIATRY & NEUROLOGY

## 2021-03-23 PROCEDURE — 700102 HCHG RX REV CODE 250 W/ 637 OVERRIDE(OP): Performed by: PSYCHIATRY & NEUROLOGY

## 2021-03-23 RX ADMIN — HALOPERIDOL 10 MG: 5 TABLET ORAL at 06:23

## 2021-03-23 RX ADMIN — DOCUSATE SODIUM 100 MG: 100 CAPSULE ORAL at 18:15

## 2021-03-23 RX ADMIN — HALOPERIDOL 10 MG: 5 TABLET ORAL at 18:15

## 2021-03-23 RX ADMIN — DIVALPROEX SODIUM 500 MG: 500 TABLET, DELAYED RELEASE ORAL at 06:22

## 2021-03-23 RX ADMIN — DOCUSATE SODIUM 100 MG: 100 CAPSULE ORAL at 06:22

## 2021-03-23 RX ADMIN — CHLORPROMAZINE HYDROCHLORIDE 10 MG: 10 TABLET, SUGAR COATED ORAL at 06:23

## 2021-03-23 RX ADMIN — CHLORPROMAZINE HYDROCHLORIDE 10 MG: 10 TABLET, SUGAR COATED ORAL at 18:15

## 2021-03-23 RX ADMIN — DIVALPROEX SODIUM 1000 MG: 500 TABLET, DELAYED RELEASE ORAL at 18:15

## 2021-03-23 RX ADMIN — METFORMIN HYDROCHLORIDE 500 MG: 500 TABLET ORAL at 06:23

## 2021-03-23 NOTE — PROGRESS NOTES
ED provider note    Patient has no complaints.  She has been cooperative.  Vital signs stable.  We will proceed with plan for psychiatric care    .Arnold Arita M.D.

## 2021-03-23 NOTE — ED NOTES
Pt resting in gurney with eyes closed, appears comfortable, respirations equal and unlabored, NADN. Sitter in view for direct observation

## 2021-03-23 NOTE — ED NOTES
"Pt refused 1200 thorazine dose. Explained it is most effective if taken at prescribed times. Pt stated I can't take meds this often, its too much\".   "

## 2021-03-23 NOTE — ED NOTES
Pt continues to rest in [facility], no changes at this time ,sitter in view for observation

## 2021-03-23 NOTE — DISCHARGE PLANNING
Alert Team:    Patient rested throughout the night without incident; sitter monitoring for patient safety outside of room. Compliant with medication regime. Legal hold extended and awaits transfer to phychiatric facility after patient declined admission by [facility]. Alert Team will continue to monitor.

## 2021-03-23 NOTE — ED NOTES
Report rec'd from Marie DAN.  Patient care assumed at this time.  Shy sitting on patient for 1:1 observation.  Pt sleeping, respirations even/unlabored.
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
## 2021-03-23 NOTE — ED NOTES
"Pt ate 100% of her lunch meal tray. Pt is sitting up right on gurney talking with rambling pressured speech, \"I planted fruit on the moon, two oranges and two bananas.\" Sitter Syh outside room in direct line of sight.   "

## 2021-03-23 NOTE — DISCHARGE PLANNING
Legal Hold    Referral: Legal Hold Court     Intervention: Pt presented for legal hold meeting with  via video conferencing.  advised pt will meet with court MD's via telemedicine monitor to contest the legal hold.      Plan: Pt will present to telemedicine mental health to meet with court physicians 3/24. Will call bedside RN once time has been determined

## 2021-03-23 NOTE — DISCHARGE PLANNING
Alert Team  Pt continues to await inpt psych bed at [facility]; [facility] declined.  LH extended til 4/1 per  court.  She was last seen by psychiatry 3/21, so is due for f/u with them tomorrow, 3/24.  Pt mostly compliant with meds; refused remeron Saturday night and refused second dose of thorazine today, (complaining of too may meds.)  WCTM

## 2021-03-23 NOTE — ED NOTES
Escorted patient for a walk around unit with Rox DAN per patient request. Pt was cooperative while walking, and ambulated with steady gait. Pt now back in green in green 29. Sitter outside room in direct line of sight.

## 2021-03-23 NOTE — ED NOTES
Escorted patient to restroom to maintain line of sight. Pt ambulated with steady gait to restroom and back to green 29. Sitter outside doorway in direct line of sight.

## 2021-03-24 PROCEDURE — 700102 HCHG RX REV CODE 250 W/ 637 OVERRIDE(OP): Performed by: PSYCHIATRY & NEUROLOGY

## 2021-03-24 PROCEDURE — 96372 THER/PROPH/DIAG INJ SC/IM: CPT

## 2021-03-24 PROCEDURE — A9270 NON-COVERED ITEM OR SERVICE: HCPCS | Performed by: EMERGENCY MEDICINE

## 2021-03-24 PROCEDURE — 700102 HCHG RX REV CODE 250 W/ 637 OVERRIDE(OP): Performed by: EMERGENCY MEDICINE

## 2021-03-24 PROCEDURE — A9270 NON-COVERED ITEM OR SERVICE: HCPCS | Performed by: PSYCHIATRY & NEUROLOGY

## 2021-03-24 PROCEDURE — 700111 HCHG RX REV CODE 636 W/ 250 OVERRIDE (IP): Performed by: EMERGENCY MEDICINE

## 2021-03-24 RX ORDER — HALOPERIDOL 5 MG/ML
5 INJECTION INTRAMUSCULAR ONCE
Status: COMPLETED | OUTPATIENT
Start: 2021-03-24 | End: 2021-03-24

## 2021-03-24 RX ORDER — LORAZEPAM 2 MG/ML
2 INJECTION INTRAMUSCULAR ONCE
Status: COMPLETED | OUTPATIENT
Start: 2021-03-24 | End: 2021-03-24

## 2021-03-24 RX ADMIN — LORAZEPAM 2 MG: 2 INJECTION INTRAMUSCULAR; INTRAVENOUS at 04:06

## 2021-03-24 RX ADMIN — DIVALPROEX SODIUM 1000 MG: 500 TABLET, DELAYED RELEASE ORAL at 18:03

## 2021-03-24 RX ADMIN — HALOPERIDOL 10 MG: 5 TABLET ORAL at 17:23

## 2021-03-24 RX ADMIN — HALOPERIDOL LACTATE 5 MG: 5 INJECTION, SOLUTION INTRAMUSCULAR at 04:06

## 2021-03-24 RX ADMIN — CHLORPROMAZINE HYDROCHLORIDE 10 MG: 10 TABLET, SUGAR COATED ORAL at 18:03

## 2021-03-24 RX ADMIN — MIRTAZAPINE 15 MG: 15 TABLET, FILM COATED ORAL at 21:07

## 2021-03-24 NOTE — DISCHARGE PLANNING
Spoke to  Xander regarding patient's meeting with the court doctors today. Patient has been scheduled to meet with court doctors via telemedicine in the [address] room at 1400. Let NI Umanzor know of upcoming meeting this afternoon.

## 2021-03-24 NOTE — DISCHARGE PLANNING
Alert Team:      Patient intermittently disruptive, dysregulated throughout the night. Patient refused Mirtazapine nightly dose and required Haldol 5 mg and Ativan 2 mg IM 0406 for anxiety and escalating behavior. Legal hold extended; sitter 1:1 observation outside of room; last seen by psychiatry 3/22/2021. Patient continues to await transfer to [facility] due to denials from declined by [facility] and [place].

## 2021-03-24 NOTE — ED NOTES
Pt to the BR with steady gait.  Sitter remains in place for 1:1 observation.  Report given to Betsey DAN.

## 2021-03-24 NOTE — ED NOTES
Pt resting comfortably, no needs expressed at this time, sitter monitoring pt with pt in line of sight. Pt respirations even and non labored. Will continue to monitor.

## 2021-03-24 NOTE — DISCHARGE PLANNING
Carmella from [facility] called and declined Pt stating Treatment Team decision.     Pt waiting for acceptance to Behavioral Health facility.

## 2021-03-24 NOTE — DISCHARGE PLANNING
LSW made aware that pt's legal hold has been released by court MD's. Stipulation and Order to release to be obtained prior to allowing pt to leave the hospital. Pt aware.     1537: LSW attempted to call [facility] to update. No answer, LSW left secure VM on admissions line.

## 2021-03-24 NOTE — ED NOTES
Pt charged sitter outside of room, picked up the sitter chair and threw it into the Nurse station. Pt was redirected by this RN back into her room and ERP was brought bedside along with security.

## 2021-03-24 NOTE — ED NOTES
Escorted pt to restroom. Pt ambulated to restroom and back to [place] [address] with steady gait. Patient now sitting up right on gurney laughing, smiling, and talking to self.

## 2021-03-24 NOTE — DISCHARGE PLANNING
Cam from [facility] has notified SW that they are planning on taking Pt they are waiting for a private room to open up due to Pt aggressive behavior.     They are aware she is on the list for transfer to [facility] and will accept as soon as an appropriate room is available.

## 2021-03-24 NOTE — PROGRESS NOTES
ED provider note.    Patient without any medical complaints.  Proceed with plan for psychiatric care.    Arnold Arita MD

## 2021-03-25 VITALS
DIASTOLIC BLOOD PRESSURE: 95 MMHG | HEIGHT: 65 IN | WEIGHT: 184.97 LBS | BODY MASS INDEX: 30.82 KG/M2 | RESPIRATION RATE: 18 BRPM | SYSTOLIC BLOOD PRESSURE: 131 MMHG | OXYGEN SATURATION: 96 % | HEART RATE: 77 BPM | TEMPERATURE: 97.6 F

## 2021-03-25 PROCEDURE — A9270 NON-COVERED ITEM OR SERVICE: HCPCS | Performed by: EMERGENCY MEDICINE

## 2021-03-25 PROCEDURE — 700102 HCHG RX REV CODE 250 W/ 637 OVERRIDE(OP): Performed by: PSYCHIATRY & NEUROLOGY

## 2021-03-25 PROCEDURE — 700102 HCHG RX REV CODE 250 W/ 637 OVERRIDE(OP): Performed by: EMERGENCY MEDICINE

## 2021-03-25 PROCEDURE — A9270 NON-COVERED ITEM OR SERVICE: HCPCS | Performed by: PSYCHIATRY & NEUROLOGY

## 2021-03-25 RX ADMIN — METFORMIN HYDROCHLORIDE 500 MG: 500 TABLET ORAL at 10:10

## 2021-03-25 RX ADMIN — DIVALPROEX SODIUM 500 MG: 500 TABLET, DELAYED RELEASE ORAL at 10:10

## 2021-03-25 RX ADMIN — CHLORPROMAZINE HYDROCHLORIDE 10 MG: 10 TABLET, SUGAR COATED ORAL at 10:10

## 2021-03-25 RX ADMIN — HALOPERIDOL 10 MG: 5 TABLET ORAL at 10:10

## 2021-03-25 NOTE — ED NOTES
Pt resting in gurney, respirations even and unlabored. Pt repositions self, NAD noted. Sitter at bedside.

## 2021-03-25 NOTE — ED NOTES
Pt discharged to [facility]. Pt given belongings. Pt ambulating independently with [facility] employee

## 2021-03-25 NOTE — DISCHARGE PLANNING
ALERT team  note:  35 year old female admitted to [facility] ED 3/19/2021, legal hold, inabilty to care for self; Medicaid FFS insurance plan  Pt with recent inpt MH tx at [facility] 2/24/2021-3/15/2021  current [facility] , Felix, [phone]  pt currently residing at [facility], [phone]    Pt evaluated by the court yesterday for legal hold continuation, per notes, legal hold DC'd by the court  [place]rier RN spoke to felix,  at [facility], and [facility] to notify them pt to be DC'd to self today to return to [facility] with transport by [facility] at 1300

## 2021-03-25 NOTE — DISCHARGE PLANNING
Alert Team  Pt went to court today and, per  DC planning notes, LH was released.  She will be eligible for DC tomorrow once the Stipulation and Order to release is obtained prior to. Pt aware.

## 2021-03-25 NOTE — ED PROVIDER NOTES
ED PROVIDER NOTE    Scribed for Charline Forde M.D. by Shane Graff. 3/25/2021, 10:24 AM.    This is an addendum to the note on Tierney Mayer. For further details and full chart entry, see the previously signed ED Provider Note written by Dr. Key (ERP).      8:30 AM - I discussed the patient's case with Dr. Shukla (ERP) who will transfer care of the patient to me at this time.        10:25 AM -  Social work informed me the patient's legal hold has been discontinued so she will be discharged. She is stable for discharge at this time.    FINAL IMPRESSION   1. Disorganized schizophrenia (HCC)    2. Acute psychosis (HCC)        IShane (Scribe), am scribing for, and in the presence of, Charline Forde M.D..    Electronically signed by: Shane Graff (Scribe), 3/25/2021    ICharline M.D. personally performed the services described in this documentation, as scribed by Shane Graff in my presence, and it is both accurate and complete.    E.    The note accurately reflects work and decisions made by me.  Charline Forde M.D.  3/25/2021  4:04 PM

## 2021-03-25 NOTE — ED NOTES
Pt resting in gurney w/ eyes closed, respirations even and unlabored, chest rise noted. NAD noted, sitter at bedside.

## 2021-03-25 NOTE — ED NOTES
Attempted to administer morning medication and VS; pt refused stated that they were already done, pt re-oriented. Pt continued to refuse, will attempt again. NAD noted, sitter at bedside.

## 2021-03-25 NOTE — ED NOTES
Pt refused AM medications and VS, pt re-oriented and educated. Pt continued to refused and state that she already took them this morning. NAD noted, sitter at bedside.

## 2021-03-25 NOTE — ED NOTES
Pt resting comfortably, sitter monitoring pt with pt in line of sight, no needs expressed, will continue to monitor. Patients breathing even and non labored.

## 2021-03-25 NOTE — ED NOTES
Report from NI Leggett. Assumed care of pt, pt resting in gurney w/ eyes closed, respirations even and unlabored. NAD noted, sitter at bedside.

## 2021-03-25 NOTE — DISCHARGE PLANNING
Received Stipulation and Order from the court releasing pt from legal hold. Sent copy to unit LSW.    Removed pt from legal hold as of 3/25 at 0836.

## 2021-03-25 NOTE — ED NOTES
Pt resting in gurney, respirations even and unlabored, chest rise noted. NAD noted, sitter at bedside.

## 2021-04-12 ENCOUNTER — HOSPITAL ENCOUNTER (EMERGENCY)
Dept: HOSPITAL 8 - ED | Age: 36
LOS: 1 days | Discharge: HOME | End: 2021-04-13
Payer: MEDICAID

## 2021-04-12 VITALS — HEIGHT: 67 IN | WEIGHT: 198.42 LBS | BODY MASS INDEX: 31.14 KG/M2

## 2021-04-12 DIAGNOSIS — F17.200: ICD-10-CM

## 2021-04-12 DIAGNOSIS — F20.9: Primary | ICD-10-CM

## 2021-04-12 DIAGNOSIS — F32.9: ICD-10-CM

## 2021-04-12 LAB
ALBUMIN SERPL-MCNC: 3.6 G/DL (ref 3.4–5)
ALP SERPL-CCNC: 83 U/L (ref 45–117)
ALT SERPL-CCNC: 17 U/L (ref 12–78)
ANION GAP SERPL CALC-SCNC: 5 MMOL/L (ref 5–15)
BASOPHILS # BLD AUTO: 0.2 X10^3/UL (ref 0–0.1)
BASOPHILS NFR BLD AUTO: 2 % (ref 0–1)
BILIRUB SERPL-MCNC: 0.1 MG/DL (ref 0.2–1)
CALCIUM SERPL-MCNC: 8.7 MG/DL (ref 8.5–10.1)
CHLORIDE SERPL-SCNC: 105 MMOL/L (ref 98–107)
CREAT SERPL-MCNC: 0.63 MG/DL (ref 0.55–1.02)
EOSINOPHIL # BLD AUTO: 0.1 X10^3/UL (ref 0–0.4)
EOSINOPHIL NFR BLD AUTO: 2 % (ref 1–7)
ERYTHROCYTE [DISTWIDTH] IN BLOOD BY AUTOMATED COUNT: 14.2 % (ref 9.6–15.2)
LYMPHOCYTES # BLD AUTO: 2.8 X10^3/UL (ref 1–3.4)
LYMPHOCYTES NFR BLD AUTO: 37 % (ref 22–44)
MCH RBC QN AUTO: 29.7 PG (ref 27–34.8)
MCHC RBC AUTO-ENTMCNC: 33.3 G/DL (ref 32.4–35.8)
MD: NO
MONOCYTES # BLD AUTO: 0.7 X10^3/UL (ref 0.2–0.8)
MONOCYTES NFR BLD AUTO: 10 % (ref 2–9)
NEUTROPHILS # BLD AUTO: 3.8 X10^3/UL (ref 1.8–6.8)
NEUTROPHILS NFR BLD AUTO: 50 % (ref 42–75)
PLATELET # BLD AUTO: 329 X10^3/UL (ref 130–400)
PMV BLD AUTO: 7.4 FL (ref 7.4–10.4)
PROT SERPL-MCNC: 7 G/DL (ref 6.4–8.2)
RBC # BLD AUTO: 4.38 X10^6/UL (ref 3.82–5.3)
VANCOMYCIN TROUGH SERPL-MCNC: 2.7 MG/DL (ref 2.8–20)

## 2021-04-12 PROCEDURE — 99285 EMERGENCY DEPT VISIT HI MDM: CPT

## 2021-04-12 PROCEDURE — 84703 CHORIONIC GONADOTROPIN ASSAY: CPT

## 2021-04-12 PROCEDURE — 80053 COMPREHEN METABOLIC PANEL: CPT

## 2021-04-12 PROCEDURE — 85025 COMPLETE CBC W/AUTO DIFF WBC: CPT

## 2021-04-12 PROCEDURE — 80329 ANALGESICS NON-OPIOID 1 OR 2: CPT

## 2021-04-12 PROCEDURE — 80299 QUANTITATIVE ASSAY DRUG: CPT

## 2021-04-12 PROCEDURE — G0480 DRUG TEST DEF 1-7 CLASSES: HCPCS

## 2021-04-12 PROCEDURE — 80307 DRUG TEST PRSMV CHEM ANLYZR: CPT

## 2021-04-12 PROCEDURE — 36415 COLL VENOUS BLD VENIPUNCTURE: CPT

## 2021-04-12 PROCEDURE — 80320 DRUG SCREEN QUANTALCOHOLS: CPT

## 2021-04-12 NOTE — NUR
pt yelling/screaming at this rn/sitter at this time. this rn educated regarding 
her behavior at this time.

## 2021-04-12 NOTE — NUR
REPORT RECIEVED FROM DEMETRA THURMAN. PT RESTING IN [facility], URINE AND LABS COLLECTED. 
NO OTHER NEEDS AT THIS TIME

## 2021-04-12 NOTE — NUR
pt resting in [facility], refused hosp bed at this time. pt laughing randomly in 
room, in line of sight of sitter
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
## 2021-04-12 NOTE — NUR
tp:shelbi from [facility] called and stated pt would have to be self pay out of 
pocket there

## 2021-04-13 VITALS — DIASTOLIC BLOOD PRESSURE: 78 MMHG | SYSTOLIC BLOOD PRESSURE: 116 MMHG

## 2021-04-13 NOTE — NUR
PT RESTING IN BED. SAFETY PRECAUTIONS PUT INTO PLACE. SITTER AT SIDE DOOR 
WATCHING ROOM. AWAITING TRANSFER.

## 2021-04-13 NOTE — NUR
PT MEDICATED PER MAR, DC INFORMATION REVIEWED WITH PT. TRANSPORTED VIA 
[facility] TO [facility].

## 2021-04-13 NOTE — NUR
PT SATES SHE FEELS ANXIOUS AND TAKES 10 MG BUSPAR AT HOME FOR ANXIETY. PT 
MEDICATED PER EMAR, NO OTHER NEEDS AT THIS TIME. IN LINE OF SIGHT OF KIMBERLY
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
## 2021-05-28 ENCOUNTER — HOSPITAL ENCOUNTER (EMERGENCY)
Dept: HOSPITAL 8 - ED | Age: 36
LOS: 1 days | Discharge: HOME | End: 2021-05-29
Payer: MEDICAID

## 2021-05-28 VITALS — HEIGHT: 69 IN | WEIGHT: 187.39 LBS | BODY MASS INDEX: 27.76 KG/M2

## 2021-05-28 DIAGNOSIS — F23: ICD-10-CM

## 2021-05-28 DIAGNOSIS — Z90.721: ICD-10-CM

## 2021-05-28 DIAGNOSIS — R94.31: ICD-10-CM

## 2021-05-28 DIAGNOSIS — Z72.9: ICD-10-CM

## 2021-05-28 DIAGNOSIS — R45.851: ICD-10-CM

## 2021-05-28 DIAGNOSIS — F25.9: Primary | ICD-10-CM

## 2021-05-28 LAB
ALBUMIN SERPL-MCNC: 3.5 G/DL (ref 3.4–5)
ALP SERPL-CCNC: 93 U/L (ref 45–117)
ALT SERPL-CCNC: 17 U/L (ref 12–78)
ANION GAP SERPL CALC-SCNC: 8 MMOL/L (ref 5–15)
BASOPHILS # BLD AUTO: 0.1 X10^3/UL (ref 0–0.1)
BASOPHILS NFR BLD AUTO: 1 % (ref 0–1)
BILIRUB SERPL-MCNC: 0.2 MG/DL (ref 0.2–1)
CALCIUM SERPL-MCNC: 8.2 MG/DL (ref 8.5–10.1)
CHLORIDE SERPL-SCNC: 99 MMOL/L (ref 98–107)
CREAT SERPL-MCNC: 0.73 MG/DL (ref 0.55–1.02)
EOSINOPHIL # BLD AUTO: 0.2 X10^3/UL (ref 0–0.4)
EOSINOPHIL NFR BLD AUTO: 2 % (ref 1–7)
ERYTHROCYTE [DISTWIDTH] IN BLOOD BY AUTOMATED COUNT: 14.9 % (ref 9.6–15.2)
LYMPHOCYTES # BLD AUTO: 2.9 X10^3/UL (ref 1–3.4)
LYMPHOCYTES NFR BLD AUTO: 34 % (ref 22–44)
MCH RBC QN AUTO: 30.5 PG (ref 27–34.8)
MCHC RBC AUTO-ENTMCNC: 34 G/DL (ref 32.4–35.8)
MD: NO
MICROSCOPIC: (no result)
MONOCYTES # BLD AUTO: 0.6 X10^3/UL (ref 0.2–0.8)
MONOCYTES NFR BLD AUTO: 7 % (ref 2–9)
NEUTROPHILS # BLD AUTO: 4.9 X10^3/UL (ref 1.8–6.8)
NEUTROPHILS NFR BLD AUTO: 57 % (ref 42–75)
PLATELET # BLD AUTO: 332 X10^3/UL (ref 130–400)
PMV BLD AUTO: 7.4 FL (ref 7.4–10.4)
PROT SERPL-MCNC: 6.9 G/DL (ref 6.4–8.2)
RBC # BLD AUTO: 4.42 X10^6/UL (ref 3.82–5.3)
VANCOMYCIN TROUGH SERPL-MCNC: 3.4 MG/DL (ref 2.8–20)

## 2021-05-28 PROCEDURE — 80320 DRUG SCREEN QUANTALCOHOLS: CPT

## 2021-05-28 PROCEDURE — 80307 DRUG TEST PRSMV CHEM ANLYZR: CPT

## 2021-05-28 PROCEDURE — 80164 ASSAY DIPROPYLACETIC ACD TOT: CPT

## 2021-05-28 PROCEDURE — 36415 COLL VENOUS BLD VENIPUNCTURE: CPT

## 2021-05-28 PROCEDURE — 87086 URINE CULTURE/COLONY COUNT: CPT

## 2021-05-28 PROCEDURE — 99285 EMERGENCY DEPT VISIT HI MDM: CPT

## 2021-05-28 PROCEDURE — 93005 ELECTROCARDIOGRAM TRACING: CPT

## 2021-05-28 PROCEDURE — 96372 THER/PROPH/DIAG INJ SC/IM: CPT

## 2021-05-28 PROCEDURE — 80329 ANALGESICS NON-OPIOID 1 OR 2: CPT

## 2021-05-28 PROCEDURE — 85025 COMPLETE CBC W/AUTO DIFF WBC: CPT

## 2021-05-28 PROCEDURE — G0480 DRUG TEST DEF 1-7 CLASSES: HCPCS

## 2021-05-28 PROCEDURE — 84703 CHORIONIC GONADOTROPIN ASSAY: CPT

## 2021-05-28 PROCEDURE — 81001 URINALYSIS AUTO W/SCOPE: CPT

## 2021-05-28 PROCEDURE — 80299 QUANTITATIVE ASSAY DRUG: CPT

## 2021-05-28 PROCEDURE — 80053 COMPREHEN METABOLIC PANEL: CPT

## 2021-05-28 RX ADMIN — HALOPERIDOL SCH MG: 5 TABLET ORAL at 21:00

## 2021-05-28 NOTE — NUR
PT PROVIDED A SANDWICH AND CHIPS WITH MILK, PT REQUEST. PT RESTING IN BED, PT 
RROM SI SECURE, SITTER AT PT DOOR. PT DENIED ANY CURRENT WANTS OR NEEDS. PT 
UNLABORED BREATHING WITH EQUAL BREATHS.

## 2021-05-28 NOTE — NUR
ZYPREXA GIVEN PER ORDER.  PT REFUSING DEPAKOTE, STATES "I JUST TOOK TWO OF 
THOSE THIS MORNING".  PT PROVIDED CRACKERS AND JUICE.  ED DIET TRAY ORDERED.

## 2021-05-28 NOTE — NUR
PSYCHIATRIC PACKET MADE AND FAXED TO [facility], [facility], [place], [facility]

## 2021-05-28 NOTE — NUR
PT RESTING IN BED, PT RROM SI SECURE, SITTER AT PT DOOR. PT DENIED ANY CURRENT 
WANTS OR NEEDS. PT UNLABORED BREATHING WITH EQUAL BREATHS.
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
## 2021-05-28 NOTE — NUR
ROOM SECURED, PT PLACED IN GOWN, BELONGINGS TO LOCKER/BELONGING LIST COMPLETED. 
 URINE COLLECTED, WALKED TO LAB.  URINE BLOODY, PT STATES ON MENSES.  ERP 
NOTIFIED.  SITTER AT DOORWAY FOR CLOSE OBS.

## 2021-05-28 NOTE — NUR
PT REFUSED DEPAKOTE AND HALDOL MEDICATION. PT SAID SHE TOOK DEPALOTE PRIOR TO 
COMING IN TO ER AND THT SHE IS NOT DUE FOR MORE TILL MORNING. PT REFUSED  
HALDOL AND SAID SHE WOOULD TAKE ZYPREXA INSTEAD

## 2021-05-29 VITALS — DIASTOLIC BLOOD PRESSURE: 86 MMHG | SYSTOLIC BLOOD PRESSURE: 126 MMHG

## 2021-05-29 RX ADMIN — HALOPERIDOL SCH MG: 5 TABLET ORAL at 08:20

## 2021-05-29 NOTE — NUR
Pt walked around unit with this RN. Requesting medications to help her "calm 
down." Pt medicated per MAR with available PRN.

## 2021-05-29 NOTE — NUR
PT AWAKE AT THIS TIME, UP TO BATHROOM, AND THE SITTER STATES THAT THE PT IS 
BECOMING MORE ANXIOUS IN HER ROOM, AND RAMBLING WORDS.  RN TALKED WITH PT, AND 
PRN MEDS PROVIDED AND PT WAS COOPERATIVE IN TAKING HER MEDS.  PT REQUESTED A 
COFFEE AND WILL PROVIDE ONE.  PT BACK IN ROOM, AND RESTING. SITTER REMAINS AT 
DOOR WITH EYES ON PT, AND PT IN HOSPITAL BED.

## 2021-05-29 NOTE — NUR
Report from Gilberto, RN. Pt requested femine hygeine products, given. Pt yelling at 
this RN that she needs blankets. Pt educated that she did not allow for enough 
time for this RN to do a linen change, pt educated not to yell, given another 
blanket and pt cooperative with VS.

## 2021-05-29 NOTE — NUR
PT WIDE AWAKE AT THIS TIME, AND SITTING UP IN BED. PT HAS BEEN MORE ANXIOUS 
THIS MORNING WITH THE SITTER.  SITTER STATES THAT PT DOESN'T LIKE HER AND HAS 
OUTBURSTS.  AT THIS TIME THE SITTERS HAVE CHANGED AND A MALE SITTER IS AT DOOR 
WAY WITH EYES ON PT.  PT CALM AND COOPERATIVE NOW, AND NO DISTRESS.

## 2021-05-29 NOTE — NUR
RECEIVED REPORT FROM RN. PT SLEEPING AT THIS TIME, NO ACUTE DISTRESS, AND 
SITTER AT DOOR WAY WITH EYES ON PT.

## 2021-05-29 NOTE — NUR
PTS MEDICATION ORDERED FROM PHARMACY FOR PRN AGITATION.  MD AWARE AND AGREES.  
PT IN BED AND CALM WHILE SHE WAITS FOR HER MEDICINE.
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
## 2021-06-16 ENCOUNTER — HOSPITAL ENCOUNTER (EMERGENCY)
Dept: HOSPITAL 8 - ED | Age: 36
LOS: 1 days | Discharge: LEFT BEFORE DISCHARGE | End: 2021-06-17
Payer: MEDICAID

## 2021-06-16 VITALS — DIASTOLIC BLOOD PRESSURE: 91 MMHG | SYSTOLIC BLOOD PRESSURE: 159 MMHG

## 2021-06-16 VITALS — WEIGHT: 198.42 LBS | BODY MASS INDEX: 30.07 KG/M2 | HEIGHT: 68 IN

## 2021-06-16 DIAGNOSIS — F28: ICD-10-CM

## 2021-06-16 DIAGNOSIS — F25.8: Primary | ICD-10-CM

## 2021-06-16 PROCEDURE — 99284 EMERGENCY DEPT VISIT MOD MDM: CPT

## 2021-06-17 ENCOUNTER — HOSPITAL ENCOUNTER (EMERGENCY)
Facility: MEDICAL CENTER | Age: 36
End: 2021-06-17
Attending: EMERGENCY MEDICINE
Payer: MEDICAID

## 2021-06-17 VITALS
BODY MASS INDEX: 34.15 KG/M2 | WEIGHT: 200 LBS | SYSTOLIC BLOOD PRESSURE: 125 MMHG | TEMPERATURE: 97.9 F | OXYGEN SATURATION: 95 % | HEART RATE: 85 BPM | RESPIRATION RATE: 16 BRPM | DIASTOLIC BLOOD PRESSURE: 74 MMHG | HEIGHT: 64 IN

## 2021-06-17 DIAGNOSIS — F20.1 DISORGANIZED SCHIZOPHRENIA (HCC): ICD-10-CM

## 2021-06-17 LAB
AMPHET UR QL SCN: POSITIVE
BARBITURATES UR QL SCN: NEGATIVE
BENZODIAZ UR QL SCN: NEGATIVE
BZE UR QL SCN: NEGATIVE
CANNABINOIDS UR QL SCN: NEGATIVE
METHADONE UR QL SCN: NEGATIVE
OPIATES UR QL SCN: NEGATIVE
OXYCODONE UR QL SCN: NEGATIVE
PCP UR QL SCN: NEGATIVE
POC BREATHALIZER: 0 PERCENT (ref 0–0.01)
PROPOXYPH UR QL SCN: NEGATIVE

## 2021-06-17 PROCEDURE — 99285 EMERGENCY DEPT VISIT HI MDM: CPT

## 2021-06-17 PROCEDURE — A9270 NON-COVERED ITEM OR SERVICE: HCPCS | Performed by: EMERGENCY MEDICINE

## 2021-06-17 PROCEDURE — 90791 PSYCH DIAGNOSTIC EVALUATION: CPT

## 2021-06-17 PROCEDURE — 302970 POC BREATHALIZER: Performed by: EMERGENCY MEDICINE

## 2021-06-17 PROCEDURE — 80307 DRUG TEST PRSMV CHEM ANLYZR: CPT

## 2021-06-17 PROCEDURE — 700111 HCHG RX REV CODE 636 W/ 250 OVERRIDE (IP): Performed by: EMERGENCY MEDICINE

## 2021-06-17 PROCEDURE — 700102 HCHG RX REV CODE 250 W/ 637 OVERRIDE(OP): Performed by: EMERGENCY MEDICINE

## 2021-06-17 RX ORDER — HALOPERIDOL 5 MG/1
10 TABLET ORAL ONCE
Status: COMPLETED | OUTPATIENT
Start: 2021-06-17 | End: 2021-06-17

## 2021-06-17 RX ORDER — DIVALPROEX SODIUM 500 MG/1
500 TABLET, DELAYED RELEASE ORAL ONCE
Status: COMPLETED | OUTPATIENT
Start: 2021-06-17 | End: 2021-06-17

## 2021-06-17 RX ORDER — LORAZEPAM 1 MG/1
1 TABLET ORAL ONCE
Status: COMPLETED | OUTPATIENT
Start: 2021-06-17 | End: 2021-06-17

## 2021-06-17 RX ORDER — HYDROXYZINE HYDROCHLORIDE 25 MG/ML
50 INJECTION, SOLUTION INTRAMUSCULAR ONCE
Status: DISCONTINUED | OUTPATIENT
Start: 2021-06-17 | End: 2021-06-17

## 2021-06-17 RX ORDER — OXCARBAZEPINE 300 MG/1
600 TABLET, FILM COATED ORAL ONCE
Status: COMPLETED | OUTPATIENT
Start: 2021-06-17 | End: 2021-06-17

## 2021-06-17 RX ORDER — CHLORPROMAZINE HYDROCHLORIDE 50 MG/1
100 TABLET, FILM COATED ORAL ONCE
Status: COMPLETED | OUTPATIENT
Start: 2021-06-17 | End: 2021-06-17

## 2021-06-17 RX ORDER — HYDROXYZINE HYDROCHLORIDE 25 MG/1
50 TABLET, FILM COATED ORAL ONCE
Status: COMPLETED | OUTPATIENT
Start: 2021-06-17 | End: 2021-06-17

## 2021-06-17 RX ADMIN — LORAZEPAM 1 MG: 1 TABLET ORAL at 02:51

## 2021-06-17 RX ADMIN — OXCARBAZEPINE 600 MG: 300 TABLET, FILM COATED ORAL at 11:54

## 2021-06-17 RX ADMIN — HALOPERIDOL 10 MG: 5 TABLET ORAL at 10:13

## 2021-06-17 RX ADMIN — CHLORPROMAZINE HYDROCHLORIDE 100 MG: 50 TABLET, SUGAR COATED ORAL at 11:54

## 2021-06-17 RX ADMIN — HYDROXYZINE HYDROCHLORIDE 50 MG: 25 TABLET, FILM COATED ORAL at 11:54

## 2021-06-17 RX ADMIN — DIVALPROEX SODIUM 500 MG: 500 TABLET, DELAYED RELEASE ORAL at 10:13

## 2021-06-17 ASSESSMENT — FIBROSIS 4 INDEX: FIB4 SCORE: 0.43

## 2021-06-17 NOTE — NUR
CHARGE RN: [facility] CALLED STATING THEY FOUND PT.  

ADDRESS GIVEN TO [facility] AND PT. TO BE TAKEN BACK TO [facility].
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
## 2021-06-17 NOTE — ED NOTES
Patient to [place] [address].   Report received from NI Etienne.  Patient awaiting transportation.

## 2021-06-17 NOTE — ED NOTES
Received report from shift RN. Assumed care of patient. Assessed patient and updated whiteboard. Plan of care discussed.
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
## 2021-06-17 NOTE — ED NOTES
Discharge instructions and follow-up care reviewed with patient. All questions answered and patient verbalized understanding. Vss. Patient stable and ambulatory upon d/c from ED with transportation by MTM back to group home.

## 2021-06-17 NOTE — DISCHARGE INSTRUCTIONS
Patient is medically stable for discharge with MTM back to her group home.    Follow-up with primary care, behavioral health this week for continued management.    Continue home medications as previously indicated.    Return to the emergency department for worsening psychosis, suicidal homicidal ideation, hallucination or other new concerns.

## 2021-06-17 NOTE — ED PROVIDER NOTES
ED Provider Note    0900 -patient has been seen and evaluated by alert team, Taty is quite familiar with her.  She is confirmed patient presentation with group home.  Recommends home medications now (Haldol 10 mg, Trileptal 600 mg, Depakote 500 mg, Thorazine 100 mg, Vistaril 50 mg) then reevaluate.  Anticipate discharge with MTM back to group home.  They are agreeable to this.  She is chronically psychotic, she is not a danger to herself or others at this time.  She is able to contract for safety and agreeable to disposition back to her group home.

## 2021-06-17 NOTE — ED TRIAGE NOTES
Per ems, pt picked up on [facility] after being called from a bystander, pt was wondering having hallucinations both auditory and visual, pt thinks she is 16, pt had a bracelet from [facility] where she was discharge to go home, glucose was 87

## 2021-06-24 ENCOUNTER — HOSPITAL ENCOUNTER (EMERGENCY)
Facility: MEDICAL CENTER | Age: 36
End: 2021-06-24
Attending: EMERGENCY MEDICINE
Payer: MEDICAID

## 2021-06-24 VITALS
DIASTOLIC BLOOD PRESSURE: 85 MMHG | HEART RATE: 97 BPM | WEIGHT: 200 LBS | SYSTOLIC BLOOD PRESSURE: 127 MMHG | OXYGEN SATURATION: 100 % | BODY MASS INDEX: 34.33 KG/M2 | RESPIRATION RATE: 18 BRPM | TEMPERATURE: 97.8 F

## 2021-06-24 DIAGNOSIS — F20.1 DISORGANIZED SCHIZOPHRENIA (HCC): ICD-10-CM

## 2021-06-24 PROCEDURE — 99284 EMERGENCY DEPT VISIT MOD MDM: CPT

## 2021-06-24 ASSESSMENT — FIBROSIS 4 INDEX: FIB4 SCORE: 0.43

## 2021-06-25 NOTE — ED NOTES
Group home was called to update that patient will be coming home at 8:00pm. A cab will be here to drive her to [facility]. ALBERTS

## 2021-06-25 NOTE — ED NOTES
Patient discharged to group G home. Report called and updated that she will be arriving shortly after 8 pm. VSS A+Ox4.

## 2021-06-25 NOTE — ED PROVIDER NOTES
"ED Provider Note    CHIEF COMPLAINT  Chief Complaint   Patient presents with   • Psych Eval       HPI  Tierney Mayer is a 36 y.o. female who presents by ambulance for psychiatric evaluation.  She lives at a psychiatric group home, history of schizophrenia.  Initially she told nurses that she wants to get her blood checked, for me she tells me that her amputated leg grew back.  She also tells me \"clonazepam is morphine.\"  She clearly is disorganized and unable to provide any sort of meaningful history.    REVIEW OF SYSTEMS  Unobtainable secondary to her psychosis    PAST MEDICAL HISTORY  Past Medical History:   Diagnosis Date   • Hypertension    • Psychiatric disorder     Schizophrenia   • Schizophrenia (HCC)        FAMILY HISTORY  History reviewed. No pertinent family history.    SOCIAL HISTORY  Social History     Tobacco Use   • Smoking status: Current Every Day Smoker     Packs/day: 1.00     Types: Cigarettes   • Smokeless tobacco: Never Used   • Tobacco comment: knows she is suppose to quit but hasn't   Vaping Use   • Vaping Use: Never used   Substance Use Topics   • Alcohol use: Yes   • Drug use: No       SURGICAL HISTORY  Past Surgical History:   Procedure Laterality Date   • PELVISCOPY  2/22/2011    Performed by BABITA SHEETS at SURGERY [facility] DAY [facility] ORS   • INCISION AND DRAINAGE GENERAL  2/22/2011    Performed by BABITA SHEETS at SURGERY SAME DAY [facility] ORS       CURRENT MEDICATIONS  I personally reviewed the medication list in the charting documentation.     ALLERGIES  Allergies   Allergen Reactions   • Clonazepam      Pt reports getting a stroke.  Per historical: Twitches and paranoia.   • Invega Sustenna [Paliperidone Palmitate]    • Olanzapine      Pt reports worsening and threatening voices.   • Paliperidone      Twitches and paranoia .       MEDICAL RECORD  I have reviewed patient's medical record and pertinent results are listed above.      PHYSICAL EXAM  VITAL SIGNS: /77   " Pulse (!) 102   Temp 36.6 °C (97.8 °F) (Temporal)   Resp 19   Wt 90.7 kg (200 lb)   SpO2 99%   BMI 34.33 kg/m²    Constitutional: Well appearing patient in no acute distress.  Awake and alert, not toxic nor ill in appearance.  HENT: Normocephalic, no obvious evidence of acute trauma.  Eyes: No scleral icterus. Normal conjunctiva   Neck: Comfortable movement without any obvious restriction in the range of motion.  Cardiovascular: Upon ascultation I appreciate a regular heart rhythm and a minimally tachycardic rate.   Thorax & Lungs: Normal nonlabored respirations.  Upon application of the stethoscope for auscultation I find there to be no associated chest wall tenderness.  I appreciate no wheezing, rhonchi or rales. There is normal air movement.    Abdomen: The abdomen is not visibly distended. Upon palpation, I find it to be without tenderness.  No mass appreciated.  Skin: The exposed portions of skin reveal no obvious rash or other abnormalities.  Extremities/Musculoskeletal: No obvious sign of acute trauma. No asymmetric calf tenderness or edema.   Psychiatric: Disorganized, tangential speech, pressured speech, at times nonsensical speech    COURSE & MEDICAL DECISION MAKING  I have reviewed any medical record information, laboratory studies and radiographic results as noted above.    Encounter Summary: This is a 36 y.o. 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  She actually resides in a psychiatric group home.  The only discernible complaint she offers to me that her amputated leg is grown back.  At this point, the patient does not require further evaluation or acute psychiatric evaluation.  She lives in the appropriate psychiatric group home facility.  She is being discharged back to the facility.      DISPOSITION:  Discharge      FINAL IMPRESSION  1. Disorganized schizophrenia (HCC)           This dictation was created using voice recognition software. The accuracy of the dictation is limited to the abilities of the software. I expect there may be some errors of grammar and possibly content. The nursing notes were reviewed and certain aspects of this information were incorporated into this note.    Electronically signed by: Enoc Key M.D., 6/24/2021 6:25 PM

## 2021-06-25 NOTE — ED TRIAGE NOTES
"Pt BIB [facility] with c/c of hallucinations/psych eval. Pt currently living at a [facility] group home where they called today because pt \"wants to get her blood checked\" pt reporting \"I keep bleeding,\" no bleeding noted pt rambling in triage. Denies SI.   "

## 2021-06-25 NOTE — DISCHARGE PLANNING
SW [facility]  [facility] to [facility] and requested transportation with Amanda.  Per Amanda Olivarez Cab should be here about 8 pm. ER RN updated via voalte message.
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
## 2021-09-09 ENCOUNTER — HOSPITAL ENCOUNTER (EMERGENCY)
Dept: HOSPITAL 8 - ED | Age: 36
LOS: 1 days | Discharge: PSYCHIATRIC OR SUBSTANCE USE FACILITY | End: 2021-09-10
Payer: MEDICAID

## 2021-09-09 VITALS — HEIGHT: 67 IN | WEIGHT: 180.38 LBS | BODY MASS INDEX: 28.31 KG/M2

## 2021-09-09 DIAGNOSIS — F31.2: Primary | ICD-10-CM

## 2021-09-09 DIAGNOSIS — Z20.822: ICD-10-CM

## 2021-09-09 DIAGNOSIS — F23: ICD-10-CM

## 2021-09-09 LAB
ALBUMIN SERPL-MCNC: 3.4 G/DL (ref 3.4–5)
ANION GAP SERPL CALC-SCNC: 6 MMOL/L (ref 5–15)
BASOPHILS # BLD AUTO: 0.1 X10^3/UL (ref 0–0.1)
BASOPHILS NFR BLD AUTO: 1 % (ref 0–1)
CALCIUM SERPL-MCNC: 8.9 MG/DL (ref 8.5–10.1)
CHLORIDE SERPL-SCNC: 108 MMOL/L (ref 98–107)
CREAT SERPL-MCNC: 0.82 MG/DL (ref 0.55–1.02)
EOSINOPHIL # BLD AUTO: 0.1 X10^3/UL (ref 0–0.4)
EOSINOPHIL NFR BLD AUTO: 1 % (ref 1–7)
ERYTHROCYTE [DISTWIDTH] IN BLOOD BY AUTOMATED COUNT: 12.5 % (ref 9.6–15.2)
HCG UR SG: 1.02 (ref 1–1.03)
LYMPHOCYTES # BLD AUTO: 3.6 X10^3/UL (ref 1–3.4)
LYMPHOCYTES NFR BLD AUTO: 31 % (ref 22–44)
MCH RBC QN AUTO: 29.8 PG (ref 27–34.8)
MCHC RBC AUTO-ENTMCNC: 33.7 G/DL (ref 32.4–35.8)
MONOCYTES # BLD AUTO: 0.6 X10^3/UL (ref 0.2–0.8)
MONOCYTES NFR BLD AUTO: 5 % (ref 2–9)
NEUTROPHILS # BLD AUTO: 7.2 X10^3/UL (ref 1.8–6.8)
NEUTROPHILS NFR BLD AUTO: 62 % (ref 42–75)
PLATELET # BLD AUTO: 382 X10^3/UL (ref 130–400)
PMV BLD AUTO: 8.4 FL (ref 7.4–10.4)
RBC # BLD AUTO: 4.54 X10^6/UL (ref 3.82–5.3)
VANCOMYCIN TROUGH SERPL-MCNC: < 1.7 MG/DL (ref 2.8–20)

## 2021-09-09 PROCEDURE — 87635 SARS-COV-2 COVID-19 AMP PRB: CPT

## 2021-09-09 PROCEDURE — 36415 COLL VENOUS BLD VENIPUNCTURE: CPT

## 2021-09-09 PROCEDURE — 99285 EMERGENCY DEPT VISIT HI MDM: CPT

## 2021-09-09 PROCEDURE — 80329 ANALGESICS NON-OPIOID 1 OR 2: CPT

## 2021-09-09 PROCEDURE — 80299 QUANTITATIVE ASSAY DRUG: CPT

## 2021-09-09 PROCEDURE — 96372 THER/PROPH/DIAG INJ SC/IM: CPT

## 2021-09-09 PROCEDURE — 82040 ASSAY OF SERUM ALBUMIN: CPT

## 2021-09-09 PROCEDURE — 80320 DRUG SCREEN QUANTALCOHOLS: CPT

## 2021-09-09 PROCEDURE — 81025 URINE PREGNANCY TEST: CPT

## 2021-09-09 PROCEDURE — 80048 BASIC METABOLIC PNL TOTAL CA: CPT

## 2021-09-09 PROCEDURE — G0480 DRUG TEST DEF 1-7 CLASSES: HCPCS

## 2021-09-09 PROCEDURE — 85025 COMPLETE CBC W/AUTO DIFF WBC: CPT

## 2021-09-09 PROCEDURE — 80307 DRUG TEST PRSMV CHEM ANLYZR: CPT
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
## 2021-09-09 NOTE — NUR
PT LEFT HER ROOM AND WOULD NOT COME BACK. PT REPROTS "I''M FUCKING LEAVING." PT 
NOT MAKING SENSE WITH HER WORDS. SECURITY CALLED AND PT PLACED IN 4 POINT 
RESTRAINTS. MD NOTIFIED AND MEDICATION ORDERED AT THIS TIME. WILL CONTINUE TO 
MONITOR. SITTER AT BEDSIDE.

## 2021-09-09 NOTE — NUR
CARE ASSUMED AT THIS TIME. FIRST CONTACT WITH PT. PT REQUESTING JUICE AND ONCE 
GIVEN LOOKED AT ME AND SAID " ARE YOU DEAD"? PT NOW RESTING IN THE BED. GARAGE 
DOORS LOCKED. SITTER AT BEDSIDE. PSYCH PERCAUTIONS MAINTAINED.

## 2021-09-10 VITALS — DIASTOLIC BLOOD PRESSURE: 69 MMHG | SYSTOLIC BLOOD PRESSURE: 133 MMHG

## 2021-09-10 NOTE — NUR
THROUGHTPUT: PT MEDICALLY CLEARED ON LEGAL HOLD, [facility] 
ACCEPTED, CALLED [facility] [place] AUTH#[id number].  WITH KALEBSA WILL BE AT [phone]. PACKET 
MADE

## 2021-09-10 NOTE — NUR
PT SLEEPING ON STRETCHER. NADN. EVEN CHEST RISE AND FALL. PSYCH PERCAUTIONS 
MAINTAINED. SITTER AT BEDSIDE.
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
## 2021-09-10 NOTE — NUR
[facility] ACCEPTED PT. PT RESTING IN BED, SAFETY PRECAUTIONS IN PLACE. Attending Attestation (For Attendings USE Only)...

## 2021-09-10 NOTE — NUR
PT RESTRAINTS D/C AT THIS TIME. PT LAYING QUIETLY IN BED TRYING TO FALL ASLEEP. 
WILL CONTINUE TO MONITOR. PSYCH PRECAUTIONS MAINTAINED. SITTER AT BEDSIDE

## 2021-10-29 ENCOUNTER — HOSPITAL ENCOUNTER (EMERGENCY)
Facility: MEDICAL CENTER | Age: 36
End: 2021-10-29
Attending: EMERGENCY MEDICINE
Payer: MEDICAID

## 2021-10-29 VITALS
WEIGHT: 183 LBS | DIASTOLIC BLOOD PRESSURE: 90 MMHG | HEART RATE: 95 BPM | BODY MASS INDEX: 33.68 KG/M2 | SYSTOLIC BLOOD PRESSURE: 148 MMHG | TEMPERATURE: 98.6 F | HEIGHT: 62 IN | RESPIRATION RATE: 18 BRPM | OXYGEN SATURATION: 98 %

## 2021-10-29 DIAGNOSIS — F29 PSYCHOSIS, UNSPECIFIED PSYCHOSIS TYPE (HCC): ICD-10-CM

## 2021-10-29 DIAGNOSIS — F15.10 METHAMPHETAMINE ABUSE (HCC): ICD-10-CM

## 2021-10-29 LAB
AMPHET UR QL SCN: POSITIVE
BARBITURATES UR QL SCN: NEGATIVE
BENZODIAZ UR QL SCN: NEGATIVE
BZE UR QL SCN: NEGATIVE
CANNABINOIDS UR QL SCN: POSITIVE
HCG UR QL: NEGATIVE
METHADONE UR QL SCN: NEGATIVE
OPIATES UR QL SCN: NEGATIVE
OXYCODONE UR QL SCN: NEGATIVE
PCP UR QL SCN: NEGATIVE
POC BREATHALIZER: 0 PERCENT (ref 0–0.01)
PROPOXYPH UR QL SCN: NEGATIVE

## 2021-10-29 PROCEDURE — 81025 URINE PREGNANCY TEST: CPT

## 2021-10-29 PROCEDURE — A9270 NON-COVERED ITEM OR SERVICE: HCPCS | Performed by: EMERGENCY MEDICINE

## 2021-10-29 PROCEDURE — 80307 DRUG TEST PRSMV CHEM ANLYZR: CPT

## 2021-10-29 PROCEDURE — 99284 EMERGENCY DEPT VISIT MOD MDM: CPT

## 2021-10-29 PROCEDURE — 700102 HCHG RX REV CODE 250 W/ 637 OVERRIDE(OP): Performed by: EMERGENCY MEDICINE

## 2021-10-29 PROCEDURE — 302970 POC BREATHALIZER: Performed by: EMERGENCY MEDICINE

## 2021-10-29 RX ORDER — LEVOTHYROXINE SODIUM 0.1 MG/1
100 TABLET ORAL EVERY MORNING
Status: SHIPPED | COMMUNITY
End: 2023-05-11

## 2021-10-29 RX ORDER — HALOPERIDOL DECANOATE 100 MG/ML
100 INJECTION INTRAMUSCULAR
Status: SHIPPED | COMMUNITY
End: 2023-05-11

## 2021-10-29 RX ORDER — CHLORPROMAZINE HYDROCHLORIDE 50 MG/1
200 TABLET, FILM COATED ORAL ONCE
Status: COMPLETED | OUTPATIENT
Start: 2021-10-29 | End: 2021-10-29

## 2021-10-29 RX ADMIN — CHLORPROMAZINE HYDROCHLORIDE 200 MG: 50 TABLET, SUGAR COATED ORAL at 10:50

## 2021-10-29 NOTE — DISCHARGE INSTRUCTIONS
Stop using methamphetamines.  Take your regularly prescribed medications.  Follow-up at [facility].

## 2021-10-29 NOTE — ED TRIAGE NOTES
"Tierney Alex Mayer  36 y.o. female  Chief Complaint   Patient presents with   • Psych Eval     Pt BIBA after they were found to be telling people at a gas station that they wanted to \"kill all babies.\" Pt admits to meth use today. Was at [facility] yesterday and discharged this morning. Pt is A/Ox2 (self/month)       Vitals:    10/29/21 0828   BP: 152/97   Pulse: (!) 110   Resp: 18   Temp: 37 °C (98.6 °F)   SpO2: 97%        Patient BIBA, [facility], from gas station for the above complaint. Per NADEEM, pt was telling people inside that they \"wanted to kill all babies.\" Pt admits to using meth today PTA, and recently was seen at [facility] and discharged this morning. Pt on arrival to ED is A/Ox2 to self and month - pt is expressing meth like behavior and thoughts are all over the place. At times, unable to understand patient and what they are saying. Pt denies any SI/HI on arrival to this RN and for screening questions. Per RAHELSA, pt has not been compliant with medications. Pt does have history of  Schizophrenia. Patient is up for ERP evaluation at this time. No legal hold initiated on arrival.        Of note, this RN and patient are in proper PPE and has a mask in place at all times during this encounter.     Past Medical History:   Diagnosis Date   • Hypertension    • Psychiatric disorder     Schizophrenia   • Schizophrenia (HCC)         "

## 2021-10-29 NOTE — ED NOTES
Patient has been updated of results and has been discharged home in stable condition by ERP.    Discharge paperwork has been provided to patient and gone over with patient by this nurse. Patient was given the opportunity to voice any questions or concerns and has verbalized understanding of discharge instructions with no questions or concerns.    Patient is A/Ox4 and vital signs are stable on discharge.    Discharge instructions/paperwork as well as all personal belongings are in possession of patient on discharge, no belongings were left behind in room.     Patient is ambulatory out to ride with transport to [facility] where pt has been accepted. All belongings in possession of patient on discharge.
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
## 2021-10-29 NOTE — ED PROVIDER NOTES
"  ED Provider Note    Scribed for Sam Rodríguez M.D. by Luther Levin. 10/29/2021  8:41 AM    Primary care provider: Pcp Pt States None  Means of arrival: EMS  History obtained from: Patient  History limited by: Altered mental status    CHIEF COMPLAINT  Chief Complaint   Patient presents with   • Psych Eval     Pt BIBA after they were found to be telling people at a gas station that they wanted to \"kill all babies.\" Pt admits to meth use today. Was at [facility] yesterday and discharged this morning. Pt is A/Ox2 (self/month)       HPI  Tierney Mayer is a 36 y.o. female who presents to the Emergency Department for a psych evaluation. Triage states that the patient was brought in by police after telling people at the gas station that she wanted to kill babies. Per the patient, she was having 'long girl thoughts' and used meth today. She denies homicidal ideation, suicidal ideation, fever, cough, headache, vomiting, diarrhea, history of schizophrenia, and history of bipolar disorder, and states she is currently pregnant.  She has been on Abilify, Invega, and thorazine and notes that she last saw a psychiatrist 18 years ago. No other exacerbating or alleviating factors were noted. She has had COVID-19 in the past.     HPI limited by: Altered mental status    REVIEW OF SYSTEMS  Pertinent positives include: psych evaluation.  Pertinent negatives include: homicidal ideation, suicidal ideation, fever, cough, headache, vomiting, or diarrhea.  ROS limited by: Altered mental status    PAST MEDICAL HISTORY  Past Medical History:   Diagnosis Date   • Hypertension    • Psychiatric disorder     Schizophrenia   • Schizophrenia (HCC)        SOCIAL HISTORY  Social History     Tobacco Use   • Smoking status: Current Every Day Smoker     Packs/day: 1.00     Types: Cigarettes   • Smokeless tobacco: Never Used   • Tobacco comment: knows she is suppose to quit but hasn't   Vaping Use   • Vaping Use: Never used   Substance Use Topics " "  • Alcohol use: Yes   • Drug use: Yes     Types: Inhaled, Oral     Comment: Marijuana, meth     Social History     Substance and Sexual Activity   Drug Use Yes   • Types: Inhaled, Oral    Comment: Marijuana, meth       SURGICAL HISTORY  Past Surgical History:   Procedure Laterality Date   • PELVISCOPY  2/22/2011    Performed by BABITA SHEETS at SURGERY SAME DAY [facility] ORS   • INCISION AND DRAINAGE GENERAL  2/22/2011    Performed by BABITA SHEETS at SURGERY SAME DAY [facility] ORS       CURRENT MEDICATIONS  Home Medications     Reviewed by Jenn Salcedo (Pharmacy Tech) on 10/29/21 at 0918  Med List Status: Complete   Medication Last Dose Status   chlorproMAZINE (THORAZINE) 200 MG tablet 10/28/2021 Active   haloperidol decanoate (HALDOL DECANOATE) 100 MG/ML injection 10/19/2021 Active   levothyroxine (SYNTHROID) 100 MCG Tab 10/28/2021 Active                ALLERGIES  Allergies   Allergen Reactions   • Clonazepam Unspecified     Pt reports getting a stroke.  Per historical: Twitches and paranoia.   • Paliperidone Unspecified     Twitches and paranoia .   • Invega Sustenna [Paliperidone Palmitate]    • Olanzapine      Pt reports worsening and threatening voices.       PHYSICAL EXAM  VITAL SIGNS: /97   Pulse (!) 110   Temp 37 °C (98.6 °F) (Tympanic)   Resp 18   Ht 1.575 m (5' 2\")   Wt 83 kg (183 lb)   SpO2 97%   BMI 33.47 kg/m²   Reviewed and tachycardic, afebrile  Constitutional: Well developed, Well nourished.  HENT: Normocephalic, atraumatic, bilateral external ears normal, wearing a mask.   Eyes: PERRLA, conjunctiva pink, no scleral icterus.   Cardiovascular: Regular rate and rhythm. No murmurs, rubs or gallops.  No dependent edema or calf tenderness  Respiratory: Lungs clear to auscultation bilaterally. No wheezes, rales, or rhonchi.  Abdominal:  Abdomen soft, non-tender, non distended. No rebound, or guarding.    Skin: No erythema, no rash.   Genitourinary: No costovertebral angle " tenderness.   Musculoskeletal: no deformities.   Neurologic: Alert & oriented x 3, cranial nerves 2-12 intact by passive exam.  No focal deficit noted.  Psychiatric: Disorganized, tangential, no depressed affect, no definite fixed delusions, decent hygiene, good eye contact.     LABORATORY:  Results for orders placed or performed during the hospital encounter of 10/29/21   URINE DRUG SCREEN   Result Value Ref Range    Amphetamines Urine Positive (A) Negative    Barbiturates Negative Negative    Benzodiazepines Negative Negative    Cocaine Metabolite Negative Negative    Methadone Negative Negative    Opiates Negative Negative    Oxycodone Negative Negative    Phencyclidine -Pcp Negative Negative    Propoxyphene Negative Negative    Cannabinoid Metab Positive (A) Negative   BETA-HCG QUALITATIVE URINE   Result Value Ref Range    Beta-Hcg Urine Negative Negative   POC BREATHALIZER   Result Value Ref Range    POC Breathalizer 0.000 0.00 - 0.01 Percent     Lab results reviewed by me.     INTERVENTIONS:  Medications   chlorproMAZINE (THORAZINE) tablet 200 mg (200 mg Oral Given 10/29/21 1050)       ED COURSE:  Nursing notes, VS, PMSFHx reviewed in chart.     Review of records reveal this is the patient's 10th visit at this facility this year. She lives in a group home and has a history of schizophrenia. She was just released from [facility] this morning, where she has also had 4 prior visits and was diagnosed with bipolar disorder and schizophrenia. Meth use was reported today and was last positive here in June.     8:41 AM - Patient seen and examined at bedside.  Ordered POC breathalyzer, Urine drug screen, and beta-hcg qualitative urine to evaluate.     9:12 AM - Informed by alert team behavioral health RN, Taty, that the patient was discharged from [facility] yesterday and that she has a tendency to resort to meth use upon being discharged. She believes the patient is stable for discharge to the women's shelter, who she will  attempt to call now. She notes that when she called nams, she was informed the patient is no longer in a group home.     9:48 AM - Patient was treated with Thorazine 200 mg.     11:06 AM - Per alert team behavioral health RN, Taty, the patient is not allowed to go back to the women's shelter but does have outpatient services available to her as well as an appointment with her psychiatrist next week. She believes she is safe for discharge to the community triage center with transportation provided by [facility].     MEDICAL DECISION MAKING:  Well-appearing patient presents with her baseline psychosis complicated by methamphetamine intoxication.  She is not a danger to herself or others.  Shelter bed was found for her by life skills.  She was discharged from [facility] yesterday.    PLAN:  Avoid methamphetamine use  Continue your medications as prescribed    [address] [address]  [phone]  Schedule an appointment as soon as possible for a visit         CONDITION: Stable.     FINAL IMPRESSION  1. Psychosis, unspecified psychosis type (HCC)    2. Methamphetamine abuse (HCC)          Luther DONG (Chika), am scribing for, and in the presence of, Sam Rodríguez M.D..    Electronically signed by: Luther Levin (Chika), 10/29/2021    Sam DONG M.D. personally performed the services described in this documentation, as scribed by Luther Levin in my presence, and it is both accurate and complete.    E    The note accurately reflects work and decisions made by me.  Sam Rodríguez M.D.  10/29/2021  1:36 PM

## 2021-10-29 NOTE — ED NOTES
[facility].

## 2021-10-30 ENCOUNTER — HOSPITAL ENCOUNTER (EMERGENCY)
Facility: MEDICAL CENTER | Age: 36
End: 2021-11-19
Attending: EMERGENCY MEDICINE
Payer: MEDICAID

## 2021-10-30 DIAGNOSIS — F15.10 METHAMPHETAMINE ABUSE (HCC): ICD-10-CM

## 2021-10-30 DIAGNOSIS — F23 ACUTE PSYCHOSIS (HCC): ICD-10-CM

## 2021-10-30 PROCEDURE — A9270 NON-COVERED ITEM OR SERVICE: HCPCS | Performed by: EMERGENCY MEDICINE

## 2021-10-30 PROCEDURE — 99285 EMERGENCY DEPT VISIT HI MDM: CPT

## 2021-10-30 PROCEDURE — 96372 THER/PROPH/DIAG INJ SC/IM: CPT

## 2021-10-30 PROCEDURE — 700102 HCHG RX REV CODE 250 W/ 637 OVERRIDE(OP): Performed by: EMERGENCY MEDICINE

## 2021-10-30 PROCEDURE — 80307 DRUG TEST PRSMV CHEM ANLYZR: CPT

## 2021-10-30 PROCEDURE — 700111 HCHG RX REV CODE 636 W/ 250 OVERRIDE (IP): Performed by: EMERGENCY MEDICINE

## 2021-10-30 PROCEDURE — 90791 PSYCH DIAGNOSTIC EVALUATION: CPT

## 2021-10-30 PROCEDURE — 302970 POC BREATHALIZER: Performed by: EMERGENCY MEDICINE

## 2021-10-30 RX ORDER — HALOPERIDOL 5 MG/ML
2.5 INJECTION INTRAMUSCULAR ONCE
Status: COMPLETED | OUTPATIENT
Start: 2021-10-30 | End: 2021-10-30

## 2021-10-30 RX ORDER — HALOPERIDOL 5 MG/1
5 TABLET ORAL ONCE
Status: COMPLETED | OUTPATIENT
Start: 2021-10-30 | End: 2021-10-30

## 2021-10-30 RX ADMIN — HALOPERIDOL LACTATE 2.5 MG: 5 INJECTION, SOLUTION INTRAMUSCULAR at 17:15

## 2021-10-30 RX ADMIN — HALOPERIDOL 5 MG: 5 TABLET ORAL at 18:58

## 2021-10-30 ASSESSMENT — LIFESTYLE VARIABLES: DO YOU DRINK ALCOHOL: NO

## 2021-10-30 NOTE — ED PROVIDER NOTES
ED Provider  Scribed for Jose Painting D.O. by Syd Castaneda. 10/30/2021  4:39 PM    Means of arrival: EMS  History obtained from: Patient  History limited by: Patient difficulty answering questions    CHIEF COMPLAINT  Chief Complaint   Patient presents with   • Psych Eval   • Legal 2000     HPI  Tierney Mayer is a 36 y.o. female who presents to the ED via EMS for acute psychosis onset . Per EMS, the [employer] called to transport the patient to the ED after they were found knocking on doors of random homes. She reports burning on her face and chest as well as hearing voices. No alleviating or exacerbating factors were identified. The patient states she recently used methamphetamine and has a history of smoking and methamphetamine use.  HPI limited secondary to patient difficulty answering questions    REVIEW OF SYSTEMS  See HPI for further details.   ROS limited by patient difficulty answering questions.    PAST MEDICAL HISTORY   has a past medical history of Hypertension, Psychiatric disorder, and Schizophrenia (HCC).    SOCIAL HISTORY  Social History     Tobacco Use   • Smoking status: Current Every Day Smoker     Packs/day: 1.00     Types: Cigarettes   • Smokeless tobacco: Never Used   • Tobacco comment: knows she is supposed to quit but hasn't   Vaping Use   • Vaping Use: Never used   Substance and Sexual Activity   • Alcohol use: Yes   • Drug use: Yes     Types: Inhaled, Oral     Comment: Marijuana, meth   • Sexual activity: None noted.     SURGICAL HISTORY   has a past surgical history that includes pelviscopy (2/22/2011) and incision and drainage general (2/22/2011).    CURRENT MEDICATIONS  Home Medications    **Home medications have not yet been reviewed for this encounter**       ALLERGIES  Allergies   Allergen Reactions   • Clonazepam Unspecified     Pt reports getting a stroke.  Per historical: Twitches and paranoia.   • Paliperidone Unspecified     Twitches and paranoia .   • Invega Sustenna  "[Paliperidone Palmitate]    • Olanzapine      Pt reports worsening and threatening voices.     PHYSICAL EXAM  VITAL SIGNS: /84   Pulse (!) 112   Temp 36.9 °C (98.4 °F) (Temporal)   Resp 20   Ht 1.702 m (5' 7\")   Wt 76.2 kg (168 lb)   SpO2 100%   BMI 26.31 kg/m²   Constitutional: Alert in mild distress.  HENT: No signs of trauma, mucous membranes are moist  Eyes: Conjunctiva normal, Non-icteric.   Neck: Normal range of motion, No tenderness, Supple.  Lymphatic: No lymphadenopathy noted.   Cardiovascular: Regular rate and rhythm, no murmurs.   Thorax & Lungs: Normal breath sounds, No respiratory distress, No wheezing, No chest tenderness.   Abdomen: Bowel sounds normal, Soft, No tenderness, No masses, No pulsatile masses. No peritoneal signs.  Skin: Warm, Dry, Mild sunburn to face, anterior chest, and upper extremities.  Back: No bony tenderness, No CVA tenderness.   Extremities: No edema, No tenderness, No cyanosis  Musculoskeletal: Good range of motion in all major joints. No tenderness to palpation or major deformities noted.   Neurologic: Alert, disoriented to recent events, Speech slurred and unclear, Normal motor function, Normal sensory function, No focal deficits noted.   Psychiatric: Mildly anxious, Judgment normal, Mood normal.     DIAGNOSTIC STUDIES / PROCEDURES     LABS  Results for orders placed or performed during the hospital encounter of 10/30/21   URINE DRUG SCREEN   Result Value Ref Range    Amphetamines Urine Positive (A) Negative    Barbiturates Negative Negative    Benzodiazepines Negative Negative    Cocaine Metabolite Negative Negative    Methadone Negative Negative    Opiates Negative Negative    Oxycodone Negative Negative    Phencyclidine -Pcp Negative Negative    Propoxyphene Negative Negative    Cannabinoid Metab Negative Negative   POC BREATHALIZER   Result Value Ref Range    POC Breathalizer 0.00 0.00 - 0.01 Percent   All labs reviewed by me.    RADIOLOGY  No orders to " "display     The radiologist's interpretations of all radiological studies have been reviewed by me.    Films have been independently by me      COURSE  Pertinent Labs & Imaging studies reviewed. (See chart for details)    Review of past medical records shows the patient has previous psychosis and methamphetamine abuse    4:39 PM - Patient seen and examined at bedside. Discussed plan of care. The patient will be medicated with Haldol 2.5mg. Ordered for POC breathalizer and urine drug screen to evaluate her symptoms.     7:09 PM - Patient was reevaluated at bedside. Patient's speech is clearing, still experiencing psychosis and states she is \"blowing bubbles\".    MEDICAL DECISION MAKING  This is a 36 y.o. female who presents with anxiety, hand flight of ideas and confusion.  She has a known history of methamphetamine abuse, and admits to using amphetamines today.    She is mildly tachycardic, she was medicated with Haldol 2.5 mg which did mildly improve her symptoms however she still remains acutely psychotic.    This patient will require further time for metabolism of methamphetamines at this time the patient was placed on ED observation had endorsed to the oncoming physician for further evaluation and treatment and anticipated discharge after methamphetamine psychosis is improved      10/30/2021 1942 admit to ED observation diagnosis acute psychosis, methamphetamine abuse, holding in the emergency department until for metabolism of methamphetamine and resolution of psychosis      Endorsed to oncoming physician Dr. Hall    9:47 PM-the patient was evaluated by life skills.  She appears to be suffering from ongoing psychosis and her life skills is not going to be a safe discharge.  As such she was continued on her hold and will be transferred for psychiatric evaluation.    FINAL IMPRESSION  1. Acute psychosis (HCC)    2. Methamphetamine abuse (HCC)         Syd DONG (Scribe), am scribing for, and in the presence " of, Jose Painting D.O..    Electronically signed by: Syd Castaneda (Scribe), 10/30/2021    I, Jose Painting D.O. personally performed the services described in this documentation, as scribed by Syd Castaneda in my presence, and it is both accurate and complete. C.    The note accurately reflects work and decisions made by me.  Jose Painting D.O.  10/30/2021  7:43 PM

## 2021-10-30 NOTE — ED TRIAGE NOTES
Chief Complaint   Patient presents with   • Psych Eval   • Legal 2000     Patient bib EMS from scene, [employer] called EMS for transport to ED.  Per EMS, patient found walking on street knocking on doors of random homes, pressured speech and alert to self only.     PTA [employer] placed patient on L2K for unable to care for self.      On arrival, patient A&O to person only, pressured speech noted, patient reports does not take medication as prescribed.  patient emotional and easily agitated.    Room stripped of all dangerous items, room safety checklist in use. Pt in hospital gown and personal items removed, placed in personal belonging bag x1, labeled and in ER patient belonging holding area. Legal hold placed in chart. No self harm discussed with pt, states will not harm self here.     Sitter has unobstructed view of patient at all times.      1:1 Observation:  Continuous visual monitoring by Trained Personnel who remain in line of site of the patient with the intent to ensure safety of the patient and minimize the risk of suicide.     Chart up for ERP.

## 2021-10-31 PROCEDURE — 99283 EMERGENCY DEPT VISIT LOW MDM: CPT | Mod: 95 | Performed by: PSYCHIATRY & NEUROLOGY

## 2021-10-31 PROCEDURE — A9270 NON-COVERED ITEM OR SERVICE: HCPCS | Performed by: PSYCHIATRY & NEUROLOGY

## 2021-10-31 PROCEDURE — 700102 HCHG RX REV CODE 250 W/ 637 OVERRIDE(OP): Performed by: PSYCHIATRY & NEUROLOGY

## 2021-10-31 RX ORDER — CHLORPROMAZINE HYDROCHLORIDE 50 MG/1
50 TABLET, FILM COATED ORAL 3 TIMES DAILY
Status: DISCONTINUED | OUTPATIENT
Start: 2021-10-31 | End: 2021-11-02

## 2021-10-31 RX ORDER — DIVALPROEX SODIUM 500 MG/1
500 TABLET, DELAYED RELEASE ORAL 3 TIMES DAILY
Status: DISCONTINUED | OUTPATIENT
Start: 2021-10-31 | End: 2021-11-19 | Stop reason: HOSPADM

## 2021-10-31 RX ADMIN — CHLORPROMAZINE HYDROCHLORIDE 50 MG: 50 TABLET, SUGAR COATED ORAL at 20:43

## 2021-10-31 RX ADMIN — DIVALPROEX SODIUM 500 MG: 500 TABLET, DELAYED RELEASE ORAL at 16:02

## 2021-10-31 RX ADMIN — DIVALPROEX SODIUM 500 MG: 500 TABLET, DELAYED RELEASE ORAL at 20:43

## 2021-10-31 RX ADMIN — CHLORPROMAZINE HYDROCHLORIDE 50 MG: 50 TABLET, SUGAR COATED ORAL at 16:02

## 2021-10-31 RX ADMIN — CHLORPROMAZINE HYDROCHLORIDE 50 MG: 50 TABLET, SUGAR COATED ORAL at 10:55

## 2021-10-31 RX ADMIN — DIVALPROEX SODIUM 500 MG: 500 TABLET, DELAYED RELEASE ORAL at 10:55

## 2021-10-31 NOTE — PROGRESS NOTES
Patient assisted to restroom.    Patient is scheduled to see Dr Rocha, and I have called to records from Dr Lemon office as well to see what medication he has put her on
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
## 2021-10-31 NOTE — ED PROVIDER NOTES
ED PROVIDER NOTE    Scribed for Altaf Hall M.D. by Jennifer Soriano. 10/30/2021, 8:15 PM.    This is an addendum to the note on Tierney Mayer. For further details and full chart entry, see the previously signed ED Provider Note written by Dr. Painting (ERP).      8:15 PM - I discussed the patient's case with Dr. Painting (ERP) who will transfer care of the patient to me at this time.        9:39PM - Psychiatry evaluated the patient and feel she is not safe to discharge.      FINAL IMPRESSION   1. Acute psychosis (HCC)    2. Methamphetamine abuse (HCC)         Jennifer DONG (Scribe), am scribing for, and in the presence of, Altaf Hall M.D..    Electronically signed by: Jennifer Soriano (Scribe), 10/30/2021    Altaf DONG M.D. personally performed the services described in this documentation, as scribed by Jennifer Soriano in my presence, and it is both accurate and complete.    The note accurately reflects work and decisions made by me.  Altaf Hall M.D.  10/31/2021  4:06 AM

## 2021-10-31 NOTE — ED PROVIDER NOTES
"ED Provider Note    ED Observation Progress Note    Date of Service: 10/31/21    Interval History  Patient has not had medical complaints.  Vital signs stable.  Patient was seen by telemetry psych today.  Medications were adjusted.  Please see the consultation note.  Advise continued legal hold and plan for transfer for inpatient care.    Physical Exam  /94   Pulse 88   Temp 36.9 °C (98.4 °F) (Temporal)   Resp 18   Ht 1.702 m (5' 7\")   Wt 76.2 kg (168 lb)   SpO2 98%   BMI 26.31 kg/m² .    Constitutional: Awake and alert. Nontoxic  HENT:  Grossly normal  Eyes: Grossly normal  Neck: Normal range of motion  Cardiovascular: Normal heart rate   Thorax & Lungs: No respiratory distress      Labs  Results for orders placed or performed during the hospital encounter of 10/30/21   URINE DRUG SCREEN   Result Value Ref Range    Amphetamines Urine Positive (A) Negative    Barbiturates Negative Negative    Benzodiazepines Negative Negative    Cocaine Metabolite Negative Negative    Methadone Negative Negative    Opiates Negative Negative    Oxycodone Negative Negative    Phencyclidine -Pcp Negative Negative    Propoxyphene Negative Negative    Cannabinoid Metab Negative Negative   POC BREATHALIZER   Result Value Ref Range    POC Breathalizer 0.00 0.00 - 0.01 Percent       Problem List  1.  Psychosis-continue with plan per psychiatry.  Medications adjusted      Electronically signed by: Arnold Arita M.D., 10/31/2021 10:59 AM  "

## 2021-10-31 NOTE — CONSULTS
[facility] INITIAL PSYCHIATRIC EVALUATION    This provider informed the patient their medical records are totally confidential except for the use by other providers involved in their care, or if the patient signs a release, or to report instances of child or elder abuse, or if it is determined they are an immediate risk to harm themselves or others.    This evaluation was conducted via Zoom using secure and encrypted videoconferencing technology. The patient was physically located at [facility] in [place], [place]. The patient was presented by a medical professional at the originating site.  The patient's identity was confirmed and verbal consent was obtained for this telemedicine encounter.      Room:  32/32 GRN    CONSULTED BY:   Arnold Arita M.d.     REASON FOR CONSULT:   Psychiatric evaluation    Chief Complaint   Patient presents with   • Psych Eval   • Legal 2000         HISTORY OF PRESENT ILLNESS  Chart reviewed.  Patient seen at bedside through telemedicine. Patient is a 36 year old female with past psychiatric history of schizoaffective disorder who presents to the hospital after being found [place] a gas station exhibiting signs and symptoms of psychosis and collin and making comments that she wanted to kill babies. She is a poor historian, but the patient is somewhat known to this writer. She does admit to using methamphetamine prior to this admission, but the patient's symptoms are present even during periods of abstinence from illicit substances. She reports that she has been non compliant with medications for approximately 12 weeks. She has history of manic episodes and psychotic symptoms which occur with and without concurrent mood symptoms. At this time the patient has very poor impulse control and minimal insight. She currently denies SI/HI and states she is willing to restart medications.     Most recent psychotropic medications:   Thorazine  Depakote    Haldol  Vistaril    MEDICAL REVIEW OF SYSTEMS:   ROS  Constitution: sitting on bed in NAD  Respiratory: Denies cough or SOB    PAST PSYCHIATRIC HISTORY  Multiple prior inpatient psychiatric hospitalizations. Most recent outpatient treatment with [facility]. Denies prior suicide attempts.     FAMILY HISTORY  unknown      SOCIAL HISTORY  Lives in group home.     DRUGS: History of methamphetamine and cannabis use. UDS positive for amphetamine and cannabis    ALCOHOL: Denies recent use.     TOBACCO: smokes 1ppd cigarettes    MEDICAL HISTORY         Past Medical History:   Diagnosis Date   • Hypertension    • Psychiatric disorder     Schizophrenia   • Schizophrenia (HCC)      Results for orders placed or performed during the hospital encounter of 21   EKG   Result Value Ref Range    Report       [facility] Emergency Dept.    Test Date:  2021  Pt Name:    GÉNESIS THACKER               Department: ER  MRN:        [id number]                      Room:       [facility]  Gender:     Female                       Technician: [id number]  :        1985                   Requested By:LANI ZHENG  Order #:    [id number]                    Reading MD:    Measurements  Intervals                                Axis  Rate:       73                           P:          55  OK:         168                          QRS:        57  QRSD:       84                           T:          9  QT:         408  QTc:        450    Interpretive Statements  SINUS RHYTHM  BORDERLINE T ABNORMALITIES, ANTERIOR LEADS  BASELINE WANDER IN LEAD(S) V3  Compared to ECG 2019 10:52:46  T-wave abnormality now present         Lab Results   Component Value Date/Time    WBC 10.6 2021 02:10 PM    RBC 4.63 2021 02:10 PM    HEMOGLOBIN 13.9 2021 02:10 PM    HEMATOCRIT 42.7 2021 02:10 PM    MCV 92.2 2021 02:10 PM    MCH 30.0 2021 02:10 PM    MCHC 32.6 (L) 2021 02:10 PM    MPV 10.0  "01/14/2021 02:10 PM    NEUTSPOLYS 65.00 01/14/2021 02:10 PM    LYMPHOCYTES 26.60 01/14/2021 02:10 PM    MONOCYTES 7.00 01/14/2021 02:10 PM    EOSINOPHILS 0.70 01/14/2021 02:10 PM    BASOPHILS 0.40 01/14/2021 02:10 PM      Lab Results   Component Value Date/Time    SODIUM 136 07/22/2019 05:40 AM    POTASSIUM 3.9 07/22/2019 05:40 AM    CHLORIDE 107 07/22/2019 05:40 AM    CO2 21 07/22/2019 05:40 AM    GLUCOSE 99 07/22/2019 05:40 AM    BUN 19 07/22/2019 05:40 AM    CREATININE 0.72 07/22/2019 05:40 AM        No results found for this or any previous visit from the past 1000 days.      CURRENT MEDICATIONS       No current facility-administered medications for this encounter.    Current Outpatient Medications:   •  levothyroxine, 100 mcg, Oral, QAM, 10/28/2021 at unknown  •  haloperidol decanoate, 100 mg, Intramuscular, Q30 DAYS, 10/19/2021 at unknown  •  chlorproMAZINE, 600 mg, Oral, TID, 10/28/2021 at unknown     ALLERGIES         Allergies   Allergen Reactions   • Clonazepam Unspecified     Pt reports getting a stroke.  Per historical: Twitches and paranoia.   • Paliperidone Unspecified     Twitches and paranoia .   • Invega Sustenna [Paliperidone Palmitate]    • Olanzapine      Pt reports worsening and threatening voices.       MENTAL STATUS EXAMINATION    /94   Pulse 88   Temp 36.9 °C (98.4 °F) (Temporal)   Resp 18   Ht 1.702 m (5' 7\")   Wt 76.2 kg (168 lb)   SpO2 98%   BMI 26.31 kg/m²   Participation: limited  Appearance: appropriately dressed in hospital attire  Orientation: AAO to person and place  Eye contact: fair  Behavior:restless  Mood: elated  Affect: reactive  Thought process: tangential  Thought content: Denies current SI/HI  Speech: pressured  Perception:  Ongoing auditory hallucinations  Memory:  poor  Insight: poor  Judgment: poor    CURRENT RISK       Suicidal:moderate       Homicidal:moderate       Self-Harm:high       Relapse:high    ASSESSMENT       36 y.o. female with hx of " schizoaffective disorder, admitted on 10/30/2021 for psychosis and homicidal ideation, psychiatry consulted for evaluation. On exam the patient continues to exhibit signs and symptoms of collin and psychosis and is unable to care for self. She has been non compliant with medications and actively using illicit substances. She has poor insight and will require stabilization in inpatient hospital.       DSM-5 Dx  Schizoaffective disorder, bipolar type  Stimulant use disorder    PLAN  - Medications - Change Thorazine to 100mg PO TID and start Depakote 500mg PO TID.   Continue Legal 2000 and transfer to inpatient psychiatric facility once medically cleared and when bed becomes available.     - Labs reviewed  - Imaging reviewed    Legal Hold: yes  Sitter: yes  Visitors: no  Personal Belongings: no  Phone: no       Discussed findings, diagnosis and recommendations with Dr. Arnold Arita
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
## 2021-11-01 PROCEDURE — 700102 HCHG RX REV CODE 250 W/ 637 OVERRIDE(OP): Performed by: PSYCHIATRY & NEUROLOGY

## 2021-11-01 PROCEDURE — A9270 NON-COVERED ITEM OR SERVICE: HCPCS | Performed by: PSYCHIATRY & NEUROLOGY

## 2021-11-01 RX ADMIN — DIVALPROEX SODIUM 500 MG: 500 TABLET, DELAYED RELEASE ORAL at 15:59

## 2021-11-01 RX ADMIN — CHLORPROMAZINE HYDROCHLORIDE 50 MG: 50 TABLET, SUGAR COATED ORAL at 15:59

## 2021-11-01 RX ADMIN — CHLORPROMAZINE HYDROCHLORIDE 50 MG: 50 TABLET, SUGAR COATED ORAL at 21:00

## 2021-11-01 RX ADMIN — CHLORPROMAZINE HYDROCHLORIDE 50 MG: 50 TABLET, SUGAR COATED ORAL at 11:09

## 2021-11-01 RX ADMIN — DIVALPROEX SODIUM 500 MG: 500 TABLET, DELAYED RELEASE ORAL at 11:09

## 2021-11-01 RX ADMIN — DIVALPROEX SODIUM 500 MG: 500 TABLET, DELAYED RELEASE ORAL at 21:00

## 2021-11-01 NOTE — DISCHARGE PLANNING
Alert Team:    Alert Team:     Patient resting throughout the night without incident. Patient compliant with scheduled medication regime; no PRN medications requested or required during PM shift. Patient evaluated by psychiatry on 10/31/21 and recommended legal hold be extended and 1:1 sitter continue. Awaiting transfer to [facility] due to being declined by [facility] due to aggression hx. Alert Team will continue to monitor.
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
## 2021-11-01 NOTE — PROGRESS NOTES
"ED Provider Progress Note    ED Observation Progress Note    Date of Service: 11/01/21    Interval History  No events overnight.  Patient does not have any complaints patient remains very anxious with disorganized thinking.  Her vital signs are stable.  Await transfer to [facility]    Physical Exam  /87   Pulse (!) 107   Temp 36.7 °C (98.1 °F) (Temporal)   Resp 16   Ht 1.702 m (5' 7\")   Wt 76.2 kg (168 lb)   SpO2 97%   BMI 26.31 kg/m² .    Constitutional: Awake and alert. Nontoxic  HENT:  Grossly normal  Eyes: Grossly normal  Neck: Normal range of motion  Cardiovascular: Normal heart rate   Thorax & Lungs: No respiratory distress  Skin:  No pathologic rash.   Extremities: Well perfused  Psychiatric: Anxious    Labs  Results for orders placed or performed during the hospital encounter of 10/30/21   URINE DRUG SCREEN   Result Value Ref Range    Amphetamines Urine Positive (A) Negative    Barbiturates Negative Negative    Benzodiazepines Negative Negative    Cocaine Metabolite Negative Negative    Methadone Negative Negative    Opiates Negative Negative    Oxycodone Negative Negative    Phencyclidine -Pcp Negative Negative    Propoxyphene Negative Negative    Cannabinoid Metab Negative Negative   POC BREATHALIZER   Result Value Ref Range    POC Breathalizer 0.00 0.00 - 0.01 Percent       Problem List  1.  Psychosis-continue with plan per psychiatry.  Transferred for inpatient care.      Electronically signed by: Arnold Arita M.D., 11/1/2021 12:13 PM    "

## 2021-11-02 PROCEDURE — 99282 EMERGENCY DEPT VISIT SF MDM: CPT | Mod: 95 | Performed by: PSYCHIATRY & NEUROLOGY

## 2021-11-02 PROCEDURE — 700102 HCHG RX REV CODE 250 W/ 637 OVERRIDE(OP): Performed by: PSYCHIATRY & NEUROLOGY

## 2021-11-02 PROCEDURE — A9270 NON-COVERED ITEM OR SERVICE: HCPCS | Performed by: PSYCHIATRY & NEUROLOGY

## 2021-11-02 RX ORDER — CHLORPROMAZINE HYDROCHLORIDE 50 MG/1
100 TABLET, FILM COATED ORAL 3 TIMES DAILY
Status: DISCONTINUED | OUTPATIENT
Start: 2021-11-02 | End: 2021-11-07

## 2021-11-02 RX ADMIN — CHLORPROMAZINE HYDROCHLORIDE 100 MG: 50 TABLET, SUGAR COATED ORAL at 17:05

## 2021-11-02 RX ADMIN — DIVALPROEX SODIUM 500 MG: 500 TABLET, DELAYED RELEASE ORAL at 17:05

## 2021-11-02 RX ADMIN — DIVALPROEX SODIUM 500 MG: 500 TABLET, DELAYED RELEASE ORAL at 21:09

## 2021-11-02 RX ADMIN — CHLORPROMAZINE HYDROCHLORIDE 100 MG: 50 TABLET, SUGAR COATED ORAL at 21:09

## 2021-11-02 RX ADMIN — CHLORPROMAZINE HYDROCHLORIDE 50 MG: 50 TABLET, SUGAR COATED ORAL at 08:49

## 2021-11-02 RX ADMIN — DIVALPROEX SODIUM 500 MG: 500 TABLET, DELAYED RELEASE ORAL at 08:49

## 2021-11-02 NOTE — DISCHARGE PLANNING
Alert Team:     Patient resting majority of the night without incident with 1:1 sitter observing patient safety outside of room; no PRN medications requested or required during PM shift. Patient compliant with medication regime; legal hold be extended and awaits transfer to [facility]. Alert Team will continue to monitor.

## 2021-11-02 NOTE — DISCHARGE PLANNING
Filed petition to the court via [employer]. Waiting on verified petition.    Received Verified Involuntary Court Ordered Petition from the court. Scanned copy of Petition into pt's chart, sent copy to FRANCHESKA HUDSON.

## 2021-11-02 NOTE — CONSULTS
"                  [facility] INITIAL PSYCHIATRIC EVALUATION    This provider informed the patient their medical records are totally confidential except for the use by other providers involved in their care, or if the patient signs a release, or to report instances of child or elder abuse, or if it is determined they are an immediate risk to harm themselves or others.    This evaluation was conducted via Zoom using secure and encrypted videoconferencing technology. The patient was physically located at [facility] in [place], [place]. The patient was presented by a medical professional at the originating site.  The patient's identity was confirmed and verbal consent was obtained for this telemedicine encounter.      Room:  32/32 GRN    CONSULTED BY:   Arnold Arita M.d.     REASON FOR CONSULT:   Psychiatric evaluation    Chief Complaint   Patient presents with   • Psych Eval   • Legal 2000         HISTORY OF PRESENT ILLNESS  Chart reviewed.  Patient seen at bedside through telemedicine. Patient seen for follow up via telemedicine. Patient continues to be disorganized and disoriented. Patient made statements such as \"My frontal lobe is missing\" and \"I am in the morgue right now\". Patient continues to respond to internal stimuli and continues to be paranoid. Tolerating medications with no reported side effects. Appetite is fair. Sleep continues to be poor. Denies SI/HI today.       MEDICAL HISTORY         Past Medical History:   Diagnosis Date   • Hypertension    • Psychiatric disorder     Schizophrenia   • Schizophrenia (HCC)      Results for orders placed or performed during the hospital encounter of 01/19/21   EKG   Result Value Ref Range    Report       [facility] Emergency Dept.    Test Date:  2021-01-20  Pt Name:    GÉNESIS THACKER               Department: ER  MRN:        [id number]                      Room:        33  Gender:     Female                       Technician: " [id number]  :        1985                   Requested By:LANI ZHENG  Order #:    [id number]                    Reading MD:    Measurements  Intervals                                Axis  Rate:       73                           P:          55  VA:         168                          QRS:        57  QRSD:       84                           T:          9  QT:         408  QTc:        450    Interpretive Statements  SINUS RHYTHM  BORDERLINE T ABNORMALITIES, ANTERIOR LEADS  BASELINE WANDER IN LEAD(S) V3  Compared to ECG 2019 10:52:46  T-wave abnormality now present         Lab Results   Component Value Date/Time    WBC 10.6 2021 02:10 PM    RBC 4.63 2021 02:10 PM    HEMOGLOBIN 13.9 2021 02:10 PM    HEMATOCRIT 42.7 2021 02:10 PM    MCV 92.2 2021 02:10 PM    MCH 30.0 2021 02:10 PM    MCHC 32.6 (L) 2021 02:10 PM    MPV 10.0 2021 02:10 PM    NEUTSPOLYS 65.00 2021 02:10 PM    LYMPHOCYTES 26.60 2021 02:10 PM    MONOCYTES 7.00 2021 02:10 PM    EOSINOPHILS 0.70 2021 02:10 PM    BASOPHILS 0.40 2021 02:10 PM      Lab Results   Component Value Date/Time    SODIUM 136 2019 05:40 AM    POTASSIUM 3.9 2019 05:40 AM    CHLORIDE 107 2019 05:40 AM    CO2 21 2019 05:40 AM    GLUCOSE 99 2019 05:40 AM    BUN 19 2019 05:40 AM    CREATININE 0.72 2019 05:40 AM        No results found for this or any previous visit from the past 1000 days.      CURRENT MEDICATIONS         Current Facility-Administered Medications:   •  chlorproMAZINE  •  divalproex    Current Outpatient Medications:   •  levothyroxine, 100 mcg, Oral, QAM, 10/28/2021 at unknown  •  haloperidol decanoate, 100 mg, Intramuscular, Q30 DAYS, 10/19/2021 at unknown  •  chlorproMAZINE, 600 mg, Oral, TID, 10/28/2021 at unknown     ALLERGIES         Allergies   Allergen Reactions   • Clonazepam Unspecified     Pt reports getting a stroke.  Per  "historical: Twitches and paranoia.   • Paliperidone Unspecified     Twitches and paranoia .   • Invega Sustenna [Paliperidone Palmitate]    • Olanzapine      Pt reports worsening and threatening voices.       MENTAL STATUS EXAMINATION    /58   Pulse 95   Temp 36.5 °C (97.7 °F) (Temporal)   Resp 18   Ht 1.702 m (5' 7\")   Wt 76.2 kg (168 lb)   SpO2 95%   BMI 26.31 kg/m²   Participation: limited  Appearance: somewhat unkempt  Orientation:AAO to person only  Eye contact: fair  Behavior:bizarre  Mood: anxious  Affect: reactive  Thought process:disorganized  Thought content:  Denies SI/HI  Speech: mildly pressured  Perception: ongoing paranoia, and AH  Memory: poor  Insight: poor  Judgment: poor    CURRENT RISK       Suicidal:high       Homicidal:moderate       Self-Harm:high       Relapse:high    ASSESSMENT       36 y.o. female with hx of schizoaffective disorder, admitted on 10/30/2021 for psychosis, collin and homicidal ideation, psychiatry consulted for evaluation. On exam the patient continues to exhibit manic and psychotic symptoms. She continues to be disorganized and disoriented and requires further stabilization.       DSM-5 Dx  Schizoaffective disorder, bipolar type  Stimulant use disorder     PLAN  - Medications - Increase Thorazine to 100mg PO TID and continue Depakote 500mg PO TID.   Continue Legal 2000 and transfer to inpatient psychiatric facility once medically cleared and when bed becomes available.    Extend legal hold    - Labs reviewed  - Imaging reviewed     Legal Hold: extend  Sitter: yes  Visitors: no  Personal Belongings: no  Phone: no                    Discussed findings, diagnosis and recommendations with Dr. Arnold Arita       "

## 2021-11-02 NOTE — DISCHARGE PLANNING
Alert Team  Pt continues to await transfer to [facility].  Seen by psychiatry today; LH to be continued and psych meds increased.  Pt has been compliant with scheduled meds.  VITALY

## 2021-11-02 NOTE — PROGRESS NOTES
"    ED Observation Progress Note    Date of Service: 11/02/21    Interval History  There were no events overnight.  Patient has been cooperative.  Patient was seen again today by psychiatry.  Legal hold was extended.  Her Thorazine was increased to 100 mg 3 times daily.  She has no medical complaints    Physical Exam  /58   Pulse 95   Temp 36.5 °C (97.7 °F) (Temporal)   Resp 18   Ht 1.702 m (5' 7\")   Wt 76.2 kg (168 lb)   SpO2 95%   BMI 26.31 kg/m² .    Constitutional: Awake and alert. Nontoxic  HENT:  Grossly normal  Eyes: Grossly normal  Neck: Normal range of motion  Cardiovascular: Normal heart rate   Thorax & Lungs: No respiratory distress  Abdomen: Nontender  Skin:  No pathologic rash.   Extremities: Well perfused      Labs  Results for orders placed or performed during the hospital encounter of 10/30/21   URINE DRUG SCREEN   Result Value Ref Range    Amphetamines Urine Positive (A) Negative    Barbiturates Negative Negative    Benzodiazepines Negative Negative    Cocaine Metabolite Negative Negative    Methadone Negative Negative    Opiates Negative Negative    Oxycodone Negative Negative    Phencyclidine -Pcp Negative Negative    Propoxyphene Negative Negative    Cannabinoid Metab Negative Negative   POC BREATHALIZER   Result Value Ref Range    POC Breathalizer 0.00 0.00 - 0.01 Percent       Problem List  1.  Schizoaffective disorder, bipolar-continue medications per psychiatry.  Extended legal hold per recommendation.      Electronically signed by: Arnold Arita M.D., 11/2/2021 12:32 PM    "

## 2021-11-03 PROCEDURE — 700102 HCHG RX REV CODE 250 W/ 637 OVERRIDE(OP): Performed by: PSYCHIATRY & NEUROLOGY

## 2021-11-03 PROCEDURE — A9270 NON-COVERED ITEM OR SERVICE: HCPCS | Performed by: PSYCHIATRY & NEUROLOGY

## 2021-11-03 RX ADMIN — CHLORPROMAZINE HYDROCHLORIDE 100 MG: 50 TABLET, SUGAR COATED ORAL at 10:08

## 2021-11-03 RX ADMIN — CHLORPROMAZINE HYDROCHLORIDE 100 MG: 50 TABLET, SUGAR COATED ORAL at 17:47

## 2021-11-03 RX ADMIN — DIVALPROEX SODIUM 500 MG: 500 TABLET, DELAYED RELEASE ORAL at 10:08

## 2021-11-03 RX ADMIN — DIVALPROEX SODIUM 500 MG: 500 TABLET, DELAYED RELEASE ORAL at 17:45

## 2021-11-03 NOTE — PROGRESS NOTES
"ED Observation Progress Note    Date of Service: 11/03/21    Interval History    Patient presented after being found knocking on random doors.  She reported hearing voices.    7:36 AM patient's evaluated at the bedside.  She startled as I enter the room.  She is easily reassured.  She has no requests and no complaints.  She is encouraged to let her need to be known.  She is currently awaiting transfer to a psychiatric facility.  She is here with acute psychosis.  No events under my care.  [facility], she will be here at the close of my shift.    Physical Exam  /80   Pulse 95   Temp 36.5 °C (97.7 °F) (Temporal)   Resp 16   Ht 1.702 m (5' 7\")   Wt 76.2 kg (168 lb)   SpO2 98%   BMI 26.31 kg/m² .    Constitutional: Awake and alert. Nontoxic  HENT:  Grossly normal  Eyes: Grossly normal  Neck: Normal range of motion  Cardiovascular: Normal heart rate   Thorax & Lungs: No respiratory distress  Abdomen: Nontender  Skin:  No pathologic rash.   Extremities: Well perfused  Psychiatric: Affect normal    Labs  Results for orders placed or performed during the hospital encounter of 10/30/21   URINE DRUG SCREEN   Result Value Ref Range    Amphetamines Urine Positive (A) Negative    Barbiturates Negative Negative    Benzodiazepines Negative Negative    Cocaine Metabolite Negative Negative    Methadone Negative Negative    Opiates Negative Negative    Oxycodone Negative Negative    Phencyclidine -Pcp Negative Negative    Propoxyphene Negative Negative    Cannabinoid Metab Negative Negative   POC BREATHALIZER   Result Value Ref Range    POC Breathalizer 0.00 0.00 - 0.01 Percent     Problem List  1.  Schizoaffective disorder  2.  Bipolar disorder  3.  Amphetamine use.      Electronically signed by: Kristin Saleh D.O., 11/3/2021 5:26 AM    "

## 2021-11-03 NOTE — DISCHARGE PLANNING
Alert Team:     Patient resting majority of the night without incident with 1:1 sitter observing patient safety outside of room; no PRN medications requested or required during PM shift. Patient compliant with medication regime; Thorazine increased during psychiatric evlaution f/u with Dr. Barreto; legal hold be extended and awaits transfer to [facility]. Alert Team will continue to monitor.

## 2021-11-04 PROCEDURE — A9270 NON-COVERED ITEM OR SERVICE: HCPCS | Performed by: PSYCHIATRY & NEUROLOGY

## 2021-11-04 PROCEDURE — 700102 HCHG RX REV CODE 250 W/ 637 OVERRIDE(OP): Performed by: PSYCHIATRY & NEUROLOGY

## 2021-11-04 PROCEDURE — 96372 THER/PROPH/DIAG INJ SC/IM: CPT

## 2021-11-04 PROCEDURE — 700111 HCHG RX REV CODE 636 W/ 250 OVERRIDE (IP)

## 2021-11-04 RX ORDER — LORAZEPAM 2 MG/ML
1 INJECTION INTRAMUSCULAR ONCE
Status: COMPLETED | OUTPATIENT
Start: 2021-11-04 | End: 2021-11-04

## 2021-11-04 RX ADMIN — CHLORPROMAZINE HYDROCHLORIDE 100 MG: 50 TABLET, SUGAR COATED ORAL at 06:13

## 2021-11-04 RX ADMIN — CHLORPROMAZINE HYDROCHLORIDE 100 MG: 50 TABLET, SUGAR COATED ORAL at 20:25

## 2021-11-04 RX ADMIN — DIVALPROEX SODIUM 500 MG: 500 TABLET, DELAYED RELEASE ORAL at 14:38

## 2021-11-04 RX ADMIN — DIVALPROEX SODIUM 500 MG: 500 TABLET, DELAYED RELEASE ORAL at 06:12

## 2021-11-04 RX ADMIN — LORAZEPAM 1 MG: 2 INJECTION INTRAMUSCULAR; INTRAVENOUS at 18:16

## 2021-11-04 RX ADMIN — CHLORPROMAZINE HYDROCHLORIDE 100 MG: 50 TABLET, SUGAR COATED ORAL at 14:39

## 2021-11-04 RX ADMIN — DIVALPROEX SODIUM 500 MG: 500 TABLET, DELAYED RELEASE ORAL at 20:25

## 2021-11-04 NOTE — DISCHARGE PLANNING
MSW spoke to Janes at [facility]. Janes confirmed they have pt's referral and she is #4 on their list.

## 2021-11-04 NOTE — PROGRESS NOTES
"ED Provider Progress Note    ED Observation Progress Note    Date of Service: 11/04/21    Interval History  Tierney Mayer is 36 y.o. F presenting on 10/30/21 initially with psychosis.  Awaiting transfer to [facility].  On a Legal hold.  No acute events overnight.      Pt is receiving thorazine and depakote.       Physical Exam  /79   Pulse 81   Temp 36.9 °C (98.4 °F) (Temporal)   Resp 16   Ht 1.702 m (5' 7\")   Wt 76.2 kg (168 lb)   SpO2 96%   BMI 26.31 kg/m² .    Constitutional: Awake and alert. Nontoxic  HENT:  Grossly normal  Cardiovascular: Normal heart rate   Thorax & Lungs: No respiratory distress      Labs  Results for orders placed or performed during the hospital encounter of 10/30/21   URINE DRUG SCREEN   Result Value Ref Range    Amphetamines Urine Positive (A) Negative    Barbiturates Negative Negative    Benzodiazepines Negative Negative    Cocaine Metabolite Negative Negative    Methadone Negative Negative    Opiates Negative Negative    Oxycodone Negative Negative    Phencyclidine -Pcp Negative Negative    Propoxyphene Negative Negative    Cannabinoid Metab Negative Negative   POC BREATHALIZER   Result Value Ref Range    POC Breathalizer 0.00 0.00 - 0.01 Percent       Radiology  No orders to display       Problem List  1. Acute psychosis (HCC)    2. Methamphetamine abuse (HCC)           Electronically signed by: Hardy Emerson M.D., 11/4/2021 6:45 AM    "

## 2021-11-04 NOTE — DISCHARGE PLANNING
Legal Hold     Referral: Legal Hold Court     Intervention: Pt presented for legal hold meeting with  via video conferencing to discuss legal options of contesting hold and meeting with the court doctors tomorrow afternoon, or not contesting legal hold and continuing the hold for one week.  advised that pt's legal hold will be be continued for one week (11/10).       Plan: Pt's legal hold has been continued for one week. Pt awaiting transfer to an in patient psych facility.

## 2021-11-04 NOTE — DISCHARGE PLANNING
Received Stipulation and Order from the court continuing pt's legal hold until 11/10. Sent copy to BECCA Queen, scanned copy into pt's chart.

## 2021-11-05 PROCEDURE — 700102 HCHG RX REV CODE 250 W/ 637 OVERRIDE(OP): Performed by: PSYCHIATRY & NEUROLOGY

## 2021-11-05 PROCEDURE — 93005 ELECTROCARDIOGRAM TRACING: CPT | Performed by: STUDENT IN AN ORGANIZED HEALTH CARE EDUCATION/TRAINING PROGRAM

## 2021-11-05 PROCEDURE — A9270 NON-COVERED ITEM OR SERVICE: HCPCS | Performed by: PSYCHIATRY & NEUROLOGY

## 2021-11-05 PROCEDURE — A9270 NON-COVERED ITEM OR SERVICE: HCPCS | Performed by: EMERGENCY MEDICINE

## 2021-11-05 PROCEDURE — 700102 HCHG RX REV CODE 250 W/ 637 OVERRIDE(OP): Performed by: EMERGENCY MEDICINE

## 2021-11-05 RX ORDER — LORAZEPAM 2 MG/1
2 TABLET ORAL ONCE
Status: COMPLETED | OUTPATIENT
Start: 2021-11-05 | End: 2021-11-05

## 2021-11-05 RX ADMIN — CHLORPROMAZINE HYDROCHLORIDE 100 MG: 50 TABLET, SUGAR COATED ORAL at 21:19

## 2021-11-05 RX ADMIN — CHLORPROMAZINE HYDROCHLORIDE 100 MG: 50 TABLET, SUGAR COATED ORAL at 16:24

## 2021-11-05 RX ADMIN — DIVALPROEX SODIUM 500 MG: 500 TABLET, DELAYED RELEASE ORAL at 16:24

## 2021-11-05 RX ADMIN — DIVALPROEX SODIUM 500 MG: 500 TABLET, DELAYED RELEASE ORAL at 21:19

## 2021-11-05 RX ADMIN — DIVALPROEX SODIUM 500 MG: 500 TABLET, DELAYED RELEASE ORAL at 08:56

## 2021-11-05 RX ADMIN — CHLORPROMAZINE HYDROCHLORIDE 100 MG: 50 TABLET, SUGAR COATED ORAL at 08:56

## 2021-11-05 RX ADMIN — LORAZEPAM 2 MG: 2 TABLET ORAL at 17:03

## 2021-11-05 ASSESSMENT — ENCOUNTER SYMPTOMS
DEPRESSION: 0
SPUTUM PRODUCTION: 0
MYALGIAS: 0
NERVOUS/ANXIOUS: 0
COUGH: 0
HEADACHES: 0
DOUBLE VISION: 0
DIZZINESS: 0
NECK PAIN: 0
HALLUCINATIONS: 0
PALPITATIONS: 0

## 2021-11-05 NOTE — PROGRESS NOTES
"ED Provider Progress Note    ED Observation Progress Note    Date of Service: 11/05/21    Interval History  Patient reevaluated.  Patient is on a legal hold, waiting transfer to psychiatric facility when a bed is available.  Please refer to initial note for complete details.  No concerns overnight.  Patient ambulates to the bathroom independently and tolerated a meal.  Medication reconciliation has been reviewed.    Awaiting placement at [facility] for  yesterday afternoon #4 on the list.    Physical Exam  /104   Pulse (!) 106   Temp 36.9 °C (98.4 °F) (Temporal)   Resp 16   Ht 1.702 m (5' 7\")   Wt 76.2 kg (168 lb)   SpO2 98%   BMI 26.31 kg/m² .    Constitutional: Awake and alert. Nontoxic  HENT:  Grossly normal  Eyes: Grossly normal  Neck: Normal range of motion  Cardiovascular: Normal heart rate   Thorax & Lungs: No respiratory distress  Abdomen: Nontender  Skin:  No pathologic rash.   Extremities: Well perfused  Psychiatric: Affect normal    Labs  Results for orders placed or performed during the hospital encounter of 10/30/21   URINE DRUG SCREEN   Result Value Ref Range    Amphetamines Urine Positive (A) Negative    Barbiturates Negative Negative    Benzodiazepines Negative Negative    Cocaine Metabolite Negative Negative    Methadone Negative Negative    Opiates Negative Negative    Oxycodone Negative Negative    Phencyclidine -Pcp Negative Negative    Propoxyphene Negative Negative    Cannabinoid Metab Negative Negative   POC BREATHALIZER   Result Value Ref Range    POC Breathalizer 0.00 0.00 - 0.01 Percent       Radiology  No orders to display       Problem List  1.  Acute psychosis -awaiting transfer to psychiatric facility for further evaluation and treatment with a bed is available.      Electronically signed by: Katerin Johnson D.O., 11/5/2021 11:11 AM    "

## 2021-11-05 NOTE — CONSULTS
"[facility]  PSYCHIATRIC FOLLOW-UP NOTE    Name: Tierney Mayer  MRN: [id number]  : 1985  Age: 36 y.o.  Date of assessment: 2021  PCP: Pcp Pt States None  Persons in attendance: Patient      This evaluation was conducted via Zoom using secure and encrypted videoconferencing technology. The patient was physically located at [facility] in [place], [place]. The patient was presented by a medical professional at the originating site.  The patient's identity was confirmed and verbal consent was obtained for this telemedicine encounter.      Room:   [facility]    Chief Complaint   Patient presents with   • Psych Eval   • Legal 2000         Interval History:   Chart reviewed. Patient is seen at bedside through telemedicine. Patient was sleeping on approach. She is upset that she was woken up, however, she does participate in evaluation. She reports feeling \"fine\". She is on thorazine and denies any side effects. She denies any si/hi/ahvh. However, she is illogical at times, with SAM, spontaneously reporting,  \"my mother is alive she didn't die\", patient says that her mother is 48 years old, unlikely given that patient is  36. Patient says that she would like to go live with her mother.       REVIEW OF SYSTEMS:        Review of Systems   Eyes: Negative for double vision.   Respiratory: Negative for cough and sputum production.    Cardiovascular: Negative for chest pain and palpitations.   Musculoskeletal: Negative for myalgias and neck pain.   Neurological: Negative for dizziness and headaches.   Psychiatric/Behavioral: Negative for depression, hallucinations and suicidal ideas. The patient is not nervous/anxious.        Medical Records/Labs/Diagnostic Tests Reviewed:     Lab Results   Component Value Date/Time    WBC 10.6 2021 02:10 PM    RBC 4.63 2021 02:10 PM    HEMOGLOBIN 13.9 2021 02:10 PM    HEMATOCRIT 42.7 2021 02:10 PM    MCV 92.2 2021 02:10 PM    MCH " "30.0 2021 02:10 PM    MCHC 32.6 (L) 2021 02:10 PM    MPV 10.0 2021 02:10 PM    NEUTSPOLYS 65.00 2021 02:10 PM    LYMPHOCYTES 26.60 2021 02:10 PM    MONOCYTES 7.00 2021 02:10 PM    EOSINOPHILS 0.70 2021 02:10 PM    BASOPHILS 0.40 2021 02:10 PM         Lab Results   Component Value Date/Time    SODIUM 136 2019 05:40 AM    POTASSIUM 3.9 2019 05:40 AM    CHLORIDE 107 2019 05:40 AM    CO2 21 2019 05:40 AM    GLUCOSE 99 2019 05:40 AM    BUN 19 2019 05:40 AM    CREATININE 0.72 2019 05:40 AM         Results for orders placed or performed during the hospital encounter of 21   EKG   Result Value Ref Range    Report       [facility] Emergency Dept.    Test Date:  2021  Pt Name:    GÉNESIS THACKER               Department: ER  MRN:        [id number]                      Room:       [facility]  Gender:     Female                       Technician: [id number]  :        1985                   Requested By:LANI ZHENG  Order #:    [id number]                    Reading MD:    Measurements  Intervals                                Axis  Rate:       73                           P:          55  DE:         168                          QRS:        57  QRSD:       84                           T:          9  QT:         408  QTc:        450    Interpretive Statements  SINUS RHYTHM  BORDERLINE T ABNORMALITIES, ANTERIOR LEADS  BASELINE WANDER IN LEAD(S) V3  Compared to ECG 2019 10:52:46  T-wave abnormality now present         No results found for this or any previous visit from the past 1000 days.        PSYCHIATRIC EXAMINATION/MENTAL STATUS  /104   Pulse (!) 106   Temp 36.9 °C (98.4 °F) (Temporal)   Resp 16   Ht 1.702 m (5' 7\")   Wt 76.2 kg (168 lb)   SpO2 98%   BMI 26.31 kg/m²   Participation: Active verbal participation  Grooming:Casual  Orientation: Alert and Confused  Eye contact: " Limited  Behavior:Calm   Mood: Euthymic  Affect: Flexible  Thought process: Circumstantial and Loose associations  Thought content:  Ideas of reference  Speech: Hypotalkative and Latency of response  Perception:  Illusions  Memory:  Poor memory for chronology of events  Insight: Limited  Judgment: Limited  Family/couple interaction observations:   Other:    Current risk:    Suicide: Low   Homicide: Low   Self-harm: Moderate  Relapse: High  Other:   Crisis Safety Plan reviewed?No  If evidence of imminent risk is present, intervention/plan:    Medical Records/Labs/Diagnostic Tests Ordered:     DIAGNOSTIC IMPRESSION(S):  1. Acute psychosis (HCC)    2. Methamphetamine abuse (HCC)           ASSESSMENT AND PLAN:  36 y.o. female with hx of schizoaffective disorder, admitted on 10/30/2021 for psychosis, collin and homicidal ideation, psychiatry consulted for evaluation. On exam the patient continues to exhibit manic and psychotic symptoms. She continues to be disorganized and disoriented and requires further stabilization. she is tolerating thorazine, denies side effects, improved mentation, will benefit from increased dose, however, will obtain EKG before increasing.         DSM-5 Dx  Schizoaffective disorder, bipolar type  Stimulant use disorder     PLAN  - Medications: continue Thorazine to 100mg PO TID and continue Depakote 500mg PO TID. Will obtain EKG before increasing thorazine.   Continue Legal 2000 and transfer to inpatient psychiatric facility once medically cleared and when bed becomes available.    Extend legal hold     - Labs reviewed  - Imaging reviewed     Legal Hold: extend  Sitter: yes  Visitors: no  Personal Belongings: no  Phone: no      Nikolai Santiago M.D.

## 2021-11-06 PROCEDURE — A9270 NON-COVERED ITEM OR SERVICE: HCPCS | Performed by: PSYCHIATRY & NEUROLOGY

## 2021-11-06 PROCEDURE — 96372 THER/PROPH/DIAG INJ SC/IM: CPT

## 2021-11-06 PROCEDURE — 700111 HCHG RX REV CODE 636 W/ 250 OVERRIDE (IP): Performed by: EMERGENCY MEDICINE

## 2021-11-06 PROCEDURE — 700102 HCHG RX REV CODE 250 W/ 637 OVERRIDE(OP): Performed by: PSYCHIATRY & NEUROLOGY

## 2021-11-06 RX ORDER — HALOPERIDOL 5 MG/ML
5 INJECTION INTRAMUSCULAR
Status: COMPLETED | OUTPATIENT
Start: 2021-11-06 | End: 2021-11-06

## 2021-11-06 RX ORDER — DIPHENHYDRAMINE HYDROCHLORIDE 50 MG/ML
50 INJECTION INTRAMUSCULAR; INTRAVENOUS
Status: COMPLETED | OUTPATIENT
Start: 2021-11-06 | End: 2021-11-06

## 2021-11-06 RX ADMIN — CHLORPROMAZINE HYDROCHLORIDE 100 MG: 50 TABLET, SUGAR COATED ORAL at 19:32

## 2021-11-06 RX ADMIN — DIVALPROEX SODIUM 500 MG: 500 TABLET, DELAYED RELEASE ORAL at 09:00

## 2021-11-06 RX ADMIN — HALOPERIDOL LACTATE 5 MG: 5 INJECTION, SOLUTION INTRAMUSCULAR at 17:45

## 2021-11-06 RX ADMIN — CHLORPROMAZINE HYDROCHLORIDE 100 MG: 50 TABLET, SUGAR COATED ORAL at 09:00

## 2021-11-06 RX ADMIN — DIPHENHYDRAMINE HYDROCHLORIDE 50 MG: 50 INJECTION, SOLUTION INTRAMUSCULAR; INTRAVENOUS at 17:45

## 2021-11-06 RX ADMIN — DIVALPROEX SODIUM 500 MG: 500 TABLET, DELAYED RELEASE ORAL at 19:32

## 2021-11-06 NOTE — PROGRESS NOTES
"ED Provider Progress Note    ED Observation Progress Note    Date of Service: 21    Interval History  Patient reevaluated.  Patient is on a legal hold, waiting transfer to psychiatric facility when a bed is available.  Please refer to initial note for complete details.  No concerns overnight.  Patient ambulates to the bathroom independently and tolerated a meal.  Medication reconciliation has been reviewed.    Physical Exam  /75   Pulse 95   Temp 36.9 °C (98.4 °F) (Temporal)   Resp 16   Ht 1.702 m (5' 7\")   Wt 76.2 kg (168 lb)   SpO2 97%   BMI 26.31 kg/m² .    Constitutional: Awake and alert. Nontoxic  HENT:  Grossly normal  Eyes: Grossly normal  Neck: Normal range of motion  Cardiovascular: Normal heart rate   Thorax & Lungs: No respiratory distress  Abdomen: Nontender  Skin:  No pathologic rash.   Extremities: Well perfused  Psychiatric: Affect normal    Labs  Results for orders placed or performed during the hospital encounter of 10/30/21   URINE DRUG SCREEN   Result Value Ref Range    Amphetamines Urine Positive (A) Negative    Barbiturates Negative Negative    Benzodiazepines Negative Negative    Cocaine Metabolite Negative Negative    Methadone Negative Negative    Opiates Negative Negative    Oxycodone Negative Negative    Phencyclidine -Pcp Negative Negative    Propoxyphene Negative Negative    Cannabinoid Metab Negative Negative   POC BREATHALIZER   Result Value Ref Range    POC Breathalizer 0.00 0.00 - 0.01 Percent   EKG   Result Value Ref Range    Report       [facility] Emergency Dept.    Test Date:  2021  Pt Name:    GÉNESIS THACKER               Department: ER  MRN:        [id number]                      Room:       [facility]  Gender:     Female                       Technician: [id number]  :        1985                   Requested By:SHAHANA NI  Order #:    [id number]                    Vasyl SIDHU:    Measurements  Intervals                                " Axis  Rate:       96                           P:          36  MI:         176                          QRS:        54  QRSD:       88                           T:          50  QT:         364  QTc:        460    Interpretive Statements  SINUS RHYTHM  Compared to ECG 01/20/2021 14:12:26  T-wave abnormality no longer present         Radiology  No orders to display       Problem List  1.  Acute psychosis: Awaiting transfer to psychiatric facility for further evaluation and treatment.      Electronically signed by: Katerin Johnson D.O., 11/6/2021 12:20 PM
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
## 2021-11-07 PROCEDURE — 700102 HCHG RX REV CODE 250 W/ 637 OVERRIDE(OP): Performed by: PSYCHIATRY & NEUROLOGY

## 2021-11-07 PROCEDURE — A9270 NON-COVERED ITEM OR SERVICE: HCPCS | Performed by: PSYCHIATRY & NEUROLOGY

## 2021-11-07 PROCEDURE — 700102 HCHG RX REV CODE 250 W/ 637 OVERRIDE(OP): Performed by: STUDENT IN AN ORGANIZED HEALTH CARE EDUCATION/TRAINING PROGRAM

## 2021-11-07 PROCEDURE — 99232 SBSQ HOSP IP/OBS MODERATE 35: CPT | Mod: 95 | Performed by: STUDENT IN AN ORGANIZED HEALTH CARE EDUCATION/TRAINING PROGRAM

## 2021-11-07 PROCEDURE — A9270 NON-COVERED ITEM OR SERVICE: HCPCS | Performed by: STUDENT IN AN ORGANIZED HEALTH CARE EDUCATION/TRAINING PROGRAM

## 2021-11-07 RX ORDER — HALOPERIDOL 5 MG/ML
5 INJECTION INTRAMUSCULAR EVERY 6 HOURS PRN
Status: DISCONTINUED | OUTPATIENT
Start: 2021-11-07 | End: 2021-11-19 | Stop reason: HOSPADM

## 2021-11-07 RX ORDER — CHLORPROMAZINE HYDROCHLORIDE 50 MG/1
150 TABLET, FILM COATED ORAL
Status: DISCONTINUED | OUTPATIENT
Start: 2021-11-07 | End: 2021-11-15

## 2021-11-07 RX ORDER — MIDAZOLAM HYDROCHLORIDE 1 MG/ML
2 INJECTION INTRAMUSCULAR; INTRAVENOUS EVERY 6 HOURS PRN
Status: DISCONTINUED | OUTPATIENT
Start: 2021-11-07 | End: 2021-11-19 | Stop reason: HOSPADM

## 2021-11-07 RX ORDER — CHLORPROMAZINE HYDROCHLORIDE 50 MG/1
100 TABLET, FILM COATED ORAL 2 TIMES DAILY
Status: DISCONTINUED | OUTPATIENT
Start: 2021-11-08 | End: 2021-11-15

## 2021-11-07 RX ORDER — LORAZEPAM 1 MG/1
1 TABLET ORAL EVERY 6 HOURS PRN
Status: DISCONTINUED | OUTPATIENT
Start: 2021-11-07 | End: 2021-11-19 | Stop reason: HOSPADM

## 2021-11-07 RX ADMIN — CHLORPROMAZINE HYDROCHLORIDE 100 MG: 50 TABLET, SUGAR COATED ORAL at 10:05

## 2021-11-07 RX ADMIN — DIVALPROEX SODIUM 500 MG: 500 TABLET, DELAYED RELEASE ORAL at 15:19

## 2021-11-07 RX ADMIN — CHLORPROMAZINE HYDROCHLORIDE 150 MG: 50 TABLET, SUGAR COATED ORAL at 20:08

## 2021-11-07 RX ADMIN — DIVALPROEX SODIUM 500 MG: 500 TABLET, DELAYED RELEASE ORAL at 20:07

## 2021-11-07 RX ADMIN — CHLORPROMAZINE HYDROCHLORIDE 100 MG: 50 TABLET, SUGAR COATED ORAL at 15:19

## 2021-11-07 ASSESSMENT — ENCOUNTER SYMPTOMS
VOMITING: 0
PALPITATIONS: 0
DIZZINESS: 0
CHILLS: 0
MYALGIAS: 0
NAUSEA: 0
FEVER: 0
DEPRESSION: 0
SHORTNESS OF BREATH: 0
NECK PAIN: 0
COUGH: 0

## 2021-11-07 NOTE — CONSULTS
"[facility]  PSYCHIATRIC FOLLOW-UP NOTE    Name: Tierney Mayer  MRN: [id number]  : 1985  Age: 36 y.o.  Date of assessment: 2021  PCP: Pcp Pt States None  Persons in attendance: Patient      This evaluation was conducted via Zoom using secure and encrypted videoconferencing technology. The patient was physically located at [facility] in [place], [place]. The patient was presented by a medical professional at the originating site.  The patient's identity was confirmed and verbal consent was obtained for this telemedicine encounter.      Room: [facility]    Chief Complaint   Patient presents with   • Psych Eval   • Legal 2000         Interval History:   Chart reviewed. Patient is seen at bedside through telemedicine. Patient is euphoric on approach and talks rapidly about how \"great\" things have been despite being hospitalized for \"8 months\". She sitting up in bed, smiling, glancing around the room, responding to internal stimuli, mumbling to self. She reports adherence with thorazine, she denies any side effects. She has odd delusions of aliens and \"thanos\" walking through the hallway \"watching me\". Despite that she denies any si/hi.        REVIEW OF SYSTEMS:        Review of Systems   Constitutional: Negative for chills and fever.   Respiratory: Negative for cough and shortness of breath.    Cardiovascular: Negative for chest pain and palpitations.   Gastrointestinal: Negative for nausea and vomiting.   Musculoskeletal: Negative for myalgias and neck pain.   Neurological: Negative for dizziness.   Psychiatric/Behavioral: Negative for depression and suicidal ideas.       Medical Records/Labs/Diagnostic Tests Reviewed:     Lab Results   Component Value Date/Time    WBC 10.6 2021 02:10 PM    RBC 4.63 2021 02:10 PM    HEMOGLOBIN 13.9 2021 02:10 PM    HEMATOCRIT 42.7 2021 02:10 PM    MCV 92.2 2021 02:10 PM    MCH 30.0 2021 02:10 PM    MCHC 32.6 (L) " "2021 02:10 PM    MPV 10.0 2021 02:10 PM    NEUTSPOLYS 65.00 2021 02:10 PM    LYMPHOCYTES 26.60 2021 02:10 PM    MONOCYTES 7.00 2021 02:10 PM    EOSINOPHILS 0.70 2021 02:10 PM    BASOPHILS 0.40 2021 02:10 PM         Lab Results   Component Value Date/Time    SODIUM 136 2019 05:40 AM    POTASSIUM 3.9 2019 05:40 AM    CHLORIDE 107 2019 05:40 AM    CO2 21 2019 05:40 AM    GLUCOSE 99 2019 05:40 AM    BUN 19 2019 05:40 AM    CREATININE 0.72 2019 05:40 AM         Results for orders placed or performed during the hospital encounter of 10/30/21   EKG   Result Value Ref Range    Report       [facility] Emergency Dept.    Test Date:  2021  Pt Name:    GÉNESIS THACKER               Department: ER  MRN:        [id number]                      Room:       [facility]  Gender:     Female                       Technician: [id number]  :        1985                   Requested By:SHAHANA NI  Order #:    [id number]                    Reading MD:    Measurements  Intervals                                Axis  Rate:       96                           P:          36  WA:         176                          QRS:        54  QRSD:       88                           T:          50  QT:         364  QTc:        460    Interpretive Statements  SINUS RHYTHM  Compared to ECG 2021 14:12:26  T-wave abnormality no longer present         No results found for this or any previous visit from the past 1000 days.        PSYCHIATRIC EXAMINATION/MENTAL STATUS  /97   Pulse 95   Temp 36.6 °C (97.9 °F) (Temporal)   Resp 16   Ht 1.702 m (5' 7\")   Wt 76.2 kg (168 lb)   SpO2 97%   BMI 26.31 kg/m²   Participation: Active verbal participation  Grooming:Casual and hospital gown  Orientation: Confused  Eye contact: Limited  Behavior:Unusual behaviors noted   Mood: Happy  Affect: Expansive  Thought process: Circumstantial and Loose " associations  Thought content:  Obsessions, Ideas of reference and Evidence of delusion  Speech: Hypertalkative  Perception:  Evidence of auditory hallucination  Memory:  Poor memory for chronology of events  Insight: Poor  Judgment: Poor  Family/couple interaction observations:   Other:    Current risk:    Suicide: Moderate   Homicide: Moderate   Self-harm: Moderate  Relapse: High  Other:   Crisis Safety Plan reviewed?No  If evidence of imminent risk is present, intervention/plan:    Medical Records/Labs/Diagnostic Tests Ordered:     DIAGNOSTIC IMPRESSION(S):  1. Acute psychosis (HCC)    2. Methamphetamine abuse (HCC)           ASSESSMENT AND PLAN:  36 y.o. female with hx of schizoaffective disorder, admitted on 10/30/2021 for psychosis, collin and homicidal ideation, psychiatry consulted for evaluation. On exam the patient continues to exhibit manic and psychotic symptoms. She continues to be disorganized and disoriented and requires further stabilization. she is tolerating thorazine, denies side effects, improved mentation, will benefit from increased dose. She may also be a candidate for dual antipsychotic therapy.           DSM-5 Dx  Schizoaffective disorder, bipolar type  Stimulant use disorder     PLAN  - Medications: continue Thorazine to 100mg PO BID and increase Thorazine HS to 150mg   continue Depakote 500mg PO TID  Continue Legal 2000 and transfer to inpatient psychiatric facility once medically cleared and when bed becomes available.    Extend legal hold     - Labs reviewed  - Imaging reviewed  - EKG reviewed, Qtc 460 on 11/5/2021     Legal Hold: extend  Sitter: 1:1  Visitors: no  Personal Belongings: no  Phone: no      Nikolai Santiago M.D.
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
## 2021-11-08 PROCEDURE — 700102 HCHG RX REV CODE 250 W/ 637 OVERRIDE(OP): Performed by: EMERGENCY MEDICINE

## 2021-11-08 PROCEDURE — A9270 NON-COVERED ITEM OR SERVICE: HCPCS | Performed by: PSYCHIATRY & NEUROLOGY

## 2021-11-08 PROCEDURE — 700102 HCHG RX REV CODE 250 W/ 637 OVERRIDE(OP): Performed by: STUDENT IN AN ORGANIZED HEALTH CARE EDUCATION/TRAINING PROGRAM

## 2021-11-08 PROCEDURE — A9270 NON-COVERED ITEM OR SERVICE: HCPCS | Performed by: STUDENT IN AN ORGANIZED HEALTH CARE EDUCATION/TRAINING PROGRAM

## 2021-11-08 PROCEDURE — 700102 HCHG RX REV CODE 250 W/ 637 OVERRIDE(OP): Performed by: PSYCHIATRY & NEUROLOGY

## 2021-11-08 PROCEDURE — A9270 NON-COVERED ITEM OR SERVICE: HCPCS | Performed by: EMERGENCY MEDICINE

## 2021-11-08 RX ADMIN — LORAZEPAM 1 MG: 1 TABLET ORAL at 18:52

## 2021-11-08 RX ADMIN — CHLORPROMAZINE HYDROCHLORIDE 100 MG: 50 TABLET, SUGAR COATED ORAL at 18:51

## 2021-11-08 RX ADMIN — DIVALPROEX SODIUM 500 MG: 500 TABLET, DELAYED RELEASE ORAL at 11:56

## 2021-11-08 NOTE — DISCHARGE PLANNING
Legal Hold     Referral: Legal Hold Court     Intervention: Pt presented for legal hold meeting with  via video conferencing to discuss legal options of contesting hold and meeting with the court doctors tomorrow afternoon, or not contesting legal hold and continuing the hold for one week.  advised that pt's legal hold will be be continued for one week (11/18).       Plan: Pt's legal hold has been continued for one week. Pt awaiting transfer to an in patient psych facility.

## 2021-11-08 NOTE — PROGRESS NOTES
"ED Observation Progress Note    Date of Service: 21    Interval History  The patient today has been cooperative today awaiting transfer to psychiatric facility.  She is kept on the legal hold.  I reviewed the records of the psychiatric evaluation from yesterday and she is still disorganized with her thoughts and will require inpatient treatment.    Physical Exam  /106   Pulse 92   Temp 36.7 °C (98 °F)   Resp 16   Ht 1.702 m (5' 7\")   Wt 76.2 kg (168 lb)   SpO2 98%   BMI 26.31 kg/m² .    Constitutional: Awake and alert. Nontoxic  HENT:  Grossly normal  Eyes: Grossly normal  Neck: Normal range of motion  Cardiovascular: Normal heart rate   Thorax & Lungs: No respiratory distress  Abdomen: Nontender  Skin:  No pathologic rash.   Extremities: Well perfused  Psychiatric: Affect normal    Labs  Results for orders placed or performed during the hospital encounter of 10/30/21   URINE DRUG SCREEN   Result Value Ref Range    Amphetamines Urine Positive (A) Negative    Barbiturates Negative Negative    Benzodiazepines Negative Negative    Cocaine Metabolite Negative Negative    Methadone Negative Negative    Opiates Negative Negative    Oxycodone Negative Negative    Phencyclidine -Pcp Negative Negative    Propoxyphene Negative Negative    Cannabinoid Metab Negative Negative   POC BREATHALIZER   Result Value Ref Range    POC Breathalizer 0.00 0.00 - 0.01 Percent   EKG   Result Value Ref Range    Report       [facility] Emergency Dept.    Test Date:  2021  Pt Name:    GÉNESIS THACKER               Department: ER  MRN:        [id number]                      Room:       [facility]  Gender:     Female                       Technician: [id number]  :        1985                   Requested By:SHAHANA NI  Order #:    [id number]                    Reading MD:    Measurements  Intervals                                Axis  Rate:       96                           P:          36  WI:         " 176                          QRS:        54  QRSD:       88                           T:          50  QT:         364  QTc:        460    Interpretive Statements  SINUS RHYTHM  Compared to ECG 01/20/2021 14:12:26  T-wave abnormality no longer present         Radiology  No orders to display       Problem List  1.  Psychosis  2.  Methamphetamine use    The patient is signed out to oncoming ERP Dr. Orlin Cain in stable condition.      Electronically signed by: Dayne Nguyen M.D., 11/8/2021 1:19 PM

## 2021-11-09 PROCEDURE — 700102 HCHG RX REV CODE 250 W/ 637 OVERRIDE(OP): Performed by: STUDENT IN AN ORGANIZED HEALTH CARE EDUCATION/TRAINING PROGRAM

## 2021-11-09 PROCEDURE — 700102 HCHG RX REV CODE 250 W/ 637 OVERRIDE(OP): Performed by: PSYCHIATRY & NEUROLOGY

## 2021-11-09 PROCEDURE — A9270 NON-COVERED ITEM OR SERVICE: HCPCS | Performed by: STUDENT IN AN ORGANIZED HEALTH CARE EDUCATION/TRAINING PROGRAM

## 2021-11-09 PROCEDURE — 700102 HCHG RX REV CODE 250 W/ 637 OVERRIDE(OP)

## 2021-11-09 PROCEDURE — A9270 NON-COVERED ITEM OR SERVICE: HCPCS | Performed by: PSYCHIATRY & NEUROLOGY

## 2021-11-09 PROCEDURE — A9270 NON-COVERED ITEM OR SERVICE: HCPCS

## 2021-11-09 RX ORDER — DIVALPROEX SODIUM 500 MG/1
TABLET, DELAYED RELEASE ORAL
Status: DISPENSED
Start: 2021-11-09 | End: 2021-11-10

## 2021-11-09 RX ORDER — CHLORPROMAZINE HYDROCHLORIDE 50 MG/1
TABLET, FILM COATED ORAL
Status: COMPLETED
Start: 2021-11-09 | End: 2021-11-09

## 2021-11-09 RX ADMIN — CHLORPROMAZINE HYDROCHLORIDE 100 MG: 50 TABLET, SUGAR COATED ORAL at 16:12

## 2021-11-09 RX ADMIN — CHLORPROMAZINE HYDROCHLORIDE 150 MG: 50 TABLET, SUGAR COATED ORAL at 20:16

## 2021-11-09 RX ADMIN — DIVALPROEX SODIUM 500 MG: 500 TABLET, DELAYED RELEASE ORAL at 20:16

## 2021-11-09 RX ADMIN — CHLORPROMAZINE HYDROCHLORIDE 100 MG: 50 TABLET, SUGAR COATED ORAL at 06:46

## 2021-11-09 NOTE — DISCHARGE PLANNING
Alert Team:    Patient rested throughout the night without incident; no PRN medications requested or required during PM shift. Patient compliant with scheduled medication regime. Legal hold extended and awaiting transfer to psychiatric facility. Patient noted to be calm and cooperative, no SI/HI; Q 15 checks appropriate at present with ability for bedside RN to escalate to 1:1 if acuity increases. Alert Team will continue to monitor.

## 2021-11-09 NOTE — ED PROVIDER NOTES
"ED Provider Note    ED Observation Progress Note    Date of Service: 11/09/21    Interval History  No concerns noted throughout the day today.  She remains on a legal hold.  She did refuse her Depakote and chlorpromazine last night.  On my assessment she is comfortable, and cooperative.  She has been up and ambulatory to use the restroom.  She has no concerns.  She remains on a legal hold with plan for transfer for inpatient care.  Alert team will continue to monitor.    Physical Exam  /76   Pulse 96   Temp 36.8 °C (98.2 °F)   Resp 16   Ht 1.702 m (5' 7\")   Wt 76.2 kg (168 lb)   SpO2 99%   BMI 26.31 kg/m² .    Constitutional: Awake and alert. Nontoxic  HENT: Atraumatic  Eyes: Grossly normal  Neck: Normal range of motion  Cardiovascular: Normal heart rate   Thorax & Lungs: No respiratory distress, no increased work of breathing, no tachycardia  Abdomen: Nontender  Skin:  No pathologic rash.   Extremities: Well perfused, no lower extremity edema  Psychiatric: Pleasant affect, calm and cooperative, no concerns voiced at this time    Labs  Prior labs are reviewed.    Radiology  No orders to display       Problem List  1.  Psychosis  2.  Methamphetamine use      Electronically signed by: Danielle Castillo M.D., 11/9/2021 3:21 PM      "

## 2021-11-10 LAB
SARS-COV+SARS-COV-2 AG RESP QL IA.RAPID: NOTDETECTED
SPECIMEN SOURCE: NORMAL

## 2021-11-10 PROCEDURE — C9803 HOPD COVID-19 SPEC COLLECT: HCPCS | Performed by: EMERGENCY MEDICINE

## 2021-11-10 PROCEDURE — 700102 HCHG RX REV CODE 250 W/ 637 OVERRIDE(OP): Performed by: STUDENT IN AN ORGANIZED HEALTH CARE EDUCATION/TRAINING PROGRAM

## 2021-11-10 PROCEDURE — 87426 SARSCOV CORONAVIRUS AG IA: CPT

## 2021-11-10 PROCEDURE — A9270 NON-COVERED ITEM OR SERVICE: HCPCS | Performed by: PSYCHIATRY & NEUROLOGY

## 2021-11-10 PROCEDURE — 700102 HCHG RX REV CODE 250 W/ 637 OVERRIDE(OP): Performed by: PSYCHIATRY & NEUROLOGY

## 2021-11-10 PROCEDURE — A9270 NON-COVERED ITEM OR SERVICE: HCPCS | Performed by: STUDENT IN AN ORGANIZED HEALTH CARE EDUCATION/TRAINING PROGRAM

## 2021-11-10 PROCEDURE — A9270 NON-COVERED ITEM OR SERVICE: HCPCS | Performed by: EMERGENCY MEDICINE

## 2021-11-10 PROCEDURE — 700102 HCHG RX REV CODE 250 W/ 637 OVERRIDE(OP): Performed by: EMERGENCY MEDICINE

## 2021-11-10 RX ADMIN — DIVALPROEX SODIUM 500 MG: 500 TABLET, DELAYED RELEASE ORAL at 11:51

## 2021-11-10 RX ADMIN — LORAZEPAM 1 MG: 1 TABLET ORAL at 17:49

## 2021-11-10 RX ADMIN — CHLORPROMAZINE HYDROCHLORIDE 100 MG: 50 TABLET, SUGAR COATED ORAL at 14:54

## 2021-11-10 RX ADMIN — CHLORPROMAZINE HYDROCHLORIDE 150 MG: 50 TABLET, SUGAR COATED ORAL at 20:18

## 2021-11-10 RX ADMIN — DIVALPROEX SODIUM 500 MG: 500 TABLET, DELAYED RELEASE ORAL at 17:49

## 2021-11-10 NOTE — DISCHARGE PLANNING
SW has verified with [facility] they have Pt referral and Pt is still pending admission, they do not have bed availability.They have been updated Pt has been declined by all other facilities.     [facility] contacted- Message left no call back.

## 2021-11-10 NOTE — DISCHARGE PLANNING
Alert Team:    Spoke with Bernice at [facility]; requesting to refax referral with updated psychiatric notes. Also noted for staffing that although patient was previously declined due to aggression history but patient has been calm and cooperative with ER staff; psychiatry continues to recommend inpatient stabilization and legal hold extended.     Bernice confirmed she received updated referral.

## 2021-11-10 NOTE — DISCHARGE PLANNING
Alert Team:     Patient rested throughout the night without incident with 1:1 sitter observing patient safety outside of room; no PRN medications requested or required during PM shift. Patient compliant with scheduled medication regime. Legal hold extended and awaiting transfer to psychiatric facility.  Alert Team will continue to monitor.

## 2021-11-10 NOTE — ED PROVIDER NOTES
"ED Provider Note    ED Observation Progress Note    Date of Service: 11/10/21    Interval History  I told that there have been no adverse events over the past 12 hours. Briefly this is a 36-year-old female with psychosis, methamphetamine abuse. She has exhausted most of the resources in the area. Still awaiting transfer to [facility]. Legal hold was extended until 11/18.    Physical Exam  /91   Pulse (!) 103   Temp 36.4 °C (97.6 °F) (Temporal)   Resp 19   Ht 1.702 m (5' 7\")   Wt 76.2 kg (168 lb)   SpO2 98%   BMI 26.31 kg/m² .    Constitutional: Awake and alert. Nontoxic, laying on gurney, appears bored but otherwise nontoxic in appearance  HENT:  Grossly normal  Eyes: Grossly normal  Neck: Normal range of motion  Cardiovascular: Normal heart rate   Thorax & Lungs: No respiratory distress  Abdomen: Nontender  Skin:  No pathologic rash.   Extremities: Well perfused  Psychiatric: Affect normal    Labs  Results for orders placed or performed during the hospital encounter of 10/30/21   URINE DRUG SCREEN   Result Value Ref Range    Amphetamines Urine Positive (A) Negative    Barbiturates Negative Negative    Benzodiazepines Negative Negative    Cocaine Metabolite Negative Negative    Methadone Negative Negative    Opiates Negative Negative    Oxycodone Negative Negative    Phencyclidine -Pcp Negative Negative    Propoxyphene Negative Negative    Cannabinoid Metab Negative Negative   SARS-COV Antigen FLORINDA: Collect dry nasal swab   Result Value Ref Range    SARS-CoV-2 Source Nasal Swab     SARS-COV ANTIGEN FLORINDA NotDetected NotDetected   POC BREATHALIZER   Result Value Ref Range    POC Breathalizer 0.00 0.00 - 0.01 Percent   EKG   Result Value Ref Range    Report       [facility] Emergency Dept.    Test Date:  2021-11-05  Pt Name:    GÉNESIS THACKER               Department: ER  MRN:        [id number]                      Room:       [facility]  Gender:     Female                       Technician: " [id number]  :        1985                   Requested By:SHAHANA NI  Order #:    [id number]                    Reading MD:    Measurements  Intervals                                Axis  Rate:       96                           P:          36  TX:         176                          QRS:        54  QRSD:       88                           T:          50  QT:         364  QTc:        460    Interpretive Statements  SINUS RHYTHM  Compared to ECG 2021 14:12:26  T-wave abnormality no longer present         Radiology  No orders to display       Problem List  1. Psychosis, methamphetamine abuse      Electronically signed by: Armando Ding M.D., 11/10/2021 2:56 PM

## 2021-11-10 NOTE — CONSULTS
"[facility]  PSYCHIATRIC FOLLOW-UP NOTE    Name: Tierney Mayer  MRN: [id number]  : 1985  Age: 36 y.o.  Date of assessment: 11/10/2021  PCP: Pcp Pt States None  Persons in attendance: Patient      This evaluation was conducted via Zoom using secure and encrypted videoconferencing technology. The patient was physically located at [facility] in [place], [place]. The patient was presented by a medical professional at the originating site.  The patient's identity was confirmed and verbal consent was obtained for this telemedicine encounter.      Room: [address]    Chief Complaint   Patient presents with   • Psych Eval   • Legal 2000         Interval History:   Chart reviewed. Patient is seen at bedside through telemedicine. Patient does not recall writer from past visits. She says that she is \"fine\". She says that she has now been here \"three months\". Patient continues with disorganized thought process, answers inappropriately. When asked if she is adherent with meds, she replies, \"I had a cigarette at the [facility] [place], he wrote a note from a tree\". She cannot take care of herself in the community, when asked what she would do for food, she reports \"the hospital gave me food, I'm full, I try not to eat\". Patient denies si/hi, denies ahvh but is seen actively responding to internal stimuli. She denies medication side effects.         REVIEW OF SYSTEMS:        ROS, cannot review due to patient's mental status.     Medical Records/Labs/Diagnostic Tests Reviewed:     Lab Results   Component Value Date/Time    WBC 10.6 2021 02:10 PM    RBC 4.63 2021 02:10 PM    HEMOGLOBIN 13.9 2021 02:10 PM    HEMATOCRIT 42.7 2021 02:10 PM    MCV 92.2 2021 02:10 PM    MCH 30.0 2021 02:10 PM    MCHC 32.6 (L) 2021 02:10 PM    MPV 10.0 2021 02:10 PM    NEUTSPOLYS 65.00 2021 02:10 PM    LYMPHOCYTES 26.60 2021 02:10 PM    MONOCYTES 7.00 2021 02:10 PM " "   EOSINOPHILS 0.70 2021 02:10 PM    BASOPHILS 0.40 2021 02:10 PM         Lab Results   Component Value Date/Time    SODIUM 136 2019 05:40 AM    POTASSIUM 3.9 2019 05:40 AM    CHLORIDE 107 2019 05:40 AM    CO2 21 2019 05:40 AM    GLUCOSE 99 2019 05:40 AM    BUN 19 2019 05:40 AM    CREATININE 0.72 2019 05:40 AM         Results for orders placed or performed during the hospital encounter of 10/30/21   EKG   Result Value Ref Range    Report       [facility] Emergency Dept.    Test Date:  2021  Pt Name:    GÉNESIS THACKER               Department: ER  MRN:        [id number]                      Room:       [facility]  Gender:     Female                       Technician: [id number]  :        1985                   Requested By:SHAHANA NI  Order #:    [id number]                    Reading MD:    Measurements  Intervals                                Axis  Rate:       96                           P:          36  VT:         176                          QRS:        54  QRSD:       88                           T:          50  QT:         364  QTc:        460    Interpretive Statements  SINUS RHYTHM  Compared to ECG 2021 14:12:26  T-wave abnormality no longer present         No results found for this or any previous visit from the past 1000 days.        PSYCHIATRIC EXAMINATION/MENTAL STATUS  /91   Pulse 95   Temp 36.3 °C (97.3 °F) (Temporal)   Resp 16   Ht 1.702 m (5' 7\")   Wt 76.2 kg (168 lb)   SpO2 99%   BMI 26.31 kg/m²   Participation: Active verbal participation  Grooming:hospital gown  Orientation: Alert and Confused  Eye contact: Intense  Behavior:Hyperactive   Mood: Elated  Affect: Full range and Expansive  Thought process: Tangential and Loose associations  Thought content:  Obsessions, Ideas of reference and Evidence of delusion  Speech: Hypertalkative  Perception:  Illusions, Evidence of auditory hallucination and " Evidence of hallucination  Memory:  Poor memory for chronology of events  Insight: Poor  Judgment: Poor  Family/couple interaction observations:   Other:    Current risk:    Suicide: High   Homicide: Low   Self-harm: Moderate  Relapse: High  Other:   Crisis Safety Plan reviewed?No  If evidence of imminent risk is present, intervention/plan:    Medical Records/Labs/Diagnostic Tests Ordered:   DIAGNOSTIC IMPRESSION(S):  1. Acute psychosis (HCC)    2. Methamphetamine abuse (HCC)           ASSESSMENT AND PLAN:  36 y.o. female with hx of schizoaffective disorder, admitted on 10/30/2021 for psychosis, collin and homicidal ideation, psychiatry consulted for evaluation. On exam the patient continues to exhibit manic and psychotic symptoms. She continues to be disorganized and disoriented and requires further stabilization. she is tolerating thorazine, denies side effects, improved mentation, will benefit from increased dose. She may also be a candidate for dual antipsychotic therapy, consider adding Abilify for augmentation.           DSM-5 Dx  Schizoaffective disorder, bipolar type  Stimulant use disorder     PLAN  - Medications: continue Thorazine to 100mg PO BID and continue Thorazine HS to 150mg   continue Depakote 500mg PO TID  Continue Legal 2000 and transfer to inpatient psychiatric facility once medically cleared and when bed becomes available.    Extend legal hold, court is continuing legal hold until 11/18     - Labs reviewed  - Imaging reviewed  - EKG reviewed, Qtc 460 on 11/5/2021     Legal Hold: extend  Sitter: 1:1  Visitors: no  Personal Belongings: no  Phone: no      Nikolai Santiago M.D.

## 2021-11-10 NOTE — DISCHARGE PLANNING
Alert Team  Pt continues to await transfer to [facility]; [place] Medicaid and [facility] does not allow her any more.  Pt has been noncompliant with her scheduled psych meds.  She did not contest her LH, so it was extended til 11/18.  On 11/4, [facility] reported she was #4 on their list.  TM

## 2021-11-10 NOTE — DISCHARGE PLANNING
Alert Team  Pt continues to await transfer to [facility]; FFS Medicaid and [facility]'s does not allow her any more.  Pt has been sporadically compliant with her scheduled psych meds.  She did not contest her LH, so it was extended til 11/18.  On 11/4, [facility] reported she was #4 on their list.  I noted a different patient, who had been here just a few days, got transferred to [facility] before this pt; requested SW look into why.  Psychiatry team followed up with her today; LH continued and meds to continue as they were.  VITALY

## 2021-11-10 NOTE — DISCHARGE PLANNING
Received Stipulation and Order from the court continuing pt's legal hold until 11/18. Sent copy to BECCA Lyons, scanned copy into pt's chart.

## 2021-11-11 PROCEDURE — A9270 NON-COVERED ITEM OR SERVICE: HCPCS | Performed by: PSYCHIATRY & NEUROLOGY

## 2021-11-11 PROCEDURE — 700102 HCHG RX REV CODE 250 W/ 637 OVERRIDE(OP): Performed by: STUDENT IN AN ORGANIZED HEALTH CARE EDUCATION/TRAINING PROGRAM

## 2021-11-11 PROCEDURE — A9270 NON-COVERED ITEM OR SERVICE: HCPCS | Performed by: STUDENT IN AN ORGANIZED HEALTH CARE EDUCATION/TRAINING PROGRAM

## 2021-11-11 PROCEDURE — 700102 HCHG RX REV CODE 250 W/ 637 OVERRIDE(OP): Performed by: PSYCHIATRY & NEUROLOGY

## 2021-11-11 RX ADMIN — DIVALPROEX SODIUM 500 MG: 500 TABLET, DELAYED RELEASE ORAL at 18:00

## 2021-11-11 RX ADMIN — CHLORPROMAZINE HYDROCHLORIDE 100 MG: 50 TABLET, SUGAR COATED ORAL at 15:31

## 2021-11-11 NOTE — ED PROVIDER NOTES
"ED Provider Note    ED Observation Progress Note    Date of Service: 21    Interval History  Patient has essentially done well overnight.  There is been no acute events.  Current plan seems to be that the patient is on the list for [facility] adult mental health services.  Continue weight transfer for inpatient psychiatric treatment.    Physical Exam  /89   Pulse 100   Temp 36.3 °C (97.4 °F) (Temporal)   Resp 16   Ht 1.702 m (5' 7\")   Wt 76.2 kg (168 lb)   SpO2 96%   BMI 26.31 kg/m² .    Constitutional: Awake and alert. Nontoxic  HENT:  Grossly normal  Eyes: Grossly normal  Neck: Normal range of motion  Cardiovascular: Normal heart rate   Thorax & Lungs: No respiratory distress  Abdomen: Nontender  Skin:  No pathologic rash.   Extremities: Well perfused  Psychiatric: Somewhat short, occasionally aggressive affect.    Labs  Results for orders placed or performed during the hospital encounter of 10/30/21   URINE DRUG SCREEN   Result Value Ref Range    Amphetamines Urine Positive (A) Negative    Barbiturates Negative Negative    Benzodiazepines Negative Negative    Cocaine Metabolite Negative Negative    Methadone Negative Negative    Opiates Negative Negative    Oxycodone Negative Negative    Phencyclidine -Pcp Negative Negative    Propoxyphene Negative Negative    Cannabinoid Metab Negative Negative   SARS-COV Antigen FLORINDA: Collect dry nasal swab   Result Value Ref Range    SARS-CoV-2 Source Nasal Swab     SARS-COV ANTIGEN FLORINDA NotDetected NotDetected   POC BREATHALIZER   Result Value Ref Range    POC Breathalizer 0.00 0.00 - 0.01 Percent   EKG   Result Value Ref Range    Report       [facility] Emergency Dept.    Test Date:  2021  Pt Name:    GÉNESIS THACKER               Department: ER  MRN:        [id number]                      Room:       [facility]  Gender:     Female                       Technician: [id number]  :        1985                   Requested By:SHAHANA" RAINE  Order #:    [id number]                    Reading MD:    Measurements  Intervals                                Axis  Rate:       96                           P:          36  IA:         176                          QRS:        54  QRSD:       88                           T:          50  QT:         364  QTc:        460    Interpretive Statements  SINUS RHYTHM  Compared to ECG 01/20/2021 14:12:26  T-wave abnormality no longer present         Radiology  No orders to display       Problem List  1. Acute psychosis (HCC)    2. Methamphetamine abuse (HCC)            Electronically signed by: Christopher Stockton M.D., 11/11/2021 11:26 AM

## 2021-11-11 NOTE — DISCHARGE PLANNING
MSW spoke to Janes at [facility].They cannot accept pt today due to lack of beds. Pt is still on their list for admission.

## 2021-11-12 PROCEDURE — A9270 NON-COVERED ITEM OR SERVICE: HCPCS | Performed by: EMERGENCY MEDICINE

## 2021-11-12 PROCEDURE — A9270 NON-COVERED ITEM OR SERVICE: HCPCS | Performed by: STUDENT IN AN ORGANIZED HEALTH CARE EDUCATION/TRAINING PROGRAM

## 2021-11-12 PROCEDURE — 700102 HCHG RX REV CODE 250 W/ 637 OVERRIDE(OP): Performed by: PSYCHIATRY & NEUROLOGY

## 2021-11-12 PROCEDURE — 700102 HCHG RX REV CODE 250 W/ 637 OVERRIDE(OP): Performed by: STUDENT IN AN ORGANIZED HEALTH CARE EDUCATION/TRAINING PROGRAM

## 2021-11-12 PROCEDURE — A9270 NON-COVERED ITEM OR SERVICE: HCPCS | Performed by: PSYCHIATRY & NEUROLOGY

## 2021-11-12 PROCEDURE — 700102 HCHG RX REV CODE 250 W/ 637 OVERRIDE(OP): Performed by: EMERGENCY MEDICINE

## 2021-11-12 RX ADMIN — LORAZEPAM 1 MG: 1 TABLET ORAL at 12:39

## 2021-11-12 RX ADMIN — DIVALPROEX SODIUM 500 MG: 500 TABLET, DELAYED RELEASE ORAL at 05:59

## 2021-11-12 RX ADMIN — LORAZEPAM 1 MG: 1 TABLET ORAL at 20:13

## 2021-11-12 RX ADMIN — DIVALPROEX SODIUM 500 MG: 500 TABLET, DELAYED RELEASE ORAL at 12:43

## 2021-11-12 RX ADMIN — DIVALPROEX SODIUM 500 MG: 500 TABLET, DELAYED RELEASE ORAL at 17:43

## 2021-11-12 RX ADMIN — CHLORPROMAZINE HYDROCHLORIDE 150 MG: 50 TABLET, SUGAR COATED ORAL at 20:13

## 2021-11-12 RX ADMIN — CHLORPROMAZINE HYDROCHLORIDE 100 MG: 50 TABLET, SUGAR COATED ORAL at 05:59

## 2021-11-12 RX ADMIN — CHLORPROMAZINE HYDROCHLORIDE 100 MG: 50 TABLET, SUGAR COATED ORAL at 17:43

## 2021-11-12 NOTE — PROGRESS NOTES
"ED Provider Progress Note    ED Observation Progress Note    Date of Service: 21    Interval History  Patient has been resting comfortably still awaiting for transfer to [facility] however, there are no beds at the facility.  The patient will continue to be on a hold until appropriate transfer.  Patient was seen on the  by psychiatry who continue the legal hold.    Physical Exam  /91   Pulse 99   Temp 36.2 °C (97.1 °F) (Temporal)   Resp 16   Ht 1.702 m (5' 7\")   Wt 76.2 kg (168 lb)   SpO2 98%   BMI 26.31 kg/m² .    Constitutional: Awake and alert. Nontoxic  HENT:  Grossly normal  Eyes: Grossly normal  Neck: Normal range of motion  Cardiovascular: Normal heart rate   Thorax & Lungs: No respiratory distress  Abdomen: Nontender  Skin:  No pathologic rash.   Extremities: Well perfused  Psychiatric: Affect normal    Labs  Results for orders placed or performed during the hospital encounter of 10/30/21   URINE DRUG SCREEN   Result Value Ref Range    Amphetamines Urine Positive (A) Negative    Barbiturates Negative Negative    Benzodiazepines Negative Negative    Cocaine Metabolite Negative Negative    Methadone Negative Negative    Opiates Negative Negative    Oxycodone Negative Negative    Phencyclidine -Pcp Negative Negative    Propoxyphene Negative Negative    Cannabinoid Metab Negative Negative   SARS-COV Antigen FLORINDA: Collect dry nasal swab   Result Value Ref Range    SARS-CoV-2 Source Nasal Swab     SARS-COV ANTIGEN FLORINDA NotDetected NotDetected   POC BREATHALIZER   Result Value Ref Range    POC Breathalizer 0.00 0.00 - 0.01 Percent   EKG   Result Value Ref Range    Report       [facility] Emergency Dept.    Test Date:  2021  Pt Name:    GÉNESIS THACKER               Department: ER  MRN:        [id number]                      Room:       [facility]  Gender:     Female                       Technician: [id number]  :        1985                   Requested By:SHAHANA" NI  Order #:    [id number]                    Reading MD:    Measurements  Intervals                                Axis  Rate:       96                           P:          36  OR:         176                          QRS:        54  QRSD:       88                           T:          50  QT:         364  QTc:        460    Interpretive Statements  SINUS RHYTHM  Compared to ECG 01/20/2021 14:12:26  T-wave abnormality no longer present         Radiology  No orders to display       Problem List  1.   1. Acute psychosis (HCC)   continue treatment with medications and await for transfer to an appropriate psychiatric facility   2. Methamphetamine abuse (HCC)             Electronically signed by: Anthony Padilla M.D., 11/12/2021 8:36 AM

## 2021-11-13 PROCEDURE — 96372 THER/PROPH/DIAG INJ SC/IM: CPT

## 2021-11-13 PROCEDURE — 700102 HCHG RX REV CODE 250 W/ 637 OVERRIDE(OP): Performed by: PSYCHIATRY & NEUROLOGY

## 2021-11-13 PROCEDURE — A9270 NON-COVERED ITEM OR SERVICE: HCPCS | Performed by: PSYCHIATRY & NEUROLOGY

## 2021-11-13 PROCEDURE — 700102 HCHG RX REV CODE 250 W/ 637 OVERRIDE(OP): Performed by: EMERGENCY MEDICINE

## 2021-11-13 PROCEDURE — A9270 NON-COVERED ITEM OR SERVICE: HCPCS | Performed by: STUDENT IN AN ORGANIZED HEALTH CARE EDUCATION/TRAINING PROGRAM

## 2021-11-13 PROCEDURE — A9270 NON-COVERED ITEM OR SERVICE: HCPCS | Performed by: EMERGENCY MEDICINE

## 2021-11-13 PROCEDURE — 700111 HCHG RX REV CODE 636 W/ 250 OVERRIDE (IP): Performed by: EMERGENCY MEDICINE

## 2021-11-13 PROCEDURE — 700102 HCHG RX REV CODE 250 W/ 637 OVERRIDE(OP): Performed by: STUDENT IN AN ORGANIZED HEALTH CARE EDUCATION/TRAINING PROGRAM

## 2021-11-13 RX ADMIN — CHLORPROMAZINE HYDROCHLORIDE 150 MG: 50 TABLET, SUGAR COATED ORAL at 21:26

## 2021-11-13 RX ADMIN — CHLORPROMAZINE HYDROCHLORIDE 100 MG: 50 TABLET, SUGAR COATED ORAL at 06:12

## 2021-11-13 RX ADMIN — LORAZEPAM 1 MG: 1 TABLET ORAL at 15:24

## 2021-11-13 RX ADMIN — HALOPERIDOL LACTATE 5 MG: 5 INJECTION, SOLUTION INTRAMUSCULAR at 22:34

## 2021-11-13 RX ADMIN — CHLORPROMAZINE HYDROCHLORIDE 100 MG: 50 TABLET, SUGAR COATED ORAL at 15:02

## 2021-11-13 RX ADMIN — HALOPERIDOL LACTATE 5 MG: 5 INJECTION, SOLUTION INTRAMUSCULAR at 16:03

## 2021-11-13 RX ADMIN — MIDAZOLAM HYDROCHLORIDE 2 MG: 1 INJECTION, SOLUTION INTRAMUSCULAR; INTRAVENOUS at 22:34

## 2021-11-13 RX ADMIN — DIVALPROEX SODIUM 500 MG: 500 TABLET, DELAYED RELEASE ORAL at 12:59

## 2021-11-13 RX ADMIN — DIVALPROEX SODIUM 500 MG: 500 TABLET, DELAYED RELEASE ORAL at 18:27

## 2021-11-13 RX ADMIN — DIVALPROEX SODIUM 500 MG: 500 TABLET, DELAYED RELEASE ORAL at 06:12

## 2021-11-13 NOTE — CONSULTS
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 History of previous admissions to [facility] and states \"that's my home\" in serious manner. Believes she is going through menopause \"I have a gray hairs... and I need to stop having sex so I can see... maybe a nursing home or go back to [facility]\" [facility]      PROS: Does endorse moderate anxiety without panic, denies history of collin nor depression but this may not be relaible. Does endorse methamphetamine use and cannot quantify nor give temporal details of use.     Allergies: Clonazepam, Paliperidone, Olanzapine     Psychiatric Medications:  Declines to provide today     Past Medical History:  Schizoaffective  Methamphetamine UD  Cannabis UD     Past Psychiatric History:  Schizoaffective  Methamphetamine UD  Cannabis UD    Social History: unable to obtain     Legal History: unable to obtain     Mental Status Exam:  General & Appearance: Age appropriate SWF in ER bed with lights off.  Orientation: Alert but not oriented to time, nor situation, \"hospital\" to place.  Mood: “good”.  Affect: Labile. Mildly fidgeting and anxious during appointment.  Thought Process: Tangential mostly but occassionally bordering word salad.  Thought Content: Denies suicidal nor homicidal ideations, denied auditory nor visual hallucinations but appears to be responding to internal stimuli.  Judgment/Insight: Poor.  Speech: Normal volume, content, rate and prosody.  Attention: Poor.  Attitude: Guarded.  Intelligence: Average.  MMSE: deferred this visit     Labs:  UDS with meth and cannabis, CBC and CMP unremarkable, UDS negative, COVID negative. "      Rads:   No recent imaging.     Assessment and Plan:  Schizoaffective  Methamphetamine Use Disorder  Cannabis UD  -Recommend increasing Thorazine 100mg BID and 150mg qHS to 200mg TID for psychosis.  -Recommend continuing Depakote 500mg TID for mood stabilization.  -Patient L2k'd and recommend extension with inpatient psychiatric placement for stabilization.  -Psych CL will continue following patient while at [facility].  -Medication reconciliation was completed.  -Reviewed safety plan - 911, ER, PCM, MHC, Suicide Crisis Line, Nursing staff while at [facility].  -Legal Hold: extend  -Visitors: yes  -Personal belongings: yes  -Observation level: line of sight, tele not appropriate  -Instruction: Please assist with inpatient Psychiatric admission/transfer.  ///

## 2021-11-13 NOTE — CONSULTS
BRIEF INPATIENT BEHAVIORAL HEALTH NOTE      IP consult to psychiatry was completed by Dr. Smith.    Yolanda Soler [facility]  Behavioral Health Therapist

## 2021-11-13 NOTE — PROGRESS NOTES
"ED Provider Progress Note  ED Observation Progress Note    Date of Service: 11/13/21    Interval History  Patient's care was transferred to me at change of shift.  Patient is currently awaiting transfer to [facility] mental health services.  No issues over the night. She did take her morning meds.  Upon evaluation this morning, she is comfortably resting on the bed.  She tells me that she wants me to turn off the light because she is \"trying to sleep.\"  Vital signs are normal and stable.  She has no complaints or concerns.  She says she is getting enough to eat and drink.    Physical Exam  /73   Pulse 78   Temp 36.3 °C (97.4 °F) (Temporal)   Resp 15   Ht 1.702 m (5' 7\")   Wt 76.2 kg (168 lb)   SpO2 97%   BMI 26.31 kg/m² .    Constitutional: Awake and alert. Nontoxic  HENT:  Grossly normal  Eyes: Grossly normal  Neck: Normal range of motion  Cardiovascular: Normal heart rate   Thorax & Lungs: No respiratory distress  Abdomen: Nontender  Skin:  No pathologic rash.   Extremities: Well perfused  Psychiatric: Affect normal    Labs  Results for orders placed or performed during the hospital encounter of 10/30/21   URINE DRUG SCREEN   Result Value Ref Range    Amphetamines Urine Positive (A) Negative    Barbiturates Negative Negative    Benzodiazepines Negative Negative    Cocaine Metabolite Negative Negative    Methadone Negative Negative    Opiates Negative Negative    Oxycodone Negative Negative    Phencyclidine -Pcp Negative Negative    Propoxyphene Negative Negative    Cannabinoid Metab Negative Negative   SARS-COV Antigen FLORINDA: Collect dry nasal swab   Result Value Ref Range    SARS-CoV-2 Source Nasal Swab     SARS-COV ANTIGEN FLORINDA NotDetected NotDetected   POC BREATHALIZER   Result Value Ref Range    POC Breathalizer 0.00 0.00 - 0.01 Percent   EKG   Result Value Ref Range    Report       [facility] Emergency Dept.    Test Date:  2021-11-05  Pt Name:    GÉNESIS THACKER         "       Department: ER  MRN:        [id number]                      Room:       [facility]  Gender:     Female                       Technician: [id number]  :        1985                   Requested By:SHAHANA NI  Order #:    [id number]                    Reading MD:    Measurements  Intervals                                Axis  Rate:       96                           P:          36  ID:         176                          QRS:        54  QRSD:       88                           T:          50  QT:         364  QTc:        460    Interpretive Statements  SINUS RHYTHM  Compared to ECG 2021 14:12:26  T-wave abnormality no longer present         Radiology  No orders to display       Problem List  1.   1. Acute psychosis (HCC)     2. Methamphetamine abuse (HCC)       This dictation has been created using voice recognition software. The accuracy of the dictation is limited by the abilities of the software. I expect there may be some errors of grammar and possibly content. I made every attempt to manually correct the errors within my dictation. However, errors related to voice recognition software may still exist and should be interpreted within the appropriate context.      Electronically signed by: Tonya Edmonds M.D., 2021 8:06 AM

## 2021-11-14 LAB
APPEARANCE UR: CLEAR
BACTERIA #/AREA URNS HPF: ABNORMAL /HPF
BILIRUB UR QL STRIP.AUTO: NEGATIVE
COLOR UR: YELLOW
EPI CELLS #/AREA URNS HPF: ABNORMAL /HPF
GLUCOSE UR STRIP.AUTO-MCNC: NEGATIVE MG/DL
KETONES UR STRIP.AUTO-MCNC: NEGATIVE MG/DL
LEUKOCYTE ESTERASE UR QL STRIP.AUTO: ABNORMAL
MICRO URNS: ABNORMAL
NITRITE UR QL STRIP.AUTO: NEGATIVE
PH UR STRIP.AUTO: 7.5 [PH] (ref 5–8)
PROT UR QL STRIP: NEGATIVE MG/DL
RBC # URNS HPF: ABNORMAL /HPF
RBC UR QL AUTO: NEGATIVE
SP GR UR STRIP.AUTO: 1
UROBILINOGEN UR STRIP.AUTO-MCNC: 0.2 MG/DL
WBC #/AREA URNS HPF: ABNORMAL /HPF

## 2021-11-14 PROCEDURE — 700102 HCHG RX REV CODE 250 W/ 637 OVERRIDE(OP): Performed by: PSYCHIATRY & NEUROLOGY

## 2021-11-14 PROCEDURE — 81001 URINALYSIS AUTO W/SCOPE: CPT

## 2021-11-14 PROCEDURE — A9270 NON-COVERED ITEM OR SERVICE: HCPCS | Performed by: EMERGENCY MEDICINE

## 2021-11-14 PROCEDURE — 700102 HCHG RX REV CODE 250 W/ 637 OVERRIDE(OP): Performed by: STUDENT IN AN ORGANIZED HEALTH CARE EDUCATION/TRAINING PROGRAM

## 2021-11-14 PROCEDURE — A9270 NON-COVERED ITEM OR SERVICE: HCPCS | Performed by: STUDENT IN AN ORGANIZED HEALTH CARE EDUCATION/TRAINING PROGRAM

## 2021-11-14 PROCEDURE — 700102 HCHG RX REV CODE 250 W/ 637 OVERRIDE(OP): Performed by: EMERGENCY MEDICINE

## 2021-11-14 PROCEDURE — A9270 NON-COVERED ITEM OR SERVICE: HCPCS | Performed by: PSYCHIATRY & NEUROLOGY

## 2021-11-14 RX ADMIN — DIVALPROEX SODIUM 500 MG: 500 TABLET, DELAYED RELEASE ORAL at 18:42

## 2021-11-14 RX ADMIN — CHLORPROMAZINE HYDROCHLORIDE 100 MG: 50 TABLET, SUGAR COATED ORAL at 07:02

## 2021-11-14 RX ADMIN — LORAZEPAM 1 MG: 1 TABLET ORAL at 07:02

## 2021-11-14 RX ADMIN — CHLORPROMAZINE HYDROCHLORIDE 100 MG: 50 TABLET, SUGAR COATED ORAL at 18:42

## 2021-11-14 RX ADMIN — DIVALPROEX SODIUM 500 MG: 500 TABLET, DELAYED RELEASE ORAL at 07:03

## 2021-11-14 RX ADMIN — DIVALPROEX SODIUM 500 MG: 500 TABLET, DELAYED RELEASE ORAL at 12:12

## 2021-11-14 NOTE — CONSULTS
"Consults   BEHAVIORAL HEALTH [facility] PSYCHIATRIC CONSULT LIAISON FOLLOW UP NOTE:     DOS: 11/14/2021     CC: “I'm about a [place]... yeah huh...”     HPI: Tierney Mayer is a 35yo single white female poor historian seen in the [facility] ER with very poor insight, pressured speech and tangential thought process bordering in word salad at times. History of previous admissions to [facility] and states \"that's my home\" in serious manner. Believes she is going through menopause \"I have a gray hairs... and I need to stop having sex so I can see... maybe a nursing home or go back to [facility]\"  Overnight was uneventful, seen again this morning at 1100 waking up and starting on cold breakfast nearly uninteligible druing interview obviously working hard to control her thoughts without success.      PROS: Does endorse moderate anxiety without panic, denies history of collin nor depression but this may not be relaible. Does endorse methamphetamine use and cannot quantify nor give temporal details of use.     Allergies: Clonazepam, Paliperidone, Olanzapine     Psychiatric Medications:  Currently on Thorazine 100mg BID and 150mg qHS  Depakote 500mg TID     Past Medical History:  Schizoaffective  Methamphetamine UD  Cannabis UD     Past Psychiatric History:  Schizoaffective  Methamphetamine UD  Cannabis UD     Social History: unable to obtain     Legal History: unable to obtain     Mental Status Exam:  General & Appearance: Age appropriate SWF in ER bed with lights off.  Orientation: Alert but not oriented to time, nor situation, \"hospital\" to place.  Mood: “hi, hi, hi”.  Affect: Labile. Mildly fidgeting and anxious during appointment.  Thought Process: Tangential mostly but occassionally bordering word salad.  Thought Content: Denies suicidal nor homicidal ideations, denied auditory nor visual hallucinations but appears to be responding to internal stimuli.  Judgment/Insight: Poor.  Speech: Normal " volume, content, increased rate and inappropriate prosody.  Attention: Poor.  Attitude: Guarded.  Intelligence: Average.  MMSE: deferred this visit     Labs:  UDS with meth and cannabis, CBC and CMP unremarkable, UDS negative, COVID negative.      Rads:   No recent imaging.     Assessment and Plan:  Schizoaffective  Methamphetamine Use Disorder  Cannabis UD  -Recommend increasing Thorazine 100mg BID and 150mg qHS to 200mg TID for psychosis.  -Recommend continuing Depakote 500mg TID for mood stabilization. May increase to 1000mg BID tomorrow if still in ER and symptomatic.  -Patient L2k'd and recommend extension with inpatient psychiatric placement for stabilization.  -Psych CL will continue following patient while at [facility].  -Medication reconciliation was completed.  -Reviewed safety plan - 911, ER, PCM, MHC, Suicide Crisis Line, Nursing staff while at [facility].  -Legal Hold: extend  -Visitors: yes  -Personal belongings: yes  -Observation level: line of sight, tele not appropriate  -Instruction: Please assist with inpatient Psychiatric admission/transfer.  ///

## 2021-11-14 NOTE — PROGRESS NOTES
"ED Observation Progress Note    Date of Service: 11/14/21    Interval History  8:29 AM patient is here on a legal hold for patient is evaluated at the bedside.  Patient care was signed out from nighttime ERP.  Patient is here on a legal hold awaiting transfer to a psychiatric facility.  Patient seen at the bedside.  She was medicated earlier this morning.  She is resting and appears comfortable.  She has no complaints and no requests.  She is encouraged to make her needs known.  Care will be signed out to oncoming ERP.    Physical Exam  /77   Pulse 93   Temp 36.3 °C (97.4 °F) (Temporal)   Resp 18   Ht 1.702 m (5' 7\")   Wt 76.2 kg (168 lb)   SpO2 95%   BMI 26.31 kg/m² .    Constitutional: Awake and alert. Nontoxic  HENT:  Grossly normal  Eyes: Grossly normal  Neck: Normal range of motion  Cardiovascular: Normal heart rate   Thorax & Lungs: No respiratory distress  Abdomen: Nontender  Skin:  No pathologic rash.   Extremities: Well perfused  Psychiatric: Affect normal    Labs  Results for orders placed or performed during the hospital encounter of 10/30/21   URINE DRUG SCREEN   Result Value Ref Range    Amphetamines Urine Positive (A) Negative    Barbiturates Negative Negative    Benzodiazepines Negative Negative    Cocaine Metabolite Negative Negative    Methadone Negative Negative    Opiates Negative Negative    Oxycodone Negative Negative    Phencyclidine -Pcp Negative Negative    Propoxyphene Negative Negative    Cannabinoid Metab Negative Negative   SARS-COV Antigen FLORINDA: Collect dry nasal swab   Result Value Ref Range    SARS-CoV-2 Source Nasal Swab     SARS-COV ANTIGEN FLORINDA NotDetected NotDetected   POC BREATHALIZER   Result Value Ref Range    POC Breathalizer 0.00 0.00 - 0.01 Percent   EKG   Result Value Ref Range    Report       [facility] Emergency Dept.    Test Date:  2021-11-05  Pt Name:    GÉNESIS THACKER               Department: ER  MRN:        [id number]                      " Room:       [facility]  Gender:     Female                       Technician: [id number]  :        1985                   Requested By:SHAHANA NI  Order #:    [id number]                    Reading MD:    Measurements  Intervals                                Axis  Rate:       96                           P:          36  OK:         176                          QRS:        54  QRSD:       88                           T:          50  QT:         364  QTc:        460    Interpretive Statements  SINUS RHYTHM  Compared to ECG 2021 14:12:26  T-wave abnormality no longer present       Problem List  1.  Schizoaffective disorder  2.  Acute psychosis  3.  Methamphetamine use  4.  Cannabis use.      Electronically signed by: Kristin Saleh D.O., 2021 8:18 AM

## 2021-11-15 PROCEDURE — A9270 NON-COVERED ITEM OR SERVICE: HCPCS | Performed by: PSYCHIATRY & NEUROLOGY

## 2021-11-15 PROCEDURE — 700102 HCHG RX REV CODE 250 W/ 637 OVERRIDE(OP): Performed by: STUDENT IN AN ORGANIZED HEALTH CARE EDUCATION/TRAINING PROGRAM

## 2021-11-15 PROCEDURE — 700102 HCHG RX REV CODE 250 W/ 637 OVERRIDE(OP): Performed by: EMERGENCY MEDICINE

## 2021-11-15 PROCEDURE — A9270 NON-COVERED ITEM OR SERVICE: HCPCS | Performed by: STUDENT IN AN ORGANIZED HEALTH CARE EDUCATION/TRAINING PROGRAM

## 2021-11-15 PROCEDURE — A9270 NON-COVERED ITEM OR SERVICE: HCPCS | Performed by: EMERGENCY MEDICINE

## 2021-11-15 PROCEDURE — 700102 HCHG RX REV CODE 250 W/ 637 OVERRIDE(OP): Performed by: PSYCHIATRY & NEUROLOGY

## 2021-11-15 RX ORDER — CHLORPROMAZINE HYDROCHLORIDE 50 MG/1
200 TABLET, FILM COATED ORAL 3 TIMES DAILY
Status: DISCONTINUED | OUTPATIENT
Start: 2021-11-15 | End: 2021-11-19 | Stop reason: HOSPADM

## 2021-11-15 RX ADMIN — DIVALPROEX SODIUM 500 MG: 500 TABLET, DELAYED RELEASE ORAL at 06:01

## 2021-11-15 RX ADMIN — CHLORPROMAZINE HYDROCHLORIDE 100 MG: 50 TABLET, SUGAR COATED ORAL at 14:13

## 2021-11-15 RX ADMIN — CHLORPROMAZINE HYDROCHLORIDE 200 MG: 50 TABLET, SUGAR COATED ORAL at 21:10

## 2021-11-15 RX ADMIN — DIVALPROEX SODIUM 500 MG: 500 TABLET, DELAYED RELEASE ORAL at 14:12

## 2021-11-15 RX ADMIN — CHLORPROMAZINE HYDROCHLORIDE 100 MG: 50 TABLET, SUGAR COATED ORAL at 06:02

## 2021-11-15 NOTE — PROGRESS NOTES
"[facility] PSYCHIATRIC CONSULT LIAISON FOLLOW UP NOTE:     DOS: 11/15/2021     CC: “I'm feeling better...”     HPI: Tierney Mayer is a 35yo single white female poor historian seen in the [facility] with very poor insight, pressured speech and tangential thought process bordering in word salad at times. History of previous admissions to [facility] and states \"that's my home\" in serious manner. Believes she is going through menopause \"I have a gray hairs... and I need to stop having sex so I can see... maybe a nursing home or go back to [facility]\"  Overnight was uneventful, seen again this morning at 1040 laying in bed, intially with one word answers \"good\" and \"well\", but appeared more calm than previous with slower speech. 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 Much more goal directed today than previous exams this past week.      PROS: Does endorse moderate anxiety without panic, denies history of collin nor depression but this may not be relaible. Does endorse methamphetamine use and cannot quantify nor give temporal details of use.     Allergies: Clonazepam, Paliperidone, Olanzapine     Psychiatric Medications:  Currently on Thorazine 100mg BID and 150mg qHS  Depakote 500mg TID     Past Medical History:  Schizoaffective  Methamphetamine UD  Cannabis UD     Past Psychiatric History:  Schizoaffective  Methamphetamine UD  Cannabis UD     Social History: unable to obtain     Legal History: unable to obtain     Mental Status Exam:  General & Appearance: Age appropriate SWF in ER bed with lights off.  Orientation: Alert  and oriented to person and situation but not oriented to time.  Mood: “better”.  Affect: Labile. Less fidgeting and anxious during appointment today.  Thought Process: More linear, logical and goal directed " today.  Thought Content: Denies suicidal nor homicidal ideations, denied auditory nor visual hallucinations but appears to have mild blocking.  Judgment/Insight: Poor.  Speech: Normal volume, content, rate and prosody.  Attention: Fair.  Attitude: Guarded.  Intelligence: Average.  MMSE: deferred this visit     Labs:  UDS with meth and cannabis, CBC and CMP unremarkable, UDS negative, COVID negative.      Rads:   No recent imaging.     Assessment and Plan:  Schizoaffective  Methamphetamine Use Disorder  Cannabis UD  -Recommend increasing Thorazine 100mg BID and 150mg qHS to 200mg TID for psychosis.  -Recommend continuing Depakote 500mg TID for mood stabilization. May increase to 1000mg BID tomorrow if still in ER and symptomatic.  -Patient L2k'd and recommend extension with inpatient psychiatric placement for stabilization.  -Psych CL will continue following patient while at [facility].  -Medication reconciliation was completed.  -Reviewed safety plan - 911, ER, PCM, MHC, Suicide Crisis Line, Nursing staff while at [facility].  -Legal Hold: extend  -Visitors: yes  -Personal belongings: yes  -Observation level: line of sight, tele ok.  -Instruction: Please assist with inpatient Psychiatric admission/transfer.  ///

## 2021-11-15 NOTE — ED PROVIDER NOTES
"Scribed for Altaf Hall M.D. by Wil Keane. 11/15/2021, 12:40 AM.    ED Observation Progress Note:    Date of Service: 11/15/21    S:  Tierney Mayer is a 36 y.o. female who presents for psychosis and methamphetamine abuse. Patient has been observed for several days.     O:  VITAL SIGNS: /75   Pulse 97   Temp 36.6 °C (97.8 °F) (Temporal)   Resp 15   Ht 1.702 m (5' 7\")   Wt 76.2 kg (168 lb)   SpO2 96%   BMI 26.31 kg/m²      Constitutional: Well developed, Well nourished, No acute distress, Non-toxic appearance.   HENT: Normocephalic, Atraumatic  Cardiovascular: Regular pulse  Lungs: No respiratory distress  Skin: Warm, Dry, no rash  Extremities: No edema  Neurologic: Alert, appropriate, follows commands  Psychiatric: Affect normal    12:41 PM Patient was reevaluated at bedside where she states she has been eating normally.    Problem List:  1. Psychosis  2. Methamphetamine abuse     Wil DONG (Yosiibsanjeev), am scribing for, and in the presence of, Altaf Hall M.D..    Electronically signed by: Wil Keane (Yosiibe), 11/15/2021    Altaf DONG M.D. personally performed the services described in this documentation, as scribed by Wil Keane in my presence, and it is both accurate and complete.     The note accurately reflects work and decisions made by me.  Altaf Hall M.D.  11/15/2021  12:46 PM    "

## 2021-11-16 PROCEDURE — A9270 NON-COVERED ITEM OR SERVICE: HCPCS | Performed by: EMERGENCY MEDICINE

## 2021-11-16 PROCEDURE — 700102 HCHG RX REV CODE 250 W/ 637 OVERRIDE(OP): Performed by: EMERGENCY MEDICINE

## 2021-11-16 PROCEDURE — A9270 NON-COVERED ITEM OR SERVICE: HCPCS | Performed by: PSYCHIATRY & NEUROLOGY

## 2021-11-16 PROCEDURE — 700102 HCHG RX REV CODE 250 W/ 637 OVERRIDE(OP): Performed by: PSYCHIATRY & NEUROLOGY

## 2021-11-16 RX ADMIN — CHLORPROMAZINE HYDROCHLORIDE 200 MG: 50 TABLET, SUGAR COATED ORAL at 20:21

## 2021-11-16 RX ADMIN — CHLORPROMAZINE HYDROCHLORIDE 200 MG: 50 TABLET, SUGAR COATED ORAL at 06:00

## 2021-11-16 RX ADMIN — CHLORPROMAZINE HYDROCHLORIDE 200 MG: 50 TABLET, SUGAR COATED ORAL at 13:04

## 2021-11-16 RX ADMIN — DIVALPROEX SODIUM 500 MG: 500 TABLET, DELAYED RELEASE ORAL at 20:22
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
## 2021-11-16 NOTE — ED PROVIDER NOTES
"ED Provider Note    ED Observation Progress Note    Date of Service: 11/16/21    Interval History    This is a 36-year-old female who presents for psychosis and methamphetamine abuse.  Patient was seen by psychiatry yesterday who recommended an increased dose of Thorazine.  Awaiting inpatient psychiatric bed.    Patient denies any acute issues today.    Physical Exam  /88   Pulse (!) 111   Temp 36.3 °C (97.4 °F) (Temporal)   Resp 16   Ht 1.702 m (5' 7\")   Wt 76.2 kg (168 lb)   SpO2 97%   BMI 26.31 kg/m² .    Constitutional: Awake and alert. Nontoxic  HENT:  Grossly normal  Eyes: Grossly normal  Neck: Normal range of motion  Cardiovascular: Normal heart rate   Thorax & Lungs: No respiratory distress  Abdomen: Nontender  Skin:  No pathologic rash.   Extremities: Well perfused  Psychiatric: Affect normal    Labs  Results for orders placed or performed during the hospital encounter of 10/30/21   URINE DRUG SCREEN   Result Value Ref Range    Amphetamines Urine Positive (A) Negative    Barbiturates Negative Negative    Benzodiazepines Negative Negative    Cocaine Metabolite Negative Negative    Methadone Negative Negative    Opiates Negative Negative    Oxycodone Negative Negative    Phencyclidine -Pcp Negative Negative    Propoxyphene Negative Negative    Cannabinoid Metab Negative Negative   SARS-COV Antigen FLORINDA: Collect dry nasal swab   Result Value Ref Range    SARS-CoV-2 Source Nasal Swab     SARS-COV ANTIGEN FLORINDA NotDetected NotDetected   URINALYSIS,CULTURE IF INDICATED    Specimen: Urine   Result Value Ref Range    Color Yellow     Character Clear     Specific Gravity 1.002 <1.035    Ph 7.5 5.0 - 8.0    Glucose Negative Negative mg/dL    Ketones Negative Negative mg/dL    Protein Negative Negative mg/dL    Bilirubin Negative Negative    Urobilinogen, Urine 0.2 Negative    Nitrite Negative Negative    Leukocyte Esterase Trace (A) Negative    Occult Blood Negative Negative    Micro Urine Req Microscopic  "   URINE MICROSCOPIC (W/UA)   Result Value Ref Range    WBC 5-10 (A) /hpf    RBC Rare /hpf    Bacteria Few (A) None /hpf    Epithelial Cells Rare /hpf   POC BREATHALIZER   Result Value Ref Range    POC Breathalizer 0.00 0.00 - 0.01 Percent   EKG   Result Value Ref Range    Report       [facility] Emergency Dept.    Test Date:  2021  Pt Name:    GÉNESIS THACKER               Department: ER  MRN:        [id number]                      Room:       [facility]  Gender:     Female                       Technician: [id number]  :        1985                   Requested By:SHAHANA NI  Order #:    [id number]                    Reading MD:    Measurements  Intervals                                Axis  Rate:       96                           P:          36  GA:         176                          QRS:        54  QRSD:       88                           T:          50  QT:         364  QTc:        460    Interpretive Statements  SINUS RHYTHM  Compared to ECG 2021 14:12:26  T-wave abnormality no longer present         Radiology  No orders to display       Problem List  1.   1. Acute psychosis (HCC)     2. Methamphetamine abuse (HCC)             Electronically signed by: Rani Garcia M.D., 2021 9:19 AM

## 2021-11-16 NOTE — DISCHARGE PLANNING
Legal Hold     Referral: Legal Hold Court     Intervention: Pt presented for legal hold meeting with  via video conferencing to discuss legal options of contesting hold and meeting with the court doctors tomorrow afternoon, or not contesting legal hold and continuing the hold for one week. Pt refused to speak to , wanting to sleep.    advised that pt's legal hold will be be continued for one week (11/24).       Plan: Pt's legal hold has been continued for one week. Pt awaiting transfer to an in patient psych facility.

## 2021-11-16 NOTE — DISCHARGE PLANNING
"Alert Team:     Patient awake and yelling at staff, \"I'm dreaming about dead babies,\" stating she is supposed to go to [facility] today. Patient refusing Depakote medication this morning but compliant with Thorazine dose.   Patient rested majority of the night without incident with 1:1 sitter observing patient safety outside of room, escalated from Q 15 checks due to patients sporadic behavioral outbursts; patient became dysregulated at 2130 last night but did not require PRN medication at the time; patient has been intermittently compliant with medication regime. Legal hold extended and now awaiting acceptance/transfer to [facility]; declined by [facility] due to aggression history. Alert Team will continue to monitor.             "
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
## 2021-11-16 NOTE — ED PROVIDER NOTES
ED Provider Note    I was contacted by Dr. Smith regarding patient Omayra Mayer, with recommendation that the patient Thorazine be increased to 200 mg 3 times a day.  With the recommendation, I have placed the order for the increased dosage.

## 2021-11-17 LAB — EKG IMPRESSION: NORMAL

## 2021-11-17 PROCEDURE — A9270 NON-COVERED ITEM OR SERVICE: HCPCS | Performed by: EMERGENCY MEDICINE

## 2021-11-17 PROCEDURE — 700102 HCHG RX REV CODE 250 W/ 637 OVERRIDE(OP): Performed by: PSYCHIATRY & NEUROLOGY

## 2021-11-17 PROCEDURE — A9270 NON-COVERED ITEM OR SERVICE: HCPCS | Performed by: PSYCHIATRY & NEUROLOGY

## 2021-11-17 PROCEDURE — 700102 HCHG RX REV CODE 250 W/ 637 OVERRIDE(OP): Performed by: EMERGENCY MEDICINE

## 2021-11-17 RX ADMIN — DIVALPROEX SODIUM 500 MG: 500 TABLET, DELAYED RELEASE ORAL at 05:38

## 2021-11-17 RX ADMIN — CHLORPROMAZINE HYDROCHLORIDE 200 MG: 50 TABLET, SUGAR COATED ORAL at 20:24

## 2021-11-17 RX ADMIN — CHLORPROMAZINE HYDROCHLORIDE 200 MG: 50 TABLET, SUGAR COATED ORAL at 05:38

## 2021-11-17 RX ADMIN — DIVALPROEX SODIUM 500 MG: 500 TABLET, DELAYED RELEASE ORAL at 11:52

## 2021-11-17 RX ADMIN — DIVALPROEX SODIUM 500 MG: 500 TABLET, DELAYED RELEASE ORAL at 20:24

## 2021-11-17 RX ADMIN — CHLORPROMAZINE HYDROCHLORIDE 200 MG: 50 TABLET, SUGAR COATED ORAL at 11:52

## 2021-11-17 NOTE — PROGRESS NOTES
"ED Provider Progress Note    ED Observation Progress Note    Date of Service: 11/17/21    Interval History  Patient has not had any complaints.  Vital signs have been stable.  She is being followed by psychiatry.  Still await transfer to psychiatric facility.    Physical Exam  /83   Pulse 84   Temp 36.9 °C (98.4 °F) (Temporal)   Resp 17   Ht 1.702 m (5' 7\")   Wt 76.2 kg (168 lb)   SpO2 98%   BMI 26.31 kg/m² .    Constitutional: Awake and alert. Nontoxic      Labs  Results for orders placed or performed during the hospital encounter of 10/30/21   URINE DRUG SCREEN   Result Value Ref Range    Amphetamines Urine Positive (A) Negative    Barbiturates Negative Negative    Benzodiazepines Negative Negative    Cocaine Metabolite Negative Negative    Methadone Negative Negative    Opiates Negative Negative    Oxycodone Negative Negative    Phencyclidine -Pcp Negative Negative    Propoxyphene Negative Negative    Cannabinoid Metab Negative Negative   SARS-COV Antigen FLORINDA: Collect dry nasal swab   Result Value Ref Range    SARS-CoV-2 Source Nasal Swab     SARS-COV ANTIGEN FLORINDA NotDetected NotDetected   URINALYSIS,CULTURE IF INDICATED    Specimen: Urine   Result Value Ref Range    Color Yellow     Character Clear     Specific Gravity 1.002 <1.035    Ph 7.5 5.0 - 8.0    Glucose Negative Negative mg/dL    Ketones Negative Negative mg/dL    Protein Negative Negative mg/dL    Bilirubin Negative Negative    Urobilinogen, Urine 0.2 Negative    Nitrite Negative Negative    Leukocyte Esterase Trace (A) Negative    Occult Blood Negative Negative    Micro Urine Req Microscopic    URINE MICROSCOPIC (W/UA)   Result Value Ref Range    WBC 5-10 (A) /hpf    RBC Rare /hpf    Bacteria Few (A) None /hpf    Epithelial Cells Rare /hpf   POC BREATHALIZER   Result Value Ref Range    POC Breathalizer 0.00 0.00 - 0.01 Percent   EKG   Result Value Ref Range    Report       [facility]    Test Date:  " 2021  Pt Name:    GÉNESIS THACKER               Department: ER  MRN:        [id number]                      Room:       [facility]  Gender:     Female                       Technician: [id number]  :        [id number]                   Requested By:SHAHANA NI  Order #:    [id number]                    Reading MD: NICHOLE CANTRELL MD    Measurements  Intervals                                Axis  Rate:       96                           P:          36  WV:         176                          QRS:        54  QRSD:       88                           T:          50  QT:         364  QTc:        460    Interpretive Statements  SINUS RHYTHM  Compared to ECG 2021 14:12:26  T-wave abnormality no longer present  Electronically Signed On 2021 11:27:49 PST by NICHOLE CANTRELL MD         Problem List  1.  Psychosis-continue with plan per psychiatry      Electronically signed by: Nichole Cantrell M.D., 2021 11:27 AM

## 2021-11-17 NOTE — CONSULTS
"[facility] PSYCHIATRIC CONSULT LIAISON FOLLOW UP NOTE:     DOS: 11/17/2021     CC: “I'm good...”     HPI: Tierney Mayer is a 35yo single white female poor historian seen in the [facility] ER with very poor insight, pressured speech and tangential thought process bordering in word salad at times. History of previous admissions to [facility] and states \"that's my home\" in serious manner. Believes she is going through menopause \"I have a gray hairs... and I need to stop having sex so I can see... maybe a nursing home or go back to [facility]\"  Overnight was uneventful. More oorganized and goal directed today after taking Depakote yesterday. Sleeping comfortably in room, no issues.      PROS: Does endorse moderate anxiety without panic, denies history of collin nor depression but this may not be relaible. Does endorse methamphetamine use and cannot quantify nor give temporal details of use.     Allergies: Clonazepam, Paliperidone, Olanzapine     Psychiatric Medications:  Currently on  Thorazine 200mg TID  Depakote 500mg TID     Past Medical History:  Schizoaffective  Methamphetamine UD  Cannabis UD     Past Psychiatric History:  Schizoaffective  Methamphetamine UD  Cannabis UD     Social History: unable to obtain     Legal History: unable to obtain     Mental Status Exam:  General & Appearance: Age appropriate SWF in ER bed with lights off.  Orientation: Alert  and oriented to person and situation but not oriented to time.  Mood: “fine, fine”.  Affect: Labile. Less fidgeting and anxious during appointment today.  Thought Process: More linear, logical and goal directed today - tangential bordering on derailed on long enough timeline.  Thought Content: Denies suicidal nor homicidal ideations, denied auditory nor visual hallucinations but appears to have mild blocking with responses to internal stimuli when observing from door.  Judgment/Insight: Poor.  Speech: Normal volume, " increased rate today.  Attention: Fair.  Attitude: Guarded.  Intelligence: Average.  MMSE: deferred this visit     Labs:  UDS with meth and cannabis, CBC and CMP unremarkable, UDS negative, COVID negative.      Rads:   No recent imaging.     Assessment and Plan:  Schizoaffective  Methamphetamine Use Disorder  Cannabis UD  -Recommend continuing Thorazine 200mg TID for psychosis.  -Recommend continuing Depakote 500mg TID for mood stabilization.  -Patient L2k'd and recommend extension with inpatient psychiatric placement for stabilization.  -Psych CL will continue following patient while at [facility].  -Medication reconciliation was completed.  -Reviewed safety plan - 911, ER, PCM, MHC, Suicide Crisis Line, Nursing staff while at [facility].  -Legal Hold: extend  -Visitors: yes  -Personal belongings: yes  -Observation level: line of sight, tele ok.  -Instruction: Please assist with inpatient Psychiatric admission/transfer.  ///

## 2021-11-18 PROCEDURE — 700102 HCHG RX REV CODE 250 W/ 637 OVERRIDE(OP): Performed by: EMERGENCY MEDICINE

## 2021-11-18 PROCEDURE — A9270 NON-COVERED ITEM OR SERVICE: HCPCS | Performed by: EMERGENCY MEDICINE

## 2021-11-18 PROCEDURE — 700111 HCHG RX REV CODE 636 W/ 250 OVERRIDE (IP): Performed by: EMERGENCY MEDICINE

## 2021-11-18 PROCEDURE — A9270 NON-COVERED ITEM OR SERVICE: HCPCS | Performed by: PSYCHIATRY & NEUROLOGY

## 2021-11-18 PROCEDURE — 700102 HCHG RX REV CODE 250 W/ 637 OVERRIDE(OP): Performed by: PSYCHIATRY & NEUROLOGY

## 2021-11-18 PROCEDURE — 96372 THER/PROPH/DIAG INJ SC/IM: CPT

## 2021-11-18 RX ADMIN — DIVALPROEX SODIUM 500 MG: 500 TABLET, DELAYED RELEASE ORAL at 12:00

## 2021-11-18 RX ADMIN — DIVALPROEX SODIUM 500 MG: 500 TABLET, DELAYED RELEASE ORAL at 18:00

## 2021-11-18 RX ADMIN — LORAZEPAM 1 MG: 1 TABLET ORAL at 23:34

## 2021-11-18 RX ADMIN — CHLORPROMAZINE HYDROCHLORIDE 200 MG: 50 TABLET, SUGAR COATED ORAL at 12:00

## 2021-11-18 RX ADMIN — HALOPERIDOL LACTATE 5 MG: 5 INJECTION, SOLUTION INTRAMUSCULAR at 00:07

## 2021-11-18 RX ADMIN — CHLORPROMAZINE HYDROCHLORIDE 200 MG: 50 TABLET, SUGAR COATED ORAL at 05:51

## 2021-11-18 RX ADMIN — CHLORPROMAZINE HYDROCHLORIDE 200 MG: 50 TABLET, SUGAR COATED ORAL at 18:00

## 2021-11-18 RX ADMIN — DIVALPROEX SODIUM 500 MG: 500 TABLET, DELAYED RELEASE ORAL at 05:51

## 2021-11-18 NOTE — DISCHARGE PLANNING
Received Stipulation and Order from the court continuing pt's legal hold until 11/24. Sent copy to Bernice, scanned copy into pt's chart.

## 2021-11-18 NOTE — CONSULTS
"[facility] PSYCHIATRIC CONSULT LIAISON FOLLOW UP NOTE:     DOS: 11/18/2021    HPI: Tierney Mayer is a 37yo single white female poor historian seen in the [facility] ER with very poor insight, pressured speech and tangential thought process bordering in word salad at times. History of previous admissions to [facility] and states \"that's my home\" in serious manner. Believes she is going through menopause \"I have a gray hairs... and I need to stop having sex so I can see... maybe a nursing home or go back to [facility]\"  Overnight was uneventful. Even more organized and goal directed today. Discussed her goals for the day, which include \"getting some exercise\" and \"taking a shower\".     PROS: Does endorse moderate anxiety without panic, denies history of collin nor depression but this may not be relaible. Does endorse methamphetamine use and cannot quantify nor give temporal details of use.     Allergies: Clonazepam, Paliperidone, Olanzapine     Psychiatric Medications:  Currently on  Thorazine 200mg TID  Depakote 500mg TID     Past Medical History:  Schizoaffective  Methamphetamine UD  Cannabis UD     Past Psychiatric History:  Schizoaffective  Methamphetamine UD  Cannabis UD     Social History: unable to obtain     Legal History: unable to obtain     Mental Status Exam:  General & Appearance: Age appropriate SWF in ER bed with lights off.  Orientation: Alert  and oriented to person and situation but not oriented to time.  Mood: “fine, fine”.  Affect: Labile. Less fidgeting and anxious during appointment today.  Thought Process: More linear, logical and goal directed today - tangential bordering on derailed on long enough timeline.  Thought Content: Denies suicidal nor homicidal ideations, denied auditory nor visual hallucinations but appears to have mild blocking with responses to internal stimuli when observing from door.  Judgment/Insight: Poor.  Speech: Normal volume, " increased rate today.  Attention: Fair.  Attitude: Guarded.  Intelligence: Average.  MMSE: deferred this visit     Labs:  UDS with meth and cannabis, CBC and CMP unremarkable, UDS negative, COVID negative.      Rads:   No recent imaging.     Assessment and Plan:  Schizoaffective  Methamphetamine Use Disorder  Cannabis UD  -No new recommendtions today.  -Recommend continuing Thorazine 200mg TID for psychosis.  -Recommend continuing Depakote 500mg TID for mood stabilization.  -Patient L2k'd and recommend extension with inpatient psychiatric placement for stabilization.  -Psych CL will continue following patient while at [facility].  -Medication reconciliation was completed.  -Reviewed safety plan - 911, ER, PCM, MHC, Suicide Crisis Line, Nursing staff while at [facility].  -Legal Hold: extend  -Visitors: yes  -Personal belongings: yes  -Observation level: line of sight, tele ok.  -Instruction: Please assist with inpatient Psychiatric admission/transfer.  ///

## 2021-11-19 VITALS
RESPIRATION RATE: 16 BRPM | HEART RATE: 85 BPM | HEIGHT: 67 IN | BODY MASS INDEX: 26.37 KG/M2 | DIASTOLIC BLOOD PRESSURE: 86 MMHG | OXYGEN SATURATION: 98 % | SYSTOLIC BLOOD PRESSURE: 122 MMHG | TEMPERATURE: 97.2 F | WEIGHT: 168 LBS

## 2021-11-19 PROCEDURE — 700102 HCHG RX REV CODE 250 W/ 637 OVERRIDE(OP): Performed by: PSYCHIATRY & NEUROLOGY

## 2021-11-19 PROCEDURE — 700111 HCHG RX REV CODE 636 W/ 250 OVERRIDE (IP): Performed by: EMERGENCY MEDICINE

## 2021-11-19 PROCEDURE — A9270 NON-COVERED ITEM OR SERVICE: HCPCS | Performed by: PSYCHIATRY & NEUROLOGY

## 2021-11-19 PROCEDURE — A9270 NON-COVERED ITEM OR SERVICE: HCPCS | Performed by: EMERGENCY MEDICINE

## 2021-11-19 PROCEDURE — 700102 HCHG RX REV CODE 250 W/ 637 OVERRIDE(OP): Performed by: EMERGENCY MEDICINE

## 2021-11-19 PROCEDURE — 96372 THER/PROPH/DIAG INJ SC/IM: CPT

## 2021-11-19 RX ORDER — HALOPERIDOL DECANOATE 50 MG/ML
50 INJECTION INTRAMUSCULAR ONCE
Status: COMPLETED | OUTPATIENT
Start: 2021-11-19 | End: 2021-11-19

## 2021-11-19 RX ADMIN — CHLORPROMAZINE HYDROCHLORIDE 200 MG: 50 TABLET, SUGAR COATED ORAL at 11:20

## 2021-11-19 RX ADMIN — HALOPERIDOL DECANOATE 50 MG: 50 INJECTION INTRAMUSCULAR at 09:15

## 2021-11-19 RX ADMIN — DIVALPROEX SODIUM 500 MG: 500 TABLET, DELAYED RELEASE ORAL at 11:19
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
## 2021-11-19 NOTE — CONSULTS
"[facility] PSYCHIATRIC CONSULT LIAISON FOLLOW UP NOTE:     DOS: 11/19/2021     CC: \"I'm sleeping good.\"    HPI: Tierney Mayer is a 37yo single white female poor historian seen in the [facility] ER with very poor insight, pressured speech and tangential thought process bordering in word salad at times. History of previous admissions to [facility] and states \"that's my home\" in serious manner. Believes she is going through menopause \"I have a gray hairs... and I need to stop having sex so I can see... maybe a nursing home or go back to [facility]\"  Overnight was uneventful. More organized and goal directed today. Discussed starting long acting injectable and she verbalized consent. Discussed discharge and she prefers [facility].     PROS: Does endorse moderate anxiety without panic, denies history of collin nor depression but this may not be relaible. Does endorse methamphetamine use and cannot quantify nor give temporal details of use.     Allergies: Clonazepam, Paliperidone, Olanzapine     Psychiatric Medications:  Currently on  Thorazine 200mg TID  Depakote 500mg TID     Past Medical History:  Schizoaffective  Methamphetamine UD  Cannabis UD     Past Psychiatric History:  Schizoaffective  Methamphetamine UD  Cannabis UD     Social History: unable to obtain     Legal History: unable to obtain     Mental Status Exam:  General & Appearance: Age appropriate SWF in ER bed with lights off.  Orientation: Alert  and oriented to person and situation.  Mood: “good”.  Affect: Labile. Less fidgeting and anxious during appointment today.  Thought Process: More linear, logical and goal directed today.  Thought Content: Denies suicidal nor homicidal ideations, denied auditory nor visual hallucinations but appears to have mild blocking.  Judgment/Insight: Poor.  Speech: Normal volume, increased rate today.  Attention: Fair.  Attitude: Guarded.  Intelligence: Average.  MMSE: deferred this " visit     Labs:  UDS with meth and cannabis, CBC and CMP unremarkable, UDS negative, COVID negative.      Rads:   No recent imaging.     Assessment and Plan:  Schizoaffective  Methamphetamine Use Disorder  Cannabis UD  -Recommend reversal of L2k today as she has stabilized and is back to baseline.  -Recommend starting Haldol Dec 50mg IM injection in ER.  -Recommend discharge with outpatient mental health resources.  -Recommend continuing Thorazine 200mg TID for psychosis.  -Recommend continuing Depakote 500mg TID for mood stabilization.  -Psych CL will continue following patient while at [facility].  -Medication reconciliation was completed.  -Reviewed safety plan - 911, ER, PCM, MHC, Suicide Crisis Line, Nursing staff while at [facility].  -Legal Hold: discontinue  -Visitors: yes  -Personal belongings: yes  -Observation level: N/A  -Instruction: Please assist with outpatient resources.  ///

## 2022-02-28 NOTE — ED NOTES
Report to NI Dickerson   Topical Ketoconazole Counseling: Patient counseled that this medication may cause skin irritation or allergic reactions.  In the event of skin irritation, the patient was advised to reduce the amount of the drug applied or use it less frequently.   The patient verbalized understanding of the proper use and possible adverse effects of ketoconazole.  All of the patient's questions and concerns were addressed.

## 2022-12-08 ENCOUNTER — HOSPITAL ENCOUNTER (EMERGENCY)
Facility: MEDICAL CENTER | Age: 37
End: 2022-12-08
Attending: EMERGENCY MEDICINE
Payer: MEDICAID

## 2022-12-08 VITALS
RESPIRATION RATE: 18 BRPM | BODY MASS INDEX: 25.9 KG/M2 | WEIGHT: 165 LBS | SYSTOLIC BLOOD PRESSURE: 142 MMHG | HEIGHT: 67 IN | TEMPERATURE: 98.3 F | DIASTOLIC BLOOD PRESSURE: 86 MMHG | OXYGEN SATURATION: 97 % | HEART RATE: 98 BPM

## 2022-12-08 DIAGNOSIS — F23 ACUTE PSYCHOSIS (HCC): ICD-10-CM

## 2022-12-08 PROCEDURE — 80307 DRUG TEST PRSMV CHEM ANLYZR: CPT

## 2022-12-08 PROCEDURE — 302970 POC BREATHALIZER

## 2022-12-08 PROCEDURE — A9270 NON-COVERED ITEM OR SERVICE: HCPCS | Performed by: EMERGENCY MEDICINE

## 2022-12-08 PROCEDURE — 700102 HCHG RX REV CODE 250 W/ 637 OVERRIDE(OP): Performed by: EMERGENCY MEDICINE

## 2022-12-08 PROCEDURE — 99285 EMERGENCY DEPT VISIT HI MDM: CPT

## 2022-12-08 RX ORDER — HALOPERIDOL 5 MG/1
5 TABLET ORAL ONCE
Status: COMPLETED | OUTPATIENT
Start: 2022-12-08 | End: 2022-12-08

## 2022-12-08 RX ADMIN — HALOPERIDOL 5 MG: 5 TABLET ORAL at 10:45

## 2022-12-08 NOTE — ED NOTES
Pt refusing to wait to speak with peer support. Medications returned to pt. Reviewed discharge instructions, pt verbalized understanding of instructions and medication. States she will schedule follow-up appointment. Denies further questions at this time. Pt ambulatory out of ER with steady gait.

## 2022-12-08 NOTE — ED NOTES
Patient ambulatory to bathroom with steady gait. Urine sample obtained. Patient speaking in disassociated words. Able to follow commands.
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
## 2022-12-08 NOTE — ED NOTES
Rounded on patient. Patient resting in bed. No signs of distress noted. Patient in view of nurses station.

## 2022-12-08 NOTE — ED TRIAGE NOTES
"Chief Complaint   Patient presents with    Hallucinations     BIBA Pt found wandering in front of the [place] near the [facility]. Pt has history of schizophrenia. Per EMS pt was having visual hallucinations in the ambulance. Pt pleasant. A&O x2.     Psych Eval     /85   Pulse (!) 102   Temp 37.3 °C (99.2 °F)   Ht 1.702 m (5' 7\")   Wt 74.8 kg (165 lb)   SpO2 98%   BMI 25.84 kg/m²     "

## 2022-12-08 NOTE — ED PROVIDER NOTES
ED Provider Note    Scribed for Vineet Wolff M.D. by Wilfredo Velazquez. 12/8/2022  9:37 AM    Primary care provider: Pcp Pt States None  Means of arrival: EMS  History obtained from: Patient  History limited by: Patient's altered mental status   CHIEF COMPLAINT  Chief Complaint   Patient presents with    Hallucinations     BIBA Pt found wandering in front of the [place] near the [facility]. Pt has history of schizophrenia. Per EMS pt was having visual hallucinations in the ambulance. Pt pleasant. A&O x2.     Psych Eval       HPI  Tierney Alex Mayer is a 37 y.o. female who presents to the Emergency Department via EMS for evaluation of altered mental status onset prior to arrival. EMS states that the patient was found wandering in front of [employer] near the [facility]. They report that she was having visual hallucinations in the ambulance. She has a history of schizophrenia and notes that she is compliant with her psychiatric medications. Associated symptoms include abdominal pain and visual hallucinations. The patient denies any chest pain, nausea, vomiting, suicidal ideation, or homicidal ideation. No alleviating or exacerbating factors noted. She endorses opium and marijuana consumption. Additionally, the patient admits to tobacco use and alcohol consumption most recently 33 days ago.     REVIEW OF SYSTEMS  Pertinent positives include altered mental status, abdominal pain, and visual hallucinations. Pertinent negatives include no chest pain, nausea, vomiting, suicidal ideation, or homicidal ideation.  All other systems reviewed and negative.    PAST MEDICAL HISTORY   has a past medical history of Hypertension, Psychiatric disorder, and Schizophrenia (HCC).    SURGICAL HISTORY   has a past surgical history that includes pelviscopy (2/22/2011) and incision and drainage general (2/22/2011).    SOCIAL HISTORY  Social History     Tobacco Use    Smoking status: Every Day     Packs/day: 1.00      "Types: Cigarettes    Smokeless tobacco: Never    Tobacco comments:     knows she is suppose to quit but hasn't   Vaping Use    Vaping Use: Never used   Substance Use Topics    Alcohol use: Yes    Drug use: Yes     Types: Inhaled, Oral     Comment: Marijuana, meth      Social History     Substance and Sexual Activity   Drug Use Yes    Types: Inhaled, Oral    Comment: Marijuana, meth       FAMILY HISTORY  No family history noted.    CURRENT MEDICATIONS  Home Medications       Reviewed by Gia Up R.N. (Registered Nurse) on 12/08/22 at 0910  Med List Status: <None>     Medication Last Dose Status   chlorproMAZINE (THORAZINE) 200 MG tablet  Active   haloperidol decanoate (HALDOL DECANOATE) 100 MG/ML injection  Active   levothyroxine (SYNTHROID) 100 MCG Tab  Active                    ALLERGIES  Allergies   Allergen Reactions    Clonazepam Unspecified     Pt reports getting a stroke.  Per historical: Twitches and paranoia.    Paliperidone Unspecified     Twitches and paranoia .    Invega Sustenna [Paliperidone Palmitate]     Olanzapine      Pt reports worsening and threatening voices.       PHYSICAL EXAM  VITAL SIGNS: /85   Pulse (!) 102   Temp 37.3 °C (99.2 °F)   Ht 1.702 m (5' 7\")   Wt 74.8 kg (165 lb)   SpO2 98%   BMI 25.84 kg/m²     Constitutional: Well developed, Well nourished, Mild distress, Disheveled appearance.   HENT: Normocephalic, Atraumatic, Bilateral external ears normal, Dry mucous membranes, No oral exudates.   Eyes: PERRLA, EOMI, Conjunctiva normal, No discharge.   Neck: No tenderness, Supple, No stridor.   Lymphatic: No lymphadenopathy noted.   Cardiovascular: Normal heart rate, Normal rhythm.   Thorax & Lungs: Clear to auscultation bilaterally, No respiratory distress, No wheezing, No crackles.   Abdomen: Soft, No tenderness, No masses, No pulsatile masses.   Skin: Warm, Dry, No erythema, No rash.   Extremities:, No edema No cyanosis.   Musculoskeletal: No tenderness to palpation " or major deformities noted.  Intact distal pulses  Neurologic: Awake, alert. Moves all extremities spontaneously.  Psychiatric: Odd behavior, active psychosis.    LABS  Results for orders placed or performed during the hospital encounter of 12/08/22   URINE DRUG SCREEN   Result Value Ref Range    Amphetamines Urine Positive (A) Negative    Barbiturates Negative Negative    Benzodiazepines Negative Negative    Cocaine Metabolite Negative Negative    Methadone Negative Negative    Opiates Negative Negative    Oxycodone Negative Negative    Phencyclidine -Pcp Negative Negative    Propoxyphene Negative Negative    Cannabinoid Metab Negative Negative   POC BREATHALIZER   Result Value Ref Range    POC Breathalizer 0.000 0.00 - 0.01 Percent        COURSE & MEDICAL DECISION MAKING  Pertinent Labs & Imaging studies reviewed. (See chart for details)    I reviewed the patient's medical records which showed she has a history of schizophrenia and methamphetamine abuse. She was last seen in the [facility] ED on 10/30/2021. The patient was most recently seen at the [facility] ED on 12/06/2022    9:37 AM - Patient seen and examined at bedside. Patient will be treated with Haldol 5 mg. Ordered POC Breathalizer and Urine Drug Screen to evaluate her symptoms. The differential diagnoses include but are not limited to: medication non-compliance or methamphetamine abuse. Will utilize labs and consult with Behavioral Health.    12:54 PM - The Alert team has evaluated the patient and relayed that she has an extensive history of methamphetamine abuse and aggression. She was recently seen at [facility], but missed her follow up appointment. They have rescheduled the patient for a new appointment at [facility] and she has all her medications at bedside. Discussed plan for discharge; I advised the patient to follow-up with [facility] as scheduled, and to return to the [facility] ED with any new or worsening symptoms. Patient was given the opportunity  for questions. I addressed all questions or concerns at this time and they verbalize agreement to the plan of care.    Decision Making:  Patient with known schizophrenia frequent ER visits.  Give the patient some Haldol, the alert team came and evaluated the patient, knows patient well, they schedule an outpatient appointment for the patient, the patient took her home medicines, the patient will be discharged home, no indication for legal hold.    The patient will return for new or worsening symptoms and is stable at the time of discharge.    The patient is referred to a primary physician for blood pressure management, diabetic screening, and for all other preventative health concerns.    DISPOSITION:  Patient will be discharged home in stable condition.    FOLLOW UP:  [facility], Emergency Dept  [address] [address] [phone]    If symptoms worsen    FINAL IMPRESSION  1. Acute psychosis (HCC)          Wilfredo DONG (Scribe), am scribing for, and in the presence of, Vineet Wolff M.D..    Electronically signed by: Wilfredo Velazquez (Yosiibe), 12/8/2022    Vineet DONG M.D. personally performed the services described in this documentation, as scribed by Wilfredo Velazquez in my presence, and it is both accurate and complete.    The note accurately reflects work and decisions made by me.  Vineet Wolff M.D.  12/8/2022  3:50 PM

## 2022-12-08 NOTE — ED NOTES
"Patient intermittently shouting \"get out of here\" or \"be quiet\" when there are no staff in the room with her. Patient responding to internal stimuli.   "

## 2022-12-08 NOTE — ED NOTES
Patient changed into hospital only clothing. Patient belongings bagged and at nurses station.     POC breathalyzer 0.000.

## 2022-12-10 ENCOUNTER — HOSPITAL ENCOUNTER (EMERGENCY)
Facility: MEDICAL CENTER | Age: 37
End: 2022-12-10
Attending: EMERGENCY MEDICINE
Payer: MEDICAID

## 2022-12-10 VITALS
BODY MASS INDEX: 32.21 KG/M2 | RESPIRATION RATE: 16 BRPM | HEIGHT: 66 IN | HEART RATE: 99 BPM | SYSTOLIC BLOOD PRESSURE: 126 MMHG | DIASTOLIC BLOOD PRESSURE: 84 MMHG | TEMPERATURE: 97.8 F | OXYGEN SATURATION: 96 % | WEIGHT: 200.4 LBS

## 2022-12-10 DIAGNOSIS — R44.3 HALLUCINATIONS: ICD-10-CM

## 2022-12-10 LAB — POC BREATHALIZER: 0 PERCENT (ref 0–0.01)

## 2022-12-10 PROCEDURE — 99283 EMERGENCY DEPT VISIT LOW MDM: CPT

## 2022-12-10 PROCEDURE — A9270 NON-COVERED ITEM OR SERVICE: HCPCS | Performed by: EMERGENCY MEDICINE

## 2022-12-10 PROCEDURE — 700102 HCHG RX REV CODE 250 W/ 637 OVERRIDE(OP): Performed by: EMERGENCY MEDICINE

## 2022-12-10 PROCEDURE — 302970 POC BREATHALIZER: Performed by: EMERGENCY MEDICINE

## 2022-12-10 RX ORDER — LORAZEPAM 2 MG/1
2 TABLET ORAL ONCE
Status: COMPLETED | OUTPATIENT
Start: 2022-12-10 | End: 2022-12-10

## 2022-12-10 RX ADMIN — LORAZEPAM 2 MG: 2 TABLET ORAL at 12:24

## 2022-12-10 ASSESSMENT — FIBROSIS 4 INDEX: FIB4 SCORE: 0.37

## 2022-12-10 NOTE — ED PROVIDER NOTES
"ED Provider Note        Primary care provider: Pcp Pt States None    I verified that the patient was wearing a mask and I was wearing appropriate PPE every time I entered the room. The patient's mask was on the patient at all times during my encounter except for a brief view of the oropharynx.      CHIEF COMPLAINT  Chief Complaint   Patient presents with    Depressed     Ambulates to triage c/o \"feeling depressed\" x 6 weeks after taking Depakote. Told RN \"it's not working\". Reports history of bipolar. Denies visual/auditory hallucination. Calm and pleasant in triage. Denies SI/HI       HPI  Tierney Mayer is a 37 y.o. female who presents to the Emergency Department with chief complaint of depression.  Patient and stated to nursing staff that she had been feeling this depressed.  She denied hallucinations to nursing staff on my evaluation patient reports that she is concerned that she has developed a testicle.  She also reports that she has been seeing dragons and saying since she also had an earth worm that was talking to her.  Patient denies any suicidal ideation denies homicidal ideation patient has a history of methamphetamine abuse as well as schizophrenia.  She states that she has been taking her Depakote and she denies any recent meth use.  Further history of present illness limited by patient's altered mental status.    REVIEW OF SYSTEMS  As per HPI otherwise limited by altered mental status      PAST MEDICAL HISTORY   has a past medical history of Hypertension, Psychiatric disorder, and Schizophrenia (HCC).    SURGICAL HISTORY   has a past surgical history that includes pelviscopy (2/22/2011) and incision and drainage general (2/22/2011).    SOCIAL HISTORY  Social History     Tobacco Use    Smoking status: Every Day     Packs/day: 1.00     Types: Cigarettes    Smokeless tobacco: Never    Tobacco comments:     knows she is suppose to quit but hasn't   Vaping Use    Vaping Use: Never used   Substance " "Use Topics    Alcohol use: Yes    Drug use: Yes     Types: Inhaled, Oral     Comment: Marijuana, meth      Social History     Substance and Sexual Activity   Drug Use Yes    Types: Inhaled, Oral    Comment: Marijuana, meth       FAMILY HISTORY  Non-Contributory    CURRENT MEDICATIONS  Home Medications       Reviewed by Nohelia Sharpe R.N. (Registered Nurse) on 12/10/22 at 1154  Med List Status: Partial     Medication Last Dose Status   chlorproMAZINE (THORAZINE) 200 MG tablet  Active   haloperidol decanoate (HALDOL DECANOATE) 100 MG/ML injection  Active   levothyroxine (SYNTHROID) 100 MCG Tab  Active                    ALLERGIES  Allergies   Allergen Reactions    Clonazepam Unspecified     Pt reports getting a stroke.  Per historical: Twitches and paranoia.    Paliperidone Unspecified     Twitches and paranoia .    Invega Sustenna [Paliperidone Palmitate]     Olanzapine      Pt reports worsening and threatening voices.       PHYSICAL EXAM  VITAL SIGNS: BP (!) 152/109   Pulse (!) 126   Temp 37.3 °C (99.1 °F) (Temporal)   Resp 20   Ht 1.676 m (5' 6\")   Wt 90.9 kg (200 lb 6.4 oz)   SpO2 98%   BMI 32.35 kg/m²   Pulse ox interpretation: I interpret this pulse ox as normal.  Constitutional: Alert and oriented x 3, no acute distress  HEENT: Atraumatic normocephalic, pupils are equal round, extraocular movements are intact. The nares is clear, external ears are normal, mouth shows moist mucous membranes  Neck: no obvious JVD or tracheal deviation  Cardiovascular: Tachycardic no murmur rub or gallop   Thorax & Lungs: No respiratory distress, no wheezes rales or rhonchi, No chest tenderness.   GI: Soft nontender nondistended positive bowel sounds, no peritoneal signs  Skin: Warm dry no obvious acute rash or lesion  Musculoskeletal: Moving all extremities with normal range strength, no acute  deformity  Neurologic: Cranial nerves III through XII are grossly intact, no sensory deficit, no cerebellar dysfunction " "  Psychiatric: Pressured tangential    DIAGNOSTIC STUDIES / PROCEDURES  LABS      Results for orders placed or performed during the hospital encounter of 12/08/22   URINE DRUG SCREEN   Result Value Ref Range    Amphetamines Urine Positive (A) Negative    Barbiturates Negative Negative    Benzodiazepines Negative Negative    Cocaine Metabolite Negative Negative    Methadone Negative Negative    Opiates Negative Negative    Oxycodone Negative Negative    Phencyclidine -Pcp Negative Negative    Propoxyphene Negative Negative    Cannabinoid Metab Negative Negative   POC BREATHALIZER   Result Value Ref Range    POC Breathalizer 0.000 0.00 - 0.01 Percent       All labs reviewed by me.      RADIOLOGY  No orders to display         COURSE & MEDICAL DECISION MAKING  Pertinent Labs & Imaging studies reviewed. (See chart for details)    12:09 PM - Patient seen and examined at bedside.       Medical Decision Making: Patient refusing to provide urine sample had placed water in the urine When prompted by the nurse for urine sample.  She was given 2 mg of Ativan on arrival.  Heart rate has normalized she does not demonstrate any type of suicidal or homicidal thoughts she has a history of schizophrenia and methamphetamine abuse she does not appear to be at risk to herself or anyone else at this time.  She is given instructions to return for worsening symptoms or concerns she is otherwise discharged in stable and improved condition.    BP (!) 152/109   Pulse (!) 126   Temp 37.3 °C (99.1 °F) (Temporal)   Resp 20   Ht 1.676 m (5' 6\")   Wt 90.9 kg (200 lb 6.4 oz)   SpO2 98%   BMI 32.35 kg/m²     [address] [address]  [phone]    for establishment of primary care, for blood pressure management    [facility], Emergency Dept  [address] [facility] [address] [phone]    in 12-24 hours if symptoms persist, immediately If symptoms worsen, or if you develop any " other symptoms or concerns        FINAL IMPRESSION  1. Hallucinations Active          This dictation has been created using voice recognition software and/or scribes. The accuracy of the dictation is limited by the abilities of the software and the expertise of the scribes. I expect there may be some errors of grammar and possibly content. I made every attempt to manually correct the errors within my dictation. However, errors related to voice recognition software and/or scribes may still exist and should be interpreted within the appropriate context.

## 2022-12-10 NOTE — ED TRIAGE NOTES
"Chief Complaint   Patient presents with    Depressed     Ambulates to triage c/o \"feeling depressed\" x 6 weeks after taking Depakote. Told RN \"it's not working\". Reports history of bipolar. Denies visual/auditory hallucination. Calm and pleasant in triage. Denies SI/HI       "

## 2022-12-10 NOTE — ED NOTES
Patient discharged home per ERP.  Discharge teaching and education discussed with patient. POC discussed.   Patient verbalized understanding of discharge teaching and education. No other questions at this time.     VSS. Patient alert and oriented. Patient arranged ride for self (taxi to Mom's house).

## 2022-12-10 NOTE — ED NOTES
Pt provided with urine cup, returned from BR with clear cup of what appears to be water. Sample discarded. ERP aware.

## 2023-02-27 ENCOUNTER — HOSPITAL ENCOUNTER (EMERGENCY)
Facility: MEDICAL CENTER | Age: 38
End: 2023-02-28
Attending: EMERGENCY MEDICINE
Payer: MEDICAID

## 2023-02-27 DIAGNOSIS — F20.9 SCHIZOPHRENIA, CHRONIC CONDITION (HCC): ICD-10-CM

## 2023-02-27 PROCEDURE — 99284 EMERGENCY DEPT VISIT MOD MDM: CPT

## 2023-02-27 ASSESSMENT — FIBROSIS 4 INDEX: FIB4 SCORE: 0.37

## 2023-02-28 VITALS
TEMPERATURE: 98.2 F | BODY MASS INDEX: 31.94 KG/M2 | HEART RATE: 87 BPM | RESPIRATION RATE: 16 BRPM | SYSTOLIC BLOOD PRESSURE: 105 MMHG | HEIGHT: 67 IN | WEIGHT: 203.48 LBS | OXYGEN SATURATION: 98 % | DIASTOLIC BLOOD PRESSURE: 68 MMHG

## 2023-02-28 NOTE — ED PROVIDER NOTES
ED Provider Note    CHIEF COMPLAINT  Chief Complaint   Patient presents with    Psych Eval     Patient reports she started to have hallucinations .Patient state  she was at the [facility] when she started seeing visions of  her mother and her sister and then they leave and go into another world. Patient states she needs help so she doesn't have a mental breakdown. Patient denies Si/HI. Patients denies any recent drug use. Patient reports taking her medications as prescribed.        EXTERNAL RECORDS REVIEWED  Inpatient Notes  , Outpatient Notes  , and External ED Note      HPI/ROS  LIMITATION TO HISTORY   Select: : None  OUTSIDE HISTORIAN(S):      Tierney Mayer is a 37 y.o. female who presents paranoid delusions.  Patient stated that she was waiting to get shelter at a local homeless shelter and was having an increase of her usual hallucinations.  States that she was not wearing her glasses and she felt as though she was seeing family members and she felt as though the lights were appearing brighter than they usually are.  When questioned as to if she had thought about hurting herself she stated that she had but she is not currently thinking about that she has no plan associated with these thoughts no thoughts of harming anyone else she denies any acute ingestion she states that she has been compliant with her psychiatric medications and that she actually got refills of her psychiatric medicines earlier in the day from [facility].    PAST MEDICAL HISTORY   has a past medical history of Hypertension, Psychiatric disorder, and Schizophrenia (HCC).    SURGICAL HISTORY   has a past surgical history that includes pelviscopy (2/22/2011) and incision and drainage general (2/22/2011).    FAMILY HISTORY  History reviewed. No pertinent family history.    SOCIAL HISTORY  Social History     Tobacco Use    Smoking status: Every Day     Packs/day: 1.00     Types: Cigarettes    Smokeless tobacco: Never     "Tobacco comments:     knows she is suppose to quit but hasn't   Vaping Use    Vaping Use: Never used   Substance and Sexual Activity    Alcohol use: Not Currently    Drug use: Not Currently     Types: Inhaled, Oral     Comment: Marijuana, meth    Sexual activity: Not on file       CURRENT MEDICATIONS  Home Medications       Reviewed by Kristin Cee R.N. (Registered Nurse) on 02/27/23 at 2056  Med List Status: Partial     Medication Last Dose Status   chlorproMAZINE (THORAZINE) 200 MG tablet  Active   haloperidol decanoate (HALDOL DECANOATE) 100 MG/ML injection  Active   levothyroxine (SYNTHROID) 100 MCG Tab  Active                    ALLERGIES  Allergies   Allergen Reactions    Clonazepam Unspecified     Pt reports getting a stroke.  Per historical: Twitches and paranoia.    Paliperidone Unspecified     Twitches and paranoia .    Invega Sustenna [Paliperidone Palmitate]     Olanzapine      Pt reports worsening and threatening voices.       PHYSICAL EXAM  VITAL SIGNS: BP (!) 137/102   Pulse 86   Temp 36.7 °C (98.1 °F) (Temporal)   Resp 14   Ht 1.702 m (5' 7\")   Wt 92.3 kg (203 lb 7.8 oz)   LMP  (LMP Unknown)   SpO2 100%   BMI 31.87 kg/m²    Pulse ox interpretation: I interpret this pulse ox as normal.  Constitutional: Alert and oriented x 3, no acute distress  HEENT: Atraumatic normocephalic, pupils are equal round reactive to light extraocular movements are intact. The nares is clear, external ears are normal, mouth shows moist mucous membranes normal dentition for age  Neck: Supple, no JVD no tracheal deviation  Cardiovascular: Regular rate and rhythm   Thorax & Lungs: No respiratory distress  GI: Soft nontender nondistended positive bowel sounds, no peritoneal signs  Skin: Warm dry no acute rash or lesion  Musculoskeletal: Moving all extremities with full range and 5 of 5 strength no acute  deformity  Neurologic: Cranial nerves III through XII are grossly intact no sensory deficit no cerebellar " "dysfunction   Psychiatric: Appropriate affect for situation at this time         COURSE & MEDICAL DECISION MAKING        ED Observation Status? No; Patient does not meet criteria for ED Observation.     ASSESSMENT, COURSE AND PLAN  Care Narrative: 37-year-old female with history of bipolar schizophrenia compliant with medication and stated that she was having increasing delusions but now her hallucinations have calm she is not suicidal not homicidal she is not acutely a threat to herself or anyone else she is established with psychiatric care.  Patient has no indication for legal hold or further testing/imaging at this time patient will be discharged.  Instructions return for worsening symptoms or concerns otherwise discharged in stable and improved condition.        ADDITIONAL PROBLEM LIST    DISPOSITION AND DISCUSSIONS  I have discussed management of the patient with the following physicians and PIEDAD's:      Discussion of management with other QHP or appropriate source(s):      Escalation of care considered, and ultimately not performed:acute inpatient care management, however at this time, the patient is most appropriate for outpatient management    Barriers to care at this time, including but not limited to: Patient does not have established PCP.     Decision tools and prescription drugs considered including, but not limited to: .  /68   Pulse 87   Temp 36.8 °C (98.2 °F) (Temporal)   Resp 16   Ht 1.702 m (5' 7\")   Wt 92.3 kg (203 lb 7.8 oz)   LMP  (LMP Unknown)   SpO2 98%   BMI 31.87 kg/m²     [place] [address] [facility] [address]  [phone]    for establishment of primary care, for blood pressure management    [facility], Emergency Dept  [address] [facility] [address] [phone]    in 12-24 hours if symptoms persist, immediately If symptoms worsen, or if you develop any other symptoms or concerns    [facility]  [address] " [facility] [address]  [phone]          FINAL DIAGNOSIS  1. Schizophrenia, chronic condition (HCC) Active          Electronically signed by: Alberto Ron M.D., 2/28/2023 12:17 AM

## 2023-02-28 NOTE — DISCHARGE PLANNING
Alert Team:    Patient provided with cab voucher# [id number] over to [facility] warming station.

## 2023-02-28 NOTE — ED NOTES
Pt ambulated to the room with steady gait and without assistance. Provided with a warm blanket and placed on monitor. No further complaints at this time. Chart up for ERP.

## 2023-02-28 NOTE — ED TRIAGE NOTES
Chief Complaint   Patient presents with    Psych Eval     Patient reports she started to have hallucinations .Patient state  she was at the [facility] when she started seeing visions of  her mother and her sister and then they leave and go into another world. Patient states she needs help so she doesn't have a mental breakdown. Patient denies Si/HI. Patients denies any recent drug use. Patient reports taking her medications as prescribed.

## 2023-02-28 NOTE — ED NOTES
Pt discharged home, discharge and follow up care instructions and cab voucher given, pt verbalized understanding

## 2023-03-04 ENCOUNTER — APPOINTMENT (OUTPATIENT)
Dept: RADIOLOGY | Facility: MEDICAL CENTER | Age: 38
End: 2023-03-04
Attending: EMERGENCY MEDICINE
Payer: MEDICAID

## 2023-03-04 ENCOUNTER — HOSPITAL ENCOUNTER (EMERGENCY)
Facility: MEDICAL CENTER | Age: 38
End: 2023-03-04
Attending: EMERGENCY MEDICINE
Payer: MEDICAID

## 2023-03-04 VITALS
HEART RATE: 108 BPM | OXYGEN SATURATION: 100 % | WEIGHT: 203.48 LBS | BODY MASS INDEX: 31.94 KG/M2 | RESPIRATION RATE: 19 BRPM | TEMPERATURE: 98.4 F | DIASTOLIC BLOOD PRESSURE: 86 MMHG | SYSTOLIC BLOOD PRESSURE: 137 MMHG | HEIGHT: 67 IN

## 2023-03-04 DIAGNOSIS — K52.9 GASTROENTERITIS: ICD-10-CM

## 2023-03-04 DIAGNOSIS — N39.0 ACUTE UTI: ICD-10-CM

## 2023-03-04 LAB
ALBUMIN SERPL BCP-MCNC: 4.4 G/DL (ref 3.2–4.9)
ALBUMIN/GLOB SERPL: 1.4 G/DL
ALP SERPL-CCNC: 112 U/L (ref 30–99)
ALT SERPL-CCNC: 16 U/L (ref 2–50)
AMPHET UR QL SCN: NEGATIVE
ANION GAP SERPL CALC-SCNC: 12 MMOL/L (ref 7–16)
APPEARANCE UR: ABNORMAL
AST SERPL-CCNC: 17 U/L (ref 12–45)
BACTERIA #/AREA URNS HPF: ABNORMAL /HPF
BARBITURATES UR QL SCN: NEGATIVE
BASOPHILS # BLD AUTO: 0.2 % (ref 0–1.8)
BASOPHILS # BLD: 0.04 K/UL (ref 0–0.12)
BENZODIAZ UR QL SCN: NEGATIVE
BILIRUB SERPL-MCNC: <0.2 MG/DL (ref 0.1–1.5)
BILIRUB UR QL STRIP.AUTO: NEGATIVE
BUN SERPL-MCNC: 8 MG/DL (ref 8–22)
BZE UR QL SCN: NEGATIVE
CALCIUM ALBUM COR SERPL-MCNC: 8.3 MG/DL (ref 8.5–10.5)
CALCIUM SERPL-MCNC: 8.6 MG/DL (ref 8.5–10.5)
CANNABINOIDS UR QL SCN: NEGATIVE
CHLORIDE SERPL-SCNC: 102 MMOL/L (ref 96–112)
CO2 SERPL-SCNC: 23 MMOL/L (ref 20–33)
COLOR UR: YELLOW
CREAT SERPL-MCNC: 0.51 MG/DL (ref 0.5–1.4)
EOSINOPHIL # BLD AUTO: 0.11 K/UL (ref 0–0.51)
EOSINOPHIL NFR BLD: 0.6 % (ref 0–6.9)
EPI CELLS #/AREA URNS HPF: ABNORMAL /HPF
ERYTHROCYTE [DISTWIDTH] IN BLOOD BY AUTOMATED COUNT: 44.3 FL (ref 35.9–50)
ETHANOL BLD-MCNC: <10.1 MG/DL
GFR SERPLBLD CREATININE-BSD FMLA CKD-EPI: 121 ML/MIN/1.73 M 2
GLOBULIN SER CALC-MCNC: 3.1 G/DL (ref 1.9–3.5)
GLUCOSE SERPL-MCNC: 109 MG/DL (ref 65–99)
GLUCOSE UR STRIP.AUTO-MCNC: NEGATIVE MG/DL
HCG SERPL QL: NEGATIVE
HCT VFR BLD AUTO: 41 % (ref 37–47)
HGB BLD-MCNC: 13.8 G/DL (ref 12–16)
HYALINE CASTS #/AREA URNS LPF: ABNORMAL /LPF
IMM GRANULOCYTES # BLD AUTO: 0.07 K/UL (ref 0–0.11)
IMM GRANULOCYTES NFR BLD AUTO: 0.4 % (ref 0–0.9)
KETONES UR STRIP.AUTO-MCNC: 15 MG/DL
LACTATE SERPL-SCNC: 1.6 MMOL/L (ref 0.5–2)
LEUKOCYTE ESTERASE UR QL STRIP.AUTO: ABNORMAL
LYMPHOCYTES # BLD AUTO: 1.09 K/UL (ref 1–4.8)
LYMPHOCYTES NFR BLD: 6.1 % (ref 22–41)
MCH RBC QN AUTO: 29.4 PG (ref 27–33)
MCHC RBC AUTO-ENTMCNC: 33.7 G/DL (ref 33.6–35)
MCV RBC AUTO: 87.4 FL (ref 81.4–97.8)
METHADONE UR QL SCN: NEGATIVE
MICRO URNS: ABNORMAL
MONOCYTES # BLD AUTO: 0.79 K/UL (ref 0–0.85)
MONOCYTES NFR BLD AUTO: 4.4 % (ref 0–13.4)
NEUTROPHILS # BLD AUTO: 15.66 K/UL (ref 2–7.15)
NEUTROPHILS NFR BLD: 88.3 % (ref 44–72)
NITRITE UR QL STRIP.AUTO: NEGATIVE
NRBC # BLD AUTO: 0 K/UL
NRBC BLD-RTO: 0 /100 WBC
OPIATES UR QL SCN: NEGATIVE
OXYCODONE UR QL SCN: NEGATIVE
PCP UR QL SCN: NEGATIVE
PH UR STRIP.AUTO: 7 [PH] (ref 5–8)
PLATELET # BLD AUTO: 364 K/UL (ref 164–446)
PMV BLD AUTO: 9.8 FL (ref 9–12.9)
POTASSIUM SERPL-SCNC: 3.7 MMOL/L (ref 3.6–5.5)
PROPOXYPH UR QL SCN: NEGATIVE
PROT SERPL-MCNC: 7.5 G/DL (ref 6–8.2)
PROT UR QL STRIP: NEGATIVE MG/DL
RBC # BLD AUTO: 4.69 M/UL (ref 4.2–5.4)
RBC # URNS HPF: ABNORMAL /HPF
RBC UR QL AUTO: ABNORMAL
RENAL EPI CELLS #/AREA URNS HPF: ABNORMAL /HPF
SODIUM SERPL-SCNC: 137 MMOL/L (ref 135–145)
SP GR UR STRIP.AUTO: 1.01
UROBILINOGEN UR STRIP.AUTO-MCNC: 1 MG/DL
WBC # BLD AUTO: 17.8 K/UL (ref 4.8–10.8)
WBC #/AREA URNS HPF: ABNORMAL /HPF

## 2023-03-04 PROCEDURE — 96374 THER/PROPH/DIAG INJ IV PUSH: CPT | Mod: XU

## 2023-03-04 PROCEDURE — 700111 HCHG RX REV CODE 636 W/ 250 OVERRIDE (IP): Performed by: EMERGENCY MEDICINE

## 2023-03-04 PROCEDURE — 80307 DRUG TEST PRSMV CHEM ANLYZR: CPT

## 2023-03-04 PROCEDURE — 83605 ASSAY OF LACTIC ACID: CPT

## 2023-03-04 PROCEDURE — 36415 COLL VENOUS BLD VENIPUNCTURE: CPT

## 2023-03-04 PROCEDURE — 74177 CT ABD & PELVIS W/CONTRAST: CPT

## 2023-03-04 PROCEDURE — 99285 EMERGENCY DEPT VISIT HI MDM: CPT

## 2023-03-04 PROCEDURE — 96375 TX/PRO/DX INJ NEW DRUG ADDON: CPT

## 2023-03-04 PROCEDURE — 700105 HCHG RX REV CODE 258: Performed by: EMERGENCY MEDICINE

## 2023-03-04 PROCEDURE — 87086 URINE CULTURE/COLONY COUNT: CPT

## 2023-03-04 PROCEDURE — 85025 COMPLETE CBC W/AUTO DIFF WBC: CPT

## 2023-03-04 PROCEDURE — 700117 HCHG RX CONTRAST REV CODE 255: Performed by: EMERGENCY MEDICINE

## 2023-03-04 PROCEDURE — 84703 CHORIONIC GONADOTROPIN ASSAY: CPT

## 2023-03-04 PROCEDURE — 82077 ASSAY SPEC XCP UR&BREATH IA: CPT

## 2023-03-04 PROCEDURE — 81001 URINALYSIS AUTO W/SCOPE: CPT | Mod: XU

## 2023-03-04 PROCEDURE — 80053 COMPREHEN METABOLIC PANEL: CPT

## 2023-03-04 PROCEDURE — 87040 BLOOD CULTURE FOR BACTERIA: CPT | Mod: 91

## 2023-03-04 PROCEDURE — 71045 X-RAY EXAM CHEST 1 VIEW: CPT

## 2023-03-04 RX ORDER — ONDANSETRON 4 MG/1
4 TABLET, ORALLY DISINTEGRATING ORAL EVERY 8 HOURS PRN
Qty: 10 TABLET | Refills: 0 | Status: SHIPPED | OUTPATIENT
Start: 2023-03-04 | End: 2023-05-11

## 2023-03-04 RX ORDER — CEFDINIR 300 MG/1
300 CAPSULE ORAL 2 TIMES DAILY
Qty: 20 CAPSULE | Refills: 0 | Status: ACTIVE | OUTPATIENT
Start: 2023-03-04 | End: 2023-05-11

## 2023-03-04 RX ORDER — SODIUM CHLORIDE, SODIUM LACTATE, POTASSIUM CHLORIDE, CALCIUM CHLORIDE 600; 310; 30; 20 MG/100ML; MG/100ML; MG/100ML; MG/100ML
1000 INJECTION, SOLUTION INTRAVENOUS ONCE
Status: COMPLETED | OUTPATIENT
Start: 2023-03-04 | End: 2023-03-04

## 2023-03-04 RX ORDER — CEFTRIAXONE 2 G/1
2000 INJECTION, POWDER, FOR SOLUTION INTRAMUSCULAR; INTRAVENOUS ONCE
Status: COMPLETED | OUTPATIENT
Start: 2023-03-04 | End: 2023-03-04

## 2023-03-04 RX ORDER — ONDANSETRON 2 MG/ML
4 INJECTION INTRAMUSCULAR; INTRAVENOUS ONCE
Status: COMPLETED | OUTPATIENT
Start: 2023-03-04 | End: 2023-03-04

## 2023-03-04 RX ADMIN — SODIUM CHLORIDE, POTASSIUM CHLORIDE, SODIUM LACTATE AND CALCIUM CHLORIDE 1000 ML: 600; 310; 30; 20 INJECTION, SOLUTION INTRAVENOUS at 17:16

## 2023-03-04 RX ADMIN — CEFTRIAXONE SODIUM 2000 MG: 2 INJECTION, POWDER, FOR SOLUTION INTRAMUSCULAR; INTRAVENOUS at 18:40

## 2023-03-04 RX ADMIN — IOHEXOL 100 ML: 350 INJECTION, SOLUTION INTRAVENOUS at 19:45

## 2023-03-04 RX ADMIN — ONDANSETRON 4 MG: 2 INJECTION INTRAMUSCULAR; INTRAVENOUS at 17:12

## 2023-03-04 ASSESSMENT — FIBROSIS 4 INDEX: FIB4 SCORE: 0.37
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
## 2023-03-05 NOTE — ED NOTES
Pt refusing about getting CT.  Explained procedure to pt.  Offered juice or a half sandwich after the CT; pt chose juice.

## 2023-03-05 NOTE — ED NOTES
Patient report received from day shift nurse.  Patient sitting on bed. Awake and alert.  Patient fed meal by sitter.

## 2023-03-05 NOTE — ED NOTES
Discharge home. Discharge summary provided to patient and verbalized understanding.   Patient vitally stable upon discharge.  Discharge instructions provided on take home meds and follow up.  IV line removed and no sign of phlebitis seen.  Patient ambulating steady.    Security called to escort her out of ER.

## 2023-03-05 NOTE — ED TRIAGE NOTES
Pt BIB [facility].  C/O N/V x 8 days; low back pain x 3wks.  EMS tx: Tylenol 500mg and Ibuprofen 600mg.  Pt replying to questions in flat affect.

## 2023-03-05 NOTE — ED NOTES
Patient report received from night shift nurse.    Patient is back from CT.   Patient calm and relax.

## 2023-03-07 LAB
BACTERIA UR CULT: NORMAL
SIGNIFICANT IND 70042: NORMAL
SITE SITE: NORMAL
SOURCE SOURCE: NORMAL

## 2023-03-09 LAB
BACTERIA BLD CULT: NORMAL
BACTERIA BLD CULT: NORMAL
SIGNIFICANT IND 70042: NORMAL
SIGNIFICANT IND 70042: NORMAL
SITE SITE: NORMAL
SITE SITE: NORMAL
SOURCE SOURCE: NORMAL
SOURCE SOURCE: NORMAL

## 2023-03-14 NOTE — PROGRESS NOTES
Patient resting in bed. 1:1 sitter at bedside.    Oriented - self; Oriented - place; Oriented - time

## 2023-03-21 ENCOUNTER — HOSPITAL ENCOUNTER (EMERGENCY)
Facility: MEDICAL CENTER | Age: 38
End: 2023-03-21
Attending: EMERGENCY MEDICINE
Payer: MEDICAID

## 2023-03-21 VITALS
SYSTOLIC BLOOD PRESSURE: 112 MMHG | HEART RATE: 91 BPM | TEMPERATURE: 98.4 F | OXYGEN SATURATION: 97 % | DIASTOLIC BLOOD PRESSURE: 67 MMHG | HEIGHT: 65 IN | RESPIRATION RATE: 18 BRPM | BODY MASS INDEX: 24.16 KG/M2 | WEIGHT: 145 LBS

## 2023-03-21 DIAGNOSIS — Z59.00 HOMELESSNESS: ICD-10-CM

## 2023-03-21 DIAGNOSIS — Z86.59 HISTORY OF SCHIZOPHRENIA: ICD-10-CM

## 2023-03-21 DIAGNOSIS — F22 PARANOIA (HCC): ICD-10-CM

## 2023-03-21 LAB
ALBUMIN SERPL BCP-MCNC: 3.9 G/DL (ref 3.2–4.9)
ALBUMIN/GLOB SERPL: 1.6 G/DL
ALP SERPL-CCNC: 90 U/L (ref 30–99)
ALT SERPL-CCNC: 22 U/L (ref 2–50)
AMPHET UR QL SCN: NEGATIVE
ANION GAP SERPL CALC-SCNC: 13 MMOL/L (ref 7–16)
AST SERPL-CCNC: 22 U/L (ref 12–45)
BARBITURATES UR QL SCN: NEGATIVE
BASOPHILS # BLD AUTO: 0.4 % (ref 0–1.8)
BASOPHILS # BLD: 0.04 K/UL (ref 0–0.12)
BENZODIAZ UR QL SCN: NEGATIVE
BILIRUB SERPL-MCNC: 0.2 MG/DL (ref 0.1–1.5)
BUN SERPL-MCNC: 11 MG/DL (ref 8–22)
BZE UR QL SCN: NEGATIVE
CALCIUM ALBUM COR SERPL-MCNC: 9.1 MG/DL (ref 8.5–10.5)
CALCIUM SERPL-MCNC: 9 MG/DL (ref 8.5–10.5)
CANNABINOIDS UR QL SCN: NEGATIVE
CHLORIDE SERPL-SCNC: 104 MMOL/L (ref 96–112)
CO2 SERPL-SCNC: 22 MMOL/L (ref 20–33)
CREAT SERPL-MCNC: 0.53 MG/DL (ref 0.5–1.4)
EOSINOPHIL # BLD AUTO: 0.18 K/UL (ref 0–0.51)
EOSINOPHIL NFR BLD: 1.7 % (ref 0–6.9)
ERYTHROCYTE [DISTWIDTH] IN BLOOD BY AUTOMATED COUNT: 44.8 FL (ref 35.9–50)
ETHANOL BLD-MCNC: <10.1 MG/DL
GFR SERPLBLD CREATININE-BSD FMLA CKD-EPI: 120 ML/MIN/1.73 M 2
GLOBULIN SER CALC-MCNC: 2.5 G/DL (ref 1.9–3.5)
GLUCOSE SERPL-MCNC: 139 MG/DL (ref 65–99)
HCG SERPL QL: NEGATIVE
HCT VFR BLD AUTO: 37.2 % (ref 37–47)
HGB BLD-MCNC: 12.4 G/DL (ref 12–16)
IMM GRANULOCYTES # BLD AUTO: 0.05 K/UL (ref 0–0.11)
IMM GRANULOCYTES NFR BLD AUTO: 0.5 % (ref 0–0.9)
LYMPHOCYTES # BLD AUTO: 3.91 K/UL (ref 1–4.8)
LYMPHOCYTES NFR BLD: 35.9 % (ref 22–41)
MCH RBC QN AUTO: 28.8 PG (ref 27–33)
MCHC RBC AUTO-ENTMCNC: 33.3 G/DL (ref 33.6–35)
MCV RBC AUTO: 86.5 FL (ref 81.4–97.8)
METHADONE UR QL SCN: NEGATIVE
MONOCYTES # BLD AUTO: 0.65 K/UL (ref 0–0.85)
MONOCYTES NFR BLD AUTO: 6 % (ref 0–13.4)
NEUTROPHILS # BLD AUTO: 6.07 K/UL (ref 2–7.15)
NEUTROPHILS NFR BLD: 55.5 % (ref 44–72)
NRBC # BLD AUTO: 0 K/UL
NRBC BLD-RTO: 0 /100 WBC
OPIATES UR QL SCN: NEGATIVE
OXYCODONE UR QL SCN: NEGATIVE
PCP UR QL SCN: NEGATIVE
PLATELET # BLD AUTO: 401 K/UL (ref 164–446)
PMV BLD AUTO: 9.4 FL (ref 9–12.9)
POTASSIUM SERPL-SCNC: 3.5 MMOL/L (ref 3.6–5.5)
PROPOXYPH UR QL SCN: NEGATIVE
PROT SERPL-MCNC: 6.4 G/DL (ref 6–8.2)
RBC # BLD AUTO: 4.3 M/UL (ref 4.2–5.4)
SODIUM SERPL-SCNC: 139 MMOL/L (ref 135–145)
WBC # BLD AUTO: 10.9 K/UL (ref 4.8–10.8)

## 2023-03-21 PROCEDURE — 36415 COLL VENOUS BLD VENIPUNCTURE: CPT

## 2023-03-21 PROCEDURE — 84703 CHORIONIC GONADOTROPIN ASSAY: CPT

## 2023-03-21 PROCEDURE — 99285 EMERGENCY DEPT VISIT HI MDM: CPT

## 2023-03-21 PROCEDURE — 85025 COMPLETE CBC W/AUTO DIFF WBC: CPT

## 2023-03-21 PROCEDURE — 80307 DRUG TEST PRSMV CHEM ANLYZR: CPT

## 2023-03-21 PROCEDURE — 82077 ASSAY SPEC XCP UR&BREATH IA: CPT

## 2023-03-21 PROCEDURE — 80053 COMPREHEN METABOLIC PANEL: CPT

## 2023-03-21 PROCEDURE — 96372 THER/PROPH/DIAG INJ SC/IM: CPT

## 2023-03-21 PROCEDURE — 700111 HCHG RX REV CODE 636 W/ 250 OVERRIDE (IP): Performed by: EMERGENCY MEDICINE

## 2023-03-21 RX ORDER — HALOPERIDOL 5 MG/ML
5 INJECTION INTRAMUSCULAR ONCE
Status: COMPLETED | OUTPATIENT
Start: 2023-03-21 | End: 2023-03-21

## 2023-03-21 RX ADMIN — HALOPERIDOL LACTATE 5 MG: 5 INJECTION, SOLUTION INTRAMUSCULAR at 03:00

## 2023-03-21 SDOH — ECONOMIC STABILITY - HOUSING INSECURITY: HOMELESSNESS UNSPECIFIED: Z59.00

## 2023-03-21 NOTE — ED NOTES
Pt dressed independently.  Pt provided resources from Alert Team.  Pt given d/c instructions and f/u info with verbal understanding. Pt refused discharge VS.  Pt ambulatory from the ED w/ steady gait.  All belongings in possession on discharge.  Pt escorted to the lobby by Alert Team RN.  [facility] has been called for pt transport.

## 2023-03-21 NOTE — ED NOTES
Pt up to the BR with steady gait.  Pt waited outside bathroom for approx 30 seconds then went back to [facility] and back to sleep.  Refusing 0900 VS.

## 2023-03-21 NOTE — DISCHARGE SUMMARY
"  ED Observation Discharge Summary    Patient:Tierney Mayer  Patient : 1985  Patient MRN: [id number]  Patient PCP: Pcp Unobtainable By     Admit Date: 3/21/2023  Discharge Date and Time: 23 7:55 AM  Discharge Attending: Kristin Saleh D.O.  Discharge Service: ED Observation    ED Course    4:25 AM care signed out from nighttime ERP.  This is a 37-year-old female with a history apparently of schizophrenia. Patient was agitated upon arrival in the ED and received IM Haldol and has since been somnolent.  Once she is able to interact more appropriately, anticipate evaluation by the alert team.    7:38 AM discussed with the alert team who was able to evaluate the patient.  She is now lucid.  She is much improved.  She is alert and oriented x4.  She is a patient at [facility] mental health clinic.  She is able to give her medications.  At this point, she is not meeting criteria for legal hold.  However, we will wait until 9 AM when the clinic opens to ensure that the information that the patient gives is correct and that she can follow-up with [facility].    Patient was discharged home in stable condition.  She is able to contract for safety.  She has insight.  She knows her medications, she is able to describe how to get to her shoulders, her mental health appointments.  Currently, she does not meet criteria for legal hold.    Discharge Exam:  /71   Pulse 65   Temp 37.2 °C (99 °F) (Oral)   Resp 18   Ht 1.651 m (5' 5\")   Wt 65.8 kg (145 lb)   SpO2 93%   BMI 24.13 kg/m² .    Constitutional: Awake and alert. Nontoxic  HENT:  Grossly normal  Eyes: Grossly normal  Neck: Normal range of motion  Cardiovascular: Normal heart rate   Thorax & Lungs: No respiratory distress  Abdomen: Nontender  Skin:  No pathologic rash.   Extremities: Well perfused  Psychiatric: Affect normal    Labs  Results for orders placed or performed during the hospital encounter of 23   CBC WITH " DIFFERENTIAL   Result Value Ref Range    WBC 10.9 (H) 4.8 - 10.8 K/uL    RBC 4.30 4.20 - 5.40 M/uL    Hemoglobin 12.4 12.0 - 16.0 g/dL    Hematocrit 37.2 37.0 - 47.0 %    MCV 86.5 81.4 - 97.8 fL    MCH 28.8 27.0 - 33.0 pg    MCHC 33.3 (L) 33.6 - 35.0 g/dL    RDW 44.8 35.9 - 50.0 fL    Platelet Count 401 164 - 446 K/uL    MPV 9.4 9.0 - 12.9 fL    Neutrophils-Polys 55.50 44.00 - 72.00 %    Lymphocytes 35.90 22.00 - 41.00 %    Monocytes 6.00 0.00 - 13.40 %    Eosinophils 1.70 0.00 - 6.90 %    Basophils 0.40 0.00 - 1.80 %    Immature Granulocytes 0.50 0.00 - 0.90 %    Nucleated RBC 0.00 /100 WBC    Neutrophils (Absolute) 6.07 2.00 - 7.15 K/uL    Lymphs (Absolute) 3.91 1.00 - 4.80 K/uL    Monos (Absolute) 0.65 0.00 - 0.85 K/uL    Eos (Absolute) 0.18 0.00 - 0.51 K/uL    Baso (Absolute) 0.04 0.00 - 0.12 K/uL    Immature Granulocytes (abs) 0.05 0.00 - 0.11 K/uL    NRBC (Absolute) 0.00 K/uL   COMP METABOLIC PANEL   Result Value Ref Range    Sodium 139 135 - 145 mmol/L    Potassium 3.5 (L) 3.6 - 5.5 mmol/L    Chloride 104 96 - 112 mmol/L    Co2 22 20 - 33 mmol/L    Anion Gap 13.0 7.0 - 16.0    Glucose 139 (H) 65 - 99 mg/dL    Bun 11 8 - 22 mg/dL    Creatinine 0.53 0.50 - 1.40 mg/dL    Calcium 9.0 8.5 - 10.5 mg/dL    AST(SGOT) 22 12 - 45 U/L    ALT(SGPT) 22 2 - 50 U/L    Alkaline Phosphatase 90 30 - 99 U/L    Total Bilirubin 0.2 0.1 - 1.5 mg/dL    Albumin 3.9 3.2 - 4.9 g/dL    Total Protein 6.4 6.0 - 8.2 g/dL    Globulin 2.5 1.9 - 3.5 g/dL    A-G Ratio 1.6 g/dL   HCG Qual Serum   Result Value Ref Range    Beta-Hcg Qualitative Serum Negative Negative   DIAGNOSTIC ALCOHOL   Result Value Ref Range    Diagnostic Alcohol <10.1 <10.1 mg/dL   ESTIMATED GFR   Result Value Ref Range    GFR (CKD-EPI) 120 >60 mL/min/1.73 m 2   CORRECTED CALCIUM   Result Value Ref Range    Correct Calcium 9.1 8.5 - 10.5 mg/dL       My final assessment includes   1. History of schizophrenia        2. Homelessness        3. Paranoia (HCC)            Upon  Reevaluation, the patient's condition has: Improved; and will be discharged.    Patient discharged from ED Observation status at 12 PM (Time) March 21, 2023 (Date).     Total time spent on this ED Observation discharge encounter is > 30 Minutes    Electronically signed by: Kristin Saleh D.O., 3/21/2023 7:55 AM

## 2023-03-21 NOTE — ED NOTES
"PT refusing breathalizer, PT states \"No, I won't do that, you are going to steal my lungs\". PT continues to refuse testing even after this RN explains that is not the case.   "

## 2023-03-21 NOTE — ED PROVIDER NOTES
"ED Provider Note    CHIEF COMPLAINT  Chief Complaint   Patient presents with    Off Psych Meds     PT is staying at [facility], approached [facility] staff stating she has paranoid schizophrenia and wanted to go back to [facility], EMS called. PT is difficult historian, unable to answer most questions appropriately. Does state she has not taken psychiatric medications in several months and would like to start taking them again.        EXTERNAL RECORDS REVIEWED  Other none available    HPI/ROS  LIMITATION TO HISTORY   Select: Altered mental status / Confusion  OUTSIDE HISTORIAN(S):  None    Nydia Mayer is a 37 y.o. female who presents to the emergency departments with abnormal behavior.  Patient with baseline history of schizophrenia.  Currently homeless.  Hoping to go back to [facility] for more medications.  States that she is not currently on any.  Last seen at [facility] roughly 3 months ago per her history.  Now feeling somewhat unsettled.    Very poor historian.  Denying suicidal homicidal ideations.  Denies any alcohol or illicit substances.  Has been staying at [facility] but reportedly kicked out    PAST MEDICAL HISTORY       SURGICAL HISTORY  patient denies any surgical history    FAMILY HISTORY  No family history on file.    SOCIAL HISTORY  Social History     Tobacco Use    Smoking status: Not on file    Smokeless tobacco: Not on file   Substance and Sexual Activity    Alcohol use: Not on file    Drug use: Not on file    Sexual activity: Not on file       CURRENT MEDICATIONS  Home Medications    **Home medications have not yet been reviewed for this encounter**         ALLERGIES  No Known Allergies    PHYSICAL EXAM  VITAL SIGNS: /63   Pulse 95   Temp 37.2 °C (99 °F) (Oral)   Resp 18   Ht 1.651 m (5' 5\")   Wt 65.8 kg (145 lb)   SpO2 90%   BMI 24.13 kg/m²      Pulse ox interpretation: I interpret this pulse ox as normal.  Constitutional: Alert in no apparent distress.  Unkempt  HENT: No " signs of trauma, Bilateral external ears normal, Nose normal.   Eyes: Pupils are equal and reactive  Neck: Normal range of motion, No tenderness, Supple  Cardiovascular: Regular rate and rhythm, no murmurs.   Thorax & Lungs: Normal breath sounds, No respiratory distress, No wheezing, No chest tenderness.   Abdomen: Bowel sounds normal, Soft, No tenderness  Skin: Warm, Dry, No erythema, No rash.   Extremities: Intact distal pulses  Musculoskeletal: Good range of motion in all major joints. No tenderness to palpation or major deformities noted.   Neurologic: Alert , Normal motor function, Normal sensory function, No focal deficits noted.   Psychiatric: Psychiatric affect.  Denies SI HI        DIAGNOSTIC STUDIES / PROCEDURES      LABS  Results for orders placed or performed during the hospital encounter of 03/21/23   CBC WITH DIFFERENTIAL   Result Value Ref Range    WBC 10.9 (H) 4.8 - 10.8 K/uL    RBC 4.30 4.20 - 5.40 M/uL    Hemoglobin 12.4 12.0 - 16.0 g/dL    Hematocrit 37.2 37.0 - 47.0 %    MCV 86.5 81.4 - 97.8 fL    MCH 28.8 27.0 - 33.0 pg    MCHC 33.3 (L) 33.6 - 35.0 g/dL    RDW 44.8 35.9 - 50.0 fL    Platelet Count 401 164 - 446 K/uL    MPV 9.4 9.0 - 12.9 fL    Neutrophils-Polys 55.50 44.00 - 72.00 %    Lymphocytes 35.90 22.00 - 41.00 %    Monocytes 6.00 0.00 - 13.40 %    Eosinophils 1.70 0.00 - 6.90 %    Basophils 0.40 0.00 - 1.80 %    Immature Granulocytes 0.50 0.00 - 0.90 %    Nucleated RBC 0.00 /100 WBC    Neutrophils (Absolute) 6.07 2.00 - 7.15 K/uL    Lymphs (Absolute) 3.91 1.00 - 4.80 K/uL    Monos (Absolute) 0.65 0.00 - 0.85 K/uL    Eos (Absolute) 0.18 0.00 - 0.51 K/uL    Baso (Absolute) 0.04 0.00 - 0.12 K/uL    Immature Granulocytes (abs) 0.05 0.00 - 0.11 K/uL    NRBC (Absolute) 0.00 K/uL   COMP METABOLIC PANEL   Result Value Ref Range    Sodium 139 135 - 145 mmol/L    Potassium 3.5 (L) 3.6 - 5.5 mmol/L    Chloride 104 96 - 112 mmol/L    Co2 22 20 - 33 mmol/L    Anion Gap 13.0 7.0 - 16.0    Glucose 139  (H) 65 - 99 mg/dL    Bun 11 8 - 22 mg/dL    Creatinine 0.53 0.50 - 1.40 mg/dL    Calcium 9.0 8.5 - 10.5 mg/dL    AST(SGOT) 22 12 - 45 U/L    ALT(SGPT) 22 2 - 50 U/L    Alkaline Phosphatase 90 30 - 99 U/L    Total Bilirubin 0.2 0.1 - 1.5 mg/dL    Albumin 3.9 3.2 - 4.9 g/dL    Total Protein 6.4 6.0 - 8.2 g/dL    Globulin 2.5 1.9 - 3.5 g/dL    A-G Ratio 1.6 g/dL   HCG Qual Serum   Result Value Ref Range    Beta-Hcg Qualitative Serum Negative Negative   DIAGNOSTIC ALCOHOL   Result Value Ref Range    Diagnostic Alcohol <10.1 <10.1 mg/dL   ESTIMATED GFR   Result Value Ref Range    GFR (CKD-EPI) 120 >60 mL/min/1.73 m 2   CORRECTED CALCIUM   Result Value Ref Range    Correct Calcium 9.1 8.5 - 10.5 mg/dL             COURSE & MEDICAL DECISION MAKING    ED Observation Status? Yes; I am placing the patient in to an observation status due to a diagnostic uncertainty as well as therapeutic intensity. Patient placed in observation status at 1:39 AM, 3/21/2023.     Observation plan is as follows: Patient presenting to the emergency department with primary complaint of psychiatric decompensation and hoping to get back on medications.  We will continue ongoing psychiatric observation and await psychiatric evaluation in the morning        INITIAL ASSESSMENT, COURSE AND PLAN  Care Narrative: Patient presented to the emergency department with the above presentation and psychiatric decompensation.  Patient will be provided with Haldol.  We will continue psychiatric work-up as well        DISPOSITION AND DISCUSSIONS  I have discussed management of the patient with the following physicians and PIEDAD's: None    Discussion of management with other QHP or appropriate source(s): Behavioral Health pending evaluation at this time      Escalation of care considered, and ultimately not performed:diagnostic imaging and acute inpatient care management, however at this time, the patient is most appropriate for outpatient management    Barriers to care  at this time, including but not limited to: Patient does not have established PCP and Patient is homeless.     37-year-old female presenting the [facility] apartment with acute psychiatric decompensation.  As stated above the patient was initially provided with Haldol for initiation of our ER treatment here.  Patient has been placed in ER observational care status.  Will await behavioral health evaluation and ongoing psychiatric work-up tomorrow.  Hematologic evaluation thus far is benign.        FINAL DIAGNOSIS  History of schizophrenia  Noncompliant with medication regimen       Electronically signed by: Saul Vargas M.D., 3/21/2023 2:58 AM

## 2023-03-21 NOTE — CONSULTS
[facility]   TRIAGE ASSESSMENT    Name: Tierney Mayer  MRN: [id number]  : 1985  Age: 37 y.o.  Date of assessment: 3/21/2023  PCP: Pcp Unobtainable By   Persons in attendance: Patient  Patient Location: [facility]    CHIEF COMPLAINT/PRESENTING ISSUE (as stated by patient): Pt BIB EMS for reports of paranoid behavior. She has been noncompliant with medications for approximately three months. Pt was allowed to sleep and at time of assessment, pt is alert, oriented and able to state that she gets food, shelter and clothing from homeless shelters. She is able to list some of her psychiatric medications such as Geodon, Latuda and trazodone.  Denies suicidal or homicidal ideation. She was initially refusing to answer questions or participate in any interviews. At time of assessment, pt is calm and cooperative. She states that she does not like the shelter and repeatedly states that she would like to go to [facility] to eat and sleep and restart her medications. She is not meeting criteria for a legal  hold at this time. Reviewed MTM benefits and educated her on how to use them. Reviewed homeless resources. Pt to use MTM to go to [facility] outpatient medication clinic and then present to [facility].. She is agreeable with this plan.  Chief Complaint   Patient presents with    Off Psych Meds     PT is staying at hotel, approached hotel staff stating she has paranoid schizophrenia and wanted to go back to [facility], EMS called. PT is difficult historian, unable to answer most questions appropriately. Does state she has not taken psychiatric medications in several months and would like to start taking them again.         CURRENT LIVING SITUATION/SOCIAL SUPPORT/FINANCIAL RESOURCES:   Chronic homelessness. Unclear financial or social supports. States she stays with friends at times. Recently at a hotel.    BEHAVIORAL HEALTH/SUBSTANCE USE TREATMENT HISTORY  Does patient/parent  report a history of prior behavioral health/substance use treatment for patient?   Pt is unable to provide history. She states she has been a client at [facility]: EMR documents that she was discharged from there . History of Well    SAFETY ASSESSMENT - SELF  Does patient acknowledge current or past symptoms of dangerousness to self or is previous history noted? no  Does parent/significant other report patient has current or past symptoms of dangerousness to self? N\A  Does presenting problem suggest symptoms of dangerousness to self? No    SAFETY ASSESSMENT - OTHERS  Does patient acknowledge current or past symptoms of aggressive behavior or risk to others or is previous history noted? Yes, EMR documents aggression toward ED staff, staff at [facility]   Does parent/significant other report patient has current or past symptoms of aggressive behavior or risk to others?  N\A  Does presenting problem suggest symptoms of dangerousness to others? No    LEGAL HISTORY  Does patient acknowledge history of arrest/FCI/MCC or is previous history noted? no    Crisis Safety Plan completed and copy given to patient? yes    ABUSE/NEGLECT SCREENING  Does patient report feeling “unsafe” in his/her home, or afraid of anyone?  Yes, feels unsafe on the streets.  Does patient report any history of physical, sexual, or emotional abuse?  no  Does parent or significant other report any of the above? N\A  Is there evidence of neglect by self?  no  Is there evidence of neglect by a caregiver? no  Does the patient/parent report any history of CPS/APS/police involvement related to suspected abuse/neglect or domestic violence? no  Based on the information provided during the current assessment, is a mandated report of suspected abuse/neglect being made?  No    SUBSTANCE USE SCREENING  Yes:  Sam all substances used in the past 30 days: denies substance use, UDS pending      Last Use Amount   []   Alcohol     []   Marijuana     []   Heroin    "  []   Prescription Opioids  (used without prescription, for    recreation, or in excess of prescribed amount)     []   Other Prescription  (used without prescription, for    recreation, or in excess of prescribed amount)     []   Cocaine      []   Methamphetamine     []   \"\" drugs (ectasy, MDMA)     []   Other substances        UDS results: pending  Breathalyzer results: pending      MENTAL STATUS   Participation: Active verbal participation and Resistant  Grooming: Disheveled  Orientation: Alert and Fully Oriented  Behavior: Calm  Eye contact: Poor  Mood: Irritable  Affect: Congruent with content  Thought process: Perseveration  Thought content: Paranoia  Speech: Rate within normal limits and Volume within normal limits  Perception: Within normal limits  Memory:  Poor memory for chronology of events  Insight: Poor  Judgment:  Poor  Other:    Collateral information:    Source:  [] Significant other present in person:   [] Significant other by telephone  [] [facility]   [] [facility] Staff  [] [facility]  [] Other:     [] Unable to complete full assessment due to:  [] Acute intoxication  [x] Patient declined to participate/engage  [] Patient verbally unresponsive  [] Significant cognitive deficits  [] Significant perceptual distortions or behavioral disorganization  [] Other:      CLINICAL IMPRESSIONS:  Primary:  paranoia  Secondary:  chronic homelessness, poor social suport       IDENTIFIED NEEDS/PLAN:  [Trigger DISPOSITION list for any items marked]    []  Imminent safety risk - self [] Imminent safety risk - others   []  Acute substance withdrawal []  Psychosis/Impaired reality testing   []  Mood/anxiety []  Substance use/Addictive behavior   []  Maladaptive behaviro []  Parent/child conflict   []  Family/Couples conflict []  Biomedical   [x]  Housing [x]  Financial   []   Legal  Occupational/Educational   []  Domestic violence []  Other:     Recommended Plan of Care:  Refer to " [facility]  *Telesitter may not be utilized for moderate or high risk patients    Has the Recommended Plan of Care/Level of Observation been reviewed with the patient's assigned nurse? yes    Does patient/parent or guardian express agreement with the above plan? Yes    Referral appointment(s) scheduled? Pt to present to walk in clinic    Alert team only:   I have discussed findings and recommendations with Dr. Saleh who is in agreement with these recommendations.     Referral information sent to the following outpatient community providers : [facility] outpatient clinic    Referral information sent to the following inpatient community providers :    If applicable : Referred  to  Alert Team for legal hold follow up at (time): N/A      Jeannine Disla R.N.  3/21/2023

## 2023-03-21 NOTE — ED TRIAGE NOTES
"Chief Complaint   Patient presents with    Off Psych Meds     PT is staying at [facility], approached [facility] staff stating she has paranoid schizophrenia and wanted to go back to [facility], EMS called. PT is difficult historian, unable to answer most questions appropriately. Does state she has not taken psychiatric medications in several months and would like to start taking them again.        BIB EMS to [facility] [employer], pt on monitor, provided call bell and in gown.    Medications given en route: None    BP (!) 154/84   Pulse (!) 110   Temp 37.2 °C (99 °F) (Oral)   Resp 20   Ht 1.651 m (5' 5\")   Wt 65.8 kg (145 lb)   SpO2 100%   BMI 24.13 kg/m²     " No issues here  Raul Newman MD

## 2023-03-21 NOTE — ED NOTES
When attempting blood draw, PT became extremely agitated, shouting explicits and nonsensical phrases.

## 2023-04-20 ENCOUNTER — HOSPITAL ENCOUNTER (EMERGENCY)
Facility: MEDICAL CENTER | Age: 38
End: 2023-04-24
Attending: STUDENT IN AN ORGANIZED HEALTH CARE EDUCATION/TRAINING PROGRAM
Payer: MEDICAID

## 2023-04-20 DIAGNOSIS — F29 PSYCHOSIS, UNSPECIFIED PSYCHOSIS TYPE (HCC): ICD-10-CM

## 2023-04-20 LAB
ALBUMIN SERPL BCP-MCNC: 3.8 G/DL (ref 3.2–4.9)
ALBUMIN/GLOB SERPL: 1.2 G/DL
ALP SERPL-CCNC: 98 U/L (ref 30–99)
ALT SERPL-CCNC: 18 U/L (ref 2–50)
AMPHET UR QL SCN: NEGATIVE
ANION GAP SERPL CALC-SCNC: 13 MMOL/L (ref 7–16)
AST SERPL-CCNC: 14 U/L (ref 12–45)
B-HCG SERPL-ACNC: <1 MIU/ML (ref 0–5)
BARBITURATES UR QL SCN: NEGATIVE
BASOPHILS # BLD AUTO: 0.4 % (ref 0–1.8)
BASOPHILS # BLD: 0.05 K/UL (ref 0–0.12)
BENZODIAZ UR QL SCN: NEGATIVE
BILIRUB SERPL-MCNC: 0.2 MG/DL (ref 0.1–1.5)
BUN SERPL-MCNC: 12 MG/DL (ref 8–22)
BZE UR QL SCN: NEGATIVE
CALCIUM ALBUM COR SERPL-MCNC: 9.1 MG/DL (ref 8.5–10.5)
CALCIUM SERPL-MCNC: 8.9 MG/DL (ref 8.5–10.5)
CANNABINOIDS UR QL SCN: NEGATIVE
CHLORIDE SERPL-SCNC: 102 MMOL/L (ref 96–112)
CO2 SERPL-SCNC: 22 MMOL/L (ref 20–33)
CREAT SERPL-MCNC: 0.55 MG/DL (ref 0.5–1.4)
EOSINOPHIL # BLD AUTO: 0.15 K/UL (ref 0–0.51)
EOSINOPHIL NFR BLD: 1.2 % (ref 0–6.9)
ERYTHROCYTE [DISTWIDTH] IN BLOOD BY AUTOMATED COUNT: 44.1 FL (ref 35.9–50)
ETHANOL BLD-MCNC: <10.1 MG/DL
GFR SERPLBLD CREATININE-BSD FMLA CKD-EPI: 119 ML/MIN/1.73 M 2
GLOBULIN SER CALC-MCNC: 3.2 G/DL (ref 1.9–3.5)
GLUCOSE SERPL-MCNC: 84 MG/DL (ref 65–99)
HCT VFR BLD AUTO: 40.1 % (ref 37–47)
HGB BLD-MCNC: 13.1 G/DL (ref 12–16)
IMM GRANULOCYTES # BLD AUTO: 0.05 K/UL (ref 0–0.11)
IMM GRANULOCYTES NFR BLD AUTO: 0.4 % (ref 0–0.9)
LYMPHOCYTES # BLD AUTO: 3.05 K/UL (ref 1–4.8)
LYMPHOCYTES NFR BLD: 25.2 % (ref 22–41)
MCH RBC QN AUTO: 28.5 PG (ref 27–33)
MCHC RBC AUTO-ENTMCNC: 32.7 G/DL (ref 33.6–35)
MCV RBC AUTO: 87.4 FL (ref 81.4–97.8)
METHADONE UR QL SCN: NEGATIVE
MONOCYTES # BLD AUTO: 0.93 K/UL (ref 0–0.85)
MONOCYTES NFR BLD AUTO: 7.7 % (ref 0–13.4)
NEUTROPHILS # BLD AUTO: 7.87 K/UL (ref 2–7.15)
NEUTROPHILS NFR BLD: 65.1 % (ref 44–72)
NRBC # BLD AUTO: 0 K/UL
NRBC BLD-RTO: 0 /100 WBC
OPIATES UR QL SCN: POSITIVE
OXYCODONE UR QL SCN: NEGATIVE
PCP UR QL SCN: NEGATIVE
PLATELET # BLD AUTO: 347 K/UL (ref 164–446)
PMV BLD AUTO: 9.8 FL (ref 9–12.9)
POTASSIUM SERPL-SCNC: 3.5 MMOL/L (ref 3.6–5.5)
PROPOXYPH UR QL SCN: NEGATIVE
PROT SERPL-MCNC: 7 G/DL (ref 6–8.2)
RBC # BLD AUTO: 4.59 M/UL (ref 4.2–5.4)
SODIUM SERPL-SCNC: 137 MMOL/L (ref 135–145)
WBC # BLD AUTO: 12.1 K/UL (ref 4.8–10.8)

## 2023-04-20 PROCEDURE — 700111 HCHG RX REV CODE 636 W/ 250 OVERRIDE (IP): Performed by: STUDENT IN AN ORGANIZED HEALTH CARE EDUCATION/TRAINING PROGRAM

## 2023-04-20 PROCEDURE — 82077 ASSAY SPEC XCP UR&BREATH IA: CPT

## 2023-04-20 PROCEDURE — 36415 COLL VENOUS BLD VENIPUNCTURE: CPT

## 2023-04-20 PROCEDURE — 96374 THER/PROPH/DIAG INJ IV PUSH: CPT

## 2023-04-20 PROCEDURE — 80053 COMPREHEN METABOLIC PANEL: CPT

## 2023-04-20 PROCEDURE — 85025 COMPLETE CBC W/AUTO DIFF WBC: CPT

## 2023-04-20 PROCEDURE — 99285 EMERGENCY DEPT VISIT HI MDM: CPT

## 2023-04-20 PROCEDURE — 84702 CHORIONIC GONADOTROPIN TEST: CPT

## 2023-04-20 PROCEDURE — 80307 DRUG TEST PRSMV CHEM ANLYZR: CPT

## 2023-04-20 RX ORDER — HALOPERIDOL 5 MG/ML
1 INJECTION INTRAMUSCULAR ONCE
Status: COMPLETED | OUTPATIENT
Start: 2023-04-20 | End: 2023-04-20

## 2023-04-20 RX ADMIN — HALOPERIDOL LACTATE 1 MG: 5 INJECTION, SOLUTION INTRAMUSCULAR at 21:00

## 2023-04-21 PROCEDURE — 700102 HCHG RX REV CODE 250 W/ 637 OVERRIDE(OP): Performed by: PSYCHIATRY & NEUROLOGY

## 2023-04-21 PROCEDURE — A9270 NON-COVERED ITEM OR SERVICE: HCPCS | Performed by: PSYCHIATRY & NEUROLOGY

## 2023-04-21 PROCEDURE — 90791 PSYCH DIAGNOSTIC EVALUATION: CPT

## 2023-04-21 RX ORDER — DIPHENHYDRAMINE HCL 25 MG
50 TABLET ORAL
Status: DISCONTINUED | OUTPATIENT
Start: 2023-04-21 | End: 2023-04-24 | Stop reason: HOSPADM

## 2023-04-21 RX ORDER — CHLORPROMAZINE HYDROCHLORIDE 50 MG/1
100 TABLET, FILM COATED ORAL
Status: DISCONTINUED | OUTPATIENT
Start: 2023-04-21 | End: 2023-04-24 | Stop reason: HOSPADM

## 2023-04-21 RX ORDER — DIAZEPAM 5 MG/1
5 TABLET ORAL
Status: DISCONTINUED | OUTPATIENT
Start: 2023-04-21 | End: 2023-04-24 | Stop reason: HOSPADM

## 2023-04-21 RX ORDER — DIVALPROEX SODIUM 500 MG/1
500 TABLET, EXTENDED RELEASE ORAL DAILY
Status: DISCONTINUED | OUTPATIENT
Start: 2023-04-21 | End: 2023-04-24 | Stop reason: HOSPADM

## 2023-04-21 RX ORDER — RISPERIDONE 2 MG/1
2 TABLET ORAL EVERY EVENING
Status: DISCONTINUED | OUTPATIENT
Start: 2023-04-21 | End: 2023-04-24 | Stop reason: HOSPADM

## 2023-04-21 RX ORDER — RISPERIDONE 1 MG/1
1 TABLET ORAL EVERY MORNING
Status: DISCONTINUED | OUTPATIENT
Start: 2023-04-21 | End: 2023-04-24 | Stop reason: HOSPADM

## 2023-04-21 RX ADMIN — RISPERIDONE 2 MG: 2 TABLET ORAL at 18:50

## 2023-04-21 RX ADMIN — RISPERIDONE 1 MG: 1 TABLET, FILM COATED ORAL at 11:40

## 2023-04-21 RX ADMIN — DIVALPROEX SODIUM 500 MG: 500 TABLET, EXTENDED RELEASE ORAL at 11:40

## 2023-04-21 RX ADMIN — DIAZEPAM 5 MG: 5 TABLET ORAL at 20:18

## 2023-04-21 NOTE — ED NOTES
Pt given meds, compliant and redirectable, stating random phrases and refusing to answer AO questions, asking for coffee

## 2023-04-21 NOTE — ED TRIAGE NOTES
"Chief Complaint   Patient presents with    Psych Eval     Pt having flights of thought. Delusions, states \"birds ate her heart\".  Oriented to place and events. Disoriented to time.  States her name is Tierney.       Ambulated to triage for above complaint.     Pt follows commands but responds inappropriately to questions. NAD. Resp are even and unlabored.      Pt placed in lobby. Pt educated on triage process. Pt encouraged to alert staff for any changes.     Patient and staff wearing appropriate PPE    /84   Pulse 103   Temp 37.2 °C (98.9 °F) (Temporal)   Resp 20   Ht 1.651 m (5' 5\")   Wt 87.4 kg (192 lb 10.9 oz)   LMP  (LMP Unknown)   SpO2 100%   BMI 32.06 kg/m²   "

## 2023-04-21 NOTE — CONSULTS
"[facility]   TRIAGE ASSESSMENT    Name: Naldo De Leon  MRN: [id number]  : 1902  Age: 89 y.o.  Date of assessment: 2023  PCP: Pcp Pt States None  Persons in attendance: Patient  Patient Location: [facility]    CHIEF COMPLAINT/PRESENTING ISSUE (as stated by patient): Pt is a female of unknown age presenting to ED for a psychiatric evaluation. Pt is A&O x1 disoriented to time, place, and situation. Pt denies SI/HI/hallucinations. Pt having flight of ideas; delusional thinking; responding to internal stimuli. States, \"Birds at my heart.\" Pt tells this writer she came from a \"barn\" and that she \"hurts inside\" Pt states her name is Tierney, but unable to give a last name; pt under an alias at this time until identity confirmed. Pt drowsy and keeps falling asleep during consult. Unable to obtain some consult questions due to drowsiness and psychosis. Findings discussed with ERP who agrees pt needs to transfer to an inpatient psychiatric facility for further evaluation and stabilization. No insurance plan noted. Inpatient psychiatric referral to be faxed to [facility] via [facility] by Behavioral Health team. Outpatient psychiatric referrals faxed to [facility] and [facility]. Emailed Patient Financial Assistance for help with [place] medicaid application. Searched pt belongings for some form of identification, but none found. No personal belongings, phone calls, or visitors at this time. Psychiatry consult has been ordered.   Chief Complaint   Patient presents with    Psych Eval     Pt having flights of thought. Delusions, states \"birds ate her heart\".  Oriented to place and events. Disoriented to time.  States her name is Tierney.        CURRENT LIVING SITUATION/SOCIAL SUPPORT/FINANCIAL RESOURCES: Unable to assess due to psychosis.     BEHAVIORAL HEALTH/SUBSTANCE USE TREATMENT HISTORY  Does patient/parent report a history of prior behavioral health/substance use treatment for patient? "   Unable to assess due to psychosis.     SAFETY ASSESSMENT - SELF  Does patient acknowledge current or past symptoms of dangerousness to self or is previous history noted? Unable to assess due to psychosis.   Does parent/significant other report patient has current or past symptoms of dangerousness to self? N\A  Does presenting problem suggest symptoms of dangerousness to self? No; Denies SI.     SAFETY ASSESSMENT - OTHERS  Does patient acknowledge current or past symptoms of aggressive behavior or risk to others or is previous history noted? Unable to assess due to psychosis.   Does parent/significant other report patient has current or past symptoms of aggressive behavior or risk to others?  N\A  Does presenting problem suggest symptoms of dangerousness to others? No; denies HI.    LEGAL HISTORY  Does patient acknowledge history of arrest/long term/custodial or is previous history noted? Unable to assess due to psychosis.     Crisis Safety Plan completed and copy given to patient? N\A    ABUSE/NEGLECT SCREENING  Does patient report feeling “unsafe” in his/her home, or afraid of anyone?  Unable to assess due to psychosis.   Does patient report any history of physical, sexual, or emotional abuse?  Unable to assess due to psychosis.   Does parent or significant other report any of the above? N\A  Is there evidence of neglect by self?  yes  Is there evidence of neglect by a caregiver? N/A  Does the patient/parent report any history of CPS/APS/police involvement related to suspected abuse/neglect or domestic violence? Unable to assess due to psychosis.   Based on the information provided during the current assessment, is a mandated report of suspected abuse/neglect being made?  No    SUBSTANCE USE SCREENING  Yes:  Sam all substances used in the past 30 days:      Last Use Amount   []   Alcohol     []   Marijuana     []   Heroin     []   Prescription Opioids  (used without prescription, for    recreation, or in excess of  "prescribed amount)     []   Other Prescription  (used without prescription, for    recreation, or in excess of prescribed amount)     []   Cocaine      []   Methamphetamine     []   \"\" drugs (ectasy, MDMA)     []   Other substances        UDS results: opiates  Breathalyzer results: <10.1        MENTAL STATUS   Participation: Limited verbal participation  Grooming: Disheveled  Orientation: Drowsy/Somnolent and Disoriented to: time, place, situation  Behavior: Calm  Eye contact: Poor  Mood: Euthymic  Affect: Constricted  Thought process: Flight of ideas  Thought content: Evidence of delusion  Speech: Hypotalkative and Latency of response  Perception: Derealization  Memory:  Poor memory for chronology of events  Insight: Poor  Judgment:  Poor  Other:    Collateral information:   Source:  [] Significant other present in person:   [] Significant other by telephone  [] [facility]   [x] [facility]  [x] [facility]  [x] Other: ERP     [] Unable to complete full assessment due to:  [] Acute intoxication  [] Patient declined to participate/engage  [] Patient verbally unresponsive  [] Significant cognitive deficits  [x] Significant perceptual distortions or behavioral disorganization  [] Other:       CLINICAL IMPRESSIONS:  Primary:  Psychosis  Secondary:  ---       IDENTIFIED NEEDS/PLAN:  [Trigger DISPOSITION list for any items marked]    []  Imminent safety risk - self [] Imminent safety risk - others   []  Acute substance withdrawal [x]  Psychosis/Impaired reality testing   [x]  Mood/anxiety []  Substance use/Addictive behavior   [x]  Maladaptive behaviro []  Parent/child conflict   []  Family/Couples conflict []  Biomedical   [x]  Housing []  Financial   []   Legal  Occupational/Educational   []  Domestic violence []  Other:     Recommended Plan of Care:  Actively being [facility] and Refer to [facility]. Findings discussed with ERP who agrees pt " needs to transfer to an inpatient psychiatric facility for further evaluation and stabilization. No insurance plan noted. Inpatient psychiatric referral to be faxed to [facility] via [facility] by Behavioral Health team. Outpatient psychiatric referrals faxed to [facility] and [facility]. Emailed Patient Financial Assistance for help with [place] medicaid application. Searched pt belongings for some form of identification, but none found. No personal belongings, phone calls, or visitors at this time. Psychiatry consult has been ordered.      Has the Recommended Plan of Care/Level of Observation been reviewed with the patient's assigned nurse? Yes; telesitter has been ordered.     Does patient/parent or guardian express agreement with the above plan? yes      Referral appointment(s) scheduled? N\A    Alert team only:   I have discussed findings and recommendations with Dr. Torres who is in agreement with these recommendations.     Referral information sent to the following outpatient community providers : [facility] and [facility]    Referral information sent to the following inpatient community providers : To be sent to [facility] via [facility] by Behavioral Health team    If applicable : Referred  to  Alert Team for legal hold follow up at (time): 04/20/2023 @ 2055      Lian Jiménez R.N.  4/21/2023

## 2023-04-21 NOTE — ED NOTES
Checked on bed, with unlabored respirations. No safety risk noted  Sleeping  Tele sitter; Continued safety precaution  Gurney in low position, side rail up for pt safety.   Will continue to monitor for any complications.

## 2023-04-21 NOTE — DISCHARGE PLANNING
Alert Team Note:    Per Dr. Lopez's consult note, pts name is Tierney Mayer. Writer contacted NI Sepulveda to inquire if this has been verified. Per NI Sepulveda, she will verify with a PAR.

## 2023-04-21 NOTE — ED PROVIDER NOTES
"ED Provider Note    CHIEF COMPLAINT  Chief Complaint   Patient presents with    Psych Eval     Pt having flights of thought. Delusions, states \"birds ate her heart\".  Oriented to place and events. Disoriented to time.  States her name is Tierney.       EXTERNAL RECORDS REVIEWED  None    HPI/ROS  LIMITATION TO HISTORY   Select: Behavior  OUTSIDE HISTORIAN(S):  None    Peanut Thirty-One is a 89 y.o. female who presents with bizarre behavior.  Patient states ' I am in the building of a mouth'. She has flight of ideas and is unable to answer my questions appropriately.  When I ask if she has any medical complaints today she says ' asthma, bronchitis, hunger'.  She does tell me that her name is Tierney.  She is unable to engage in further history taking and appears to be responding to internal stimuli.    PAST MEDICAL HISTORY   Unable to obtain    SURGICAL HISTORY  patient denies any surgical history    FAMILY HISTORY  No family history on file.    SOCIAL HISTORY  Social History     Tobacco Use    Smoking status: Not on file    Smokeless tobacco: Not on file   Substance and Sexual Activity    Alcohol use: Not on file    Drug use: Not on file    Sexual activity: Not on file       CURRENT MEDICATIONS  Home Medications       Reviewed by Jenn Winchester (Pharmacy Tech) on 04/21/23 at 0006  Med List Status: Unable to Obtain     Medication Last Dose Status        Patient Mike Taking any Medications                           ALLERGIES  Not on File    PHYSICAL EXAM  VITAL SIGNS: /69   Pulse 100   Temp 36.9 °C (98.4 °F)   Resp 18   Ht 1.651 m (5' 5\")   Wt 87.4 kg (192 lb 10.9 oz)   LMP  (LMP Unknown)   SpO2 99%   BMI 32.06 kg/m²    Constitutional: Awake and alert. Disheveled  HENT: Normocephalic, Atraumatic  Eyes: Normal inspection  Neck: Grossly normal range of motion.  Cardiovascular: Normal heart rate, Normal rhythm.  Symmetric peripheral pulses.   Thorax & Lungs: No respiratory distress, No wheezing, No " rales, No rhonchi,   Abdomen: Soft, non-distended, nontender to palpation in all 4 quadrants, no mass  Skin: No obvious rash.  Extremities: Warm, well perfused. No clubbing, cyanosis, edema   Neurologic: Grossly normal   Psychiatric: Delusional with flight of ideas. She appears to be responding to internal stimuli    DIAGNOSTIC STUDIES / PROCEDURES      LABS  Results for orders placed or performed during the hospital encounter of 04/20/23   CBC WITH DIFFERENTIAL   Result Value Ref Range    WBC 12.1 (H) 4.8 - 10.8 K/uL    RBC 4.59 4.20 - 5.40 M/uL    Hemoglobin 13.1 12.0 - 16.0 g/dL    Hematocrit 40.1 37.0 - 47.0 %    MCV 87.4 81.4 - 97.8 fL    MCH 28.5 27.0 - 33.0 pg    MCHC 32.7 (L) 33.6 - 35.0 g/dL    RDW 44.1 35.9 - 50.0 fL    Platelet Count 347 164 - 446 K/uL    MPV 9.8 9.0 - 12.9 fL    Neutrophils-Polys 65.10 44.00 - 72.00 %    Lymphocytes 25.20 22.00 - 41.00 %    Monocytes 7.70 0.00 - 13.40 %    Eosinophils 1.20 0.00 - 6.90 %    Basophils 0.40 0.00 - 1.80 %    Immature Granulocytes 0.40 0.00 - 0.90 %    Nucleated RBC 0.00 /100 WBC    Neutrophils (Absolute) 7.87 (H) 2.00 - 7.15 K/uL    Lymphs (Absolute) 3.05 1.00 - 4.80 K/uL    Monos (Absolute) 0.93 (H) 0.00 - 0.85 K/uL    Eos (Absolute) 0.15 0.00 - 0.51 K/uL    Baso (Absolute) 0.05 0.00 - 0.12 K/uL    Immature Granulocytes (abs) 0.05 0.00 - 0.11 K/uL    NRBC (Absolute) 0.00 K/uL   COMP METABOLIC PANEL   Result Value Ref Range    Sodium 137 135 - 145 mmol/L    Potassium 3.5 (L) 3.6 - 5.5 mmol/L    Chloride 102 96 - 112 mmol/L    Co2 22 20 - 33 mmol/L    Anion Gap 13.0 7.0 - 16.0    Glucose 84 65 - 99 mg/dL    Bun 12 8 - 22 mg/dL    Creatinine 0.55 0.50 - 1.40 mg/dL    Calcium 8.9 8.5 - 10.5 mg/dL    AST(SGOT) 14 12 - 45 U/L    ALT(SGPT) 18 2 - 50 U/L    Alkaline Phosphatase 98 30 - 99 U/L    Total Bilirubin 0.2 0.1 - 1.5 mg/dL    Albumin 3.8 3.2 - 4.9 g/dL    Total Protein 7.0 6.0 - 8.2 g/dL    Globulin 3.2 1.9 - 3.5 g/dL    A-G Ratio 1.2 g/dL   DIAGNOSTIC  ALCOHOL   Result Value Ref Range    Diagnostic Alcohol <10.1 <10.1 mg/dL   BETA-HCG QUANTITATIVE SERUM   Result Value Ref Range    Bhcg <1.0 0.0 - 5.0 mIU/mL   URINE DRUG SCREEN   Result Value Ref Range    Amphetamines Urine Negative Negative    Barbiturates Negative Negative    Benzodiazepines Negative Negative    Cocaine Metabolite Negative Negative    Methadone Negative Negative    Opiates Positive (A) Negative    Oxycodone Negative Negative    Phencyclidine -Pcp Negative Negative    Propoxyphene Negative Negative    Cannabinoid Metab Negative Negative   ESTIMATED GFR   Result Value Ref Range    GFR (CKD-EPI) 71 >60 mL/min/1.73 m 2   CORRECTED CALCIUM   Result Value Ref Range    Correct Calcium 9.1 8.5 - 10.5 mg/dL           COURSE & MEDICAL DECISION MAKING    ED Observation Status? Yes; I am placing the patient in to an observation status due to a diagnostic uncertainty as well as therapeutic intensity. Patient placed in observation status at 21.05 AM, 4/20/2023.     Observation plan is as follows: Psych Evaluation and transfer to inpatient facility       INITIAL ASSESSMENT, COURSE AND PLAN  Care Narrative: Patient with normal vital signs and overall well appearing. No evidence of trauma on exam.   Presentation not consistent with acute organic causes to include toxic ingestions, CNS infection, intracranial process or electrolyte derangement. Labs notable for a mild leukocytosis, alcohol negative, no significant electrolyte derangements.  Pregnancy negative.  U tox positive for opiates.  She is nontoxic-appearing, afebrile and leukocytosis more likely due to demarginalization rather than infection in this clinical scenario.  Patient given Haldol continues to be delusional, responding to internal stimuli  and is still unable to identify herself. Patient placed on a legal hold as she appears to be decompensated from a psychiatric standpoint at this time.  She was evaluated by our behavioral health specialist  Lian who agrees with legal hold and need for transfer to an inpatient psychiatric facility.  Patient is in ED observation status pending psychiatry evaluation and placement.        DISPOSITION AND DISCUSSIONS  I have discussed management of the patient with the following physicians and PIEDAD's:  None    Discussion of management with other Q or appropriate source(s): Behavioral [facility]      Escalation of care considered, and ultimately not performed:diagnostic imaging, no signs of trauma on exam      FINAL DIAGNOSIS  1. Psychosis, unspecified psychosis type (HCC) Acute          Electronically signed by: Samantha Torres M.D., 4/20/2023 8:43 PM

## 2023-04-21 NOTE — DISCHARGE PLANNING
[facility] ED Behavioral Health Fax Referral      [facility] ED Behavioral Health Alert Team:  [phone]    Referral: Legal Hold    Intervention: Patient referral to [facility] inpatient  facillity    Legal Hold Initiated: Date: 4/20/23 Time: 2055    Patient’s Insurance Listed on Face Sheet: None    Referrals sent to: [facility] [place]    Referral faxed by Jhoana Foy    This referral contains the following information:  Face sheet __x__  Page 1 and Page 2 of Legal Hold ___x_  Alert Team Assessment/Psych Assessment _x___  Head to toe physical exam __x__  Urine Drug Screen __x__  Blood Alcohol __x__  Vital signs ___x_  Pregnancy test when applicable __x_  Medications list _x___  Covid screening ____    Plan: Patient will transfer to mental health facility once acceptance is obtained

## 2023-04-21 NOTE — DISCHARGE PLANNING
Alert Team Note:    Submitted updated face sheet and psych notes to [facility] via [facility].

## 2023-04-21 NOTE — CONSULTS
"[facility] PSYCHIATRIC CONSULT:Intake  : Name is TIERNEY THACKER. Other MRN is [id number]  Reason for admission:  female of unknown age presenting to ED for a psychiatric evaluation. Pt is A&O x1 disoriented to time, place, and situation. Pt denies SI/HI/hallucinations. Pt having flight of ideas; delusional thinking; responding to internal stimuli. States, \"Birds at my heart.\" Pt tells this writer she came from a \"barn\" and that she \"hurts inside\" Pt states her name is Tierney, but unable to give a last name  Consulting Physician/APN/PA: Samantha Torres M.D.  Reason for Consult:psychosis  Consultant: Melvi Lopez MD    Legal Status   on hold      CC:\"im hungry\"  HPI: 89 yo female (who per records is 39-40), who is barely cooperative who is irritable, accusing everyone of being \"abusive\" who is here because she \"wanted sex from a male and I didn't get any\". She denies depression but is anxious though won't say why, demanding food, denies AH, wants to go to [facility] but denies ever having been hospitalized. Says she was told she had schizophrenia but again denies all symptoms. Denies drugs but is + for opiates saying \"I don't like opiates but Im trying to do them\". Won't answer many questions like month and year but knows she is at [facility] and lives on [address] in [place].    Chart(s) Review:  Outside records [place]: hx of depression, anxiety, schizoaffective disorder, + meth, internal stimuli. Has been on zoloft, thorazine, zyprexa, depakote, loxapine, propanolol. Has/had a mom in [place].    [facility] Records under other MRN.Please review: goes/went to [facility], dx schizoaffective dis. Bipolar type, different MRN listed above.    Medical ROS:  Review of systems per tx tm: reviewed    Neurological \"NO!!!\" To sz or TBI    Psychiatric Exam (MSE):  Vitals:Blood pressure 124/76, pulse 77, temperature 36.8 °C (98.3 °F), temperature source Temporal, resp. rate 14, height 1.651 m (5' 5\"), weight 87.4 kg (192 lb " "10.9 oz), SpO2 97 %.     General Appearance:over wt, glaring eye contact, partially cooperative at best, sun tanned.   Musculoskeletal: moves freely in bed  Alert/Orientation grossly intact  Attn/Concentration:distracted by herself and hunger  Fund of Knowledge: not tested  Memory recent/remote: grossly intact  Speech:fast and loud at times  Language:  fluent  Thought Content: psychosis, unknown SI/HI , perseverate on food,      Thought Process: flight of ideas  Insight/Judgement: impaired although she wants to go to [facility]  Mood: anxiety  Affect: irritable    Past Medical Hx:   No past medical history on file.      Past Psychiatric Hx:  SI/SAs: no reply  Hospitalizations: \"NO!\" But suspect she has  Dx:schizoaffective disorder per records. Schizophrenia per pt  Medication Trials: see above        Family Psych Hx:  No family history on file.    Social Hx:  Housing: not known  Financial:?  Support: \"NO ONE\"  Education: ?  Abuse:?  Drugs/Alcohol: as noted. Denies alc or meth but by hx , + meth and now opiates    Labs:  Lab Results   Component Value Date/Time    AMPHUR Negative 04/20/2023 2050    BARBSURINE Negative 04/20/2023 2050    BENZODIAZU Negative 04/20/2023 2050    COCAINEMET Negative 04/20/2023 2050    METHADONE Negative 04/20/2023 2050    OPIATES Positive (A) 04/20/2023 2050    OXYCODN Negative 04/20/2023 2050    PCPURINE Negative 04/20/2023 2050    PROPOXY Negative 04/20/2023 2050    CANNABINOID Negative 04/20/2023 2050     Recent Labs     04/20/23 2050   WBC 12.1*   RBC 4.59   HEMOGLOBIN 13.1   HEMATOCRIT 40.1   MCV 87.4   MCH 28.5   RDW 44.1   PLATELETCT 347   MPV 9.8   NEUTSPOLYS 65.10   LYMPHOCYTES 25.20   MONOCYTES 7.70   EOSINOPHILS 1.20   BASOPHILS 0.40     Recent Labs     04/20/23 2050   SODIUM 137   POTASSIUM 3.5*   CHLORIDE 102   CO2 22   GLUCOSE 84   BUN 12       Cranial Imaging: none    EKG: QTc:  none       Meds Current:  No current facility-administered medications for this encounter. "     Allergies: Patient has no allergy information on record.      Assessement    1. Schizoaffective dis, bipolar type, manic with psychosis  2  Opiate use: degree unknown  3  hx of meth: details unknown    Medical:    -leukocytosis      Recommendations:  Legal Status: extended    Observation status:   -line of site with sitter     Visitors:   no  Personal belongings:  no    Discussed/voalted: WILDER Escobar MD    Medication and Other Recommendations: final orders as per Tx Tm   Risperdol 1/2 mg  Depakote 500/1000 mg  Benadryl 50 mg po q 1 hour  or IM if she prefers prn EPS/agitation. Max dose by all routes 200 mg/24 hours.   4    Valium 5 mg hs, repeat x 1 if not asleep  5    Thorazine 100 mg q1 hr prn severe agitation with dangerousness. Then cannot refuse. Max dose 600 mg/24 hours by all routes. Offer po first.   6    Pt is/was connected to [facility]    Will continue to follow with you.    Thank you for the consult.        Discharge recommendations: inpt psucj

## 2023-04-21 NOTE — ED NOTES
Patient's home medications have been reviewed by the pharmacy team.     Patient's Medications    No medications on file         A:  Medications do not appear to be contributing to current complaints.   Med rec was unable to be completed    P:    No recommendations at this time. Defer to psychiatry.    Vince Garcia, PharmD

## 2023-04-21 NOTE — ED NOTES
Report received from prior RN. Pt resting. Resp normal/unlabored. Bed side rails up/in low position.    All items removed from room for safety and to minimize risk of suicide. Verified that Legal Hold appropriate and signed in patient's chart. Confirmed patient's personal items removed from room, and if applicable labeled/placed in secure area.     Tele-Sitter Observation. Continuous visual monitoring by Trained Personnel. Sitter has unobstructed view of patient at all times.

## 2023-04-21 NOTE — ED NOTES
Checked on bed, asleep with unlabored respirations.Gurney in low position, side rail up for pt safety. Will continue to monitor for any complications.

## 2023-04-21 NOTE — DISCHARGE PLANNING
Contacted Garima at [facility] to follow up on referral, referral has been received but currently has no beds available at this time. Most likely will not have any beds opening up until this weekend.

## 2023-04-21 NOTE — PROGRESS NOTES
"ED Observation Progress Note    Date of Service: 04/21/23    Interval History and Interventions  Patient here with psychosis.  Seen yesterday and placed on a legal hold as she was felt to be unable to care for self.  Patient was seen by our psychiatric team today who agreed with this assessment and will place orders to help facilitate improved psychosis.  Patient has not had any issues throughout my shift.      Physical Exam  /76   Pulse 77   Temp 36.8 °C (98.3 °F) (Temporal)   Resp 14   Ht 1.651 m (5' 5\")   Wt 87.4 kg (192 lb 10.9 oz)   LMP  (LMP Unknown)   SpO2 97%   BMI 32.06 kg/m² .    Constitutional: Awake and alert. Nontoxic  HENT:  Grossly normal  Eyes: Grossly normal  Neck: Normal range of motion  Cardiovascular: Normal heart rate   Thorax & Lungs: No respiratory distress  Abdomen: Nontender  Skin:  No pathologic rash.   Extremities: Well perfused  Psychiatric: Affect normal    Labs  Results for orders placed or performed during the hospital encounter of 04/20/23   CBC WITH DIFFERENTIAL   Result Value Ref Range    WBC 12.1 (H) 4.8 - 10.8 K/uL    RBC 4.59 4.20 - 5.40 M/uL    Hemoglobin 13.1 12.0 - 16.0 g/dL    Hematocrit 40.1 37.0 - 47.0 %    MCV 87.4 81.4 - 97.8 fL    MCH 28.5 27.0 - 33.0 pg    MCHC 32.7 (L) 33.6 - 35.0 g/dL    RDW 44.1 35.9 - 50.0 fL    Platelet Count 347 164 - 446 K/uL    MPV 9.8 9.0 - 12.9 fL    Neutrophils-Polys 65.10 44.00 - 72.00 %    Lymphocytes 25.20 22.00 - 41.00 %    Monocytes 7.70 0.00 - 13.40 %    Eosinophils 1.20 0.00 - 6.90 %    Basophils 0.40 0.00 - 1.80 %    Immature Granulocytes 0.40 0.00 - 0.90 %    Nucleated RBC 0.00 /100 WBC    Neutrophils (Absolute) 7.87 (H) 2.00 - 7.15 K/uL    Lymphs (Absolute) 3.05 1.00 - 4.80 K/uL    Monos (Absolute) 0.93 (H) 0.00 - 0.85 K/uL    Eos (Absolute) 0.15 0.00 - 0.51 K/uL    Baso (Absolute) 0.05 0.00 - 0.12 K/uL    Immature Granulocytes (abs) 0.05 0.00 - 0.11 K/uL    NRBC (Absolute) 0.00 K/uL   COMP METABOLIC PANEL   Result " Value Ref Range    Sodium 137 135 - 145 mmol/L    Potassium 3.5 (L) 3.6 - 5.5 mmol/L    Chloride 102 96 - 112 mmol/L    Co2 22 20 - 33 mmol/L    Anion Gap 13.0 7.0 - 16.0    Glucose 84 65 - 99 mg/dL    Bun 12 8 - 22 mg/dL    Creatinine 0.55 0.50 - 1.40 mg/dL    Calcium 8.9 8.5 - 10.5 mg/dL    AST(SGOT) 14 12 - 45 U/L    ALT(SGPT) 18 2 - 50 U/L    Alkaline Phosphatase 98 30 - 99 U/L    Total Bilirubin 0.2 0.1 - 1.5 mg/dL    Albumin 3.8 3.2 - 4.9 g/dL    Total Protein 7.0 6.0 - 8.2 g/dL    Globulin 3.2 1.9 - 3.5 g/dL    A-G Ratio 1.2 g/dL   DIAGNOSTIC ALCOHOL   Result Value Ref Range    Diagnostic Alcohol <10.1 <10.1 mg/dL   BETA-HCG QUANTITATIVE SERUM   Result Value Ref Range    Bhcg <1.0 0.0 - 5.0 mIU/mL   URINE DRUG SCREEN   Result Value Ref Range    Amphetamines Urine Negative Negative    Barbiturates Negative Negative    Benzodiazepines Negative Negative    Cocaine Metabolite Negative Negative    Methadone Negative Negative    Opiates Positive (A) Negative    Oxycodone Negative Negative    Phencyclidine -Pcp Negative Negative    Propoxyphene Negative Negative    Cannabinoid Metab Negative Negative   ESTIMATED GFR   Result Value Ref Range    GFR (CKD-EPI) 71 >60 mL/min/1.73 m 2   CORRECTED CALCIUM   Result Value Ref Range    Correct Calcium 9.1 8.5 - 10.5 mg/dL       Radiology  No orders to display       Problem List    1. Psychosis, unspecified psychosis type (HCC) Acute

## 2023-04-21 NOTE — ED NOTES
1;1 initiated til tele sitter is available; Eliana of Behavioral health will see her and will order tele sitter for her.

## 2023-04-21 NOTE — ED NOTES
Asleep; on tele sitter; with even respirations noted; to be seen by psych this morning for evaluation

## 2023-04-21 NOTE — ED NOTES
Received report from Analia DAN and assumed care of pt. Telesitter in place and aware of pt. Pt given coffee per request.

## 2023-04-21 NOTE — ED NOTES
Taken patient from triage waiting room, ambulatory with steady gait, alert/ oriented x 2.Verified patient identification.  Assumed patient care. Placed on patient room. Changed clothes to hospital gown. Bed on lowest position, side rails up, breaks locked. Will continue to monitor for any complications

## 2023-04-21 NOTE — ED NOTES
Bedside report given to Analia; Patient still asleep; with even respirations; side rails up for safety; to be seen by pschy this morning

## 2023-04-21 NOTE — ED NOTES
Pt refused BP, allowed pulse ox vitals, refusing to answer AO questions, will not follow commands

## 2023-04-22 PROCEDURE — 700102 HCHG RX REV CODE 250 W/ 637 OVERRIDE(OP): Performed by: PSYCHIATRY & NEUROLOGY

## 2023-04-22 PROCEDURE — A9270 NON-COVERED ITEM OR SERVICE: HCPCS | Performed by: PSYCHIATRY & NEUROLOGY

## 2023-04-22 RX ORDER — RISPERIDONE 120 MG
120 KIT SUBCUTANEOUS
Status: SHIPPED | COMMUNITY
End: 2023-05-11

## 2023-04-22 RX ADMIN — RISPERIDONE 1 MG: 1 TABLET, FILM COATED ORAL at 06:21

## 2023-04-22 RX ADMIN — RISPERIDONE 2 MG: 2 TABLET ORAL at 18:56

## 2023-04-22 RX ADMIN — DIVALPROEX SODIUM 500 MG: 500 TABLET, EXTENDED RELEASE ORAL at 06:21

## 2023-04-22 NOTE — ED NOTES
Bedside report given to NI Gramajo resting on gurney, on RA  Appears comfortable, no acute signs of distress present.

## 2023-04-22 NOTE — ED NOTES
Report given to Marylin RN at bedside. Pt alert and responsive. VS Refused, Respirations steady and unlabored.

## 2023-04-22 NOTE — CONSULTS
"  [facility] PSYCHIATRIC FOLLOW-UP:(established)  *Reason for admission:  female of unknown age presenting to ED for a psychiatric evaluation. Pt is A&O x1 disoriented to time, place, and situation. Pt denies SI/HI/hallucinations. Pt having flight of ideas; delusional thinking; responding to internal stimuli. States, \"Birds at my heart.\" Pt tells this writer she came from a \"barn\" and that she \"hurts inside\" Pt states her name is Tierney, but unable to give a last name  *Legal Hold Status: on hold              S:  when I came in she says \"too loud!\" A few times referring to my tone so I whispered. She remained lying on the gurney, her eyes close and denying all symptoms. Reports she slept however. Later with staff was irritable over the  LH which now she does not want, and does not want to stay in the hospital which she wanted she wanted yesterday. She also had her breast exposed and when staff asked her to cover up she said it was okay because it was not her \"vagina'    O: Medical ROS (as pertinent):                      *Psychiatric Examination:   Vitals:   Vitals:    04/21/23 0439 04/21/23 0945 04/21/23 1800 04/22/23 0545   BP: 124/76  138/89 129/81   Pulse: 76 77 78 90   Resp: 14 14 16 16   Temp: 36.8 °C (98.3 °F)      TempSrc: Temporal      SpO2: 99% 97% 97% 99%   Weight:       Height:         General Appearance:  poor eye contact, superficially cooperative at best  Abnormal Movements: none   Gait and Posture: within normal limits observed later when she went to the bathroom  Speech: whispered  Thought Process:  only answers the questions with yes or no  Associations:   linear  Abnormal or Psychotic Thoughts: none  Judgement and Insight: impaired   Orientation: grossly intact  Recent and Remote Memory: grossly intact  Attention Span and Concentration: intact  Language:fluent  Fund of Knowledge: not tested  Mood and Affect:  \"I fine!\" In soft voice  SI/HI:  suicidal - no and homicidal - no      "   Current Medications:  Scheduled Medications   Medication Dose Frequency    diazePAM  5 mg QHS    risperiDONE  1 mg QAM    risperiDONE  2 mg Q EVENING    divalproex ER  500 mg DAILY          *ASSESSMENT/RECOMENDATIONS:  1.. Schizoaffective dis, bipolar type, manic with psychosis  2  Opiate use: degree unknown  3  hx of meth: details unknown     Medical:    -leukocytosis        Recommendations:  Legal Status: extended    Observation status:   -line of site with sitter     Visitors:   no  Personal belongings:  no     Discussed/voalted: WILDER Escobar MD     Medication and Other Recommendations: final orders as per Tx Tm   no changes today  6    Pt is/was connected to [facility]     Will continue to follow with you.    .         Discharge recommendations: inpt psych

## 2023-04-22 NOTE — ED NOTES
Pt resting on gurney, telesitter in place with direct observation. No acute signs of distress present.

## 2023-04-22 NOTE — PROGRESS NOTES
"ED Observation Progress Note    Date of Service: 04/22/23    Interval History and Interventions  Patient with longstanding history of psychosis.  Patient was seen by our psychiatry team as well as our psychiatry attending yesterday who continued patient's hold and believes she will need hospitalization for ongoing decompensated psychiatric illness.  No issues overnight per nursing    Physical Exam  /89   Pulse 78   Temp 36.8 °C (98.3 °F) (Temporal)   Resp 16   Ht 1.651 m (5' 5\")   Wt 87.4 kg (192 lb 10.9 oz)   LMP  (LMP Unknown)   SpO2 97%   BMI 32.06 kg/m² .    Constitutional: Awake and alert. Nontoxic  HENT:  Grossly normal  Eyes: Grossly normal  Neck: Normal range of motion  Cardiovascular: Normal heart rate   Thorax & Lungs: No respiratory distress  Abdomen: Nontender  Skin:  No pathologic rash.   Extremities: Well perfused  Psychiatric: Affect normal    Labs  Results for orders placed or performed during the hospital encounter of 04/20/23   CBC WITH DIFFERENTIAL   Result Value Ref Range    WBC 12.1 (H) 4.8 - 10.8 K/uL    RBC 4.59 4.20 - 5.40 M/uL    Hemoglobin 13.1 12.0 - 16.0 g/dL    Hematocrit 40.1 37.0 - 47.0 %    MCV 87.4 81.4 - 97.8 fL    MCH 28.5 27.0 - 33.0 pg    MCHC 32.7 (L) 33.6 - 35.0 g/dL    RDW 44.1 35.9 - 50.0 fL    Platelet Count 347 164 - 446 K/uL    MPV 9.8 9.0 - 12.9 fL    Neutrophils-Polys 65.10 44.00 - 72.00 %    Lymphocytes 25.20 22.00 - 41.00 %    Monocytes 7.70 0.00 - 13.40 %    Eosinophils 1.20 0.00 - 6.90 %    Basophils 0.40 0.00 - 1.80 %    Immature Granulocytes 0.40 0.00 - 0.90 %    Nucleated RBC 0.00 /100 WBC    Neutrophils (Absolute) 7.87 (H) 2.00 - 7.15 K/uL    Lymphs (Absolute) 3.05 1.00 - 4.80 K/uL    Monos (Absolute) 0.93 (H) 0.00 - 0.85 K/uL    Eos (Absolute) 0.15 0.00 - 0.51 K/uL    Baso (Absolute) 0.05 0.00 - 0.12 K/uL    Immature Granulocytes (abs) 0.05 0.00 - 0.11 K/uL    NRBC (Absolute) 0.00 K/uL   COMP METABOLIC PANEL   Result Value Ref Range    Sodium 137 " 135 - 145 mmol/L    Potassium 3.5 (L) 3.6 - 5.5 mmol/L    Chloride 102 96 - 112 mmol/L    Co2 22 20 - 33 mmol/L    Anion Gap 13.0 7.0 - 16.0    Glucose 84 65 - 99 mg/dL    Bun 12 8 - 22 mg/dL    Creatinine 0.55 0.50 - 1.40 mg/dL    Calcium 8.9 8.5 - 10.5 mg/dL    AST(SGOT) 14 12 - 45 U/L    ALT(SGPT) 18 2 - 50 U/L    Alkaline Phosphatase 98 30 - 99 U/L    Total Bilirubin 0.2 0.1 - 1.5 mg/dL    Albumin 3.8 3.2 - 4.9 g/dL    Total Protein 7.0 6.0 - 8.2 g/dL    Globulin 3.2 1.9 - 3.5 g/dL    A-G Ratio 1.2 g/dL   DIAGNOSTIC ALCOHOL   Result Value Ref Range    Diagnostic Alcohol <10.1 <10.1 mg/dL   BETA-HCG QUANTITATIVE SERUM   Result Value Ref Range    Bhcg <1.0 0.0 - 5.0 mIU/mL   URINE DRUG SCREEN   Result Value Ref Range    Amphetamines Urine Negative Negative    Barbiturates Negative Negative    Benzodiazepines Negative Negative    Cocaine Metabolite Negative Negative    Methadone Negative Negative    Opiates Positive (A) Negative    Oxycodone Negative Negative    Phencyclidine -Pcp Negative Negative    Propoxyphene Negative Negative    Cannabinoid Metab Negative Negative   ESTIMATED GFR   Result Value Ref Range    GFR (CKD-EPI) 119 >60 mL/min/1.73 m 2   CORRECTED CALCIUM   Result Value Ref Range    Correct Calcium 9.1 8.5 - 10.5 mg/dL       Radiology  No orders to display       Problem List    1. Psychosis, unspecified psychosis type (HCC) Acute         Electronically signed by: Saurabh Escobar M.D., 4/22/2023 4:11 AM

## 2023-04-22 NOTE — ED NOTES
Bedside report received from NI Wetzel. Pt resting on gurney, no signs of distress or discomfort present. 1:1 sitter in form of telesitter in place.

## 2023-04-22 NOTE — ED NOTES
Patient resting on gurney. No acute distress. Active chest rise noted. No agitation noted. No behavioral pain indicators. telesitter in direct view of patient.

## 2023-04-22 NOTE — ED NOTES
Med rec partial per patient interview at bedside. Patient states she takes risperidone and wellbutrin but is unsure of the doses. She states her wellbutrin is an injection that lasts a month. Unclear if this is in reference to a paliperidone [place]. Waiting on call from [facility] for clarification on this.    Patient has NKDA.    No abx in the last 30 days.    Home pharmacy is [facility] but patient states she hasn't had a pharmacy in some time.

## 2023-04-22 NOTE — ED NOTES
Report received from Coco RN. Assumed care of patient. Pt assessed Patient aware of POC. Fall precautions in place. This RN masked and in appropriate PPE during encounter.  Telesitter in direct view of patient.

## 2023-04-22 NOTE — ED NOTES
Pt ambulatory to bathroom and back to room with RN supervision. Pt requesting coffee and was provided with a cup of decaf. Pt in view of Telesitter drinking her coffee now, no distress noted.

## 2023-04-22 NOTE — ED NOTES
"Vital signs attempted on patient. Patient swung arms and stated \"Don't touch me\". Vitals refused at this time.  "

## 2023-04-22 NOTE — DISCHARGE PLANNING
[facility] ED Behavioral Health Fax Referral      [facility] ED Behavioral Health Alert Team:  [phone]    Referral: Legal Hold    Intervention: Patient referral to [facility] inpatient  facilty    Legal Hold Initiated: Date: 04/20/2023 Time: 2055    Patient’s Insurance Listed on Face Sheet: Medicaid FFS    [facility]     Referrals faxed by NI Beal    This referral contains the following information:  Face sheet ___x_  Page 1 and Page 2 of Legal Hold __x__  Alert Team Assessment/Psych Assessment __x__  Head to toe physical exam __x__  Urine Drug Screen ___x_  Blood Alcohol __x__  Vital signs __x__  Pregnancy test when applicable ___  Medications list __x__  Covid screening ____    Plan: Patient will transfer to mental health facility once acceptance is obtained

## 2023-04-22 NOTE — ED NOTES
Patient resting comfortably in bed on RA. Breathing steady and unlabored with telesitter in place for observation and safety.

## 2023-04-22 NOTE — ED NOTES
Pt up to door, asking for food, sandwich provided. Pt returned to sirisha, Thelma in place with direct observation

## 2023-04-23 PROCEDURE — 700102 HCHG RX REV CODE 250 W/ 637 OVERRIDE(OP): Performed by: PSYCHIATRY & NEUROLOGY

## 2023-04-23 PROCEDURE — A9270 NON-COVERED ITEM OR SERVICE: HCPCS | Performed by: PSYCHIATRY & NEUROLOGY

## 2023-04-23 RX ADMIN — DIVALPROEX SODIUM 500 MG: 500 TABLET, EXTENDED RELEASE ORAL at 06:21

## 2023-04-23 RX ADMIN — CHLORPROMAZINE HYDROCHLORIDE 100 MG: 50 TABLET, FILM COATED ORAL at 22:20

## 2023-04-23 RX ADMIN — RISPERIDONE 1 MG: 1 TABLET, FILM COATED ORAL at 06:21

## 2023-04-23 RX ADMIN — RISPERIDONE 2 MG: 2 TABLET ORAL at 18:37

## 2023-04-23 RX ADMIN — DIAZEPAM 5 MG: 5 TABLET ORAL at 21:05

## 2023-04-23 RX ADMIN — CHLORPROMAZINE HYDROCHLORIDE 100 MG: 50 TABLET, FILM COATED ORAL at 09:27

## 2023-04-23 NOTE — ED NOTES
Pt resting left lateral on gurney. Pt appears to be sleeping. Even chest rise and fall visualized. Telesitter maintained with direct view of pt.

## 2023-04-23 NOTE — ED NOTES
Pt yelling at staff after EVS attempted to  room. Security at bedside for pt/staff safety. Pt given tamra

## 2023-04-23 NOTE — ED NOTES
Report in hallway outside of room from NI Flores in direct view of pt. Telesitter bedside. 1:1 continuous observation maintained.

## 2023-04-23 NOTE — ED NOTES
Pt supine on gurney. Even chest rise and fall visualized. Telesitter in place in room with view of pt.

## 2023-04-23 NOTE — ED NOTES
"Pt has loud vocalizations in bathroom of rapid-fire, pressured speech. When asked whether she's okay, replies \"No, I'm not okay\" initially. Pt sitting on toilet. Walks back to room calmly. Lays back down in bed.  "

## 2023-04-23 NOTE — DISCHARGE PLANNING
[facility]: packet received and is under review. No beds available. Possible discharges tomorrow morning. Follow up after 0900.

## 2023-04-23 NOTE — ED NOTES
Pt appears to be sleeping on gurney, left lateral. Even chest rise and fall visualized. Telesitter maintained in room with view of pt.

## 2023-04-23 NOTE — ED NOTES
Pt left lateral on gurney. Even chest rise and fall visualized. Pt appears to be sleeping. Easily roused with verbal stimuli. Refuses vital signs. Telesitter at bedside with direct line of sight to pt.

## 2023-04-23 NOTE — CONSULTS
"  [facility] PSYCHIATRIC FOLLOW-UP:(established)  *Reason for admission:   female of unknown age presenting to ED for a psychiatric evaluation. Pt is A&O x1 disoriented to time, place, and situation. Pt denies SI/HI/hallucinations. Pt having flight of ideas; delusional thinking; responding to internal stimuli. States, \"Birds at my heart.\" Pt tells this writer she came from a \"barn\" and that she \"hurts inside\" Pt states her name is Tierney, but unable to give a last name  *Legal Hold Status: on hold                 S:  refused valium yesterday hs but otherwise taking all her meds. She remains irritable on contact with everyone. She gets loud but not aggressive physically. She took the thorazine without complaint (prn) and I asked if it helped. \"Not really\", then 5 seconds later, \"It does\".     O: Medical ROS (as pertinent):                      *Psychiatric Examination:   Vitals:   Vitals:    04/21/23 0439 04/21/23 0945 04/21/23 1800 04/22/23 0545   BP: 124/76  138/89 129/81   Pulse: 76 77 78 90   Resp: 14 14 16 16   Temp: 36.8 °C (98.3 °F)      TempSrc: Temporal      SpO2: 99% 97% 97% 99%   Weight:       Height:         General Appearance:  good eye contact, cooperative with prn  Abnormal Movements: none   Gait and Posture: within normal limits  Speech:  can be loud and yelling or soft  Thought Process: fast rate  Associations:   loose  Abnormal or Psychotic Thoughts: none overtly  Judgement and Insight: fair she does take her meds  Orientation: grossly intact because of other comments  Recent and Remote Memory: grossly intact  Attention Span and Concentration: distracted by thoughts and mood  Language:fluent  Fund of Knowledge: not tested  Mood and Affect:  not identified but irritable and looks angry  SI/HI:   not assessed        Current Medications:  Scheduled Medications   Medication Dose Frequency    diazePAM  5 mg QHS    risperiDONE  1 mg QAM    risperiDONE  2 mg Q EVENING    divalproex ER  " 500 mg DAILY          *ASSESSMENT/RECOMENDATIONS:  1.Schizoaffective dis, bipolar type, manic with psychosis  2  Opiate use: degree unknown  3  hx of meth: details unknown     Medical:    -leukocytosis        Recommendations:  Legal Status: extended    Observation status:   -line of site with sitter     Visitors:   no  Personal belongings:  no        Medication and Other Recommendations: final orders as per Tx Tm   no changes today; she is taking the meds but its only day 3 that she has.  6    Pt is/was connected to [facility]     Will continue to follow with you.    .I will not be available until Friday 28. Please contact Sam RG (first), Qian Lemus MD (second)          Discharge recommendations: inpt psych

## 2023-04-23 NOTE — ED NOTES
Pt requesting coffee. Provided decaf. No other needs at this time. Tracking appropriately. Telesitter maintained in room with view of pt.

## 2023-04-23 NOTE — ED NOTES
Pt easily roused by verbal stimuli. Consents to medication administration. Provided decaf coffee. No other needs at this time. Continues to decline vital signs. No obvious or apparent distress, difficulty breathing, or trauma.

## 2023-04-23 NOTE — ED NOTES
Pt appears to be sleeping on gurney inside room. Even chest rise and fall visualized. Telesitter in place with view of pt.

## 2023-04-23 NOTE — ED NOTES
"Pt escalates, slamming monitoring equipment inside room while on bed. Security outside door. Monitoring equipment removed. Pt states \"I just want the door closed and the lights off.\" Telesitter in place. Lights off and slightly ajar.  "

## 2023-04-23 NOTE — ED NOTES
Pt supine on gurney. Appears to be sleeping. Even chest rise and fall visualized. Telesitter maintained with direct view of pt.

## 2023-04-23 NOTE — ED NOTES
Pt resting left lateral on gurney. Telesitter in place with view of pt and room surrounding. Pt has even chest rise and fall visualized.

## 2023-04-23 NOTE — PROGRESS NOTES
"ED Observation Progress Note    Date of Service: 04/23/23    Interval History and Interventions  Brief this is a 38-year-old female who presented for psychosis.  She is currently on a legal hold pending psychiatric placement.  This morning patient was agitated, was able to be de-escalated verbally.  She is being followed by psychiatry team.  She is currently pending psychiatric placement versus stabilization.    Physical Exam  /81   Pulse 90   Temp 36.8 °C (98.3 °F) (Temporal)   Resp 16   Ht 1.651 m (5' 5\")   Wt 87.4 kg (192 lb 10.9 oz)   LMP  (LMP Unknown)   SpO2 99%   BMI 32.06 kg/m² .    Constitutional: Awake and alert. Nontoxic  HENT:  Grossly normal  Eyes: Grossly normal  Neck: Normal range of motion  Cardiovascular: Normal heart rate   Thorax & Lungs: No respiratory distress  Skin:  No pathologic rash.   Extremities: Well perfused  Psychiatric: Affect normal    Labs  Results for orders placed or performed during the hospital encounter of 04/20/23   CBC WITH DIFFERENTIAL   Result Value Ref Range    WBC 12.1 (H) 4.8 - 10.8 K/uL    RBC 4.59 4.20 - 5.40 M/uL    Hemoglobin 13.1 12.0 - 16.0 g/dL    Hematocrit 40.1 37.0 - 47.0 %    MCV 87.4 81.4 - 97.8 fL    MCH 28.5 27.0 - 33.0 pg    MCHC 32.7 (L) 33.6 - 35.0 g/dL    RDW 44.1 35.9 - 50.0 fL    Platelet Count 347 164 - 446 K/uL    MPV 9.8 9.0 - 12.9 fL    Neutrophils-Polys 65.10 44.00 - 72.00 %    Lymphocytes 25.20 22.00 - 41.00 %    Monocytes 7.70 0.00 - 13.40 %    Eosinophils 1.20 0.00 - 6.90 %    Basophils 0.40 0.00 - 1.80 %    Immature Granulocytes 0.40 0.00 - 0.90 %    Nucleated RBC 0.00 /100 WBC    Neutrophils (Absolute) 7.87 (H) 2.00 - 7.15 K/uL    Lymphs (Absolute) 3.05 1.00 - 4.80 K/uL    Monos (Absolute) 0.93 (H) 0.00 - 0.85 K/uL    Eos (Absolute) 0.15 0.00 - 0.51 K/uL    Baso (Absolute) 0.05 0.00 - 0.12 K/uL    Immature Granulocytes (abs) 0.05 0.00 - 0.11 K/uL    NRBC (Absolute) 0.00 K/uL   COMP METABOLIC PANEL   Result Value Ref Range    " Sodium 137 135 - 145 mmol/L    Potassium 3.5 (L) 3.6 - 5.5 mmol/L    Chloride 102 96 - 112 mmol/L    Co2 22 20 - 33 mmol/L    Anion Gap 13.0 7.0 - 16.0    Glucose 84 65 - 99 mg/dL    Bun 12 8 - 22 mg/dL    Creatinine 0.55 0.50 - 1.40 mg/dL    Calcium 8.9 8.5 - 10.5 mg/dL    AST(SGOT) 14 12 - 45 U/L    ALT(SGPT) 18 2 - 50 U/L    Alkaline Phosphatase 98 30 - 99 U/L    Total Bilirubin 0.2 0.1 - 1.5 mg/dL    Albumin 3.8 3.2 - 4.9 g/dL    Total Protein 7.0 6.0 - 8.2 g/dL    Globulin 3.2 1.9 - 3.5 g/dL    A-G Ratio 1.2 g/dL   DIAGNOSTIC ALCOHOL   Result Value Ref Range    Diagnostic Alcohol <10.1 <10.1 mg/dL   BETA-HCG QUANTITATIVE SERUM   Result Value Ref Range    Bhcg <1.0 0.0 - 5.0 mIU/mL   URINE DRUG SCREEN   Result Value Ref Range    Amphetamines Urine Negative Negative    Barbiturates Negative Negative    Benzodiazepines Negative Negative    Cocaine Metabolite Negative Negative    Methadone Negative Negative    Opiates Positive (A) Negative    Oxycodone Negative Negative    Phencyclidine -Pcp Negative Negative    Propoxyphene Negative Negative    Cannabinoid Metab Negative Negative   ESTIMATED GFR   Result Value Ref Range    GFR (CKD-EPI) 119 >60 mL/min/1.73 m 2   CORRECTED CALCIUM   Result Value Ref Range    Correct Calcium 9.1 8.5 - 10.5 mg/dL       Radiology  No orders to display       Problem List  1.  Psychosis-on legal hold pending psychiatric placement    Electronically signed by: Barbie Bhatia M.D., 4/23/2023 5:53 AM

## 2023-04-23 NOTE — ED NOTES
Pt appears to be sleeping supine on gurney inside room. Even chest rise and fall visualized. Telesitter in place with view of pt.

## 2023-04-24 VITALS
BODY MASS INDEX: 32.1 KG/M2 | RESPIRATION RATE: 16 BRPM | WEIGHT: 192.68 LBS | HEART RATE: 79 BPM | TEMPERATURE: 98.2 F | OXYGEN SATURATION: 97 % | HEIGHT: 65 IN | SYSTOLIC BLOOD PRESSURE: 112 MMHG | DIASTOLIC BLOOD PRESSURE: 69 MMHG

## 2023-04-24 PROCEDURE — A9270 NON-COVERED ITEM OR SERVICE: HCPCS | Mod: UD | Performed by: PSYCHIATRY & NEUROLOGY

## 2023-04-24 PROCEDURE — 700102 HCHG RX REV CODE 250 W/ 637 OVERRIDE(OP): Mod: UD | Performed by: PSYCHIATRY & NEUROLOGY

## 2023-04-24 RX ADMIN — DIVALPROEX SODIUM 500 MG: 500 TABLET, EXTENDED RELEASE ORAL at 09:15

## 2023-04-24 RX ADMIN — RISPERIDONE 1 MG: 1 TABLET, FILM COATED ORAL at 09:16

## 2023-04-24 NOTE — ED NOTES
Report received from prior RN. Pt resting. Resp normal/unlabored. Bed side rails up/in low position. Pt updated to POC and all questions answered.     Verified that Legal Hold appropriate and signed in patient's chart. Confirmed patient's personal items removed from room, and if applicable labeled/placed in secure area.        1:1 Observation. Continuous visual monitoring by Trained Personnel. Sitter has unobstructed view of patient at all times. Discussion with sitter about patient care, safety, and support.

## 2023-04-24 NOTE — DISCHARGE PLANNING
Received Verified Involuntary Court Ordered Petition from the court. Scanned copy of Petition into pt's chart, sent copy to [facility].

## 2023-04-24 NOTE — ED NOTES
Checked on bed, with unlabored respirations. No safety risk noted  Sleeping arousable  Sitter - 1:1; Continued safety precaution  Gurney in low position, side rail up for pt safety.   Will continue to monitor for any complications.

## 2023-04-24 NOTE — ED NOTES
Bedside report received from previous shift, NI Jean-Baptiste.  Assumed patient care. Verified patient identification. Checked on bed with unlabored respirations. Denied any new complaints. Gurney in low position, side rail up for pt safety. With active tele sitter. Will continue to monitor for any complications.

## 2023-04-24 NOTE — ED NOTES
Checked on bed, with unlabored respirations. No safety risk noted  Sleeping  Tele Sitter - 1:1; Continued safety precaution  Gurney in low position, side rail up for pt safety.   Will continue to monitor for any complications.

## 2023-04-24 NOTE — ED NOTES
"Vitals rechecked and are stable.  Patient given her 0800 scheduled meds, delayed due to patient sleeping. Warm breakfast tray offered, patient declined, states \"can I go back to sleep?\".  Meal held.  1:1 sitter remains in place.    "

## 2023-04-24 NOTE — ED NOTES
Checked on bed, with unlabored respirations. No safety risk noted  Sleeping nil complaints  Sitter - 1:1; Continued safety precaution  Gurney in low position, side rail up for pt safety.

## 2023-04-24 NOTE — DISCHARGE PLANNING
Alert Team:    Updated active Medicaid/[place] to facesheet and faxed to [facility] and [place]

## 2023-04-24 NOTE — ED NOTES
Checked on bed, with unlabored respirations. No safety risk noted  Awake,  Pt keep talking.    Sitter - 1:1; Continued safety precaution  Gurney in low position, side rail up for pt safety.   Will continue to monitor for any complications.

## 2023-04-24 NOTE — DISCHARGE PLANNING
Legal Hold Transfer     Referral: Legal hold transfer to Mental Health Facility     Intervention: Notified by  Jair that pt has been accepted to [facility].     Pt's accepting physcian is Dr. Tracy    Spoke to Lisa at [facility]     Transport arranged through [facility]     The pt will be picked up at 1200      Notified Bedside RN Dudley, Alert Team RN Jeannine, and Dr. Bhatia of the departure time as well as accepting facility.      Transfer packet and COBRA created with original legal hold and placed on chart by Alert Team RN.     Plan: Pt will be transferring to [facility] today at 1200 via [facility].

## 2023-04-24 NOTE — DISCHARGE SUMMARY
"  ED Observation Discharge Summary    Patient:Tierney Mayer  Patient : 1985  Patient MRN: [id number]  Patient PCP: Pcp Pt States None    Admit Date: 2023  Discharge Date and Time: 23 12:29 PM  Discharge Diagnosis: Psychosis  Discharge Attending: Barbie Bhatia M.D.  Discharge Service: ED Observation    ED Course  Briefly this is a 38-year-old female who initially presented for acute psychosis.  This morning no acute complaints.  Psychiatry was following with the patient and adjusted her medications.  She was accepted to [facility] for ongoing psychiatric care.  Patient is transferred in stable condition.    Discharge Exam:  /69   Pulse 79   Temp 36.8 °C (98.2 °F) (Temporal)   Resp 16   Ht 1.651 m (5' 5\")   Wt 87.4 kg (192 lb 10.9 oz)   LMP  (LMP Unknown)   SpO2 97%   BMI 32.06 kg/m² .    Constitutional: Awake and alert. Nontoxic  HENT:  Grossly normal  Eyes: Grossly normal  Neck: Normal range of motion  Cardiovascular: Normal heart rate   Thorax & Lungs: No respiratory distress  Skin:  No pathologic rash.   Extremities: Well perfused  Psychiatric: Affect normal    Labs  Results for orders placed or performed during the hospital encounter of 23   CBC WITH DIFFERENTIAL   Result Value Ref Range    WBC 12.1 (H) 4.8 - 10.8 K/uL    RBC 4.59 4.20 - 5.40 M/uL    Hemoglobin 13.1 12.0 - 16.0 g/dL    Hematocrit 40.1 37.0 - 47.0 %    MCV 87.4 81.4 - 97.8 fL    MCH 28.5 27.0 - 33.0 pg    MCHC 32.7 (L) 33.6 - 35.0 g/dL    RDW 44.1 35.9 - 50.0 fL    Platelet Count 347 164 - 446 K/uL    MPV 9.8 9.0 - 12.9 fL    Neutrophils-Polys 65.10 44.00 - 72.00 %    Lymphocytes 25.20 22.00 - 41.00 %    Monocytes 7.70 0.00 - 13.40 %    Eosinophils 1.20 0.00 - 6.90 %    Basophils 0.40 0.00 - 1.80 %    Immature Granulocytes 0.40 0.00 - 0.90 %    Nucleated RBC 0.00 /100 WBC    Neutrophils (Absolute) 7.87 (H) 2.00 - 7.15 K/uL    Lymphs (Absolute) 3.05 1.00 - 4.80 K/uL    Monos (Absolute) 0.93 " (H) 0.00 - 0.85 K/uL    Eos (Absolute) 0.15 0.00 - 0.51 K/uL    Baso (Absolute) 0.05 0.00 - 0.12 K/uL    Immature Granulocytes (abs) 0.05 0.00 - 0.11 K/uL    NRBC (Absolute) 0.00 K/uL   COMP METABOLIC PANEL   Result Value Ref Range    Sodium 137 135 - 145 mmol/L    Potassium 3.5 (L) 3.6 - 5.5 mmol/L    Chloride 102 96 - 112 mmol/L    Co2 22 20 - 33 mmol/L    Anion Gap 13.0 7.0 - 16.0    Glucose 84 65 - 99 mg/dL    Bun 12 8 - 22 mg/dL    Creatinine 0.55 0.50 - 1.40 mg/dL    Calcium 8.9 8.5 - 10.5 mg/dL    AST(SGOT) 14 12 - 45 U/L    ALT(SGPT) 18 2 - 50 U/L    Alkaline Phosphatase 98 30 - 99 U/L    Total Bilirubin 0.2 0.1 - 1.5 mg/dL    Albumin 3.8 3.2 - 4.9 g/dL    Total Protein 7.0 6.0 - 8.2 g/dL    Globulin 3.2 1.9 - 3.5 g/dL    A-G Ratio 1.2 g/dL   DIAGNOSTIC ALCOHOL   Result Value Ref Range    Diagnostic Alcohol <10.1 <10.1 mg/dL   BETA-HCG QUANTITATIVE SERUM   Result Value Ref Range    Bhcg <1.0 0.0 - 5.0 mIU/mL   URINE DRUG SCREEN   Result Value Ref Range    Amphetamines Urine Negative Negative    Barbiturates Negative Negative    Benzodiazepines Negative Negative    Cocaine Metabolite Negative Negative    Methadone Negative Negative    Opiates Positive (A) Negative    Oxycodone Negative Negative    Phencyclidine -Pcp Negative Negative    Propoxyphene Negative Negative    Cannabinoid Metab Negative Negative   ESTIMATED GFR   Result Value Ref Range    GFR (CKD-EPI) 119 >60 mL/min/1.73 m 2   CORRECTED CALCIUM   Result Value Ref Range    Correct Calcium 9.1 8.5 - 10.5 mg/dL       Radiology  No orders to display       Medications:   New Prescriptions    No medications on file       My final assessment includes psychosis  Upon Reevaluation, the patient's condition has: not improved; and will be escalated to inpatient psychiatric hospitalization.    Patient discharged from ED Observation status at 12:29 PM on 4/24/2023    Total time spent on this ED Observation discharge encounter is < 30 Minutes    Electronically  signed by: Barbie Bhatia M.D., 4/24/2023 12:29 PM

## 2023-04-24 NOTE — ED NOTES
Checked on bed, with unlabored respirations. No safety risk noted  Sleeping  Sitter - 1:1; Continued safety precaution  Gurney in low position, side rail up for pt safety.   Will continue to monitor for any complications.

## 2023-04-24 NOTE — ED NOTES
Checked on bed, with unlabored respirations. No safety risk noted  Awake laying on bed comfortably  Sitter - 1:1; Continued safety precaution  Gurney in low position, side rail up for pt safety.   Will continue to monitor for any complications.

## 2023-04-24 NOTE — ED NOTES
Pt discharged, all appropriate hospital equipment removed (IV, monitor, pulse ox, etc.). Pt left unit via walking with NADEEM to [facility]. Personal belongings with pt when leaving unit. Pt given discharge instructions prior to leaving unit including where to  prescriptions and when to follow-up; verbalizes understanding. Pt informed to return to ED if symptoms worsen/return or altered status develop. Copy of discharge instructions signed and turned into DC basket and copy sent with pt. Discharge packet given to NADEEM

## 2023-04-24 NOTE — DISCHARGE PLANNING
Alert Team Note:    Contacted by [facility], spoke to Raji. Pt has been accepted, accepting is Dr. Tracy. Facility expects transport at 1200.  Informed LH ARETHA Ely.

## 2023-04-24 NOTE — ED NOTES
Checked on bed, with unlabored respirations. No safety risk noted  Awake. Asked for coffee.   Pt medicated per MAR.   Sitter - 1:1; Continued safety precaution  Gurney in low position, side rail up for pt safety.   Will continue to monitor for any complications.

## 2023-04-24 NOTE — ED NOTES
Suppository administered by pt with stand by assist,  Fresh stool noted in pt's gluteal fold.    Pt denies other needs at this time.    Carl

## 2023-04-24 NOTE — ED NOTES
Pt rounded sleeping kept undisturbed.  Noted even respirations.  New sitter came 1:1 with pt full view, given bedside report.    Continued safety precaution,  Gurney in low position, side rail up for pt safety.

## 2023-04-24 NOTE — ED NOTES
Bedside report received, assuming care.  1:1 sitter in place, in direct view of patient.  Safety checklist and safety precautions in place and discussed with Yin mack.  Patient resting on her right side, eyes closed, resp even and unlabored.

## 2023-04-27 NOTE — DISCHARGE PLANNING
LMOM for patient to schedule a DM check Medical Social Work    Referral: Legal Hold    Intervention: Legal Hold Paperwork given to SW by [employer] RN: Loki    Legal Hold Initiated: Date: 07/09/2019 Time: 1210    Legal Hold faxed: Date: 07/10/2019  Time: 0320    Patient’s Insurance Listed on Face Sheet: Medicaid FFS    [facility]: [facility], [facility].    Plan: Patient will transfer to mental health facility once acceptance is obtained.

## 2023-05-02 ENCOUNTER — APPOINTMENT (OUTPATIENT)
Dept: RADIOLOGY | Facility: MEDICAL CENTER | Age: 38
End: 2023-05-02
Attending: EMERGENCY MEDICINE
Payer: MEDICAID

## 2023-05-02 ENCOUNTER — HOSPITAL ENCOUNTER (EMERGENCY)
Facility: MEDICAL CENTER | Age: 38
End: 2023-05-03
Attending: EMERGENCY MEDICINE
Payer: MEDICAID

## 2023-05-02 ENCOUNTER — HOSPITAL ENCOUNTER (EMERGENCY)
Facility: MEDICAL CENTER | Age: 38
End: 2023-05-02
Attending: EMERGENCY MEDICINE
Payer: MEDICAID

## 2023-05-02 VITALS
WEIGHT: 135 LBS | HEART RATE: 98 BPM | HEIGHT: 64 IN | BODY MASS INDEX: 23.05 KG/M2 | OXYGEN SATURATION: 97 % | TEMPERATURE: 98.6 F | SYSTOLIC BLOOD PRESSURE: 160 MMHG | DIASTOLIC BLOOD PRESSURE: 100 MMHG | RESPIRATION RATE: 20 BRPM

## 2023-05-02 DIAGNOSIS — F29 PSYCHOSIS, UNSPECIFIED PSYCHOSIS TYPE (HCC): ICD-10-CM

## 2023-05-02 DIAGNOSIS — R44.3 HALLUCINATIONS: ICD-10-CM

## 2023-05-02 LAB
AMPHET UR QL SCN: NEGATIVE
ANION GAP SERPL CALC-SCNC: 14 MMOL/L (ref 7–16)
BARBITURATES UR QL SCN: NEGATIVE
BASOPHILS # BLD AUTO: 0.4 % (ref 0–1.8)
BASOPHILS # BLD: 0.06 K/UL (ref 0–0.12)
BENZODIAZ UR QL SCN: NEGATIVE
BUN SERPL-MCNC: 7 MG/DL (ref 8–22)
BZE UR QL SCN: NEGATIVE
CALCIUM SERPL-MCNC: 9.4 MG/DL (ref 8.5–10.5)
CANNABINOIDS UR QL SCN: NEGATIVE
CHLORIDE SERPL-SCNC: 101 MMOL/L (ref 96–112)
CO2 SERPL-SCNC: 24 MMOL/L (ref 20–33)
CREAT SERPL-MCNC: 0.47 MG/DL (ref 0.5–1.4)
EKG IMPRESSION: NORMAL
EOSINOPHIL # BLD AUTO: 0.11 K/UL (ref 0–0.51)
EOSINOPHIL NFR BLD: 0.7 % (ref 0–6.9)
ERYTHROCYTE [DISTWIDTH] IN BLOOD BY AUTOMATED COUNT: 44.2 FL (ref 35.9–50)
GFR SERPLBLD CREATININE-BSD FMLA CKD-EPI: 123 ML/MIN/1.73 M 2
GLUCOSE SERPL-MCNC: 79 MG/DL (ref 65–99)
HCG UR QL: NEGATIVE
HCT VFR BLD AUTO: 42.6 % (ref 37–47)
HGB BLD-MCNC: 13.9 G/DL (ref 12–16)
IMM GRANULOCYTES # BLD AUTO: 0.08 K/UL (ref 0–0.11)
IMM GRANULOCYTES NFR BLD AUTO: 0.5 % (ref 0–0.9)
LYMPHOCYTES # BLD AUTO: 3.17 K/UL (ref 1–4.8)
LYMPHOCYTES NFR BLD: 20.9 % (ref 22–41)
MCH RBC QN AUTO: 28.4 PG (ref 27–33)
MCHC RBC AUTO-ENTMCNC: 32.6 G/DL (ref 33.6–35)
MCV RBC AUTO: 87.1 FL (ref 81.4–97.8)
METHADONE UR QL SCN: NEGATIVE
MONOCYTES # BLD AUTO: 1.08 K/UL (ref 0–0.85)
MONOCYTES NFR BLD AUTO: 7.1 % (ref 0–13.4)
NEUTROPHILS # BLD AUTO: 10.67 K/UL (ref 2–7.15)
NEUTROPHILS NFR BLD: 70.4 % (ref 44–72)
NRBC # BLD AUTO: 0 K/UL
NRBC BLD-RTO: 0 /100 WBC
OPIATES UR QL SCN: NEGATIVE
OXYCODONE UR QL SCN: NEGATIVE
PCP UR QL SCN: NEGATIVE
PLATELET # BLD AUTO: 389 K/UL (ref 164–446)
PMV BLD AUTO: 9.9 FL (ref 9–12.9)
POC BREATHALIZER: 0 PERCENT (ref 0–0.01)
POTASSIUM SERPL-SCNC: 4 MMOL/L (ref 3.6–5.5)
PROPOXYPH UR QL SCN: NEGATIVE
RBC # BLD AUTO: 4.89 M/UL (ref 4.2–5.4)
SODIUM SERPL-SCNC: 139 MMOL/L (ref 135–145)
TSH SERPL DL<=0.005 MIU/L-ACNC: 3.76 UIU/ML (ref 0.38–5.33)
WBC # BLD AUTO: 15.2 K/UL (ref 4.8–10.8)

## 2023-05-02 PROCEDURE — 96374 THER/PROPH/DIAG INJ IV PUSH: CPT

## 2023-05-02 PROCEDURE — 96375 TX/PRO/DX INJ NEW DRUG ADDON: CPT

## 2023-05-02 PROCEDURE — 700111 HCHG RX REV CODE 636 W/ 250 OVERRIDE (IP): Mod: UD | Performed by: EMERGENCY MEDICINE

## 2023-05-02 PROCEDURE — 85025 COMPLETE CBC W/AUTO DIFF WBC: CPT

## 2023-05-02 PROCEDURE — 36415 COLL VENOUS BLD VENIPUNCTURE: CPT

## 2023-05-02 PROCEDURE — 70450 CT HEAD/BRAIN W/O DYE: CPT

## 2023-05-02 PROCEDURE — 302970 POC BREATHALIZER: Performed by: EMERGENCY MEDICINE

## 2023-05-02 PROCEDURE — 84443 ASSAY THYROID STIM HORMONE: CPT

## 2023-05-02 PROCEDURE — 90791 PSYCH DIAGNOSTIC EVALUATION: CPT

## 2023-05-02 PROCEDURE — 80048 BASIC METABOLIC PNL TOTAL CA: CPT

## 2023-05-02 PROCEDURE — 99284 EMERGENCY DEPT VISIT MOD MDM: CPT | Mod: 27

## 2023-05-02 PROCEDURE — 80307 DRUG TEST PRSMV CHEM ANLYZR: CPT

## 2023-05-02 PROCEDURE — 99285 EMERGENCY DEPT VISIT HI MDM: CPT

## 2023-05-02 PROCEDURE — 81025 URINE PREGNANCY TEST: CPT

## 2023-05-02 PROCEDURE — 93005 ELECTROCARDIOGRAM TRACING: CPT | Performed by: EMERGENCY MEDICINE

## 2023-05-02 RX ORDER — LORAZEPAM 2 MG/ML
2 INJECTION INTRAMUSCULAR
Status: COMPLETED | OUTPATIENT
Start: 2023-05-02 | End: 2023-05-02

## 2023-05-02 RX ORDER — HALOPERIDOL 5 MG/ML
5 INJECTION INTRAMUSCULAR
Status: DISCONTINUED | OUTPATIENT
Start: 2023-05-02 | End: 2023-05-02

## 2023-05-02 RX ORDER — HALOPERIDOL 5 MG/ML
5 INJECTION INTRAMUSCULAR
Status: COMPLETED | OUTPATIENT
Start: 2023-05-02 | End: 2023-05-02

## 2023-05-02 RX ADMIN — HALOPERIDOL LACTATE 5 MG: 5 INJECTION, SOLUTION INTRAMUSCULAR at 13:06

## 2023-05-02 RX ADMIN — LORAZEPAM 2 MG: 2 INJECTION INTRAMUSCULAR; INTRAVENOUS at 13:05

## 2023-05-02 ASSESSMENT — FIBROSIS 4 INDEX
FIB4 SCORE: 0.47
FIB4 SCORE: 0.47

## 2023-05-02 NOTE — ED NOTES
Legal hold initiated by ERP. All patient belongings gathered. Face sheet placed in belongings bag. Sitter at bedside initiated. Security called for belongings search.

## 2023-05-02 NOTE — ED TRIAGE NOTES
"Chief Complaint   Patient presents with    Hallucinations     Pt BIBA. PT was at [facility] [employer] wandering around. PD stopped the patient and called EMS when the patient was talking about worms and visual hallucinations. Pt has a GCS 13. PD reports that pt admitted to taking meth tonight. Pt endorses drug use tonight but unable to state what drug. Denies alcohol use. Pt appears paranoid in ER room.       BIB EMS to [place] [address], pt on monitor and in gown, labs drawn and sent. .    Medications given en route:None    BP (!) 156/104   Pulse 95   Temp 36.8 °C (98.2 °F)   Resp (!) 24   Ht 1.626 m (5' 4\")   Wt 61.2 kg (135 lb)   SpO2 99%   BMI 23.17 kg/m²   "

## 2023-05-02 NOTE — ED NOTES
Pt resting, respirations even and unlabored. Denies any needs at this time. 1:1 sitter across room continued.

## 2023-05-02 NOTE — ED TRIAGE NOTES
"Chief Complaint   Patient presents with    Psych Eval     Patient BIB EMS for psych eval. EMS reports pt was on the street about 4 blocks away exhibiting \"abnormal behavior.\" Pt has hx of schizophrenia. Denies any HI/SI.         "

## 2023-05-02 NOTE — ED NOTES
Patient ambulatory to the restroom with a steady gait. She was able to provide urine sample that was collected and sent to lab

## 2023-05-02 NOTE — ED PROVIDER NOTES
"ED Provider Note    CHIEF COMPLAINT  Chief Complaint   Patient presents with    Psych Eval     Patient BIB EMS for psych eval. EMS reports pt was on the street about 4 blocks away exhibiting \"abnormal behavior.\" Pt has hx of schizophrenia. Denies any HI/SI.         EXTERNAL RECORDS REVIEWED  Other -patient was seen earlier today for a psychiatric evaluation.  She was apparently found at [facility] wandering about, talking about worms and hallucinations.  She admitted to using methamphetamines but reported medication compliance for her underlying psychiatric etiologies.  She was noted to be actively hallucinating but had no command hallucinations and denied any SI/HI.  She appeared to have a safe plan as well as a safe place to go and it was felt that there was no indication for legal hold.  She was discharged after she was provided with food and stable condition.    HPI/ROS  LIMITATION TO HISTORY   Select: Behavior  OUTSIDE HISTORIAN(S):  None    Tierney Mayer is a 38 y.o. female with a history of schizophrenia who presents after she was found by EMS exhibiting abnormal behavior.  She denies any physical complaints.  She reports that she is hungry.  She denies any suicidal or homicidal ideation.  She denies any trauma.  She denies using any drugs or alcohol prior to arrival.  She admits to visual hallucinations seeing \"2 breasts\" in front of her.  Remainder of the history is limited due to behavior.    PAST MEDICAL HISTORY   has a past medical history of Hypertension, Psychiatric disorder, and Schizophrenia ([facility]).    SURGICAL HISTORY   has a past surgical history that includes pelviscopy (2/22/2011) and incision and drainage general (2/22/2011).    FAMILY HISTORY  No family history on file.    SOCIAL HISTORY  Social History     Tobacco Use    Smoking status: Every Day     Packs/day: 1.00     Types: Cigarettes    Smokeless tobacco: Never    Tobacco comments:     knows she is suppose to quit but hasn't " "  Vaping Use    Vaping Use: Never used   Substance and Sexual Activity    Alcohol use: Not Currently    Drug use: Not Currently     Types: Inhaled, Oral     Comment: Marijuana, meth    Sexual activity: Not on file       CURRENT MEDICATIONS  Home Medications       Reviewed by Citlali Benavidez R.N. (Registered Nurse) on 05/02/23 at 1151  Med List Status: Not Addressed     Medication Last Dose Status   cefdinir (OMNICEF) 300 MG Cap  Active   chlorproMAZINE (THORAZINE) 200 MG tablet  Active   haloperidol decanoate (HALDOL DECANOATE) 100 MG/ML injection  Active   levothyroxine (SYNTHROID) 100 MCG Tab  Active   ondansetron (ZOFRAN ODT) 4 MG TABLET DISPERSIBLE  Active                    ALLERGIES  Allergies   Allergen Reactions    Clonazepam Unspecified     Pt reports getting a stroke.  Per historical: Twitches and paranoia.    Paliperidone Unspecified     Twitches and paranoia .    Invega Sustenna [Paliperidone Palmitate]     Olanzapine      Pt reports worsening and threatening voices.       PHYSICAL EXAM  VITAL SIGNS: BP (!) 154/102   Pulse 100   Temp 36.8 °C (98.3 °F) (Temporal)   Ht 1.778 m (5' 10\")   Wt 81.6 kg (180 lb)   SpO2 98%   BMI 25.83 kg/m²    Physical Exam  Vitals and nursing note reviewed.   Constitutional:       Appearance: Normal appearance.      Comments: Actively responding to internal stimuli   HENT:      Right Ear: External ear normal.      Left Ear: External ear normal.      Nose: Nose normal.      Mouth/Throat:      Mouth: Mucous membranes are dry.   Eyes:      Extraocular Movements: Extraocular movements intact.      Conjunctiva/sclera: Conjunctivae normal.      Pupils: Pupils are equal, round, and reactive to light.   Cardiovascular:      Rate and Rhythm: Regular rhythm. Tachycardia present.      Pulses: Normal pulses.   Pulmonary:      Effort: Pulmonary effort is normal.      Breath sounds: Normal breath sounds.   Abdominal:      Palpations: Abdomen is soft.      Tenderness: There " is no abdominal tenderness.   Musculoskeletal:         General: Normal range of motion.      Cervical back: Normal range of motion and neck supple.   Skin:     General: Skin is warm and dry.   Neurological:      General: No focal deficit present.      Mental Status: She is alert.   Psychiatric:      Comments: Requires redirection.  Actively responding to internal stimuli.     DIAGNOSTIC STUDIES / PROCEDURES  LABS  Results for orders placed or performed during the hospital encounter of 05/02/23   URINE DRUG SCREEN   Result Value Ref Range    Amphetamines Urine Negative Negative    Barbiturates Negative Negative    Benzodiazepines Negative Negative    Cocaine Metabolite Negative Negative    Methadone Negative Negative    Opiates Negative Negative    Oxycodone Negative Negative    Phencyclidine -Pcp Negative Negative    Propoxyphene Negative Negative    Cannabinoid Metab Negative Negative   BETA-HCG QUALITATIVE URINE   Result Value Ref Range    Beta-Hcg Urine Negative Negative   TSH WITH REFLEX TO FT4   Result Value Ref Range    TSH 3.760 0.380 - 5.330 uIU/mL   CBC WITH DIFFERENTIAL   Result Value Ref Range    WBC 15.2 (H) 4.8 - 10.8 K/uL    RBC 4.89 4.20 - 5.40 M/uL    Hemoglobin 13.9 12.0 - 16.0 g/dL    Hematocrit 42.6 37.0 - 47.0 %    MCV 87.1 81.4 - 97.8 fL    MCH 28.4 27.0 - 33.0 pg    MCHC 32.6 (L) 33.6 - 35.0 g/dL    RDW 44.2 35.9 - 50.0 fL    Platelet Count 389 164 - 446 K/uL    MPV 9.9 9.0 - 12.9 fL    Neutrophils-Polys 70.40 44.00 - 72.00 %    Lymphocytes 20.90 (L) 22.00 - 41.00 %    Monocytes 7.10 0.00 - 13.40 %    Eosinophils 0.70 0.00 - 6.90 %    Basophils 0.40 0.00 - 1.80 %    Immature Granulocytes 0.50 0.00 - 0.90 %    Nucleated RBC 0.00 /100 WBC    Neutrophils (Absolute) 10.67 (H) 2.00 - 7.15 K/uL    Lymphs (Absolute) 3.17 1.00 - 4.80 K/uL    Monos (Absolute) 1.08 (H) 0.00 - 0.85 K/uL    Eos (Absolute) 0.11 0.00 - 0.51 K/uL    Baso (Absolute) 0.06 0.00 - 0.12 K/uL    Immature Granulocytes (abs) 0.08  0.00 - 0.11 K/uL    NRBC (Absolute) 0.00 K/uL   Basic Metabolic Panel   Result Value Ref Range    Sodium 139 135 - 145 mmol/L    Potassium 4.0 3.6 - 5.5 mmol/L    Chloride 101 96 - 112 mmol/L    Co2 24 20 - 33 mmol/L    Glucose 79 65 - 99 mg/dL    Bun 7 (L) 8 - 22 mg/dL    Creatinine 0.47 (L) 0.50 - 1.40 mg/dL    Calcium 9.4 8.5 - 10.5 mg/dL    Anion Gap 14.0 7.0 - 16.0   ESTIMATED GFR   Result Value Ref Range    GFR (CKD-EPI) 123 >60 mL/min/1.73 m 2   POC BREATHALIZER   Result Value Ref Range    POC Breathalizer 0.00 0.00 - 0.01 Percent   EKG   Result Value Ref Range    Report       [facility] Emergency Dept.    Test Date:  2023  Pt Name:    GÉNESIS THACKER               Department: ER  MRN:        [id number]                      Room:       [facility]  Gender:     Female                       [facility]  :        1985                   Requested By:LILIAN BURNS  Order #:    [id number]                    Reading MD: Lilian Bunrs MD    Measurements  Intervals                                Axis  Rate:       98                           P:          42  AK:         160                          QRS:        55  QRSD:       88                           T:          15  QT:         395  QTc:        505    Interpretive Statements  Sinus rhythm  Compared to ECG 2021 17:12:22  Electronically Signed On 2023 15:17:29 PDT by Lilian Burns MD       RADIOLOGY  Radiologist interpretation:   No orders to display     COURSE & MEDICAL DECISION MAKING    ED Observation Status? Yes; I am placing the patient in to an observation status due to a diagnostic uncertainty as well as therapeutic intensity. Patient placed in observation status at 12:07 PM, 2023.     Observation plan is as follows: Labs, imaging, legal hold, psychiatric evaluation    INITIAL ASSESSMENT, COURSE AND PLAN  Care Narrative: This is a 38-year-old female who was brought here for the second time in 24 hours due to  disorganized behavior.  She does have an underlying history of schizophrenia although per the prior note maintains that she is compliant on all of her psychiatric medications.  Here she denies any medical complaints.  She is clearly responding to internal stimuli and unable to hold an appropriate conversation.  She denies any alcohol or drug use today and has no obvious external trauma.  She is moving all 4 extremities equally and has no focal neurologic deficits.    Legal hold was initiated due to inability to care for self.  She required chemical restraints, specifically Haldol and Ativan due to agitation.  She was placed on cardiac monitoring after medication administration was observed closely.    Medical work-up did not reveal acute abnormality.  She has maintained normal and stable vital signs.  She is medically cleared for transport to an inpatient psychiatric facility.    Patient will require evaluation by behavioral health when she is more awake.  I anticipate transfer to a psychiatric facility.    Patient care will be transferred to my colleague pending bed availability.    ADDITIONAL PROBLEM LIST  Psychosis    DISPOSITION AND DISCUSSIONS  I have discussed management of the patient with the following physicians and PIEDDA's: None    Discussion of management with other [facility] or appropriate source(s): Behavioral Health -discussed patient presentation and recommendations for transfer to an inpatient psychiatric facility       Escalation of care considered, and ultimately not performed: N/A.    Barriers to care at this time, including but not limited to: Patient does not have established PCP.     Decision tools and prescription drugs considered including, but not limited to:  None .    Patient is critically ill.   The patient continues to have: Altered mental status and agitation requiring chemical sedation  The vital organ system that is affected is the: All are at risk  If untreated there is a high chance of  deterioration into: Agitated delirium, rhabdomyolysis, traumatic injury, cardiac arrest, respiratory arrest, and eventually death.   The critical care that I am providing today is: Review of medical records, initial bedside evaluation and resuscitation, medication management, interpretation of lab/imaging results, multiple reevaluations, discussion with alert team personnel, and preparation of the medical record.  The critical that has been undertaken is medically complex.   There has been no overlap in critical care time.   Critical Care Time not including procedures: 37 minutes    FINAL DIAGNOSIS  1. Psychosis, unspecified psychosis type (HCC)      Electronically signed by: Cali Urena M.D., 5/2/2023 12:07 PM

## 2023-05-02 NOTE — ED PROVIDER NOTES
ED Provider Note    Scribed for Barbie Bhatia M.D. by Antwan Agustin. 5/2/2023, 5:03 AM.    Primary care provider: Pcp Unobtainable By   Means of arrival: EMS  History obtained from: patient  History limited by: behavior    CHIEF COMPLAINT  Chief Complaint   Patient presents with    Hallucinations     Pt BIBA. PT was at [facility] wandering around. PD stopped the patient and called EMS when the patient was talking about worms and visual hallucinations. Pt has a GCS 13. PD reports that pt admitted to taking meth tonight. Pt endorses drug use tonight but unable to state what drug. Denies alcohol use. Pt appears paranoid in ER room.       HPI/ROS  Tierney Mayer is a 38 y.o. female who presents to the Emergency Department for psychiatric evaluation onset prior to arrival. Per police she was found at [facility] wandering about, talking about worms and hallucinations. She admitted to using meth tonight.  She reports that she has been compliant with her psychiatric medications.  She admits to having hallucinations, but would not like any medications for treatment.  Denies command hallucinations.  Denies any hallucinations that are telling her to harm others or herself.  She reports that she has a safe place to stay, is currently living at the [facility].    EXTERNAL RECORDS REVIEWED  Multiple ED visits in the past between [facility] and [place] for psych evals and hallucinations     LIMITATION TO HISTORY   Select: Behavior    PAST MEDICAL HISTORY   has a past medical history of Hypertension, Psychiatric disorder, and Schizophrenia (HCC).    SURGICAL HISTORY   has a past surgical history that includes pelviscopy (2/22/2011) and incision and drainage general (2/22/2011).    SOCIAL HISTORY  Social History     Tobacco Use    Smoking status: Every Day     Packs/day: 1.00     Types: Cigarettes    Smokeless tobacco: Never    Tobacco comments:     knows she is suppose to quit but hasn't   Vaping Use    Vaping  "Use: Never used   Substance Use Topics    Alcohol use: Not Currently    Drug use: Not Currently     Types: Inhaled, Oral     Comment: Marijuana, meth      Social History     Substance and Sexual Activity   Drug Use Not Currently    Types: Inhaled, Oral    Comment: Marijuana, meth     FAMILY HISTORY  None noted    CURRENT MEDICATIONS  Current Outpatient Medications   Medication Instructions    cefdinir (OMNICEF) 300 mg, Oral, 2 TIMES DAILY    chlorproMAZINE (THORAZINE) 600 mg, Oral, 3 TIMES DAILY    haloperidol decanoate (HALDOL DECANOATE) 100 mg, Intramuscular, EVERY 30 DAYS    levothyroxine (SYNTHROID) 100 mcg, Oral, EVERY MORNING    ondansetron (ZOFRAN ODT) 4 mg, Oral, EVERY 8 HOURS PRN      ALLERGIES  Allergies   Allergen Reactions    Clonazepam Unspecified     Pt reports getting a stroke.  Per historical: Twitches and paranoia.    Paliperidone Unspecified     Twitches and paranoia .    Invega Sustenna [Paliperidone Palmitate]     Olanzapine      Pt reports worsening and threatening voices.       PHYSICAL EXAM  VITAL SIGNS: BP (!) 156/104   Pulse 95   Temp 36.8 °C (98.2 °F)   Resp (!) 24   Ht 1.626 m (5' 4\")   Wt 61.2 kg (135 lb)   SpO2 99%   BMI 23.17 kg/m²   Vitals reviewed by myself.  Nursing note and vitals reviewed.  Constitutional: Well-developed and well-nourished. No acute distress.   HENT: Head is normocephalic and atraumatic.  Eyes: extra-ocular movements intact  Cardiovascular: Regular rate and regular rhythm. No murmur heard.  Pulmonary/Chest: Breath sounds normal. No wheezes or rales.   Musculoskeletal: Extremities exhibit normal range of motion without edema or tenderness.   Neurological: Awake and alert  Skin: Skin is warm and dry. No rash.      COURSE & MEDICAL DECISION MAKING    ED Observation Status? No, patient does not meet criteria for ED observation.    INITIAL ASSESSMENT AND PLAN    Patient is a 38-year-old female who comes in for evaluation of hallucinations.  On exam patient is " well-appearing, vitals notable for slight hypertension likely related to methamphetamine use.  Vitals are otherwise unremarkable.  At this time patient is hallucinating but has no hallucinations that are command hallucinations, they are not telling her to hurt others or herself.  She has a safe plan and has a place to go.  Therefore at this time no indication for legal hold.  She is safe for discharge at this time.  She is provided with some food and discharged in stable condition.       ADDITIONAL PROBLEM LIST AND RESOURCE UTILIZATION    Additional problems aside from the chief complaint that I have addressed: none    I have discussed management of the patient with the following physicians and PIEDAD's: none    Discussion of management with other [facility] or appropriate source(s): none     Escalation of care considered, and ultimately not performed: see above.     Barriers to care at this time, including but not limited to: none    Decision tools and prescription drugs considered including, but not limited to: see above.    Please see review of records as noted above      FINAL IMPRESSION  1. Hallucinations          Antwan DONG (Scribe), am scribing for, and in the presence of, Barbie Bhatia M.D..    Electronically signed by: Antwan Agustin (Yosiibsanjeev), 5/2/2023    Barbie DONG M.D. personally performed the services described in this documentation, as scribed by Antwan Agustin in my presence, and it is both accurate and complete.    The note accurately reflects work and decisions made by me.  Barbie Bhatia M.D.  5/2/2023  5:40 AM

## 2023-05-03 VITALS
DIASTOLIC BLOOD PRESSURE: 88 MMHG | TEMPERATURE: 98.6 F | RESPIRATION RATE: 18 BRPM | SYSTOLIC BLOOD PRESSURE: 128 MMHG | BODY MASS INDEX: 25.77 KG/M2 | HEART RATE: 98 BPM | HEIGHT: 70 IN | WEIGHT: 180 LBS | OXYGEN SATURATION: 97 %

## 2023-05-03 NOTE — ED NOTES
Bedside report received from previous shift. Assumed patient care. Verified patient identification.   Checked on bed, with unlabored respirations. No safety risk noted  Sleeping  Sitter - 1:1; Continued safety precaution  Gurney in low position, side rail up for pt safety.   Will continue to monitor for any complications.

## 2023-05-03 NOTE — ED NOTES
Information was offered by the Alert Team, Ursula DAN - pt is accepted in [facility], ETA 1030 AM (5/3/2023)

## 2023-05-03 NOTE — CONSULTS
"[facility]   TRIAGE ASSESSMENT    Name: Tierney Mayer  MRN: [id number]  : 1985  Age: 38 y.o.  Date of assessment: 2023  PCP: Pcp Unobtainable By   Persons in attendance: Patient  Patient Location: [facility]    CHIEF COMPLAINT/PRESENTING ISSUE (as stated by patient): Pt BIB EMS for erratic behavior. This is her second ED presentation in 24 hours for similar complaint. Pt was recently discharged from [facility] and has been noncompliant with discharge plan. She admits to using methamphetamine last night. UDS is negative for all substances. Legal hold certified by Dr. Urena, referral to be sent to area inpatient facilities.  Chief Complaint   Patient presents with    Psych Eval     Patient BIB EMS for psych eval. EMS reports pt was on the street about 4 blocks away exhibiting \"abnormal behavior.\" Pt has hx of schizophrenia. Denies any HI/SI.          CURRENT LIVING SITUATION/SOCIAL SUPPORT/FINANCIAL RESOURCES:    Pt told ED providers last night that she is staying at the [facility]. However, she has struggled with chronic homelessness in the past.    BEHAVIORAL HEALTH/SUBSTANCE USE TREATMENT HISTORY  Does patient/parent report a history of prior behavioral health/substance use treatment for patient?   Yes:  Multiple psychiatric hospitalizations in the past, pt is unable to provide history. EMR documents admissions to [facility] and [facility]. Recently admitted to [facility] from [facility] ED.    SAFETY ASSESSMENT - SELF  Does patient acknowledge current or past symptoms of dangerousness to self or is previous history noted? yes  Does parent/significant other report patient has current or past symptoms of dangerousness to self? no  Does presenting problem suggest symptoms of dangerousness to self?  no    SAFETY ASSESSMENT - OTHERS  Does patient acknowledge current or past symptoms of aggressive behavior or risk to others or is previous history noted? " "yes  Does parent/significant other report patient has current or past symptoms of aggressive behavior or risk to others?  N\A  Does presenting problem suggest symptoms of dangerousness to others?   EMR documents history of aggression towards healthcare providers        LEGAL HISTORY  Does patient acknowledge history of arrest/detention/half-way or is previous history noted? Yes, EMR documents history of arrests for assaulting staff    Crisis Safety Plan completed and copy given to patient? N\A    ABUSE/NEGLECT SCREENING  Does patient report feeling “unsafe” in his/her home, or afraid of anyone?  Unable to assess  Does patient report any history of physical, sexual, or emotional abuse?  Unable to assess  Does parent or significant other report any of the above? N\A  Is there evidence of neglect by self?  yes  Is there evidence of neglect by a caregiver? no  Does the patient/parent report any history of CPS/APS/police involvement related to suspected abuse/neglect or domestic violence? no  Based on the information provided during the current assessment, is a mandated report of suspected abuse/neglect being made?  No    SUBSTANCE USE SCREENING  Yes:  Sam all substances used in the past 30 days:      Last Use Amount   []   Alcohol     []   Marijuana     []   Heroin     []   Prescription Opioids  (used without prescription, for    recreation, or in excess of prescribed amount)     []   Other Prescription  (used without prescription, for    recreation, or in excess of prescribed amount)     []   Cocaine      [x]   Methamphetamine     []   \"\" drugs (ectasy, MDMA)     []   Other substances        UDS results: negative for all substances  Breathalyzer results: 0.0    What consequences does the patient associate with any of the above substance use and or addictive behaviors? Health problems    Risk factors for detox (check all that apply):  []  Seizures   []  Diaphoretic (sweating)   []  Tremors   []  Hallucinations   []  " Increased blood pressure   []  Decreased blood pressure   []  Other   []  None      [] Patient education on risk factors for detoxification and instructed to return to ER as needed.      MENTAL STATUS   Participation: No verbal participation  Grooming: Casual  Orientation: Drowsy/Somnolent  Behavior: Calm  Eye contact: Limited  Mood:  irritable  Affect:  labile  Thought process:  linear  Thought content:  delusional  Speech: Soft  Perception: Evidence of hallucination  Memory:  Poor memory for chronology of events  Insight: Poor  Judgment:  Poor  Other:    Collateral information:    Source:  [] Significant other present in person:   [] Significant other by telephone  [] [facility]   [] [facility]  [x] [facility] Medical Record  [] Other:     [] Unable to complete full assessment due to:  [] Acute intoxication   [] Patient declined to participate/engage  [] Patient verbally unresponsive  [] Significant cognitive deficits  [] Significant perceptual distortions or behavioral disorganization  [] Other:      CLINICAL IMPRESSIONS:  Primary:   psychosis  Secondary:  polysubstance use disorder       IDENTIFIED NEEDS/PLAN:  [Trigger DISPOSITION list for any items marked]    []  Imminent safety risk - self [] Imminent safety risk - others   []  Acute substance withdrawal [x]  Psychosis/Impaired reality testing   []  Mood/anxiety []  Substance use/Addictive behavior   []  Maladaptive behaviro []  Parent/child conflict   []  Family/Couples conflict []  Biomedical   []  Housing []  Financial   []   Legal  Occupational/Educational   []  Domestic violence []  Other:     Recommended Plan of Care:  Actively being addressed by [facility] Emergency Department, Refer to [facility], and 1:1 Observation  *Telesitter may not be utilized for moderate or high risk patients    Has the Recommended Plan of Care/Level of Observation been reviewed with the patient's assigned nurse? yes    Does  patient/parent or guardian express agreement with the above plan? yes     Referral appointment(s) scheduled? no    Alert team only:   I have discussed findings and recommendations with Dr. Urena who is in agreement with these recommendations.     Referral information sent to the following outpatient community providers :    Referral information sent to the following inpatient community providers : [facility], [facility]    If applicable : Referred  to  Alert Team for legal hold follow up at (time): 5/5/23      Jeannine Disla R.N.  5/2/2023

## 2023-05-03 NOTE — ED NOTES
Bedside report from Rashel DAN. Safety checklist in use. 1:1 Sitter across hallway. Report given to sitter.

## 2023-05-03 NOTE — ED NOTES
Vitals reassessed.  Vitals:    05/03/23 0543   BP: 97/56   Pulse: 97   Resp: 16   Temp:    SpO2: 94%

## 2023-05-03 NOTE — ED NOTES
Unable to complete med rec  at this time. Pt is unable to participate in an interview and home pharmacy currently closed.

## 2023-05-03 NOTE — CONSULTS
Alert Team:     Referral: Adult Patient Transfer to Mental Health Facility     Intervention: Received call from [facility] at  stating that Dr. Trevino has accepted the patient for admission.  Facility requests that transport be arranged for 10:00 AM.     Arranged for transportation to be set up through [facility] with [facility] for [facility] transport.     The pt will be picked up at 10:30 AM.      Notified the RN of the departure time as well as accepting facility.      Created transfer packet and placed on chart. ([facility] to be completed)     Plan: Pt will transfer to [facility] at 10:30 AM.     IP Psychiatry Consult cancelled.

## 2023-05-03 NOTE — ED NOTES
Vitals reassessed.   Vitals:    05/02/23 2354   BP: (!) 116/94   Pulse: 84   Resp: 16   Temp:    SpO2: 96%

## 2023-05-03 NOTE — DISCHARGE PLANNING
[facility]ERT TEAM DISCHARGE PLANNING NOTE    Date:  5/2/23  Patient Name:  Tierney Mayer - 38 y.o. - Discharge Planning  MRN:  [id number]   YOB: 1985  ADMISSION DATE:  5/2/2023      Writer forwarded referral packet for inpatient psychiatric care to the following community providers:  [facility], [facility], [facility], [facility] via [facility]   Items included in the referral packet:   __x___Face Sheet   __x___Pages 1 and 2 of completed legal hold   __x___Alert Team/Psych Assessment   __x___H&P   __x___UDS   __x___Blood Alcohol   __x___Vital signs   __x___Pregnancy Test (if applicable)   __x___Medications List   _____Covid Screen

## 2023-05-03 NOTE — ED PROVIDER NOTES
ED Observation Discharge Summary    Scribed for Danielle Castillo M.D. by Radha Balderrama. 5/3/2023  10:22 AM    Patient:Tierney Mayer  Patient : 1985  Patient MRN: [id number]  Patient PCP: Pcp Unobtainable By     Admit Date: 2023  Discharge Date and Time: 23 10:22 AM  Discharge Diagnosis: Psychosis, unspecified psychosis type.  Discharge Attending: Danielle Castillo M.D.  Discharge Service: ED Observation    ED Course  Tierney is a 38 y.o. female who was evaluated at [facility]. Ms. Mayer initially presented to this Emergency Department yesterday for psychiatric evaluation. On arrival, emergency physician noted that she was found walking around [place] a local mall talking about worms and responding to hallucinations. She reported that she did use methamphetamine recently. She is on behavioral health medications, and reported compliance on arrival. She did not report any hallucinations that were telling her to harm herself or others. Stated that she is currently living at the [facility]. At that time, it was noted that she had no command hallucinations, no thoughts of self-harm or harming others, she did have a safe place to go, and appeared to be at a stable point with regard to her psychiatric disorder, she was initially discharged.      However, she was then brought back later in the day by paramedics who reported that she was about 4 blocks from the hospital exhibiting abnormal behavior. She again denied suicidal thoughts, homicidal thoughts, or trauma. She reported visual hallucinations. She was tachycardic on arrival. As she was responding to internal stimuli, was clearly unable to care for herself, legal hold was placed due to grave disability. She became agitated and required calming medications including Haldol and Ativan.     Yesterday evening she was seen and evaluated by behavioral health RN. Urine drug screen resulted negative for all substances, symptoms do not appear to be  "substance-induced. Behavioral health RN is in agreement with emergency physician that the patient will require inpatient stabilization and treatment for her safety.    Overnight, we received word that she was accepted at [facility] for admission, [facility] requested transport to their facility at 10:00 this morning.    On my assessment this morning she is well-appearing, eager for transport to [facility].  She has no concerns at this time, no agitation, is calm and cooperative.    Discharge Exam:  /88   Pulse 98   Temp 37 °C (98.6 °F)   Resp 18   Ht 1.778 m (5' 10\")   Wt 81.6 kg (180 lb)   SpO2 97%   BMI 25.83 kg/m² .    Constitutional: Awake and alert. Nontoxic  HENT:  Grossly normal  Eyes: Grossly normal  Neck: Normal range of motion  Cardiovascular: Normal heart rate   Thorax & Lungs: No respiratory distress  Abdomen: Nontender  Skin:  No pathologic rash.   Extremities: Well perfused  Psychiatric: Affect normal    Labs  Urine drug screen performed 5/2/2023.  TSH, BMP, and hCG had no significant abnormalities. White blood count was mildly elevated to 15.2, though she remained afebrile throughout her stay in the emergency department. Doubt infectious etiology.    Radiology  CT head performed 5/2/2023 demonstrated no acute intracranial abnormality.  CT-HEAD W/O   Final Result      No acute intracranial abnormality.                    My final assessment includes:  1.  Psychosis, unspecified psychosis type.    Upon Reevaluation, the patient's condition has: Improved; and will be discharged.    Patient discharged from ED Observation status at 10:00 AM, 5/3/2023.    Total time spent on this ED Observation discharge encounter is 18 minutes.    The note accurately reflects work and decisions made by me.  Danielle Castillo M.D.  5/5/2023  3:24 PM    "

## 2023-05-03 NOTE — ED NOTES
Report to [facility]. Patient transferred to [facility] with [facility] with all belongings.

## 2023-05-03 NOTE — ED NOTES
Bedside report given to the next shift RN Marylin for continuity of care and management.  Provided opportunity to asks questions.

## 2023-05-03 NOTE — DISCHARGE PLANNING
Alert Team Note:    Contacted [facility], spoke to Yaima. Pt has been accepted, accepting is Dr. Tracy. Facility expects transport at 1000.  Informed LH ARETHA Ely.     Transport scheduled by Alert Team NI Vergara overnight. [employer] [facility] and [employer] scanned into pts chart.   Transport scheduled for 1000.   Notified Bedside NI Coulter, Alert Team NI fuentes, and Dr. Castillo of the departure time as well as accepting facility.

## 2023-05-09 ENCOUNTER — HOSPITAL ENCOUNTER (EMERGENCY)
Facility: MEDICAL CENTER | Age: 38
End: 2023-05-09
Attending: EMERGENCY MEDICINE
Payer: MEDICAID

## 2023-05-09 VITALS
HEART RATE: 90 BPM | OXYGEN SATURATION: 96 % | SYSTOLIC BLOOD PRESSURE: 130 MMHG | TEMPERATURE: 97 F | DIASTOLIC BLOOD PRESSURE: 75 MMHG | RESPIRATION RATE: 16 BRPM

## 2023-05-09 DIAGNOSIS — F29 PSYCHOSIS, UNSPECIFIED PSYCHOSIS TYPE (HCC): ICD-10-CM

## 2023-05-09 DIAGNOSIS — F19.91 HISTORY OF DRUG USE: ICD-10-CM

## 2023-05-09 LAB
ALBUMIN SERPL BCP-MCNC: 4.1 G/DL (ref 3.2–4.9)
ALBUMIN/GLOB SERPL: 1.4 G/DL
ALP SERPL-CCNC: 101 U/L (ref 30–99)
ALT SERPL-CCNC: 17 U/L (ref 2–50)
ANION GAP SERPL CALC-SCNC: 14 MMOL/L (ref 7–16)
AST SERPL-CCNC: 14 U/L (ref 12–45)
BASOPHILS # BLD AUTO: 0.4 % (ref 0–1.8)
BASOPHILS # BLD: 0.07 K/UL (ref 0–0.12)
BILIRUB SERPL-MCNC: <0.2 MG/DL (ref 0.1–1.5)
BUN SERPL-MCNC: 5 MG/DL (ref 8–22)
CALCIUM ALBUM COR SERPL-MCNC: 8.9 MG/DL (ref 8.5–10.5)
CALCIUM SERPL-MCNC: 9 MG/DL (ref 8.5–10.5)
CHLORIDE SERPL-SCNC: 102 MMOL/L (ref 96–112)
CO2 SERPL-SCNC: 25 MMOL/L (ref 20–33)
CREAT SERPL-MCNC: 0.53 MG/DL (ref 0.5–1.4)
EOSINOPHIL # BLD AUTO: 0.15 K/UL (ref 0–0.51)
EOSINOPHIL NFR BLD: 0.9 % (ref 0–6.9)
ERYTHROCYTE [DISTWIDTH] IN BLOOD BY AUTOMATED COUNT: 45.4 FL (ref 35.9–50)
ETHANOL BLD-MCNC: <10.1 MG/DL
GFR SERPLBLD CREATININE-BSD FMLA CKD-EPI: 120 ML/MIN/1.73 M 2
GLOBULIN SER CALC-MCNC: 3 G/DL (ref 1.9–3.5)
GLUCOSE SERPL-MCNC: 100 MG/DL (ref 65–99)
HCG SERPL QL: NEGATIVE
HCT VFR BLD AUTO: 40.5 % (ref 37–47)
HGB BLD-MCNC: 13.5 G/DL (ref 12–16)
IMM GRANULOCYTES # BLD AUTO: 0.1 K/UL (ref 0–0.11)
IMM GRANULOCYTES NFR BLD AUTO: 0.6 % (ref 0–0.9)
LIPASE SERPL-CCNC: 12 U/L (ref 11–82)
LYMPHOCYTES # BLD AUTO: 2.99 K/UL (ref 1–4.8)
LYMPHOCYTES NFR BLD: 17.6 % (ref 22–41)
MCH RBC QN AUTO: 29.3 PG (ref 27–33)
MCHC RBC AUTO-ENTMCNC: 33.3 G/DL (ref 33.6–35)
MCV RBC AUTO: 88 FL (ref 81.4–97.8)
MONOCYTES # BLD AUTO: 1.37 K/UL (ref 0–0.85)
MONOCYTES NFR BLD AUTO: 8.1 % (ref 0–13.4)
NEUTROPHILS # BLD AUTO: 12.32 K/UL (ref 2–7.15)
NEUTROPHILS NFR BLD: 72.4 % (ref 44–72)
NRBC # BLD AUTO: 0 K/UL
NRBC BLD-RTO: 0 /100 WBC
PLATELET # BLD AUTO: 405 K/UL (ref 164–446)
PMV BLD AUTO: 9.9 FL (ref 9–12.9)
POTASSIUM SERPL-SCNC: 4.1 MMOL/L (ref 3.6–5.5)
PROT SERPL-MCNC: 7.1 G/DL (ref 6–8.2)
RBC # BLD AUTO: 4.6 M/UL (ref 4.2–5.4)
SODIUM SERPL-SCNC: 141 MMOL/L (ref 135–145)
T PALLIDUM AB SER QL IA: NORMAL
TSH SERPL DL<=0.005 MIU/L-ACNC: 3.69 UIU/ML (ref 0.38–5.33)
WBC # BLD AUTO: 17 K/UL (ref 4.8–10.8)

## 2023-05-09 PROCEDURE — 84703 CHORIONIC GONADOTROPIN ASSAY: CPT

## 2023-05-09 PROCEDURE — 36415 COLL VENOUS BLD VENIPUNCTURE: CPT

## 2023-05-09 PROCEDURE — 80053 COMPREHEN METABOLIC PANEL: CPT

## 2023-05-09 PROCEDURE — 84443 ASSAY THYROID STIM HORMONE: CPT

## 2023-05-09 PROCEDURE — 99285 EMERGENCY DEPT VISIT HI MDM: CPT

## 2023-05-09 PROCEDURE — A9270 NON-COVERED ITEM OR SERVICE: HCPCS | Mod: UD | Performed by: EMERGENCY MEDICINE

## 2023-05-09 PROCEDURE — 83690 ASSAY OF LIPASE: CPT

## 2023-05-09 PROCEDURE — 86780 TREPONEMA PALLIDUM: CPT

## 2023-05-09 PROCEDURE — 82077 ASSAY SPEC XCP UR&BREATH IA: CPT

## 2023-05-09 PROCEDURE — 700102 HCHG RX REV CODE 250 W/ 637 OVERRIDE(OP): Mod: UD | Performed by: EMERGENCY MEDICINE

## 2023-05-09 PROCEDURE — 85025 COMPLETE CBC W/AUTO DIFF WBC: CPT

## 2023-05-09 RX ORDER — CHLORPROMAZINE HYDROCHLORIDE 50 MG/1
100 TABLET, FILM COATED ORAL 3 TIMES DAILY PRN
Status: DISCONTINUED | OUTPATIENT
Start: 2023-05-09 | End: 2023-05-09 | Stop reason: HOSPADM

## 2023-05-09 RX ADMIN — CHLORPROMAZINE HYDROCHLORIDE 100 MG: 50 TABLET, FILM COATED ORAL at 06:30

## 2023-05-09 NOTE — ED NOTES
Pt ambulatory to the bathroom, requested a urine cup. Then proceeded to fill urine cup with water and state she provided a sample. The fluid in the urine cup was room temperature and clear.

## 2023-05-09 NOTE — ED NOTES
Patient awake and alert and reports readiness for discharge. Discharge instructions and follow-up care reviewed with patient. Vss. Patient stable and ambulatory upon d/c from ED. Directions to [facility] provided.

## 2023-05-09 NOTE — CONSULTS
Alert Team:    No full consult required. Patient presents to ER with ALOC; EMR documents recent transfer to [facility] on the 3rd and confirmed discharge was yesterday form facility. Patient has a diagnose hx of schizoaffective D/O and significant hx of methamphetamine use which high likely contributing to patient's present mental status. Patient denies SI/HI, no CAH. Does not meet L2K criteria. Discussed with Dr. Riddle. Patient provided with 100 mg Thorazine and allowed to rest until [facility] walk-in clinic opens this morning to help successfully connect pt to OP PMH care. Day Alert Team will continue to monitor.

## 2023-05-09 NOTE — ED NOTES
"Pt stripped down to naked, shouted out some random incomprehensive words. Placed in hospital gown. When attempting to place clothing in pt belongings bag, pt screamed out \" get out of here you aren't going to eat my clothes!\"  "

## 2023-05-09 NOTE — ED NOTES
Bedside report given to NI Prieto pt resting on gurney. Appears comfortable, no acute signs of distress present.

## 2023-05-09 NOTE — ED PROVIDER NOTES
"                                                        ED Provider Note    CHIEF COMPLAINT  Chief Complaint   Patient presents with    Hallucinations     BIB EMS for hallucinations. Pt is speaking in random words, that don't make sense. Is clear enough to refuse blood draw, and full set of vitals.         HPI    Primary care provider: No primary care provider on file.   History obtained from: Patient  History limited by: Limited by patient's clinical condition    Nydia Pichardo is a 38 y.o. female who presents to the ED by EMS for reported hallucinations.  Patient with pressured and random speech.  She is cooperative with exam but requires frequent redirection.  Her main complaint is \"I need a meal, I need a ride to mental health, I need menstrual pad.\"  She denies suicidal or homicidal ideations.  She denies drug use.  She reports drinking alcohol \"quite a bit.\"  She reports past medical history including asthma and \"caffeine.\"  She denies any pain at this time.  She denies shortness of breath/difficulty breathing/nausea/vomiting/diarrhea/dysuria.  Patient states that she may be pregnant \"5 days, 2 weeks, 1 day.\"    REVIEW OF SYSTEMS  Please see HPI for pertinent positives/negatives.  Limited by patient's clinical condition    PAST MEDICAL HISTORY  No past medical history on file.     SURGICAL HISTORY  No past surgical history on file.     SOCIAL HISTORY  Social History     Tobacco Use    Smoking status: Not on file    Smokeless tobacco: Not on file   Substance and Sexual Activity    Alcohol use: Not on file    Drug use: Not on file    Sexual activity: Not on file        FAMILY HISTORY  No family history on file.     CURRENT MEDICATIONS  Home Medications    **Home medications have not yet been reviewed for this encounter**          ALLERGIES  No Known Allergies     PHYSICAL EXAM  VITAL SIGNS: /75   Pulse 90   Temp 36.1 °C (97 °F) (Temporal)   Resp 16   SpO2 96%  @NEERU[[id number]::@     Pulse ox " interpretation: 95% I interpret this pulse ox as normal     Constitutional: Well developed, well nourished, alert and smiling in no apparent distress, nontoxic appearance    HENT: No external signs of trauma, normocephalic, oropharynx moist and clear, nose normal     Eyes: PERRL, EOMI, conjunctiva without erythema, no discharge, no icterus    Neck: Soft and supple, trachea midline, no stridor, no tenderness, no LAD, no JVD, good ROM apparent stiffness or discomfort  Cardiovascular: Regular rate and rhythm, no murmurs/rubs/gallops, strong distal pulses and good perfusion    Thorax & Lungs: No respiratory distress, CTAB    Abdomen: Soft, nontender, nondistended, no guarding, no rebound, normal BS    Back: No CVAT     Extremities: No cyanosis, no edema, no gross deformity, good ROM, no tenderness, intact distal pulses with brisk cap refill    Skin: Warm, dry, no pallor/cyanosis, no rash noted      Lymphatic: No lymphadenopathy noted     Neuro: Alert and oriented to person and she knows she is in a hospital but disoriented to month and year.  GCS 15.  CN II-XII grossly intact.  Rapid and disorganized speech.  Equal strength bilateral UE/LE.  Sensation intact to touch.  Normal gait.    Psychiatric: Cooperative but needs frequent redirection, slightly anxious at times, denies suicidal or homicidal ideations      DIAGNOSTIC STUDIES / PROCEDURES        LABS  All labs reviewed by me.     Results for orders placed or performed during the hospital encounter of 05/09/23   CBC WITH DIFFERENTIAL   Result Value Ref Range    WBC 17.0 (H) 4.8 - 10.8 K/uL    RBC 4.60 4.20 - 5.40 M/uL    Hemoglobin 13.5 12.0 - 16.0 g/dL    Hematocrit 40.5 37.0 - 47.0 %    MCV 88.0 81.4 - 97.8 fL    MCH 29.3 27.0 - 33.0 pg    MCHC 33.3 (L) 33.6 - 35.0 g/dL    RDW 45.4 35.9 - 50.0 fL    Platelet Count 405 164 - 446 K/uL    MPV 9.9 9.0 - 12.9 fL    Neutrophils-Polys 72.40 (H) 44.00 - 72.00 %    Lymphocytes 17.60 (L) 22.00 - 41.00 %    Monocytes 8.10 0.00  - 13.40 %    Eosinophils 0.90 0.00 - 6.90 %    Basophils 0.40 0.00 - 1.80 %    Immature Granulocytes 0.60 0.00 - 0.90 %    Nucleated RBC 0.00 /100 WBC    Neutrophils (Absolute) 12.32 (H) 2.00 - 7.15 K/uL    Lymphs (Absolute) 2.99 1.00 - 4.80 K/uL    Monos (Absolute) 1.37 (H) 0.00 - 0.85 K/uL    Eos (Absolute) 0.15 0.00 - 0.51 K/uL    Baso (Absolute) 0.07 0.00 - 0.12 K/uL    Immature Granulocytes (abs) 0.10 0.00 - 0.11 K/uL    NRBC (Absolute) 0.00 K/uL   COMP METABOLIC PANEL   Result Value Ref Range    Sodium 141 135 - 145 mmol/L    Potassium 4.1 3.6 - 5.5 mmol/L    Chloride 102 96 - 112 mmol/L    Co2 25 20 - 33 mmol/L    Anion Gap 14.0 7.0 - 16.0    Glucose 100 (H) 65 - 99 mg/dL    Bun 5 (L) 8 - 22 mg/dL    Creatinine 0.53 0.50 - 1.40 mg/dL    Calcium 9.0 8.5 - 10.5 mg/dL    AST(SGOT) 14 12 - 45 U/L    ALT(SGPT) 17 2 - 50 U/L    Alkaline Phosphatase 101 (H) 30 - 99 U/L    Total Bilirubin <0.2 0.1 - 1.5 mg/dL    Albumin 4.1 3.2 - 4.9 g/dL    Total Protein 7.1 6.0 - 8.2 g/dL    Globulin 3.0 1.9 - 3.5 g/dL    A-G Ratio 1.4 g/dL   LIPASE   Result Value Ref Range    Lipase 12 11 - 82 U/L   DIAGNOSTIC ALCOHOL   Result Value Ref Range    Diagnostic Alcohol <10.1 <10.1 mg/dL   TSH WITH REFLEX TO FT4   Result Value Ref Range    TSH 3.690 0.380 - 5.330 uIU/mL   BETA-HCG QUALITATIVE SERUM   Result Value Ref Range    Beta-Hcg Qualitative Serum Negative Negative   T.PALLIDUM AB DES (SCREENING)   Result Value Ref Range    Syphilis, Treponemal Qual Non-Reactive Non-Reactive   ESTIMATED GFR   Result Value Ref Range    GFR (CKD-EPI) 120 >60 mL/min/1.73 m 2   CORRECTED CALCIUM   Result Value Ref Range    Correct Calcium 8.9 8.5 - 10.5 mg/dL        RADIOLOGY  I have independently interpreted the diagnostic imaging associated with this visit and am waiting the final reading from the radiologist.     No orders to display          COURSE & MEDICAL DECISION MAKING  Nursing notes, VS, PMSFHx reviewed in chart.     Review of past medical  records shows the patient has had multiple past ED visits for similar with her last visit on May 2, 2023 for psychosis.  She was last seen at [place] ED on December 6, 2022 for acute psychosis.  CT head May 2, 2023 without acute findings.      Differential diagnoses considered include but are not limited to: Anxiety, depression, psychosis/schizophrenia, personality disorder, intoxication/overdose, electrolyte abnormality, thyroid disorder, endocrine dysfunction       ED Observation Status? Yes; I am placing the patient in to an observation status due to a diagnostic uncertainty as well as therapeutic intensity. Patient placed in observation status at 2:50 AM, 5/9/2023.     Observation plan is as follows: We will obtain laboratory studies and monitor patient in the ED.    Upon Reevaluation, the patient's condition has: Improved; and will be discharged.    Patient discharged from ED Observation status at 0830 on May 9, 2023.      INITIAL ASSESSMENT AND PLAN  Care Narrative: This is a 38-year-old female patient with history of schizoaffective disorder and drug use who was brought in by EMS for reported hallucinations.  She has pressured and random speech and thought patterns but redirectable and cooperative with exam.  She denies suicidal or homicidal ideations.  Rest of her exam is benign.  We will obtain laboratory studies and closely monitor patient in the ED.      Discussion of management with other HP or appropriate source(s): Social Work   and Behavioral Health        Escalation of care considered, and ultimately not performed: acute inpatient care management, however at this time, the patient is most appropriate for outpatient management.     Barriers to care at this time, including but not limited to: Patient does not have established PCP.     Decision tools and prescription drugs considered including, but not limited to: Medication modification   .       History and physical exam as above.  Patient  well-known to this ED with many past ED visits for similar presentation.  Patient with history of schizoaffective disorder and drug use who apparently was discharged from [facility] yesterday and presents today with delusional thought pattern and random speech but cooperative when redirected.  Initial exam is otherwise benign.  She denies suicidal or homicidal ideations.  Laboratory testing fairly unremarkable and unrevealing.  Patient with frequent similar leukocytosis on previous results and likely due to stress reaction.  Patient did not give urine specimen for UDS.  She was given a dose of Thorazine in the ED and slept for most of her stay.  On recheck this morning, she is alert, ambulating without difficulty in no acute distress and nontoxic in appearance and without evidence for acute medical issues.  She continues to deny suicidal or homicidal ideations.  No indications for legal hold.  She was advised not to use drugs.  She will be given transportation to [facility].  Return to ED precautions were given.  Patient also noted to have elevated blood pressure readings for which she can follow-up on outpatient basis for further management.  She verbalized understanding and agreed with plan of care with no further questions or concerns.      The patient is referred to a primary physician for blood pressure management, diabetic screening, and for all other preventative health concerns.       FINAL IMPRESSION  1. Psychosis, unspecified psychosis type (HCC) Acute   2. History of drug use Active          DISPOSITION  Patient will be discharged home in stable condition.       FOLLOW UP  [facility]  [address] [facility] [address]  [phone]  Call today      [facility], Emergency Dept  [address] [facility] [address] [phone]    If symptoms worsen         OUTPATIENT MEDICATIONS  There are no discharge medications for this patient.         Electronically signed  by: Benoit Riddle D.O., 5/9/2023 2:50 AM      Portions of this record were made with voice recognition software.  Despite my review, spelling/grammar/context errors may still remain.  Interpretation of this chart should be taken in this context.

## 2023-05-09 NOTE — ED TRIAGE NOTES
Chief Complaint   Patient presents with    Hallucinations     BIB EMS for hallucinations. Pt is speaking in random words, that don't make sense. Is clear enough to refuse blood draw, and full set of vitals.

## 2023-05-09 NOTE — ED NOTES
Report received from NI Rodrigues.   Patient sleeping on gurney, NAD.   Will continue to monitor.

## 2023-05-10 ENCOUNTER — HOSPITAL ENCOUNTER (EMERGENCY)
Facility: MEDICAL CENTER | Age: 38
End: 2023-05-11
Attending: EMERGENCY MEDICINE
Payer: MEDICAID

## 2023-05-10 DIAGNOSIS — F20.3 UNDIFFERENTIATED SCHIZOPHRENIA (HCC): ICD-10-CM

## 2023-05-10 PROCEDURE — 99284 EMERGENCY DEPT VISIT MOD MDM: CPT

## 2023-05-10 ASSESSMENT — FIBROSIS 4 INDEX: FIB4 SCORE: 0.34

## 2023-05-11 VITALS
BODY MASS INDEX: 24.62 KG/M2 | DIASTOLIC BLOOD PRESSURE: 74 MMHG | RESPIRATION RATE: 16 BRPM | TEMPERATURE: 97.4 F | OXYGEN SATURATION: 98 % | WEIGHT: 171.96 LBS | SYSTOLIC BLOOD PRESSURE: 124 MMHG | HEART RATE: 85 BPM | HEIGHT: 70 IN

## 2023-05-11 PROCEDURE — 700102 HCHG RX REV CODE 250 W/ 637 OVERRIDE(OP): Mod: UD | Performed by: EMERGENCY MEDICINE

## 2023-05-11 PROCEDURE — A9270 NON-COVERED ITEM OR SERVICE: HCPCS | Mod: UD | Performed by: EMERGENCY MEDICINE

## 2023-05-11 RX ORDER — CHLORPROMAZINE HYDROCHLORIDE 50 MG/1
50 TABLET, FILM COATED ORAL 3 TIMES DAILY PRN
Status: COMPLETED | OUTPATIENT
Start: 2023-05-11 | End: 2023-05-11

## 2023-05-11 RX ORDER — RISPERIDONE 2 MG/1
2 TABLET ORAL 2 TIMES DAILY
Status: SHIPPED | COMMUNITY
End: 2023-08-10 | Stop reason: SDUPTHER

## 2023-05-11 RX ORDER — DIVALPROEX SODIUM 500 MG/1
1000 TABLET, EXTENDED RELEASE ORAL DAILY
Status: DISCONTINUED | OUTPATIENT
Start: 2023-05-11 | End: 2023-05-11 | Stop reason: HOSPADM

## 2023-05-11 RX ORDER — RISPERIDONE 2 MG/1
2 TABLET ORAL 2 TIMES DAILY
Status: DISCONTINUED | OUTPATIENT
Start: 2023-05-11 | End: 2023-05-11 | Stop reason: HOSPADM

## 2023-05-11 RX ORDER — DIVALPROEX SODIUM 500 MG/1
1000 TABLET, EXTENDED RELEASE ORAL DAILY
Status: SHIPPED | COMMUNITY
End: 2024-02-06

## 2023-05-11 RX ADMIN — DIVALPROEX SODIUM 1000 MG: 500 TABLET, EXTENDED RELEASE ORAL at 12:02

## 2023-05-11 RX ADMIN — RISPERIDONE 2 MG: 2 TABLET ORAL at 12:02

## 2023-05-11 RX ADMIN — CHLORPROMAZINE HYDROCHLORIDE 50 MG: 50 TABLET, FILM COATED ORAL at 03:47

## 2023-05-11 NOTE — ED NOTES
Patient's home medications have been reviewed by the pharmacy team.     Past Medical History:   Diagnosis Date    Hypertension     Psychiatric disorder     Schizophrenia    Schizophrenia (HCC)        Patient's Medications   New Prescriptions    No medications on file   Previous Medications    DIVALPROEX ER (DEPAKOTE ER) 500 MG TABLET SR 24 HR    Take 1,000 mg by mouth every day.    RISPERIDONE (RISPERDAL) 2 MG TAB    Take 2 mg by mouth 2 times a day.   Modified Medications    No medications on file   Discontinued Medications    BUPROPION HCL (WELLBUTRIN PO)    Take  by mouth.    CEFDINIR (OMNICEF) 300 MG CAP    Take 1 Capsule by mouth 2 times a day.    CHLORPROMAZINE (THORAZINE) 200 MG TABLET    Take 600 mg by mouth 3 times a day.    HALOPERIDOL DECANOATE (HALDOL DECANOATE) 100 MG/ML INJECTION    Inject 100 mg into the shoulder, thigh, or buttocks every 30 days.    LEVOTHYROXINE (SYNTHROID) 100 MCG TAB    Take 100 mcg by mouth every morning.    ONDANSETRON (ZOFRAN ODT) 4 MG TABLET DISPERSIBLE    Take 1 Tablet by mouth every 8 hours as needed for Nausea/Vomiting.    RISPERIDONE ER (PERSERIS) 120 MG PREFILLED SYRINGE    Inject 120 mg under the skin every 30 (thirty) days.          A:  Medications do not appear to be contributing to current complaints. Spoke with [facility] [phone] regarding if the patient received Risperidone ER injection while at their facility. Has NOT received Risperidone ER injection or haloperidol decanoate injection at their facility.      From [facility] pharmacy - patient received 15 day supply of Depakote and Risperdal PO on 4/6/23.       P:    Patient has been restarted on Depakote and Risperdal PO doses filled by pharmacy.         Cari West, PharmD

## 2023-05-11 NOTE — ED NOTES
Assumed bedside report and patient care from NI Rhodes. Patient is resting comfortably in bed with no signs of labored breathing or restlessness.Safety measures in place, will continue to monitor.

## 2023-05-11 NOTE — DISCHARGE INSTRUCTIONS
Follow instructions of [facility].  Follow-up with your doctor.  Return for any new medical problems or complaints.  Do not have a doctor follow-up [facility].  Return for any new medical problems or complaints.

## 2023-05-11 NOTE — ED NOTES
Patient remains sleeping, comfortable on gurney, no identifiable needs at this time. Equal chest rise and fall bilaterally. Pending psych eval. Safety measures in place, call light within reach.

## 2023-05-11 NOTE — ED NOTES
"Patient refused to do breathalyzer stated \"I don't wanna blow that fucking thing in my mouth\"   "

## 2023-05-11 NOTE — ED NOTES
Patient bedside report given to NI Ortiz . Pt AAO X 4 , respirations even and unlabored, on room air .

## 2023-05-11 NOTE — ED NOTES
Med rec complete per [facility] [phone].   I left message with [facility] RN line to Receive full list of medications. Per  this could take up to 48 hours. I told her of urgency to get list to help get Pt's medications on board.   Per  , Pt was last seen [address] on May 4 th.  Per Pharmacy, she last filled Depakote and Risperidone on April 6th for 15 day supply.  Unable to verify allergies .  [facility] [phone]

## 2023-05-11 NOTE — ED PROVIDER NOTES
ED Provider Note    CHIEF COMPLAINT  Chief Complaint   Patient presents with    Psych Eval       EXTERNAL RECORDS REVIEWED  Other reviewed her prior records when she presented with similar symptoms as she does have a known history of psychosis.  She also had a CT scan of her head on May 2, 2023 that showed no acute abnormalities.  Of note the patient's urine drug screen was negative on 2 May.    HPI/ROS  LIMITATION TO HISTORY   Select: Second to the patient's underlying psychosis she is a poor historian      Tierney Mayer is a 38 y.o. female who presents via EMS after the patient was found in the street acting inappropriately.  She does have flight of ideas as incongruent thoughts.  She states she does have a known history of psychosis and she states she supposed to be taking Depakote and Wellbutrin.  She states she has been compliant with the Wellbutrin.  She states that she is from [place] and has been in [place] unable to get back differently.  She was evaluated here in the emergency department on the ninth and was discharged from behavioral health the day prior.  She denies alcohol and drug abuse.  She does not have any pain complaints nor she had any recent infections.  However she is a poor historian due to her underlying psychosis.  She does deny suicidal and homicidal ideation.    PAST MEDICAL HISTORY   has a past medical history of Hypertension, Psychiatric disorder, and Schizophrenia (HCC).    SURGICAL HISTORY   has a past surgical history that includes pelviscopy (2/22/2011) and incision and drainage general (2/22/2011).    FAMILY HISTORY  No family history on file.    SOCIAL HISTORY  Social History     Tobacco Use    Smoking status: Every Day     Packs/day: 1.00     Types: Cigarettes    Smokeless tobacco: Never    Tobacco comments:     knows she is suppose to quit but hasn't   Vaping Use    Vaping Use: Never used   Substance and Sexual Activity    Alcohol use: Not Currently    Drug use: Not  "Currently     Types: Inhaled, Oral     Comment: Marijuana, meth    Sexual activity: Not on file       CURRENT MEDICATIONS  Home Medications    **Home medications have not yet been reviewed for this encounter**         ALLERGIES  Allergies   Allergen Reactions    Clonazepam Unspecified     Pt reports getting a stroke.  Per historical: Twitches and paranoia.    Paliperidone Unspecified     Twitches and paranoia .    Invega Sustenna [Paliperidone Palmitate]     Olanzapine      Pt reports worsening and threatening voices.       PHYSICAL EXAM  VITAL SIGNS: /86   Pulse (!) 101   Temp 36.9 °C (98.4 °F) (Temporal)   Resp 18   Ht 1.768 m (5' 9.6\")   Wt 78 kg (171 lb 15.3 oz)   LMP  (LMP Unknown)   BMI 24.96 kg/m²    In general the patient is unkempt and slightly anxious    HEENT unremarkable    Pulmonary chest clear to auscultation bilaterally    Cardiovascular S1-S2 with a slightly tachycardic rate    GI abdomen soft    Skin the patient does have a slight sunburn throughout her extremities    Extremities atraumatic    Neurologic examination is grossly intact    Psychiatric exam the patient does have flight of ideas as well as incongruent thoughts.      COURSE & MEDICAL DECISION MAKING    ED Observation Status? Yes; I am placing the patient in to an observation status due to a diagnostic uncertainty as well as therapeutic intensity. Patient placed in observation status at 10:19 PM, 5/10/2023.     Observation plan is as follows: The patient presents with psychosis.  In reviewing her records it sounds like she may be at her baseline.  We will attempt to find a therapeutic regimen that may be effective for the patient and the patient will be evaluated by life skills.  I will also order a drug screen.  The patient be admitted to ED observation pending response to therapeutics as well as life skills evaluation.    0110 I spoke with the alert team regarding the patient.  They state that they are very familiar with the " patient.  They are unclear with what the patient is supposed to be taking.  I did speak with pharmacy and it appears she supposed to be on Seroquel as well as Risperdal.  The patient will receive a dose of Seroquel and we will continue to keep the patient admitted to the emergency room under observation status to see if she clears.      FINAL DIAGNOSIS  Psychosis       Electronically signed by: David Perez M.D., 5/10/2023 10:13 PM    This is an addendum as the patient does not take Seroquel and Risperdal as we are looking at the med rec of a different patient.  The patient has been on Thorazine and Haldol in the past and therefore she will receive Thorazine and will be observed.

## 2023-05-11 NOTE — ED NOTES
Alert team at bedside. Pt yelling at alert team RN.   Awaiting Med Rec to begin regular medications.

## 2023-05-11 NOTE — ED NOTES
Patient remains comfortable on gurney, no identifiable needs at this time. Equal chest rise and fall bilaterally. Safety measures in place, will continue to monitor.

## 2023-05-11 NOTE — ED NOTES
" from [facility] at bedside to provide pt with resources. Pt refused resources and reports \"I will go wherever life takes me\".   "

## 2023-05-11 NOTE — ED TRIAGE NOTES
"Chief Complaint   Patient presents with    Psych Eval     BIB EMS to [place] [address] pickup from street, as per EMS they were called by a by-stander because patient was standing in the middle of road with poor safety awareness,pt is poor historian unable to give any further history.    FSBG by   /86   Pulse (!) 101   Temp 36.9 °C (98.4 °F) (Temporal)   Resp 18   Ht 1.768 m (5' 9.6\")   Wt 78 kg (171 lb 15.3 oz)   LMP  (LMP Unknown)   BMI 24.96 kg/m²   "

## 2023-05-11 NOTE — ED NOTES
Pt shouting in hallway, stating inappropriate things. Pt redirected. ERP notified.   Pt medicated per MAR.

## 2023-05-11 NOTE — ED NOTES
ERP at bedside. Home medications ordered. Pt able to ambulate to the restroom with a steady gait. Pt states she was unable to urinate in urine sample cup.

## 2023-05-11 NOTE — DISCHARGE SUMMARY
"  ED Observation Discharge Summary    Patient:Tierney Mayer  Patient : 1985  Patient MRN: [id number]  Patient PCP: Pcp Pt States None    Admit Date: 5/10/2023  Discharge Date and Time: 23 12:08 PM  Discharge Diagnosis:   1. Undifferentiated schizophrenia (HCC)            Discharge Attending: Saul Muñiz M.D.  Discharge Service: ED Observation    ED Course  Tierney is a 38 y.o. female who was evaluated at [facility] and signed out to me as stable pending psychiatric evaluation.  The patient has a well-known history of psychiatric disease.  She is seen this morning after medication  She is much more awake and alert.  She denies any medical problems or complaints.  She states she feels okay.  She was [facility] by by [facility] and is confirmed to be at her baseline.  She has a  who will come get her.  We have arranged to ensure she has an appropriate place to go and she has a supply of her medications.  She was here couple days ago and had a UTI she did receive antibiotics.  She had urine culture obtained from the time of the abnormal urinalysis which was negative.  At this point she seems stable for discharge has no other complaints and she has been cleared by behavioral health.    Discharge Exam:  /74   Pulse 88   Temp 36.6 °C (97.8 °F) (Temporal)   Resp 16   Ht 1.768 m (5' 9.6\")   Wt 78 kg (171 lb 15.3 oz)   LMP  (LMP Unknown)   SpO2 98%   BMI 24.96 kg/m² .    Constitutional: Awake and alert. Nontoxic  HENT:  Grossly normal  Eyes: Grossly normal  Neck: Normal range of motion  Cardiovascular: Normal heart rate   Thorax & Lungs: No respiratory distress  Abdomen: Nontender  Skin:  No pathologic rash.   Extremities: Well perfused      Labs  Results for orders placed or performed during the hospital encounter of 23   CBC WITH DIFFERENTIAL   Result Value Ref Range    WBC 17.0 (H) 4.8 - 10.8 K/uL    RBC 4.60 4.20 - 5.40 M/uL    Hemoglobin 13.5 12.0 - 16.0 g/dL    Hematocrit " 40.5 37.0 - 47.0 %    MCV 88.0 81.4 - 97.8 fL    MCH 29.3 27.0 - 33.0 pg    MCHC 33.3 (L) 33.6 - 35.0 g/dL    RDW 45.4 35.9 - 50.0 fL    Platelet Count 405 164 - 446 K/uL    MPV 9.9 9.0 - 12.9 fL    Neutrophils-Polys 72.40 (H) 44.00 - 72.00 %    Lymphocytes 17.60 (L) 22.00 - 41.00 %    Monocytes 8.10 0.00 - 13.40 %    Eosinophils 0.90 0.00 - 6.90 %    Basophils 0.40 0.00 - 1.80 %    Immature Granulocytes 0.60 0.00 - 0.90 %    Nucleated RBC 0.00 /100 WBC    Neutrophils (Absolute) 12.32 (H) 2.00 - 7.15 K/uL    Lymphs (Absolute) 2.99 1.00 - 4.80 K/uL    Monos (Absolute) 1.37 (H) 0.00 - 0.85 K/uL    Eos (Absolute) 0.15 0.00 - 0.51 K/uL    Baso (Absolute) 0.07 0.00 - 0.12 K/uL    Immature Granulocytes (abs) 0.10 0.00 - 0.11 K/uL    NRBC (Absolute) 0.00 K/uL   COMP METABOLIC PANEL   Result Value Ref Range    Sodium 141 135 - 145 mmol/L    Potassium 4.1 3.6 - 5.5 mmol/L    Chloride 102 96 - 112 mmol/L    Co2 25 20 - 33 mmol/L    Anion Gap 14.0 7.0 - 16.0    Glucose 100 (H) 65 - 99 mg/dL    Bun 5 (L) 8 - 22 mg/dL    Creatinine 0.53 0.50 - 1.40 mg/dL    Calcium 9.0 8.5 - 10.5 mg/dL    AST(SGOT) 14 12 - 45 U/L    ALT(SGPT) 17 2 - 50 U/L    Alkaline Phosphatase 101 (H) 30 - 99 U/L    Total Bilirubin <0.2 0.1 - 1.5 mg/dL    Albumin 4.1 3.2 - 4.9 g/dL    Total Protein 7.1 6.0 - 8.2 g/dL    Globulin 3.0 1.9 - 3.5 g/dL    A-G Ratio 1.4 g/dL   LIPASE   Result Value Ref Range    Lipase 12 11 - 82 U/L   DIAGNOSTIC ALCOHOL   Result Value Ref Range    Diagnostic Alcohol <10.1 <10.1 mg/dL   TSH WITH REFLEX TO FT4   Result Value Ref Range    TSH 3.690 0.380 - 5.330 uIU/mL   BETA-HCG QUALITATIVE SERUM   Result Value Ref Range    Beta-Hcg Qualitative Serum Negative Negative   T.PALLIDUM AB DES (SCREENING)   Result Value Ref Range    Syphilis, Treponemal Qual Non-Reactive Non-Reactive   ESTIMATED GFR   Result Value Ref Range    GFR (CKD-EPI) 120 >60 mL/min/1.73 m 2   CORRECTED CALCIUM   Result Value Ref Range    Correct Calcium 8.9 8.5 -  10.5 mg/dL       Radiology  No orders to display       Medications:   New Prescriptions    No medications on file       My final assessment includes   1. Undifferentiated schizophrenia (HCC)            Upon Reevaluation, the patient's condition has: Improved; and will be discharged.    Patient discharged from ED Observation status at 1222 (Time) 5/11 (Date).     Total time spent on this ED Observation discharge encounter is < 30 Minutes    Electronically signed by: Saul Muñiz M.D., 5/11/2023 12:08 PM
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
## 2023-05-15 ENCOUNTER — HOSPITAL ENCOUNTER (EMERGENCY)
Facility: MEDICAL CENTER | Age: 38
End: 2023-05-15
Attending: EMERGENCY MEDICINE
Payer: MEDICAID

## 2023-05-15 VITALS
DIASTOLIC BLOOD PRESSURE: 68 MMHG | OXYGEN SATURATION: 96 % | TEMPERATURE: 97.3 F | HEIGHT: 65 IN | HEART RATE: 84 BPM | BODY MASS INDEX: 32.91 KG/M2 | SYSTOLIC BLOOD PRESSURE: 137 MMHG | WEIGHT: 197.53 LBS | RESPIRATION RATE: 18 BRPM

## 2023-05-15 VITALS
BODY MASS INDEX: 28.94 KG/M2 | TEMPERATURE: 98.2 F | OXYGEN SATURATION: 96 % | DIASTOLIC BLOOD PRESSURE: 70 MMHG | WEIGHT: 173.72 LBS | HEIGHT: 65 IN | HEART RATE: 90 BPM | RESPIRATION RATE: 17 BRPM | SYSTOLIC BLOOD PRESSURE: 123 MMHG

## 2023-05-15 DIAGNOSIS — Z91.148 NON COMPLIANCE W MEDICATION REGIMEN: ICD-10-CM

## 2023-05-15 DIAGNOSIS — F20.9 SCHIZOPHRENIA, UNSPECIFIED TYPE (HCC): ICD-10-CM

## 2023-05-15 PROCEDURE — 700102 HCHG RX REV CODE 250 W/ 637 OVERRIDE(OP): Performed by: EMERGENCY MEDICINE

## 2023-05-15 PROCEDURE — 302970 POC BREATHALIZER: Performed by: EMERGENCY MEDICINE

## 2023-05-15 PROCEDURE — 99283 EMERGENCY DEPT VISIT LOW MDM: CPT | Mod: 27

## 2023-05-15 PROCEDURE — 99284 EMERGENCY DEPT VISIT MOD MDM: CPT

## 2023-05-15 PROCEDURE — A9270 NON-COVERED ITEM OR SERVICE: HCPCS | Performed by: EMERGENCY MEDICINE

## 2023-05-15 RX ORDER — RISPERIDONE 2 MG/1
2 TABLET ORAL 2 TIMES DAILY
Status: SHIPPED | COMMUNITY
End: 2023-05-15

## 2023-05-15 RX ORDER — DIVALPROEX SODIUM 500 MG/1
1000 TABLET, EXTENDED RELEASE ORAL DAILY
Status: SHIPPED | COMMUNITY
End: 2023-05-15 | Stop reason: SDUPTHER

## 2023-05-15 RX ORDER — RISPERIDONE 2 MG/1
2 TABLET ORAL 2 TIMES DAILY
Qty: 60 TABLET | Refills: 0 | Status: SHIPPED | OUTPATIENT
Start: 2023-05-15 | End: 2023-06-14

## 2023-05-15 RX ORDER — DIVALPROEX SODIUM 500 MG/1
1000 TABLET, DELAYED RELEASE ORAL ONCE
Status: COMPLETED | OUTPATIENT
Start: 2023-05-15 | End: 2023-05-15

## 2023-05-15 RX ORDER — DIVALPROEX SODIUM 500 MG/1
1000 TABLET, EXTENDED RELEASE ORAL DAILY
Qty: 60 TABLET | Refills: 0 | Status: SHIPPED | OUTPATIENT
Start: 2023-05-15 | End: 2023-06-14

## 2023-05-15 RX ORDER — RISPERIDONE 2 MG/1
2 TABLET ORAL ONCE
Status: COMPLETED | OUTPATIENT
Start: 2023-05-15 | End: 2023-05-15

## 2023-05-15 RX ADMIN — DIVALPROEX SODIUM 1000 MG: 500 TABLET, DELAYED RELEASE ORAL at 09:17

## 2023-05-15 RX ADMIN — RISPERIDONE 2 MG: 2 TABLET ORAL at 09:17

## 2023-05-15 ASSESSMENT — FIBROSIS 4 INDEX: FIB4 SCORE: 0.34

## 2023-05-15 NOTE — ED NOTES
BS report received from Rox DAN.  Pt sleeping with visible rise and fall of chest. NAD. Will continue to monitor

## 2023-05-15 NOTE — ED PROVIDER NOTES
"ED Provider Note    CHIEF COMPLAINT  Chief Complaint   Patient presents with    Psych Eval     Patient states she is here for a \"food overdose\". Reports eating too much human meat 23 days ago. Also complains of vaginal pain.        EXTERNAL RECORDS REVIEWED  multiple emergency department encounters for psychosis     HPI/ROS  LIMITATION TO HISTORY-behavior    Tierney Mayer is a 38 y.o. female who presents to the emergency department because she lost her home medications.  She told the nurse that she had been eating too much meat.  She complained of vaginal pain but denies this to me.  She says she feels fine and is just very tired.  She says she has holes in her pockets and when she put her pills in her pockets she lost them so she has not been taking her psychiatric medications.      PAST MEDICAL HISTORY   Schizophrenia    SURGICAL HISTORY  patient denies any surgical history    FAMILY HISTORY  No family history on file.    SOCIAL HISTORY  Denies drugs or alcohol      CURRENT MEDICATIONS  Home Medications       Reviewed by Jenn Bull (Pharmacy Tech) on 05/15/23 at 0855  Med List Status: Complete     Medication Last Dose Status   divalproex ER (DEPAKOTE ER) 500 MG TABLET SR 24 HR UNK Active   risperiDONE (RISPERDAL) 2 MG Tab UNK Active                    ALLERGIES  Not on File    PHYSICAL EXAM  VITAL SIGNS: BP (!) 163/99   Pulse 102   Temp 36.1 °C (97 °F) (Temporal)   Resp 18   Ht 1.651 m (5' 5\")   Wt 89.6 kg (197 lb 8.5 oz)   SpO2 99%   BMI 32.87 kg/m²    Constitutional: Awake and alert.  Poorly kept.  Nontoxic  HENT: Normal inspection  Eyes: Normal inspection  Neck: Grossly normal range of motion.  Cardiovascular: Normal heart rate  Thorax & Lungs: No respiratory distress  Extremities: Well perfused  Neurologic: Grossly normal   Psychiatric: Denies SI or HI.  Denies hallucination.  Is not a good historian      DIAGNOSTIC STUDIES / PROCEDURES      COURSE & MEDICAL DECISION " MAKING      INITIAL ASSESSMENT, COURSE AND PLAN  Care Narrative: Patient presents to the ER for her psychiatric medications.  She is not suicidal.  She says she is not hallucinating, but her behavior is rather bizarre.  She is frequently in the emergency department.  She appears to be at her baseline compared to prior visits.  She says that she can go to the shelter.  She appears capable of caring for self and there is no indication for legal hold.  She has no medical complaints and declines medical evaluation otherwise.  I will give her her dose of Risperdal and Depakote in the emergency department and I have written a prescription for these meds.  They were sent to the pharmacy.  Patient is to return to the ER for any medical complaints or concerns.    DISPOSITION AND DISCUSSIONS    Discussion of management with other [facility] or appropriate source(s): Pharmacy tech for medication reconciliation    Escalation of care considered, and ultimately not performed: Considered blood work and psychiatry consultation, but this does not appear to be indicated    Barriers to care at this time, including but not limited to: Patient is homeless.     Decision tools and prescription drugs considered including, but not limited to: Prescribed Risperdal and Depakote as previously given    FINAL DIAGNOSIS  1. Schizophrenia, unspecified type (HCC)           Electronically signed by: Arnold Arita M.D., 5/15/2023 10:42 AM
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
## 2023-05-16 NOTE — ED PROVIDER NOTES
"ED Provider Note    CHIEF COMPLAINT  Chief Complaint   Patient presents with    Psych Eval     PAtient reports she was seen in the ER several times and needs to go to [facility]. Patient asks, \" Why do you ask me these questions? I need an eye exam and a trip to [facility].\"        EXTERNAL RECORDS REVIEWED  Inpatient Notes multiple ER notes are noted.  ER notes from this month which include 5 prior today are also reviewed    HPI/ROS  LIMITATION TO HISTORY   Select: Behavior  OUTSIDE HISTORIAN(S):  None    Tierneyyesenia Mayer is a 38 y.o. female who presents to the emerged part with primary request of returning to [facility].  States that she was recently there.  Has been noncompliant with her medications.  Continues to believe that she is decompensated from her schizophrenia and other psychiatric illnesses.  Denies any suicidal homicidal ideations.  Has been living at the [facility].  Denies any drugs or alcohol.    PAST MEDICAL HISTORY   has a past medical history of Hypertension, Psychiatric disorder, and Schizophrenia (HCC).    SURGICAL HISTORY   has a past surgical history that includes pelviscopy (2/22/2011) and incision and drainage general (2/22/2011).    FAMILY HISTORY  No family history on file.    SOCIAL HISTORY  Social History     Tobacco Use    Smoking status: Every Day     Packs/day: 1.00     Types: Cigarettes    Smokeless tobacco: Never    Tobacco comments:     knows she is suppose to quit but hasn't   Vaping Use    Vaping Use: Never used   Substance and Sexual Activity    Alcohol use: Not Currently    Drug use: Not Currently     Types: Inhaled, Oral     Comment: Marijuana, meth    Sexual activity: Not on file       CURRENT MEDICATIONS  Home Medications    **Home medications have not yet been reviewed for this encounter**         ALLERGIES  Allergies   Allergen Reactions    Clonazepam Unspecified     Pt reports getting a stroke.  Per historical: Twitches and paranoia.    " "Paliperidone Unspecified     Twitches and paranoia .    Invega Sustenna [Paliperidone Palmitate]     Olanzapine      Pt reports worsening and threatening voices.       PHYSICAL EXAM  VITAL SIGNS: /70   Pulse 90   Temp 36.8 °C (98.2 °F) (Temporal)   Resp 17   Ht 1.651 m (5' 5\")   Wt 78.8 kg (173 lb 11.6 oz)   LMP  (LMP Unknown)   SpO2 96%   BMI 28.91 kg/m²      Pulse ox interpretation: I interpret this pulse ox as normal.  Constitutional: Alert in no apparent distress.  HENT: No signs of trauma, Bilateral external ears normal, Nose normal.   Eyes: Pupils are equal and reactive  Neck: Normal range of motion, No tenderness, Supple  Cardiovascular: Regular rate and rhythm, no murmurs.   Thorax & Lungs: Normal breath sounds, No respiratory distress, No wheezing, No chest tenderness.   Abdomen: Bowel sounds normal, Soft, No tenderness  Skin: Warm, Dry, No erythema, No rash.   Extremities: Intact distal pulses  Musculoskeletal: Good range of motion in all major joints. No tenderness to palpation or major deformities noted.   Neurologic: Alert , Normal motor function, Normal sensory function, No focal deficits noted.   Psychiatric: Psychiatric affect, judgment normal, Mood normal.  No SI HI.        COURSE & MEDICAL DECISION MAKING    ED Observation Status? Yes; I am placing the patient in to an observation status due to a diagnostic uncertainty as well as therapeutic intensity. Patient placed in observation status at 10:10 PM, 5/15/2023.     Observation plan is as follows: We will continue with psychiatric work-up    Patient has been seen by psychiatric services.  We will get Voucher to be brought to [facility] for further intake there.    Upon Reevaluation, the patient's condition has: Improved; and will be discharged.    Patient discharged from ED Observation status at 2330 (Time) 5/15/2023 (Date).     INITIAL ASSESSMENT, COURSE AND PLAN  Care Narrative: 38-year-old presenting to the " emerged part with the above presentation.  I will consult with behavioral health services for further evaluation and the possibility of getting the patient to [facility] as she is currently requesting.  Patient does not currently meet criteria for legal hold per my perspective.    DISPOSITION AND DISCUSSIONS  I have discussed management of the patient with the following physicians and PIEDAD's: None    Discussion of management with other [facility] or appropriate source(s): Behavioral Health for further psychiatric evaluation and care planning      Escalation of care considered, and ultimately not performed:blood analysis, diagnostic imaging, and acute inpatient care management, however at this time, the patient is most appropriate for outpatient management    Barriers to care at this time, including but not limited to: Patient does not have established PCP.     38-year-old female presents emerged from with the above presentation.  Currently clinically stable although likely slightly decompensated from a psychiatric standpoint.  Again she does not meet criteria for legal hold.  Behavioral health services has kindly consulted and are agreeable that the patient would likely benefit from [facility].  Patient is agreeable this plan the patient will be provided with taxi voucher to establish further care at that facility.  At this time she is discharged from this facility with no other acute emergent needs.      FINAL DIAGNOSIS  1. Schizophrenia, unspecified type (HCC)    2. Non compliance w medication regimen           Electronically signed by: Saul Vargas M.D., 5/15/2023 10:10 PM

## 2023-05-16 NOTE — CONSULTS
Alert Team:    No full consult required. Patient provided with cab voucher # [phone] over to [facility] for intake assessment; patient requesting assistance with transport back over to facility for possible voluntary admission; Denies SI/HI/AVH. Does not meet legal hold criteria. ERP agreeable with plan.

## 2023-05-16 NOTE — ED NOTES
Pt ambulated to [facility] from [facility] with steady gait. Chart up for ERP.

## 2023-05-16 NOTE — ED TRIAGE NOTES
"Chief Complaint   Patient presents with    Psych Eval     PAtient reports she was seen in the ER several times and needs to go to [facility]. Patient asks, \" Why do you ask me these questions? I need an eye exam and a trip to [facility].\"        "

## 2023-05-16 NOTE — ED NOTES
Pt discharged to [facility], cab voucher provided. [facility]. Pt in possession of belongings. Pt provided discharge education and information pertaining to follow up appointments. Pt received copy of discharge instructions   Vitals:    05/15/23 2335   BP: 123/70   Pulse: 90   Resp: 17   Temp: 36.8 °C (98.2 °F)   SpO2: 96%

## 2023-05-16 NOTE — ED NOTES
Security assistance taken to escort patient out to ED lobby , patient aggressive and refused to change.

## 2023-05-16 NOTE — ED NOTES
Patient refused to have POC breathylizer and refused to provide urine specimen, provider made aware.

## 2023-05-23 PROCEDURE — 99285 EMERGENCY DEPT VISIT HI MDM: CPT

## 2023-05-23 PROCEDURE — 96372 THER/PROPH/DIAG INJ SC/IM: CPT

## 2023-05-24 ENCOUNTER — HOSPITAL ENCOUNTER (EMERGENCY)
Facility: MEDICAL CENTER | Age: 38
End: 2023-05-24
Attending: EMERGENCY MEDICINE
Payer: MEDICAID

## 2023-05-24 VITALS
WEIGHT: 173 LBS | SYSTOLIC BLOOD PRESSURE: 158 MMHG | BODY MASS INDEX: 28.82 KG/M2 | RESPIRATION RATE: 14 BRPM | HEIGHT: 65 IN | DIASTOLIC BLOOD PRESSURE: 87 MMHG | HEART RATE: 83 BPM | TEMPERATURE: 97.8 F | OXYGEN SATURATION: 96 %

## 2023-05-24 DIAGNOSIS — Z86.59 HX OF SCHIZOPHRENIA: ICD-10-CM

## 2023-05-24 DIAGNOSIS — F29 PSYCHOSIS, UNSPECIFIED PSYCHOSIS TYPE (HCC): ICD-10-CM

## 2023-05-24 LAB — POC BREATHALIZER: 0 PERCENT (ref 0–0.01)

## 2023-05-24 PROCEDURE — 700111 HCHG RX REV CODE 636 W/ 250 OVERRIDE (IP): Mod: UD | Performed by: EMERGENCY MEDICINE

## 2023-05-24 PROCEDURE — 90791 PSYCH DIAGNOSTIC EVALUATION: CPT

## 2023-05-24 PROCEDURE — 302970 POC BREATHALIZER: Performed by: EMERGENCY MEDICINE

## 2023-05-24 RX ORDER — HALOPERIDOL 5 MG/ML
5 INJECTION INTRAMUSCULAR ONCE
Status: COMPLETED | OUTPATIENT
Start: 2023-05-24 | End: 2023-05-24

## 2023-05-24 RX ADMIN — HALOPERIDOL LACTATE 5 MG: 5 INJECTION, SOLUTION INTRAMUSCULAR at 06:08

## 2023-05-24 ASSESSMENT — FIBROSIS 4 INDEX: FIB4 SCORE: 0.34

## 2023-05-24 NOTE — PROGRESS NOTES
"ED Observation Progress Note    Date of Service: 05/24/23    Interval History and Interventions  Briefly this is a 38-year-old female who came in on a legal hold for psychosis.  Overnight she was having some paranoia and psychosis and was given Haldol.    Physical Exam  BP (!) 119/93   Pulse 80   Temp 36.4 °C (97.5 °F) (Temporal)   Resp 20   Ht 1.651 m (5' 5\")   Wt 78.5 kg (173 lb)   LMP  (LMP Unknown)   SpO2 95%   BMI 28.79 kg/m² .    Constitutional: Awake and alert. Nontoxic  HENT:  Grossly normal  Eyes: Grossly normal  Neck: Normal range of motion  Cardiovascular: Normal heart rate   Thorax & Lungs: No respiratory distress  Abdomen: Nontender  Skin:  No pathologic rash.   Extremities: Well perfused  Psychiatric: Affect normal    Labs  Results for orders placed or performed during the hospital encounter of 05/24/23   POC BREATHALIZER   Result Value Ref Range    POC Breathalizer 0.00 0.00 - 0.01 Percent       Radiology  No orders to display       Problem List  1.  Psychosis-on legal hold pending safe discharge planning    Electronically signed by: Barbie Bhatia M.D., 5/24/2023 7:29 AM          "

## 2023-05-24 NOTE — ED NOTES
Meal tray provided to pt, pt was asked if she needed to use the restroom, pt stated no I'm fine, telesitter at bedside in direct line of sight to the pt, no signs of distress noted, will continue to monitor

## 2023-05-24 NOTE — ED NOTES
Bedside report received from Sparkle DAN, telesitter in room in direct line of sight to the pt, will continue to monitor

## 2023-05-24 NOTE — DISCHARGE SUMMARY
"  ED Observation Discharge Summary    Patient:Tierney Mayer  Patient : 1985  Patient MRN: [id number]  Patient PCP: Pcp Pt States None    Admit Date: 2023  Discharge Date and Time: 23 8:18 AM  Discharge Diagnosis: Psychosis  Discharge Attending: Barbie Bhatia M.D.  Discharge Service: ED Observation    ED Course  Tierney is a 38 y.o. female who was evaluated at [facility] for evaluation of psychosis.  Patient has some psychiatric disorder but had worsening psychosis from baseline.  Therefore she was placed on a legal hold.  I was informed by case management that she has been placed at [facility] and is awaiting transfer there.  They have accepted her for transfer there.  She will be transferred there for higher level psychiatric care.    Discharge Exam:  BP (!) 119/93   Pulse 80   Temp 36.4 °C (97.5 °F) (Temporal)   Resp 20   Ht 1.651 m (5' 5\")   Wt 78.5 kg (173 lb)   LMP  (LMP Unknown)   SpO2 95%   BMI 28.79 kg/m² .    Constitutional: Awake and alert. Nontoxic  HENT:  Grossly normal  Eyes: Grossly normal  Neck: Normal range of motion  Cardiovascular: Normal heart rate   Thorax & Lungs: No respiratory distress  Abdomen: Nontender  Skin:  No pathologic rash.   Extremities: Well perfused  Psychiatric: Affect normal    Labs  Results for orders placed or performed during the hospital encounter of 23   POC BREATHALIZER   Result Value Ref Range    POC Breathalizer 0.00 0.00 - 0.01 Percent       Radiology  No orders to display       Medications:   New Prescriptions    No medications on file       My final assessment includes psychosis  Upon Reevaluation, the patient's condition has: not improved; and will be transferred to inpatient psychiatric hospitalization    Patient discharged from ED Observation status at 8:18 AM on 2023    Total time spent on this ED Observation discharge encounter is < 30 Minutes    Electronically signed by: Barbie JOHNSON" JORJE Bhatia., 5/24/2023 8:18 AM
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
## 2023-05-24 NOTE — DISCHARGE PLANNING
[facility] requests to change transport time to 1230. Notified Daksha MENDOZA and Jhoana JOE to arrange this.

## 2023-05-24 NOTE — DISCHARGE PLANNING
Alert Team:    Referral faxed to [facility] and [facility]

## 2023-05-24 NOTE — ED NOTES
Erp at bedside, telesitter in room in direct line of sight to the pt, no signs of distress noted at this time, will continue to monitor

## 2023-05-24 NOTE — ED NOTES
Pt laying in bed, eyes closed, equal chest rise noted, no signs of distress noted, telesitter in room in direct line of sight to the pt, will continue to monitor

## 2023-05-24 NOTE — ED NOTES
Pt resting in bed, eyes closed, equal chest rise noted, no signs of distress at this time, telesitter at bedside in direct line of sight to the pt, will continue to monitor
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
## 2023-05-24 NOTE — DISCHARGE PLANNING
Legal Hold Transfer     Referral: Legal hold transfer to Mental Health Facility     Intervention: Informed by Alert Team NI Vergara that [facility] has accepted pt.     Pt's accepting physcian is Dr. Tracy     Transport arranged through Marquita at [facility]    Spoke to [facility] with MTM     The pt will be picked up at 1500      Notified Bedside RN Vicente, Alert Team NI Paez, and Dr. Bhatia of the departure time as well as accepting facility.      Transfer packet and COBRA to be created with original legal hold and placed on chart.      Plan: Pt will be transferring to [facility] via [facility] at 1500.

## 2023-05-30 ENCOUNTER — HOSPITAL ENCOUNTER (EMERGENCY)
Facility: MEDICAL CENTER | Age: 38
End: 2023-05-31
Attending: STUDENT IN AN ORGANIZED HEALTH CARE EDUCATION/TRAINING PROGRAM
Payer: MEDICAID

## 2023-05-30 DIAGNOSIS — F23 ACUTE PSYCHOSIS (HCC): ICD-10-CM

## 2023-05-30 DIAGNOSIS — F15.10 METHAMPHETAMINE ABUSE (HCC): ICD-10-CM

## 2023-05-30 PROCEDURE — 99285 EMERGENCY DEPT VISIT HI MDM: CPT

## 2023-05-30 PROCEDURE — 700102 HCHG RX REV CODE 250 W/ 637 OVERRIDE(OP): Mod: UD | Performed by: STUDENT IN AN ORGANIZED HEALTH CARE EDUCATION/TRAINING PROGRAM

## 2023-05-30 PROCEDURE — A9270 NON-COVERED ITEM OR SERVICE: HCPCS | Mod: UD | Performed by: STUDENT IN AN ORGANIZED HEALTH CARE EDUCATION/TRAINING PROGRAM

## 2023-05-30 RX ORDER — LORAZEPAM 1 MG/1
1 TABLET ORAL ONCE
Status: COMPLETED | OUTPATIENT
Start: 2023-05-30 | End: 2023-05-30

## 2023-05-30 RX ORDER — HALOPERIDOL 5 MG/1
5 TABLET ORAL ONCE
Status: COMPLETED | OUTPATIENT
Start: 2023-05-30 | End: 2023-05-30

## 2023-05-30 RX ORDER — IPRATROPIUM BROMIDE AND ALBUTEROL SULFATE 2.5; .5 MG/3ML; MG/3ML
3 SOLUTION RESPIRATORY (INHALATION)
Status: DISCONTINUED | OUTPATIENT
Start: 2023-05-30 | End: 2023-05-30

## 2023-05-30 RX ADMIN — LORAZEPAM 1 MG: 1 TABLET ORAL at 22:16

## 2023-05-30 RX ADMIN — HALOPERIDOL 5 MG: 5 TABLET ORAL at 21:17

## 2023-05-30 ASSESSMENT — FIBROSIS 4 INDEX: FIB4 SCORE: 0.34

## 2023-05-31 VITALS
HEIGHT: 65 IN | RESPIRATION RATE: 16 BRPM | SYSTOLIC BLOOD PRESSURE: 149 MMHG | HEART RATE: 100 BPM | TEMPERATURE: 98 F | DIASTOLIC BLOOD PRESSURE: 88 MMHG | OXYGEN SATURATION: 98 % | BODY MASS INDEX: 28.82 KG/M2 | WEIGHT: 173 LBS

## 2023-05-31 PROCEDURE — 90791 PSYCH DIAGNOSTIC EVALUATION: CPT

## 2023-05-31 NOTE — ED NOTES
.Patient discharged in stable condition per orders.  Wristband removed per protocol. Patient verbalized understanding of all discharge instructions. All belongings accounted for.

## 2023-05-31 NOTE — ED TRIAGE NOTES
.  Chief Complaint   Patient presents with    Other     Pt BIB EMS for abnormal behavior. Per report pt was going through other peoples belongings at the shelter and [employer] was called. Pt A&Ox2.

## 2023-05-31 NOTE — DISCHARGE PLANNING
Alert Team:    This writer aware of pending mental health consult; the patient required sedation upon arrival due to their altered mental status and was medicated per MAR upon arrival with Ativan and Haldol.     Patient admits to methamphetamine use prior to ER arrival and denies SI/HI to ERP. Telemonitor appropriate observation level until further assessed by Alert Team.    Spoke with [facility] and confirmed patient had discharged from 6 days of phychiatric stabilization on 5/30/2023 in the morning. Patient's current presentation is likely substance induced psychosis.     Patient unable to be roused at this time; Alert Team to continue to monitor and will evaluate patient once they are lucid and able to participate in a mental health assessment.     Patient would greatly benefit connecting to [facility] to address on going substance addiction as well as f/u at [facility] for ongoing PMH OP treatment

## 2023-05-31 NOTE — DISCHARGE SUMMARY
"  ED Observation Discharge Summary    Patient:Tierney Mayer  Patient : 1985  Patient MRN: [id number]  Patient PCP: Pcp Pt States None    Admit Date: 2023  Discharge Date and Time: 23 6:12 AM  Discharge Attending: Kristin Saleh D.O.  Discharge Service: ED Observation    ED Course  Tierney is a 38 y.o. female who was evaluated at [facility] initially for psychosis thought to be meth induced.  Recent evaluation at [facility].  Care was signed out to me from the nighttime ERP.  Await metabolism of methamphetamines which she admits to using and evaluation by the alert team.    6:10 AM discussed with the alert team.  They have been able to evaluate this patient.  She is not currently on a legal hold.  She was discharged from [facility] just yesterday and promptly resumed her use of methamphetamines.  It is thought that her psychosis currently, is related to methamphetamines.  She has been allowed to clear here in the ED and she is calm and appropriate.  Plan is to feed the patient, she is agreeable to following up with [facility].  No indication for legal hold at this time.    Discharge Exam:  BP (!) 150/95   Pulse (!) 120   Temp 36.9 °C (98.5 °F) (Temporal)   Resp 20   Ht 1.651 m (5' 5\")   Wt 78.5 kg (173 lb)   SpO2 98%   BMI 28.79 kg/m² .    Constitutional: Awake and alert. Nontoxic  HENT:  Grossly normal  Eyes: Grossly normal  Neck: Normal range of motion  Cardiovascular: Normal heart rate   Thorax & Lungs: No respiratory distress  Abdomen: Nontender  Skin:  No pathologic rash.   Extremities: Well perfused  Psychiatric: Affect normal    Labs  Results for orders placed or performed during the hospital encounter of 23   POC BREATHALIZER   Result Value Ref Range    POC Breathalizer 0.00 0.00 - 0.01 Percent         Medications:   New Prescriptions    No medications on file       My final assessment includes   1. Acute psychosis (HCC)      " suspect methamphetamine induced      2. Methamphetamine abuse (HCC)            Upon Reevaluation, the patient's condition has: Improved; and will be discharged.    Patient discharged from ED Observation status at 8:30 AM (Time) May 31, 2023 (Date).     Total time spent on this ED Observation discharge encounter is < 30 Minutes    Electronically signed by: Kristin Saleh D.O., 5/31/2023 6:12 AM

## 2023-05-31 NOTE — ED NOTES
Pt resting in bed, with even rise and fall of chest noted. No obvious signs of pain or distress, reposition self occasionally, bed in low position.

## 2023-05-31 NOTE — CONSULTS
[facility]   TRIAGE ASSESSMENT    Name: Tierney Mayer  MRN: [id number]  : 1985  Age: 38 y.o.  Date of assessment: 2023  PCP: Pcp Pt States None  Persons in attendance: Patient  Patient Location: [facility]    CHIEF COMPLAINT/PRESENTING ISSUE (as stated by Patient, ER RN, ERP):   Chief Complaint   Patient presents with    Other      Patient is a 37 y/o female well known to Alert Team staff, with a diagnose history of schizoaffective dis. Bipolar type and struggles with methamphetamine addiction. Patient discharged from [facility] yesterday morning after 6 days of stabilization; pt admits to using methamphetamines yesterday. Patient presented to ER displaying erratic behavior, pressured speech and nonsensical. Patient provided with Ativan and Haldol to address her presenting psychosis and allowed to rest to be re-evaluated this AM. Patient is now lucid and oriented; denies any SI/HI/AVH; able to vocalize plan for f/u with her PMH provider at [facility] ([facility]) this morning. Patient not meeting legal hold criteria at this time and will be provided with 24 hr bus pass upon discharge to assist connecting with resources in the community. Discussed POC with Dr. Saleh who is in agreement.     CURRENT LIVING SITUATION/SOCIAL SUPPORT/FINANCIAL RESOURCES: Patient is unsheltered at times will stay in weekly motel; nominal social support.     BEHAVIORAL HEALTH/SUBSTANCE USE TREATMENT HISTORY  Does patient/parent report a history of prior behavioral health/substance use treatment for patient?   Dates Level of Care Facilty/Provider Diagnosis/  Problem Medications   current OP [facility] Schizoaffective D/O Uncertain medication compliance                5/24/23 x 6 days  5/3/2023  2023 IP [facility]  psychosis                 2021 and multiple previous hospital[facility] since  [facility]                    IP [facility] Schizophren[facility]                                  SAFETY ASSESSMENT - SELF  Does patient acknowledge current or past symptoms of dangerousness to self or is previous history noted? Yes; vocalized vague SI during triage but denies this to ERP and this writer.   Does parent/significant other report patient has current or past symptoms of dangerousness to self? N\A  Does presenting problem suggest symptoms of dangerousness to self? No; denies SI    SAFETY ASSESSMENT - OTHERS  Does patient acknowledge current or past symptoms of aggressive behavior or risk to others or is previous history noted? yes  Does parent/significant other report patient has current or past symptoms of aggressive behavior or risk to others?  N\A  Does presenting problem suggest symptoms of dangerousness to others? No; denies HI    LEGAL HISTORY  Does patient acknowledge history of arrest/assisted/group home or is previous history noted? yes    Crisis Safety Plan completed and copy given to patient? N\A    ABUSE/NEGLECT SCREENING  Does patient report feeling “unsafe” in his/her home, or afraid of anyone?  no  Does patient report any history of physical, sexual, or emotional abuse?  unknown  Does parent or significant other report any of the above? N\A  Is there evidence of neglect by self?  no  Is there evidence of neglect by a caregiver? no  Does the patient/parent report any history of CPS/APS/police involvement related to suspected abuse/neglect or domestic violence? no  Based on the information provided during the current assessment, is a mandated report of suspected abuse/neglect being made?  No    SUBSTANCE USE SCREENING  Yes:  Sam all substances used in the past 30 days:      Last Use Amount   []   Alcohol     []   Marijuana     []   Heroin     []   Prescription Opioids  (used without prescription, for    recreation, or in excess of prescribed amount)     []   Other Prescription  (used without prescription, for    recreation, or in excess of prescribed amount)     []   Cocaine     "  [x]   Methamphetamine 5/30/23 Unknown amount   []   \"\" drugs (ectasy, MDMA)     []   Other substances        UDS results: pending   Breathalyzer results: 0.00    What consequences does the patient associate with any of the above substance use and or addictive behaviors? Health problems: hx of substance addiction     Risk factors for detox (check all that apply):  []  Seizures   []  Diaphoretic (sweating)   []  Tremors   []  Hallucinations   []  Increased blood pressure   []  Decreased blood pressure   []  Other   [x]  None      [x] Patient education on risk factors for detoxification and instructed to return to ER as needed.      MENTAL STATUS   Participation: Limited verbal participation  Grooming: Disheveled  Orientation: Fully Oriented, Drowsy/Somnolent, and Evidence of delusions present upon arrival  Behavior: Calm, Hyperactive, and Unusual behaviors noted upon arrival  Eye contact: Limited  Mood: Euthymic  Affect: Constricted  Thought process:  Logical, Goal-directed at present, and Tangential on arrival  Thought content: Paranoia  Speech: Pressured  Perception: Within normal limits  Memory:  No gross evidence of memory deficits  Insight: Limited  Judgment:  Limited  Other:    Collateral information:   Source:  [] Significant other present in person:   [] Significant other by telephone  [] [facility]   [x] [facility]  [x] [facility]  [x] Other: ERP    [] Unable to complete full assessment due to:  [] Acute intoxication  [] Patient declined to participate/engage  [] Patient verbally unresponsive  [] Significant cognitive deficits  [] Significant perceptual distortions or behavioral disorganization  [x] Other: N/A     CLINICAL IMPRESSIONS:  Primary:  psychosis  Secondary:  methamphetamine use       IDENTIFIED NEEDS/PLAN:  [Trigger DISPOSITION list for any items marked]    []  Imminent safety risk - self [] Imminent safety risk - others   []  Acute substance withdrawal [x]  " Psychosis/Impaired reality testing   []  Mood/anxiety [x]  Substance use/Addictive behavior   []  Maladaptive behaviro []  Parent/child conflict   []  Family/Couples conflict []  Biomedical   [x]  Housing []  Financial   []   Legal  Occupational/Educational   []  Domestic violence []  Other:     Recommended Plan of Care:  Refer to [facility] for PMH treatment f/u    Has the Recommended Plan of Care/Level of Observation been reviewed with the patient's assigned nurse? yes    Does patient/parent or guardian express agreement with the above plan? yes    Referral appointment(s) scheduled? N\A    Alert team only: Patient oriented and calm, no psychosis observed. Patient not meeting legal hold criteria and able to discharge to self. Patient vocalized plan to f/u with OP PMH provider at [facility]. 24 hr bus pass will be provided to patient upon discharge.   I have discussed findings and recommendations with Dr. Saleh who is in agreement with these recommendations.     Referral information sent to the following outpatient community providers : [facility]    Referral information sent to the following inpatient community providers : N/A      Ursula Parrish R.N.  5/31/2023

## 2023-05-31 NOTE — ED NOTES
Pt resting in bed, appears to be sleeping with even rise and fall of chest noted. No obvious signs of pain or distress, tele-sitter in room performing direct observation, reposition self occasionally, bed in low position, safety rooms features in place.

## 2023-05-31 NOTE — ED NOTES
Pt ambulated to BR w/ steady gait.  Pt requesting food and coffee. Provided pt lean cuisine and 1 cup of decaf coffee.

## 2023-05-31 NOTE — ED PROVIDER NOTES
ED Provider Note    CHIEF COMPLAINT  Chief Complaint   Patient presents with    Other       EXTERNAL RECORDS REVIEWED  Inpatient Notes psychiatry consult note on 4/23/2023 for psychosis    HPI/ROS  LIMITATION TO HISTORY   Select: Acutely psychotic  OUTSIDE HISTORIAN(S):  EMS reports the patient was going through other people's belongings at the homeless shelter    Tierney Mayer is a 38 y.o. female who presents with reports of acute psychosis.  Patient is a very poor historian.  Patient denies any pain at this time, chest pain, fevers, chills, body aches, sweats.  Patient reports that she has not been taking her medicine since she was discharged from behavioral health.  Patient is asking for food.  Patient with rapid pressured speech.  Patient denies suicidal or homicidal ideation.  Patient endorses auditory hallucinations.    PAST MEDICAL HISTORY   has a past medical history of Hypertension, Psychiatric disorder, and Schizophrenia (HCC).    SURGICAL HISTORY   has a past surgical history that includes pelviscopy (2/22/2011) and incision and drainage general (2/22/2011).    FAMILY HISTORY  No family history on file.    SOCIAL HISTORY  Social History     Tobacco Use    Smoking status: Every Day     Packs/day: 1.00     Types: Cigarettes    Smokeless tobacco: Never    Tobacco comments:     knows she is suppose to quit but hasn't   Vaping Use    Vaping Use: Never used   Substance and Sexual Activity    Alcohol use: Not Currently    Drug use: Not Currently     Types: Inhaled, Oral     Comment: Marijuana, meth    Sexual activity: Not on file       CURRENT MEDICATIONS  Home Medications       Reviewed by Raz Peñaloza R.N. (Registered Nurse) on 05/30/23 at 2029  Med List Status: Partial     Medication Last Dose Status   divalproex ER (DEPAKOTE ER) 500 MG TABLET SR 24 HR  Active   divalproex ER (DEPAKOTE ER) 500 MG TABLET SR 24 HR  Active   risperiDONE (RISPERDAL) 2 MG Tab  Active   risperiDONE (RISPERDAL) 2 MG Tab   "Active                    ALLERGIES  Allergies   Allergen Reactions    Clonazepam Unspecified     Pt reports getting a stroke.  Per historical: Twitches and paranoia.    Paliperidone Unspecified     Twitches and paranoia .    Invega Sustenna [Paliperidone Palmitate]     Olanzapine      Pt reports worsening and threatening voices.       PHYSICAL EXAM  VITAL SIGNS: BP (!) 150/95   Pulse (!) 120   Temp 36.9 °C (98.5 °F) (Temporal)   Resp 20   Ht 1.651 m (5' 5\")   Wt 78.5 kg (173 lb)   SpO2 98%   BMI 28.79 kg/m²    Physical Exam  Vitals and nursing note reviewed.   Constitutional:       Comments: Patient is lying in bed supine, rapid pressured speech, psychomotor agitation   HENT:      Head: Normocephalic and atraumatic.   Eyes:      Extraocular Movements: Extraocular movements intact.      Conjunctiva/sclera: Conjunctivae normal.      Pupils: Pupils are equal, round, and reactive to light.   Cardiovascular:      Pulses: Normal pulses.      Comments:   Pulmonary:      Effort: Pulmonary effort is normal. No respiratory distress.   Musculoskeletal:         General: No swelling. Normal range of motion.      Cervical back: Normal range of motion. No rigidity.   Skin:     General: Skin is warm and dry.      Capillary Refill: Capillary refill takes less than 2 seconds.   Neurological:      Mental Status: She is alert.   Psychiatric:         Attention and Perception: She is inattentive.         Mood and Affect: Mood is elated. Affect is labile.         Speech: Speech is rapid and pressured.         Behavior: Behavior is hyperactive.         Thought Content: Thought content does not include homicidal or suicidal ideation.           COURSE & MEDICAL DECISION MAKING    ED Observation Status? Yes; I am placing the patient in to an observation status due to a diagnostic uncertainty as well as therapeutic intensity. Patient placed in observation status at 9:56 PM, 5/30/2023.     Observation plan is as follows: Pending " transfer to inpatient psychiatry    INITIAL ASSESSMENT, COURSE AND PLAN  Care Narrative: Patient presenting highly psychotic, psychomotor agitation, tachycardic.  Patient cortically is allergic to benzodiazepines.  Patient will be given Haldol for symptom control which has yielded good results in the past.  Patient will be placed on a mental health hold for acute psychosis.  There may be some component of drug-induced psychosis however at this time patient is not safe for discharge and requires psychiatric evaluation.  No evidence of acute medical process at this time.  No chest pain fevers or other acute process.  Disposition pending transfer to inpatient psychiatry.    This dictation has been created using voice recognition software. I am continuously working with the software to minimize the number of voice recognition errors and I have made every attempt to manually correct the errors within my dictation. However errors  related to this voice recognition software may still exist and should be interpreted within the appropriate context.     Electronically signed by: Jamin Wilkerson M.D., 5/30/2023 9:56 PM      Discussion of management with other [facility] or appropriate source(s): Alert team has been consulted       FINAL DIAGNOSIS  1. Acute psychosis (HCC)           Electronically signed by: Jamin Wilkerson M.D., 5/30/2023 9:53 PM

## 2023-07-06 ENCOUNTER — HOSPITAL ENCOUNTER (EMERGENCY)
Facility: MEDICAL CENTER | Age: 38
End: 2023-07-06
Attending: EMERGENCY MEDICINE
Payer: MEDICAID

## 2023-07-06 VITALS
SYSTOLIC BLOOD PRESSURE: 139 MMHG | OXYGEN SATURATION: 99 % | RESPIRATION RATE: 18 BRPM | TEMPERATURE: 98.4 F | HEART RATE: 108 BPM | HEIGHT: 64 IN | DIASTOLIC BLOOD PRESSURE: 93 MMHG | WEIGHT: 170 LBS | BODY MASS INDEX: 29.02 KG/M2

## 2023-07-06 DIAGNOSIS — F23 ACUTE PSYCHOSIS (HCC): ICD-10-CM

## 2023-07-06 DIAGNOSIS — F20.9 SCHIZOPHRENIA, UNSPECIFIED TYPE (HCC): ICD-10-CM

## 2023-07-06 LAB
APPEARANCE UR: CLEAR
BILIRUB UR QL STRIP.AUTO: NEGATIVE
COLOR UR: YELLOW
GLUCOSE UR STRIP.AUTO-MCNC: NEGATIVE MG/DL
HCG UR QL: NEGATIVE
KETONES UR STRIP.AUTO-MCNC: ABNORMAL MG/DL
LEUKOCYTE ESTERASE UR QL STRIP.AUTO: NEGATIVE
MICRO URNS: ABNORMAL
NITRITE UR QL STRIP.AUTO: NEGATIVE
PH UR STRIP.AUTO: 6 [PH] (ref 5–8)
PROT UR QL STRIP: NEGATIVE MG/DL
RBC UR QL AUTO: NEGATIVE
SP GR UR STRIP.AUTO: 1.02
UROBILINOGEN UR STRIP.AUTO-MCNC: 0.2 MG/DL

## 2023-07-06 PROCEDURE — 99285 EMERGENCY DEPT VISIT HI MDM: CPT

## 2023-07-06 PROCEDURE — 96372 THER/PROPH/DIAG INJ SC/IM: CPT

## 2023-07-06 PROCEDURE — A9270 NON-COVERED ITEM OR SERVICE: HCPCS | Mod: UD | Performed by: EMERGENCY MEDICINE

## 2023-07-06 PROCEDURE — 81003 URINALYSIS AUTO W/O SCOPE: CPT

## 2023-07-06 PROCEDURE — 81025 URINE PREGNANCY TEST: CPT

## 2023-07-06 PROCEDURE — 700111 HCHG RX REV CODE 636 W/ 250 OVERRIDE (IP): Mod: JZ,UD | Performed by: EMERGENCY MEDICINE

## 2023-07-06 PROCEDURE — 700102 HCHG RX REV CODE 250 W/ 637 OVERRIDE(OP): Mod: UD | Performed by: EMERGENCY MEDICINE

## 2023-07-06 RX ORDER — BENZTROPINE MESYLATE 1 MG/1
1 TABLET ORAL ONCE
Status: COMPLETED | OUTPATIENT
Start: 2023-07-06 | End: 2023-07-06

## 2023-07-06 RX ORDER — HALOPERIDOL 5 MG/1
10 TABLET ORAL ONCE
Status: COMPLETED | OUTPATIENT
Start: 2023-07-06 | End: 2023-07-06

## 2023-07-06 RX ORDER — DIPHENHYDRAMINE HYDROCHLORIDE 50 MG/ML
25 INJECTION INTRAMUSCULAR; INTRAVENOUS ONCE
Status: COMPLETED | OUTPATIENT
Start: 2023-07-06 | End: 2023-07-06

## 2023-07-06 RX ORDER — HALOPERIDOL 5 MG/ML
5 INJECTION INTRAMUSCULAR ONCE
Status: COMPLETED | OUTPATIENT
Start: 2023-07-06 | End: 2023-07-06

## 2023-07-06 RX ORDER — HALOPERIDOL 5 MG/ML
5 INJECTION INTRAMUSCULAR ONCE
Status: DISCONTINUED | OUTPATIENT
Start: 2023-07-06 | End: 2023-07-06

## 2023-07-06 RX ORDER — LORAZEPAM 2 MG/ML
1 INJECTION INTRAMUSCULAR ONCE
Status: COMPLETED | OUTPATIENT
Start: 2023-07-06 | End: 2023-07-06

## 2023-07-06 RX ORDER — DIVALPROEX SODIUM 500 MG/1
1000 TABLET, DELAYED RELEASE ORAL ONCE
Status: COMPLETED | OUTPATIENT
Start: 2023-07-06 | End: 2023-07-06

## 2023-07-06 RX ADMIN — DIVALPROEX SODIUM 1000 MG: 500 TABLET, DELAYED RELEASE ORAL at 18:27

## 2023-07-06 RX ADMIN — DIPHENHYDRAMINE HYDROCHLORIDE 25 MG: 50 INJECTION, SOLUTION INTRAMUSCULAR; INTRAVENOUS at 12:15

## 2023-07-06 RX ADMIN — LORAZEPAM 1 MG: 2 INJECTION INTRAMUSCULAR; INTRAVENOUS at 12:15

## 2023-07-06 RX ADMIN — HALOPERIDOL LACTATE 5 MG: 5 INJECTION, SOLUTION INTRAMUSCULAR at 12:30

## 2023-07-06 RX ADMIN — BENZTROPINE MESYLATE 1 MG: 1 TABLET ORAL at 18:28

## 2023-07-06 RX ADMIN — HALOPERIDOL 10 MG: 5 TABLET ORAL at 18:27

## 2023-07-06 NOTE — ED PROVIDER NOTES
ED Provider Note    CHIEF COMPLAINT  Chief Complaint   Patient presents with    Psych Eval     Pt found by security outside of [facility] [place] naked and talking to herself. Pt appears distressed.        EXTERNAL RECORDS REVIEWED  Other none available    Subsequently was able to find the patient's identity and multiple visits to this emergency department, as well as prior visits to [facility] emergency department 2022 with noted history of schizophrenia, acute psychosis, most recently May 30, 2023 noted to be on Risperdal and Depakote    HPI/ROS  LIMITATION TO HISTORY   Select: Altered mental status / Confusion  OUTSIDE HISTORIAN(S):  Security noted patient walking outside of [facility] [place] naked and talking to herself    Batsheva Rust is a 89 y.o. adult who presents with altered mental status.  Patient was found confused and walking around outside naked.  She will state her name and that she lives at [facility]  In [place].  She however does not know the date or really recent events either.  She will mumble incoherently with pressured speech as well at times difficult to understand.  She did tell nursing staff that she wanted to kill herself by eating food.  To me she just states she feels like she needs to touch herself.  She states no headaches or chest pain or abdominal pain or fevers although history is unreliable.  When asked if she takes medications she says yes but is unsure what these are    PAST MEDICAL HISTORY       SURGICAL HISTORY  patient denies any surgical history    FAMILY HISTORY  History reviewed. No pertinent family history.    SOCIAL HISTORY  Social History     Tobacco Use    Smoking status: Every Day     Packs/day: 1.00     Types: Cigarettes    Smokeless tobacco: Never   Substance and Sexual Activity    Alcohol use: Not Currently    Drug use: Not Currently    Sexual activity: Not on file       CURRENT MEDICATIONS  Home Medications       Reviewed by Jenn Lebron (Pharmacy Tech) on  "07/06/23 at 1200  Med List Status: Complete     Medication Last Dose Status        Patient Mike Taking any Medications                           ALLERGIES  No Known Allergies    PHYSICAL EXAM  VITAL SIGNS: BP (!) 147/93   Pulse 122   Temp 37.7 °C (99.9 °F) (Temporal)   Resp 18   Ht 1.626 m (5' 4\")   Wt 77.1 kg (170 lb)   SpO2 95%   BMI 29.18 kg/m²      Pulse ox interpretation: I interpret this pulse ox as normal.  Constitutional: Alert disheveled  HENT: No signs of trauma, Bilateral external ears normal, Nose normal.   Eyes: Pupils are equal and reactive, Conjunctiva normal, Non-icteric.   Neck: Normal range of motion, No tenderness, Supple, No stridor.   Cardiovascular: Regular rate and rhythm, no murmurs.   Thorax & Lungs: Normal breath sounds, No respiratory distress, No wheezing, No chest tenderness.   Abdomen: Bowel sounds normal, Soft, No tenderness, No masses, No pulsatile masses. No peritoneal signs.  Skin: Warm, Dry, No erythema, No rash.   Back: No bony tenderness, No CVA tenderness.   Extremities: Intact distal pulses, No edema, No tenderness, No cyanosis,  Negative Gilberto's sign.   Musculoskeletal: Good range of motion in all major joints. No tenderness to palpation or major deformities noted.   Neurologic: Alert , oriented to person and place normal motor function, Normal sensory function, No focal deficits noted.   Psychiatric: Anxious appearing, pressured speech,.           DIAGNOSTIC STUDIES / PROCEDURES      COURSE & MEDICAL DECISION MAKING    ED Observation Status? Yes; I am placing the patient in to an observation status due to a diagnostic uncertainty as well as therapeutic intensity. Patient placed in observation status at 10:38 AM, 7/6/2023.     Observation plan is as follows: Mental health evaluation, continued monitoring for her safety    Patient's care is signed over to the oncoming emergency physician pending psychiatric evaluation    INITIAL ASSESSMENT, COURSE AND PLAN  Care " Narrative: 10:38 AM  Patient evaluated at bedside, at this point differential includes acute pathology such as acute psychosis, substance abuse, consider metabolic or electrolyte derangement although with [facility] this does seem most likely primary psychiatric, also consideration for infectious pathology although she has no fevers or obvious focal infectious symptoms.  Will attempt to track down further history on her regarding her psychiatric history    We have confirmed the patient's identity, as above, multiple prior visits to multiple emergency departments for schizophrenia and psychosis    11:53 AM  Patient has become more combative, is now throwing things, will plan for Haldol, Ativan, Benadryl    1:39 PM  Patient currently resting comfortably without issue        ADDITIONAL PROBLEM LIST  #Psychosis.  Patient with longstanding history, certainly features suggestive of this today.  No findings suggestive of other acute medical pathology.  She is currently pending behavioral health evaluation for further disposition    #Schizophrenia, history of      DISPOSITION AND DISCUSSIONS    Discussion of management with other [facility] or appropriate source(s): Behavioral Health for legal hold evaluation      Patient is admitted to ED observation in stable condition as above and turned over to the oncoming emergency physician    FINAL DIAGNOSIS  1. Acute psychosis (HCC)    2. Schizophrenia, unspecified type (HCC)           Electronically signed by: Saurabh Almendarez M.D., 7/6/2023 10:38 AM

## 2023-07-06 NOTE — ED TRIAGE NOTES
"Chief Complaint   Patient presents with    Psych Eval     Pt found by security outside of [facility] naked and talking to herself. Pt appears distressed.      Pt brought in to triage room by security. Pt tearful and not answering questions appropriately. Charge RN made aware of pt. Pt taken to G32.     /86   Pulse 112   Temp 37.6 °C (99.6 °F) (Temporal)   Resp 20   Ht 1.626 m (5' 4\")   Wt 77.1 kg (170 lb)   SpO2 98%     "

## 2023-07-06 NOTE — ED NOTES
Bedside report received from NI Antonio. Pt in bed, eyes closed, resps unlabored. Does not appear to be sleeping - restless at times. Room safety checklist in place.

## 2023-07-06 NOTE — ED NOTES
Patient's home medications have been reviewed by the pharmacy team.     History reviewed. No pertinent past medical history.    Patient's Medications    No medications on file          A:  Denies taking any home medications    P:    No recommendations at this time. Follow psychiatrist recommendations.    Johan Urbina, Student       Reviewed case with pharmacy student   No recommendations at this time  Samantha Huerta PharmLeilaD., [facility]  ER Clinical Pharmacist

## 2023-07-06 NOTE — ED NOTES
Med rec completed per [facility]  Pt unable to participate in an interview     Per [facility] pt has medications on HOLD that have not been filled   Per [facility] pt has not filled any medications since May 2023 for a 30 day supply

## 2023-07-06 NOTE — ED NOTES
"Pt to room from lobby via wheelchair by security. Pt with pressured speech. When asked if suicidal pt states \"yes\". When asked what plan is, pt states \"to eat food\". Pt denies hx of SI. Denies HI. Pt arrives in paper gown from security. No belongings with pt. Agree with triage note. Sitter at bedside.  "

## 2023-07-06 NOTE — ED NOTES
"Pt refusing any lab work. Not currently agitated, but crossing & arms & states \"no\". ERP aware.  "
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
## 2023-07-07 NOTE — ED NOTES
Patient discharged home per ERP.  Discharge teaching and education discussed with patient. POC discussed.   Patient verbalized understanding of discharge teaching and education. No other questions at this time.

## 2023-07-07 NOTE — DISCHARGE SUMMARY
"  ED Observation Discharge Summary    Patient:Tierney Mayer  Patient : 1985  Patient MRN: [id number]  Patient PCP: Pcp Pt States None    Admit Date: 2023  Discharge Date and Time: 23 5:09 PM  Discharge Diagnosis: acute psychosis, amphetamine abuse  Discharge Attending: Wyatt Lu M.D.  Discharge Service: ED Observation    ED Course  Tierney is a 38 y.o. adult who was evaluated at [facility].    2:00 PM - I discussed the patient's case with Dr. Almendarez ([facility]) who will transfer care of the patient to me at this time.        2:43 PM - The patient's continuing management included; patient was reevaluated at bedside. There is no clonus, but patient remains tachycardic.     5:12 PM PT was encouraged to follow up with WC tomorrow.      Discharge Exam:  BP (!) 139/93   Pulse (!) 108   Temp 36.9 °C (98.4 °F) (Temporal)   Resp 18   Ht 1.626 m (5' 4\")   Wt 77.1 kg (170 lb)   SpO2 99%   BMI 29.18 kg/m² .    Constitutional: Awake and alert. Nontoxic  HENT:  Grossly normal  Eyes: Grossly normal  Neck: Normal range of motion  Cardiovascular: tachycardic  Thorax & Lungs: No respiratory distress  Abdomen: Nontender  Skin:  No pathologic rash.   Extremities: Well perfused  Psychiatric: Affect normal    Labs  Results for orders placed or performed during the hospital encounter of 23   URINALYSIS    Specimen: Urine   Result Value Ref Range    Color Yellow     Character Clear     Specific Gravity 1.018 <1.035    Ph 6.0 5.0 - 8.0    Glucose Negative Negative mg/dL    Ketones Trace (A) Negative mg/dL    Protein Negative Negative mg/dL    Bilirubin Negative Negative    Urobilinogen, Urine 0.2 Negative    Nitrite Negative Negative    Leukocyte Esterase Negative Negative    Occult Blood Negative Negative    Micro Urine Req see below    BETA-HCG QUALITATIVE URINE   Result Value Ref Range    Beta-Hcg Urine Negative Negative       Radiology  No orders to display       Medications:   New Prescriptions    No " medications on file       My final assessment includes pt alert and admits to recent meth  Upon Reevaluation, the patient's condition has: not improved; and will be escalated to hospitalization.    Patient discharged from ED Observation status at 510 (Time) 7/6 (Date).     Total time spent on this ED Observation discharge encounter is < 30 Minutes     Enrique DONG (Chika), am scribing for, and in the presence of, Wyatt Lu M.D..    Electronically signed by: Enrique Hannah (Scribe), 7/6/2023    Wyatt DONG M.D. personally performed the services described in this documentation, as scribed by Enrique Hannah in my presence, and it is both accurate and complete.     Electronically signed by: Wyatt Lu M.D., 7/6/2023 5:09 PM

## 2023-07-09 ENCOUNTER — HOSPITAL ENCOUNTER (EMERGENCY)
Facility: MEDICAL CENTER | Age: 38
End: 2023-07-10
Attending: EMERGENCY MEDICINE
Payer: MEDICAID

## 2023-07-09 VITALS
BODY MASS INDEX: 29.02 KG/M2 | TEMPERATURE: 98.2 F | WEIGHT: 170 LBS | SYSTOLIC BLOOD PRESSURE: 160 MMHG | DIASTOLIC BLOOD PRESSURE: 86 MMHG | HEART RATE: 87 BPM | RESPIRATION RATE: 20 BRPM | OXYGEN SATURATION: 97 % | HEIGHT: 64 IN

## 2023-07-09 DIAGNOSIS — Z59.00 HOMELESSNESS: ICD-10-CM

## 2023-07-09 DIAGNOSIS — F23 ACUTE PSYCHOSIS (HCC): ICD-10-CM

## 2023-07-09 PROCEDURE — 99284 EMERGENCY DEPT VISIT MOD MDM: CPT

## 2023-07-09 SDOH — ECONOMIC STABILITY - HOUSING INSECURITY: HOMELESSNESS UNSPECIFIED: Z59.00

## 2023-07-10 PROCEDURE — A9270 NON-COVERED ITEM OR SERVICE: HCPCS | Mod: UD | Performed by: EMERGENCY MEDICINE

## 2023-07-10 PROCEDURE — 700102 HCHG RX REV CODE 250 W/ 637 OVERRIDE(OP): Mod: UD | Performed by: EMERGENCY MEDICINE

## 2023-07-10 RX ORDER — RISPERIDONE 2 MG/1
2 TABLET ORAL ONCE
Status: COMPLETED | OUTPATIENT
Start: 2023-07-10 | End: 2023-07-10

## 2023-07-10 RX ADMIN — RISPERIDONE 2 MG: 2 TABLET ORAL at 00:49

## 2023-07-10 NOTE — ED PROVIDER NOTES
"ED Provider Note    CHIEF COMPLAINT  Chief Complaint   Patient presents with    Psych Eval       EXTERNAL RECORDS REVIEWED  Outpatient Notes patient has been seen extensively in our facility was last seen here 2 days ago and via her other MRN she has been seen multiple times over the last several months    HPI/ROS  LIMITATION TO HISTORY   Select: Patient has a history of psychosis and was a little aggressive  OUTSIDE HISTORIAN(S):  EMS patient was at [facility] and refused to leave during closing time so 9 1 was called.    Tierney Mayer is a 38 y.o. female who presents to the emergency department with a history of schizophrenia and psychosis she would not leave the department store this evening when it closed and so 911 was called.  She does tells me she wants to eat and drink something and that she feels like her stomach is upset because she still hungry.  She also asked that I not touch her she denied SI or HI.  And then refused to answer any other questions until she was fed    PAST MEDICAL HISTORY   has a past medical history of Schizophrenia (HCC).    SURGICAL HISTORY  patient denies any surgical history    FAMILY HISTORY  History reviewed. No pertinent family history.    SOCIAL HISTORY  Social History     Tobacco Use    Smoking status: Every Day     Packs/day: 1.00     Types: Cigarettes    Smokeless tobacco: Never   Vaping Use    Vaping Use: Never used   Substance and Sexual Activity    Alcohol use: Not Currently    Drug use: Not Currently    Sexual activity: Not on file       CURRENT MEDICATIONS  Home Medications       Reviewed by Citlali Thompson R.N. (Registered Nurse) on 07/09/23 at 0596  Med List Status: Partial     Medication Last Dose Status        Patient Mike Taking any Medications                           ALLERGIES  No Known Allergies    PHYSICAL EXAM  VITAL SIGNS: BP (!) 160/86   Pulse 87   Temp 36.8 °C (98.2 °F) (Temporal)   Resp 20   Ht 1.626 m (5' 4\")   Wt 77.1 kg (170 lb)   SpO2 97%   BMI " 29.18 kg/m²    Pulse OX: Pulse Oxygen level is normal  Constitutional: Alert in no apparent distress. disheveled  HENT: Normocephalic, Atraumatic, MMM  Eyes: PERound. Conjunctiva normal, non-icteric.   Heart: Regular rate and rhythm, intact distal pulses   Lungs: Symmetrical movement, no resp distress   Abdomen: Non-tender, non-distended, normal bowel sounds  Skin: Warm, Dry, No erythema, No rash.   Neurologic: Alert and oriented, Grossly non-focal.   Psych: Little agitated some delusions but not responding to internal stimuli no SI no HI      DIAGNOSTIC STUDIES / PROCEDURES    COURSE & MEDICAL DECISION MAKING    ED Observation Status? No; Patient does not meet criteria for ED Observation.     INITIAL ASSESSMENT, COURSE AND PLAN/ DISPOSITION AND DISCUSSIONS  Care Narrative: Patient has a history of schizophrenia and psychosis and is pretty well-known to our facility.  She does have a history of requiring Depakote and respite also give her home dose of Resporal allow her to eat and drink her abdomen is soft nontender on the limited exam she allowed me.  She does have a little bit of delusions but she is alert she is oriented does not want herself or others.  Once medicated she can be discharged.    I have discussed management of the patient with the following physicians and PIEDAD's:  none    Discussion of management with other HP or appropriate source(s): None     Escalation of care considered, and ultimately not performed:blood analysis    Barriers to care at this time, including but not limited to: Patient is homeless.     Decision tools and prescription drugs considered including, but not limited to:  none .    The patient will return for new or worsening symptoms and is stable at the time of discharge.    The patient is referred to a primary physician for blood pressure management, diabetic screening, and for all other preventative health concerns.    DISPOSITION:  Patient will be discharged home in stable  condition.    FOLLOW UP:  [facility], Emergency Dept  [address]  [place] [id number] [phone]    If symptoms worsen      OUTPATIENT MEDICATIONS:  New Prescriptions    No medications on file         FINAL DIAGNOSIS  1. Acute psychosis (HCC)    2. Homelessness           Electronically signed by: Katerin Ronquillo M.D., 7/10/2023 12:10 AM

## 2023-07-10 NOTE — ED TRIAGE NOTES
Tierney Mayer  38 y.o.  Chief Complaint   Patient presents with    Psych Eval      BIB EMS ambulatory to ED with steady gait for above.    Patient was at [facility], refused to leave during closing time, 911 was called. Patient noted on scene with abnormal behavior, brought to the ED for evaluation.    Denies SI/HI.    Upon arrival to ED patient immediately removed all her clothing, wrapped herself in a sheet, and sat on the gurney. Refusing to wear gown. Refusing vitals signs recheck upon arrival to ED. Becoming verbally aggressive with this RN during triage assessment, refusing to answer questions.    Patient well-known to ED staff for psychiatric complaints.    Denies SI/HI to this RN.

## 2023-07-10 NOTE — ED NOTES
PATIENT WALKING NAKED TO THE RESTROOM, PT REFUSES TO WEAR GOWN OR BLANKET. TRYING TO GET PATIENT TO EAT THEN D/C.

## 2023-07-10 NOTE — ED NOTES
Patient ambulatory to and from  with steady gait under constant supervision by ED staff. Patient naked while ambulating in hallway, refusing repeated requests by staff to put on gown or to wrap herself in a sheet. Noted to be wearing treaded socks from prior hospital visit - refusing offer of new socks.

## 2023-08-04 ENCOUNTER — HOSPITAL ENCOUNTER (EMERGENCY)
Facility: MEDICAL CENTER | Age: 38
End: 2023-08-05
Attending: STUDENT IN AN ORGANIZED HEALTH CARE EDUCATION/TRAINING PROGRAM
Payer: MEDICAID

## 2023-08-04 DIAGNOSIS — F23 ACUTE PSYCHOSIS (HCC): ICD-10-CM

## 2023-08-04 DIAGNOSIS — F15.10 METHAMPHETAMINE ABUSE (HCC): ICD-10-CM

## 2023-08-04 PROCEDURE — 96372 THER/PROPH/DIAG INJ SC/IM: CPT

## 2023-08-04 PROCEDURE — 99285 EMERGENCY DEPT VISIT HI MDM: CPT

## 2023-08-04 PROCEDURE — 700111 HCHG RX REV CODE 636 W/ 250 OVERRIDE (IP): Mod: JZ,UD | Performed by: STUDENT IN AN ORGANIZED HEALTH CARE EDUCATION/TRAINING PROGRAM

## 2023-08-04 RX ORDER — HALOPERIDOL 5 MG/ML
5 INJECTION INTRAMUSCULAR ONCE
Status: COMPLETED | OUTPATIENT
Start: 2023-08-04 | End: 2023-08-04

## 2023-08-04 RX ADMIN — HALOPERIDOL LACTATE 5 MG: 5 INJECTION, SOLUTION INTRAMUSCULAR at 21:43

## 2023-08-04 ASSESSMENT — FIBROSIS 4 INDEX: FIB4 SCORE: 0.48

## 2023-08-05 ENCOUNTER — HOSPITAL ENCOUNTER (EMERGENCY)
Facility: MEDICAL CENTER | Age: 38
End: 2023-08-06
Attending: EMERGENCY MEDICINE
Payer: MEDICAID

## 2023-08-05 ENCOUNTER — HOSPITAL ENCOUNTER (EMERGENCY)
Facility: MEDICAL CENTER | Age: 38
End: 2023-08-05
Attending: EMERGENCY MEDICINE
Payer: MEDICAID

## 2023-08-05 VITALS
SYSTOLIC BLOOD PRESSURE: 117 MMHG | RESPIRATION RATE: 18 BRPM | OXYGEN SATURATION: 97 % | DIASTOLIC BLOOD PRESSURE: 67 MMHG | BODY MASS INDEX: 33.5 KG/M2 | WEIGHT: 196.21 LBS | TEMPERATURE: 98.1 F | HEART RATE: 92 BPM | HEIGHT: 64 IN

## 2023-08-05 VITALS
OXYGEN SATURATION: 97 % | WEIGHT: 193.56 LBS | HEART RATE: 117 BPM | HEIGHT: 67 IN | BODY MASS INDEX: 30.38 KG/M2 | SYSTOLIC BLOOD PRESSURE: 129 MMHG | TEMPERATURE: 97.3 F | DIASTOLIC BLOOD PRESSURE: 84 MMHG | RESPIRATION RATE: 19 BRPM

## 2023-08-05 DIAGNOSIS — F20.9 SCHIZOPHRENIA, UNSPECIFIED TYPE (HCC): ICD-10-CM

## 2023-08-05 DIAGNOSIS — F23 ACUTE PSYCHOSIS (HCC): ICD-10-CM

## 2023-08-05 DIAGNOSIS — F15.10 METHAMPHETAMINE ABUSE (HCC): ICD-10-CM

## 2023-08-05 LAB
ALBUMIN SERPL BCP-MCNC: 4.2 G/DL (ref 3.2–4.9)
ALBUMIN SERPL BCP-MCNC: 4.3 G/DL (ref 3.2–4.9)
ALBUMIN/GLOB SERPL: 1.3 G/DL
ALBUMIN/GLOB SERPL: 1.5 G/DL
ALP SERPL-CCNC: 107 U/L (ref 30–99)
ALP SERPL-CCNC: 98 U/L (ref 30–99)
ALT SERPL-CCNC: 15 U/L (ref 2–50)
ALT SERPL-CCNC: 17 U/L (ref 2–50)
AMPHET UR QL SCN: POSITIVE
ANION GAP SERPL CALC-SCNC: 11 MMOL/L (ref 7–16)
ANION GAP SERPL CALC-SCNC: 13 MMOL/L (ref 7–16)
AST SERPL-CCNC: 16 U/L (ref 12–45)
AST SERPL-CCNC: 17 U/L (ref 12–45)
BARBITURATES UR QL SCN: NEGATIVE
BASOPHILS # BLD AUTO: 0.3 % (ref 0–1.8)
BASOPHILS # BLD AUTO: 0.4 % (ref 0–1.8)
BASOPHILS # BLD: 0.04 K/UL (ref 0–0.12)
BASOPHILS # BLD: 0.05 K/UL (ref 0–0.12)
BENZODIAZ UR QL SCN: NEGATIVE
BILIRUB SERPL-MCNC: 0.2 MG/DL (ref 0.1–1.5)
BILIRUB SERPL-MCNC: 0.3 MG/DL (ref 0.1–1.5)
BUN SERPL-MCNC: 13 MG/DL (ref 8–22)
BUN SERPL-MCNC: 14 MG/DL (ref 8–22)
BZE UR QL SCN: NEGATIVE
CALCIUM ALBUM COR SERPL-MCNC: 8.9 MG/DL (ref 8.5–10.5)
CALCIUM ALBUM COR SERPL-MCNC: 9 MG/DL (ref 8.5–10.5)
CALCIUM SERPL-MCNC: 9.1 MG/DL (ref 8.5–10.5)
CALCIUM SERPL-MCNC: 9.2 MG/DL (ref 8.5–10.5)
CANNABINOIDS UR QL SCN: NEGATIVE
CHLORIDE SERPL-SCNC: 103 MMOL/L (ref 96–112)
CHLORIDE SERPL-SCNC: 99 MMOL/L (ref 96–112)
CK SERPL-CCNC: 56 U/L (ref 0–154)
CO2 SERPL-SCNC: 22 MMOL/L (ref 20–33)
CO2 SERPL-SCNC: 22 MMOL/L (ref 20–33)
CREAT SERPL-MCNC: 0.63 MG/DL (ref 0.5–1.4)
CREAT SERPL-MCNC: 0.73 MG/DL (ref 0.5–1.4)
EOSINOPHIL # BLD AUTO: 0.09 K/UL (ref 0–0.51)
EOSINOPHIL # BLD AUTO: 0.11 K/UL (ref 0–0.51)
EOSINOPHIL NFR BLD: 0.7 % (ref 0–6.9)
EOSINOPHIL NFR BLD: 0.8 % (ref 0–6.9)
ERYTHROCYTE [DISTWIDTH] IN BLOOD BY AUTOMATED COUNT: 43.8 FL (ref 35.9–50)
ERYTHROCYTE [DISTWIDTH] IN BLOOD BY AUTOMATED COUNT: 44.6 FL (ref 35.9–50)
ETHANOL BLD-MCNC: <10.1 MG/DL
FENTANYL UR QL: NEGATIVE
GFR SERPLBLD CREATININE-BSD FMLA CKD-EPI: 106 ML/MIN/1.73 M 2
GFR SERPLBLD CREATININE-BSD FMLA CKD-EPI: 115 ML/MIN/1.73 M 2
GLOBULIN SER CALC-MCNC: 2.8 G/DL (ref 1.9–3.5)
GLOBULIN SER CALC-MCNC: 3.4 G/DL (ref 1.9–3.5)
GLUCOSE SERPL-MCNC: 105 MG/DL (ref 65–99)
GLUCOSE SERPL-MCNC: 185 MG/DL (ref 65–99)
HCG SERPL QL: NEGATIVE
HCT VFR BLD AUTO: 38 % (ref 37–47)
HCT VFR BLD AUTO: 38.2 % (ref 37–47)
HGB BLD-MCNC: 12.4 G/DL (ref 12–16)
HGB BLD-MCNC: 12.6 G/DL (ref 12–16)
IMM GRANULOCYTES # BLD AUTO: 0.06 K/UL (ref 0–0.11)
IMM GRANULOCYTES # BLD AUTO: 0.07 K/UL (ref 0–0.11)
IMM GRANULOCYTES NFR BLD AUTO: 0.4 % (ref 0–0.9)
IMM GRANULOCYTES NFR BLD AUTO: 0.5 % (ref 0–0.9)
LYMPHOCYTES # BLD AUTO: 2.78 K/UL (ref 1–4.8)
LYMPHOCYTES # BLD AUTO: 2.98 K/UL (ref 1–4.8)
LYMPHOCYTES NFR BLD: 20.5 % (ref 22–41)
LYMPHOCYTES NFR BLD: 22 % (ref 22–41)
MCH RBC QN AUTO: 28.1 PG (ref 27–33)
MCH RBC QN AUTO: 28.2 PG (ref 27–33)
MCHC RBC AUTO-ENTMCNC: 32.6 G/DL (ref 32.2–35.5)
MCHC RBC AUTO-ENTMCNC: 33 G/DL (ref 32.2–35.5)
MCV RBC AUTO: 85.5 FL (ref 81.4–97.8)
MCV RBC AUTO: 86.2 FL (ref 81.4–97.8)
METHADONE UR QL SCN: NEGATIVE
MONOCYTES # BLD AUTO: 0.71 K/UL (ref 0–0.85)
MONOCYTES # BLD AUTO: 0.73 K/UL (ref 0–0.85)
MONOCYTES NFR BLD AUTO: 5.2 % (ref 0–13.4)
MONOCYTES NFR BLD AUTO: 5.4 % (ref 0–13.4)
NEUTROPHILS # BLD AUTO: 9.64 K/UL (ref 1.82–7.42)
NEUTROPHILS # BLD AUTO: 9.84 K/UL (ref 1.82–7.42)
NEUTROPHILS NFR BLD: 71.3 % (ref 44–72)
NEUTROPHILS NFR BLD: 72.5 % (ref 44–72)
NRBC # BLD AUTO: 0 K/UL
NRBC # BLD AUTO: 0 K/UL
NRBC BLD-RTO: 0 /100 WBC (ref 0–0.2)
NRBC BLD-RTO: 0 /100 WBC (ref 0–0.2)
OPIATES UR QL SCN: NEGATIVE
OXYCODONE UR QL SCN: NEGATIVE
PCP UR QL SCN: NEGATIVE
PLATELET # BLD AUTO: 336 K/UL (ref 164–446)
PLATELET # BLD AUTO: 377 K/UL (ref 164–446)
PMV BLD AUTO: 9.7 FL (ref 9–12.9)
PMV BLD AUTO: 9.8 FL (ref 9–12.9)
POTASSIUM SERPL-SCNC: 3.3 MMOL/L (ref 3.6–5.5)
POTASSIUM SERPL-SCNC: 3.7 MMOL/L (ref 3.6–5.5)
PROPOXYPH UR QL SCN: NEGATIVE
PROT SERPL-MCNC: 7 G/DL (ref 6–8.2)
PROT SERPL-MCNC: 7.7 G/DL (ref 6–8.2)
RBC # BLD AUTO: 4.41 M/UL (ref 4.2–5.4)
RBC # BLD AUTO: 4.47 M/UL (ref 4.2–5.4)
SODIUM SERPL-SCNC: 132 MMOL/L (ref 135–145)
SODIUM SERPL-SCNC: 138 MMOL/L (ref 135–145)
WBC # BLD AUTO: 13.5 K/UL (ref 4.8–10.8)
WBC # BLD AUTO: 13.6 K/UL (ref 4.8–10.8)

## 2023-08-05 PROCEDURE — A9270 NON-COVERED ITEM OR SERVICE: HCPCS | Mod: UD | Performed by: STUDENT IN AN ORGANIZED HEALTH CARE EDUCATION/TRAINING PROGRAM

## 2023-08-05 PROCEDURE — 90791 PSYCH DIAGNOSTIC EVALUATION: CPT

## 2023-08-05 PROCEDURE — 80053 COMPREHEN METABOLIC PANEL: CPT | Mod: 91

## 2023-08-05 PROCEDURE — 99284 EMERGENCY DEPT VISIT MOD MDM: CPT

## 2023-08-05 PROCEDURE — 85025 COMPLETE CBC W/AUTO DIFF WBC: CPT | Mod: 91

## 2023-08-05 PROCEDURE — 36415 COLL VENOUS BLD VENIPUNCTURE: CPT

## 2023-08-05 PROCEDURE — 80053 COMPREHEN METABOLIC PANEL: CPT

## 2023-08-05 PROCEDURE — 84703 CHORIONIC GONADOTROPIN ASSAY: CPT

## 2023-08-05 PROCEDURE — 85025 COMPLETE CBC W/AUTO DIFF WBC: CPT

## 2023-08-05 PROCEDURE — 96372 THER/PROPH/DIAG INJ SC/IM: CPT

## 2023-08-05 PROCEDURE — 700111 HCHG RX REV CODE 636 W/ 250 OVERRIDE (IP): Mod: UD | Performed by: EMERGENCY MEDICINE

## 2023-08-05 PROCEDURE — 82550 ASSAY OF CK (CPK): CPT

## 2023-08-05 PROCEDURE — 700102 HCHG RX REV CODE 250 W/ 637 OVERRIDE(OP): Mod: UD | Performed by: STUDENT IN AN ORGANIZED HEALTH CARE EDUCATION/TRAINING PROGRAM

## 2023-08-05 PROCEDURE — 80307 DRUG TEST PRSMV CHEM ANLYZR: CPT

## 2023-08-05 PROCEDURE — 82077 ASSAY SPEC XCP UR&BREATH IA: CPT

## 2023-08-05 RX ORDER — SODIUM CHLORIDE, SODIUM LACTATE, POTASSIUM CHLORIDE, CALCIUM CHLORIDE 600; 310; 30; 20 MG/100ML; MG/100ML; MG/100ML; MG/100ML
1000 INJECTION, SOLUTION INTRAVENOUS ONCE
Status: DISCONTINUED | OUTPATIENT
Start: 2023-08-05 | End: 2023-08-05 | Stop reason: HOSPADM

## 2023-08-05 RX ORDER — RISPERIDONE 2 MG/1
2 TABLET ORAL ONCE
Status: COMPLETED | OUTPATIENT
Start: 2023-08-06 | End: 2023-08-06

## 2023-08-05 RX ORDER — DIPHENHYDRAMINE HYDROCHLORIDE 50 MG/ML
25 INJECTION INTRAMUSCULAR; INTRAVENOUS ONCE
Status: DISCONTINUED | OUTPATIENT
Start: 2023-08-05 | End: 2023-08-05

## 2023-08-05 RX ORDER — LORAZEPAM 2 MG/ML
2 INJECTION INTRAMUSCULAR ONCE
Status: COMPLETED | OUTPATIENT
Start: 2023-08-05 | End: 2023-08-05

## 2023-08-05 RX ORDER — LORAZEPAM 2 MG/ML
2 INJECTION INTRAMUSCULAR ONCE
Status: DISCONTINUED | OUTPATIENT
Start: 2023-08-05 | End: 2023-08-05

## 2023-08-05 RX ORDER — RISPERIDONE 2 MG/1
2 TABLET ORAL ONCE
Status: COMPLETED | OUTPATIENT
Start: 2023-08-05 | End: 2023-08-05

## 2023-08-05 RX ADMIN — RISPERIDONE 2 MG: 2 TABLET ORAL at 00:43

## 2023-08-05 RX ADMIN — LORAZEPAM 2 MG: 2 INJECTION INTRAMUSCULAR; INTRAVENOUS at 14:59

## 2023-08-05 ASSESSMENT — FIBROSIS 4 INDEX
FIB4 SCORE: 0.49
FIB4 SCORE: 0.48

## 2023-08-05 NOTE — ED NOTES
Patient given discharge instructions and verbalizes understanding. Patient provided new clothing and left with all belongings and ambulates to ED lobby with steady gait.

## 2023-08-05 NOTE — ED NOTES
RN attempted to breathalize pt, pt still not able to correctly perform breathalizer. Alert team bedside. PT not endorsing SI/ HI, reports she can provide urine specimen after she drinks something.

## 2023-08-05 NOTE — DISCHARGE SUMMARY
"  ED Observation Discharge Summary    Patient:Tierney Mayer  Patient : 1985  Patient MRN: [id number]  Patient PCP: Pcp Pt States None    Admit Date: 2023  Discharge Date and Time: 23 6:02 AM  Discharge Diagnosis:   1. Acute psychosis (HCC)    2. Methamphetamine abuse (HCC)        Discharge Attending: Saurabh Escobar M.D.  Discharge Service: ED Observation    ED Course  Tierney is a 38 y.o. female who was evaluated at [facility] with bizarre behavior.  Patient with longstanding history of methamphetamine abuse.  Patient on reassessment is now clinically sober.  Denies any SI or HI.  Patient given multiple resources for polysubstance abuse and mental health, she has been seen by our behavioral health nurse.  Patient will be discharged in good condition    Discharge Exam:  /56   Pulse 78   Temp 36.6 °C (97.9 °F) (Temporal)   Resp 17   Ht 1.626 m (5' 4\")   Wt 89 kg (196 lb 3.4 oz)   SpO2 95%   BMI 33.68 kg/m² .    Constitutional: Awake and alert. Nontoxic  HENT:  Grossly normal  Eyes: Grossly normal  Neck: Normal range of motion  Cardiovascular: Normal heart rate   Thorax & Lungs: No respiratory distress  Abdomen: Nontender  Skin:  No pathologic rash.   Extremities: Well perfused  Psychiatric: Affect normal    Labs  Results for orders placed or performed during the hospital encounter of 23   URINALYSIS    Specimen: Urine   Result Value Ref Range    Color Yellow     Character Clear     Specific Gravity 1.018 <1.035    Ph 6.0 5.0 - 8.0    Glucose Negative Negative mg/dL    Ketones Trace (A) Negative mg/dL    Protein Negative Negative mg/dL    Bilirubin Negative Negative    Urobilinogen, Urine 0.2 Negative    Nitrite Negative Negative    Leukocyte Esterase Negative Negative    Occult Blood Negative Negative    Micro Urine Req see below    BETA-HCG QUALITATIVE URINE   Result Value Ref Range    Beta-Hcg Urine Negative Negative       Radiology  No orders to display       Medications: "   New Prescriptions    No medications on file       My final assessment includes   1. Acute psychosis (HCC)    2. Methamphetamine abuse (HCC)      Upon Reevaluation, the patient's condition has: Improved; and will be discharged.    Patient discharged from ED Observation status at 6:03 AM (Time) 08/05/23 (Date).     Total time spent on this ED Observation discharge encounter is > 30 Minutes    Electronically signed by: Saurabh Escobar M.D., 8/5/2023 6:02 AM

## 2023-08-05 NOTE — ED NOTES
Pt medicated per MAR. Pt reminded that urine sample is needed, pt asking RN to leave room and refusing breathalyzer.

## 2023-08-05 NOTE — ED NOTES
ERP at bedside awared that pt refused IV cannulation  Will just ask phlebotomist to collect blood later

## 2023-08-05 NOTE — ED PROVIDER NOTES
ED Provider Note    CHIEF COMPLAINT  Chief Complaint   Patient presents with    Psych Eval     Patient ambulates to triage [facility] for a psych eval. EMS was called out for a possible psychosis, per ems patient has been making numerous statements in regards to demon blood and being possessed. Patient was released from [facility] earlier today. MOST team refused to place patient on L2K until patient was evaluated by ERP.  Patient aox1, states were in [place], when asked what day it is patient repeats her birthday        EXTERNAL RECORDS REVIEWED  Outpatient Notes      HPI/ROS  LIMITATION TO HISTORY   Select: Altered mental status / Confusion  OUTSIDE HISTORIAN(S):  EMS      Tierney Mayer is a 38 y.o. female who presents psychosis.  Patient recently released from our .  Behavioral health team was called to patient's home for erratic behavior.  Limited history from patient.  Patient says she does not know who she is and is possessed.  Patient has no specific complaints.  Patient denies suicidal or homicidal ideations.    PAST MEDICAL HISTORY   has a past medical history of Hypertension, Psychiatric disorder, and Schizophrenia (HCC).    SURGICAL HISTORY   has a past surgical history that includes pelviscopy (2/22/2011) and incision and drainage general (2/22/2011).    FAMILY HISTORY  No family history on file.    SOCIAL HISTORY  Social History     Tobacco Use    Smoking status: Every Day     Packs/day: 1.00     Types: Cigarettes    Smokeless tobacco: Never    Tobacco comments:     knows she is suppose to quit but hasn't   Vaping Use    Vaping Use: Never used   Substance and Sexual Activity    Alcohol use: Not Currently    Drug use: Not Currently     Types: Inhaled, Oral     Comment: Marijuana, meth    Sexual activity: Not on file       CURRENT MEDICATIONS  Home Medications       Reviewed by Janice Esquivel R.N. (Registered Nurse) on 08/04/23 at 2893  Med List Status: <None>     Medication Last Dose Status  "  divalproex ER (DEPAKOTE ER) 500 MG TABLET SR 24 HR  Active   risperiDONE (RISPERDAL) 2 MG Tab  Active                    ALLERGIES  Allergies   Allergen Reactions    Clonazepam Unspecified     Pt reports getting a stroke.  Per historical: Twitches and paranoia.    Paliperidone Unspecified     Twitches and paranoia .    Invega Sustenna [Paliperidone Palmitate]     Olanzapine      Pt reports worsening and threatening voices.       PHYSICAL EXAM  VITAL SIGNS: BP 98/66   Pulse 89   Temp 36.6 °C (97.9 °F) (Temporal)   Resp 17   Ht 1.626 m (5' 4\")   Wt 89 kg (196 lb 3.4 oz)   SpO2 94%   BMI 33.68 kg/m²    Constitutional: Alert in no apparent distress.  HENT: No signs of trauma, Bilateral external ears normal, Nose normal.   Eyes: Pupils are equal and reactive, Conjunctiva normal, Non-icteric.   Neck: Normal range of motion, No tenderness, Supple, No stridor.   Cardiovascular: Regular rate and rhythm, no murmurs.   Thorax & Lungs: Normal breath sounds, No respiratory distress, No wheezing, No chest tenderness.   Abdomen: Bowel sounds normal, Soft, No tenderness, No masses, No pulsatile masses.   Skin: Warm, Dry, No erythema, No rash.   Back: No bony tenderness,   Extremities: Intact distal pulses, No edema, No tenderness, No cyanosis  Musculoskeletal: No tenderness to palpation or major deformities noted.   Neurologic: Alert , Normal motor function, Normal sensory function, No focal deficits noted.   Psychiatric: Tangential, and attentive, paranoid    DIAGNOSTIC STUDIES / PROCEDURES  EKG    LABS  Labs Reviewed   URINE DRUG SCREEN   POC BREATHALIZER         RADIOLOGY      COURSE & MEDICAL DECISION MAKING    ED Observation Status? Yes; I am placing the patient in to an observation status due to a diagnostic uncertainty as well as therapeutic intensity. Patient placed in observation status at 12:32 AM, 8/5/2023.     Observation plan is as follows: Monitoring for baseline mental status and clinical sobriety    Upon " Reevaluation, the patient's condition has: Remain the same we will continue to observe.  Signed out to oncoming [facility] for ongoing observation reassessment when back to baseline.        INITIAL ASSESSMENT, COURSE AND PLAN  Care Narrative: Differential includes toxidrome, acute psychosis, lower suspicion for traumatic brain injury, acute infectious process.  Patient is paranoid, tangential with bizarre thoughts.  Based on review of prior records patient with probably treated schizophrenia and methamphetamine use.  Will monitor patient and reassess given possibility of recent methamphetamine use.  Does patient with antipsychotic.  Patient continues to have acute paranoia and bizarre thinking will place legal hold as she is not want to pursue to be carried now and unable to provide consistent history.    On reassessment patient sleeping comfortably.  Patient arousable but still with bizarre thinking and delusions.  We will continue to observe patient for clinical sobriety, baseline mental status.        ADDITIONAL PROBLEM LIST    DISPOSITION AND DISCUSSIONS  I have discussed management of the patient with the following physicians and PIEDAD's:      Discussion of management with other QHP or appropriate source(s): Behavioral Health        Escalation of care considered, and ultimately not performed:    Barriers to care at this time, including but not limited to: Patient is homeless.     Decision tools and prescription drugs considered including, but not limited to: .    FINAL DIAGNOSIS  1. Acute psychosis (HCC)           Electronically signed by: Lane Becker D.O., 8/5/2023 12:29 AM

## 2023-08-05 NOTE — ED NOTES
Instructed to give urine sample but pt went to restroom but never given sample  Will continue to follow up later

## 2023-08-05 NOTE — ED TRIAGE NOTES
"Chief Complaint   Patient presents with    Psych Eval     Patient ambulates to triage [facility] for a psych eval. EMS was called out for a possible psychosis, per ems patient has been making numerous statements in regards to demon blood and being possessed. Patient was released from [facility] earlier today. MOST team refused to place patient on L2K until patient was evaluated by ERP.  Patient aox1, states were in [place], when asked what day it is patient repeats her birthday      Patient denies SI/HI, or hearing voices, however patient responses would suggest auditory hallucinations.     /80   Pulse (!) 121   Temp 36.6 °C (97.9 °F) (Temporal)   Resp 16   Ht 1.626 m (5' 4\")   Wt 89 kg (196 lb 3.4 oz)   SpO2 99%     ER charge notified, patient roomed to [facility]   "

## 2023-08-05 NOTE — ED NOTES
While security was searching belongings, they noted a bug come out of a bag pt had brought with her from [facility]. Security ceased going through belongings  Pt's head was checked for bugs and body with no signs of bugs   Charge nurse notified  Belongings in a biohazard bag

## 2023-08-05 NOTE — ED NOTES
"Pt changed into a hospital gown  Pt unable to follow commands to give a UA or preform a breathalyzer  Pt is A&Ox3 upon arrival to room from [facility]  Pt states that she is here for \"food and wine\"  ERP at bedside   "
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
## 2023-08-05 NOTE — ED PROVIDER NOTES
"  ER Provider Note    Scribed for Wyatt Lu M.d. by Jennifer Soriano. 8/5/2023  1:32 PM    Primary Care Provider: Pcp Pt States None    CHIEF COMPLAINT  Chief Complaint   Patient presents with    Psych Eval     Pt states \"Tiffanie is gonna shoot my brains out\". Pt reports she took her psych meds today, Denies any drug use. Denoes SI/HI. Pt is rambling incoherently at times but is redirectable, attending to internal stimuli.      LIMITATION TO HISTORY   Select: Poor historian    HPI/ROS      EXTERNAL RECORDS REVIEWED  Outpatient Notes Patient was released from [facility] yesterday. Dr. Escobar discharged her at 6AM this morning. She has a history of methamphetamine abuse.     Tierney Mayer is a 38 y.o. female who presents to the ED for evaluation of psych eval onset today. Patient reports that she is \"hurt inside the bowels\". She remembers being sent home from the ED at 6AM this morning and states using her dope pipe with nicotine when she left. She denies headache. No alleviating factors were reported.        PAST MEDICAL HISTORY  Past Medical History:   Diagnosis Date    Hypertension     Psychiatric disorder     Schizophrenia    Schizophrenia (HCC)        SURGICAL HISTORY  Past Surgical History:   Procedure Laterality Date    PELVISCOPY  2/22/2011    Performed by BABITA SHEETS at SURGERY SAME DAY [facility]    INCISION AND DRAINAGE GENERAL  2/22/2011    Performed by BABITA SHEETS at SURGERY SAME DAY [facility]       FAMILY HISTORY  None noted    SOCIAL HISTORY   reports that she has been smoking cigarettes. She has been smoking an average of 1 pack per day. She has never used smokeless tobacco. She reports that she does not currently use alcohol. She reports that she does not currently use drugs after having used the following drugs: Inhaled and Oral.    CURRENT MEDICATIONS  Previous Medications    DIVALPROEX ER (DEPAKOTE ER) 500 MG TABLET SR 24 HR    Take 1,000 mg by mouth every " "day.    RISPERIDONE (RISPERDAL) 2 MG TAB    Take 2 mg by mouth 2 times a day.       ALLERGIES  Clonazepam, Paliperidone, Invega sustenna [paliperidone palmitate], and Olanzapine    PHYSICAL EXAM  BP (!) 142/101   Pulse (!) 115   Temp 36.4 °C (97.6 °F) (Temporal)   Resp 18   Ht 1.702 m (5' 7\")   Wt 87.8 kg (193 lb 9 oz)   LMP 07/26/2023 (Approximate)   SpO2 96%   BMI 30.32 kg/m²     Constitutional: Alert in no apparent distress.  HENT: No signs of trauma, Bilateral external ears normal, Nose normal. Uvula midline.   Eyes: Pupils are equal and reactive, Conjunctiva normal, Non-icteric.   Neck: Normal range of motion, No tenderness, Supple, No stridor.   Lymphatic: No lymphadenopathy noted.   Cardiovascular: Tachycardic rate and normal rhythm, no murmurs.   Thorax & Lungs: Normal breath sounds, No respiratory distress, No wheezing, No chest tenderness.   Abdomen: Bowel sounds normal, Soft, No tenderness, No peritoneal signs, No masses, No pulsatile masses.   Skin: Warm, Dry, No erythema, No rash.   Back: No bony tenderness, No CVA tenderness.   Extremities: Intact distal pulses, No edema, No tenderness, No cyanosis.  Musculoskeletal: Good range of motion in all major joints. No tenderness to palpation or major deformities noted.   Neurologic: Alert , Normal motor function, Normal sensory function, No focal deficits noted. No clonus.   Psychiatric: tangetial, slurred    DIAGNOSTIC STUDIES & PROCEDURES    Labs:   Results for orders placed or performed during the hospital encounter of 08/05/23   CBC WITH DIFFERENTIAL   Result Value Ref Range    WBC 13.5 (H) 4.8 - 10.8 K/uL    RBC 4.41 4.20 - 5.40 M/uL    Hemoglobin 12.4 12.0 - 16.0 g/dL    Hematocrit 38.0 37.0 - 47.0 %    MCV 86.2 81.4 - 97.8 fL    MCH 28.1 27.0 - 33.0 pg    MCHC 32.6 32.2 - 35.5 g/dL    RDW 44.6 35.9 - 50.0 fL    Platelet Count 336 164 - 446 K/uL    MPV 9.8 9.0 - 12.9 fL    Neutrophils-Polys 71.30 44.00 - 72.00 %    Lymphocytes 22.00 22.00 - " 41.00 %    Monocytes 5.20 0.00 - 13.40 %    Eosinophils 0.70 0.00 - 6.90 %    Basophils 0.30 0.00 - 1.80 %    Immature Granulocytes 0.50 0.00 - 0.90 %    Nucleated RBC 0.00 0.00 - 0.20 /100 WBC    Neutrophils (Absolute) 9.64 (H) 1.82 - 7.42 K/uL    Lymphs (Absolute) 2.98 1.00 - 4.80 K/uL    Monos (Absolute) 0.71 0.00 - 0.85 K/uL    Eos (Absolute) 0.09 0.00 - 0.51 K/uL    Baso (Absolute) 0.04 0.00 - 0.12 K/uL    Immature Granulocytes (abs) 0.07 0.00 - 0.11 K/uL    NRBC (Absolute) 0.00 K/uL   CMP   Result Value Ref Range    Sodium 138 135 - 145 mmol/L    Potassium 3.3 (L) 3.6 - 5.5 mmol/L    Chloride 103 96 - 112 mmol/L    Co2 22 20 - 33 mmol/L    Anion Gap 13.0 7.0 - 16.0    Glucose 185 (H) 65 - 99 mg/dL    Bun 14 8 - 22 mg/dL    Creatinine 0.73 0.50 - 1.40 mg/dL    Calcium 9.2 8.5 - 10.5 mg/dL    Correct Calcium 9.0 8.5 - 10.5 mg/dL    AST(SGOT) 16 12 - 45 U/L    ALT(SGPT) 15 2 - 50 U/L    Alkaline Phosphatase 98 30 - 99 U/L    Total Bilirubin 0.2 0.1 - 1.5 mg/dL    Albumin 4.2 3.2 - 4.9 g/dL    Total Protein 7.0 6.0 - 8.2 g/dL    Globulin 2.8 1.9 - 3.5 g/dL    A-G Ratio 1.5 g/dL   CREATINE KINASE   Result Value Ref Range    CPK Total 56 0 - 154 U/L   BETA-HCG QUALITATIVE SERUM   Result Value Ref Range    Beta-Hcg Qualitative Serum Negative Negative   ESTIMATED GFR   Result Value Ref Range    GFR (CKD-EPI) 106 >60 mL/min/1.73 m 2      All labs reviewed by me.    COURSE & MEDICAL DECISION MAKING    ED Observation Status? Yes; I am placing the patient in to an observation status due to a diagnostic uncertainty as well as therapeutic intensity. Patient placed in observation status at 1:35 PM, 8/5/2023.     Observation plan is as follows: sober reeval    Upon Reevaluation, the patient's condition has: Improved; and will be discharged.    Patient discharged from ED Observation status at 543 (Time) 8/5 (Date).     INITIAL ASSESSMENT AND PLAN  Care Narrative:       1:32 PM - Patient seen and evaluated at bedside. Tierney  Alex Mayer is a 38 y.o. female who presents with psych eval. . Ordered Urine drug screen, CBC with diff, CMP, Creatine kinase, and Beta HCG to evaluate. She understands and agrees to the plan of care.     Differential diagnoses include but are not limited to: Pt acutely altered on arrival. Reports +meth use.   PT given ativan with improvement in sx    2:57 PM - Patient is incoherent. She is refusing an IV. Administered IV injection.     HYDRATION: Based on the patient's presentation of Dehydration the patient was given IV fluids. IV Hydration was used because oral hydration was not as rapid as required. Upon recheck following hydration, the patient was mildly improved.    5:45 PM pt now sober and ambulates w/ steady gait w/o assistance and tolerating PO prior to dc               DISPOSITION AND DISCUSSIONS      Discussion of management with other QHP or appropriate source(s): None     Escalation of care considered, and ultimately not performed: acute inpatient care management, however at this time, the patient is most appropriate for outpatient management.    Barriers to care at this time, including but not limited to: Patient does not have established PCP.     FINAL IMPRESSION   1. Acute psychosis (HCC)    2. Methamphetamine abuse (HCC)         Jennifer DONG (Chika), am scribing for, and in the presence of, Wyatt Lu M.D..    Electronically signed by: Jennifer Soriano (Chika), 8/5/2023    Wyatt DONG M.D. personally performed the services described in this documentation, as scribed by Jennifer Soriano in my presence, and it is both accurate and complete.    The note accurately reflects work and decisions made by me.  Wyatt Lu M.D.  8/5/2023  5:44 PM
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
## 2023-08-05 NOTE — ED NOTES
Checked on bed, with unlabored respirations. No safety risk noted    Awake on bed, still restless and trying to leave the room  Redirected and re-oriented the pt    Continued safety precaution  Gurney in low position, side rail up for pt safety.   Will continue to monitor for any complications.

## 2023-08-05 NOTE — ED NOTES
Bed alarm applied per altered pt protocol. Pt provided blanket, resting with even chest rise and fall, reports no needs at this time.

## 2023-08-05 NOTE — ED NOTES
Taken patient from triage waiting room, ambulatory with steady gait, alert/ oriented x 4.Verified patient identification.  Assumed patient care. Placed on patient room. . Given the call light and instructed to call for any assistance needed/ or concerns. Bed on lowest position, side rails up, breaks locked. Will continue to monitor for any complications

## 2023-08-05 NOTE — ED TRIAGE NOTES
"Tierney Mayer.  38 y.o.  Female  Chief Complaint   Patient presents with    Psych Eval     Pt states \"Tiffanie is gonna shoot my brains out\". Pt reports she took her psych meds today, Denies any drug use. Denoes SI/HI. Pt is rambling incoherently at times but is redirectable, attending to internal stimuli.      Patient educated on triage process, to alert staff if any changes in condition.    "

## 2023-08-06 VITALS
HEIGHT: 65 IN | BODY MASS INDEX: 24.99 KG/M2 | WEIGHT: 150 LBS | OXYGEN SATURATION: 96 % | RESPIRATION RATE: 17 BRPM | HEART RATE: 103 BPM | SYSTOLIC BLOOD PRESSURE: 148 MMHG | DIASTOLIC BLOOD PRESSURE: 103 MMHG | TEMPERATURE: 98.8 F

## 2023-08-06 PROCEDURE — A9270 NON-COVERED ITEM OR SERVICE: HCPCS | Mod: UD | Performed by: EMERGENCY MEDICINE

## 2023-08-06 PROCEDURE — 700102 HCHG RX REV CODE 250 W/ 637 OVERRIDE(OP): Mod: UD | Performed by: EMERGENCY MEDICINE

## 2023-08-06 RX ADMIN — RISPERIDONE 2 MG: 2 TABLET ORAL at 00:22

## 2023-08-06 NOTE — ED NOTES
"Pt discharged home. Pt in possession of belongings. Pt provided discharge education and information pertaining to medications and follow up appointments. Pt received copy of discharge instructions and verbalized understanding. BP (!) 148/103   Pulse (!) 103   Temp 37.1 °C (98.8 °F) (Temporal)   Resp 17   Ht 1.651 m (5' 5\")   Wt 68 kg (150 lb)   LMP 07/26/2023 (Approximate)   SpO2 96%   BMI 24.96 kg/m²     "

## 2023-08-06 NOTE — ED NOTES
Taken patient from triage waiting room, ambulatory with steady gait, alert/ oriented x 4.Verified patient identification.  Assumed patient care. Placed on patient room  Refused to change her clothes with a gown     Given the call light and instructed to call for any assistance needed/ or concerns. Bed on lowest position, side rails up, breaks locked. Will continue to monitor for any complications     Pt was just discharged few hours ago, she said she just stay in the ER lobby area and register again because she is hungry

## 2023-08-06 NOTE — ED NOTES
Pt discharged to home. Discharge paperwork provided. Education provided by ERP.  Pt was given follow up instructions. Pt verbalized understanding of all instructions for discharge. Patient ambulatory, alert and oriented x 4, out of ER with all belongings and steady gait.

## 2023-08-06 NOTE — DISCHARGE INSTRUCTIONS
Please discuss the following findings with your regular doctor:  No orders to display     Labs Reviewed   CBC WITH DIFFERENTIAL - Abnormal; Notable for the following components:       Result Value    WBC 13.5 (*)     Neutrophils (Absolute) 9.64 (*)     All other components within normal limits   COMP METABOLIC PANEL - Abnormal; Notable for the following components:    Potassium 3.3 (*)     Glucose 185 (*)     All other components within normal limits   CREATINE KINASE   HCG QUAL SERUM   ESTIMATED GFR   URINE DRUG SCREEN

## 2023-08-06 NOTE — DISCHARGE SUMMARY
"  ED Observation Discharge Summary    Patient:Tierney Mayer  Patient : 1985  Patient MRN: [id number]  Patient PCP: Pcp Pt States None    Admit Date: 2023  Discharge Date and Time: 23 4:15 AM  Discharge Diagnosis: Schizophrenia, unspecified type  Discharge Attending: Mel Hogue M.D.  Discharge Service: ED Observation    ED Course  Tierney is a 38 y.o. female who was evaluated at [facility] for vague complaints.  She has a history of schizophrenia and was discharged from the ER earlier today for acute signs of psychosis.  After being discharged, she smokes methamphetamine.  She has no acute complaints.  Laboratory evaluation was done and found mild leukocytosis but patient had no fever.  No apparent acute organic medical issues were identified.  Patient was given dose of Risperdal.  Psychiatry evaluated patient and felt that she did not meet criteria for legal hold as she is not gravely disabled.    4:15 AM I reevaluated the patient at bedside.  She is resting comfortably and reports that she is feeling well.  She wishes to go home.  She has seen a complete meal.  I discussed importance of cessation from methamphetamines.  She will follow-up with her outpatient psychiatrist.  Strict ER return precautions were discussed.  Patient is agreeable to plan with no further questions.      Discharge Exam:  /57   Pulse (!) 105   Temp 36.7 °C (98.1 °F) (Temporal)   Resp 19   Ht 1.651 m (5' 5\")   Wt 68 kg (150 lb)   LMP 2023 (Approximate)   SpO2 94%   BMI 24.96 kg/m² .    Constitutional: Awake and alert. Nontoxic  HENT:  Grossly normal  Eyes: Grossly normal  Neck: Normal range of motion  Cardiovascular: Normal heart rate   Thorax & Lungs: No respiratory distress  Abdomen: Nontender  Skin:  No pathologic rash.   Extremities: Well perfused  Psychiatric: Affect normal    Labs  Results for orders placed or performed during the hospital encounter of 23   CBC WITH DIFFERENTIAL "   Result Value Ref Range    WBC 13.6 (H) 4.8 - 10.8 K/uL    RBC 4.47 4.20 - 5.40 M/uL    Hemoglobin 12.6 12.0 - 16.0 g/dL    Hematocrit 38.2 37.0 - 47.0 %    MCV 85.5 81.4 - 97.8 fL    MCH 28.2 27.0 - 33.0 pg    MCHC 33.0 32.2 - 35.5 g/dL    RDW 43.8 35.9 - 50.0 fL    Platelet Count 377 164 - 446 K/uL    MPV 9.7 9.0 - 12.9 fL    Neutrophils-Polys 72.50 (H) 44.00 - 72.00 %    Lymphocytes 20.50 (L) 22.00 - 41.00 %    Monocytes 5.40 0.00 - 13.40 %    Eosinophils 0.80 0.00 - 6.90 %    Basophils 0.40 0.00 - 1.80 %    Immature Granulocytes 0.40 0.00 - 0.90 %    Nucleated RBC 0.00 0.00 - 0.20 /100 WBC    Neutrophils (Absolute) 9.84 (H) 1.82 - 7.42 K/uL    Lymphs (Absolute) 2.78 1.00 - 4.80 K/uL    Monos (Absolute) 0.73 0.00 - 0.85 K/uL    Eos (Absolute) 0.11 0.00 - 0.51 K/uL    Baso (Absolute) 0.05 0.00 - 0.12 K/uL    Immature Granulocytes (abs) 0.06 0.00 - 0.11 K/uL    NRBC (Absolute) 0.00 K/uL   COMP METABOLIC PANEL   Result Value Ref Range    Sodium 132 (L) 135 - 145 mmol/L    Potassium 3.7 3.6 - 5.5 mmol/L    Chloride 99 96 - 112 mmol/L    Co2 22 20 - 33 mmol/L    Anion Gap 11.0 7.0 - 16.0    Glucose 105 (H) 65 - 99 mg/dL    Bun 13 8 - 22 mg/dL    Creatinine 0.63 0.50 - 1.40 mg/dL    Calcium 9.1 8.5 - 10.5 mg/dL    Correct Calcium 8.9 8.5 - 10.5 mg/dL    AST(SGOT) 17 12 - 45 U/L    ALT(SGPT) 17 2 - 50 U/L    Alkaline Phosphatase 107 (H) 30 - 99 U/L    Total Bilirubin 0.3 0.1 - 1.5 mg/dL    Albumin 4.3 3.2 - 4.9 g/dL    Total Protein 7.7 6.0 - 8.2 g/dL    Globulin 3.4 1.9 - 3.5 g/dL    A-G Ratio 1.3 g/dL   DIAGNOSTIC ALCOHOL   Result Value Ref Range    Diagnostic Alcohol <10.1 <10.1 mg/dL   URINE DRUG SCREEN   Result Value Ref Range    Amphetamines Urine Positive (A) Negative    Barbiturates Negative Negative    Benzodiazepines Negative Negative    Cocaine Metabolite Negative Negative    Fentanyl, Urine Negative Negative    Methadone Negative Negative    Opiates Negative Negative    Oxycodone Negative Negative     Phencyclidine -Pcp Negative Negative    Propoxyphene Negative Negative    Cannabinoid Metab Negative Negative   ESTIMATED GFR   Result Value Ref Range    GFR (CKD-EPI) 115 >60 mL/min/1.73 m 2       My final assessment includes patient improved, seen by behavioral health team who who does not feel that she meets criteria for legal hold and I agree with this as I do not think that the patient is gravely disabled.  She denies any suicidal ideation.  Suspect her underlying schizophrenia is worsened with methamphetamine use.    Upon Reevaluation, the patient's condition has: Improved; and will be discharged.    Patient discharged from ED Observation status at 04:20 (Time) 08/06/2023 (Date).     Total time spent on this ED Observation discharge encounter is < 30 Minutes    Electronically signed by: Mel Hogue M.D., 8/6/2023 4:15 AM

## 2023-08-06 NOTE — DISCHARGE INSTRUCTIONS
Please follow-up with your psychiatrist and stop using drugs.  Return to the ER for any fever, crushing chest pain or any other concerns.

## 2023-08-06 NOTE — ED NOTES
Checked on bed, with unlabored respirations. No safety risk noted  Pt is awake on bed, saying that she want to go home  Notified to the provider, awaiting for order  Continued safety precaution  Elpidio in low position, side rail up for pt safety.   Will continue to monitor for any complications.

## 2023-08-06 NOTE — ED NOTES
Bedside report given to the next shift [facility] for continuity of care and management.  Provided opportunity to asks questions.  Pt connected to monitor  All pt belongings at bedside
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
## 2023-08-07 ENCOUNTER — HOSPITAL ENCOUNTER (EMERGENCY)
Facility: MEDICAL CENTER | Age: 38
End: 2023-08-07
Attending: EMERGENCY MEDICINE
Payer: MEDICAID

## 2023-08-07 VITALS
RESPIRATION RATE: 18 BRPM | WEIGHT: 150 LBS | HEIGHT: 65 IN | SYSTOLIC BLOOD PRESSURE: 148 MMHG | HEART RATE: 99 BPM | OXYGEN SATURATION: 98 % | DIASTOLIC BLOOD PRESSURE: 91 MMHG | TEMPERATURE: 97.6 F | BODY MASS INDEX: 24.99 KG/M2

## 2023-08-07 DIAGNOSIS — Z00.8 ENCOUNTER FOR PSYCHOLOGICAL EVALUATION: ICD-10-CM

## 2023-08-07 LAB
AMPHET UR QL SCN: POSITIVE
BARBITURATES UR QL SCN: NEGATIVE
BENZODIAZ UR QL SCN: NEGATIVE
BZE UR QL SCN: NEGATIVE
CANNABINOIDS UR QL SCN: NEGATIVE
FENTANYL UR QL: NEGATIVE
HCG UR QL: NEGATIVE
METHADONE UR QL SCN: NEGATIVE
OPIATES UR QL SCN: NEGATIVE
OXYCODONE UR QL SCN: NEGATIVE
PCP UR QL SCN: NEGATIVE
PROPOXYPH UR QL SCN: NEGATIVE

## 2023-08-07 PROCEDURE — 80307 DRUG TEST PRSMV CHEM ANLYZR: CPT

## 2023-08-07 PROCEDURE — A9270 NON-COVERED ITEM OR SERVICE: HCPCS | Mod: UD | Performed by: EMERGENCY MEDICINE

## 2023-08-07 PROCEDURE — 81025 URINE PREGNANCY TEST: CPT

## 2023-08-07 PROCEDURE — 700102 HCHG RX REV CODE 250 W/ 637 OVERRIDE(OP): Mod: UD | Performed by: EMERGENCY MEDICINE

## 2023-08-07 PROCEDURE — 99285 EMERGENCY DEPT VISIT HI MDM: CPT

## 2023-08-07 RX ORDER — DIVALPROEX SODIUM 500 MG/1
1000 TABLET, EXTENDED RELEASE ORAL DAILY
Status: DISCONTINUED | OUTPATIENT
Start: 2023-08-07 | End: 2023-08-07 | Stop reason: HOSPADM

## 2023-08-07 RX ORDER — RISPERIDONE 2 MG/1
2 TABLET ORAL 2 TIMES DAILY
Status: DISCONTINUED | OUTPATIENT
Start: 2023-08-07 | End: 2023-08-07 | Stop reason: HOSPADM

## 2023-08-07 RX ADMIN — RISPERIDONE 2 MG: 2 TABLET ORAL at 09:53

## 2023-08-07 RX ADMIN — DIVALPROEX SODIUM 1000 MG: 500 TABLET, EXTENDED RELEASE ORAL at 11:07

## 2023-08-07 ASSESSMENT — FIBROSIS 4 INDEX: FIB4 SCORE: 0.44

## 2023-08-07 NOTE — ED NOTES
ERP aware of pt refusing breathilizer. ERP does not want a blood draw. Updated behavioral health RN

## 2023-08-07 NOTE — ED NOTES
Pt  provided discharge instructions, as well as resources to well care or [facility]. Pt verbalized understanding. Pt leaving ER in stable condition, pt ambulatory with steady gait.     Pt provided MTM paperwork as well to call for taxi if wanted
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
## 2023-08-07 NOTE — CONSULTS
Pt received medications per MAR. At time of consult, she is alert, oriented and able to formulate a safe discharge plan. She is an established patient at [facility] outpatient clinic, but has not been seen since January. Contacted [facility] for appointment. Tierney is able to be seen on a walk-in basis and can present for services. She was discharged from [facility] last week. She states that she has her medication and is not in need of a refill. Provided pt with addresses and phone numbers for [facility] and [facility] and reviewed MTM benefits. She denies SI/HI. Although she has some psychosis, it appears to be at baseline. She is not meeting criteria for a legal hold at this time. Discharge to self.

## 2023-08-10 ENCOUNTER — HOSPITAL ENCOUNTER (EMERGENCY)
Facility: MEDICAL CENTER | Age: 38
End: 2023-08-10
Attending: EMERGENCY MEDICINE
Payer: MEDICAID

## 2023-08-10 VITALS
BODY MASS INDEX: 24.98 KG/M2 | HEART RATE: 80 BPM | RESPIRATION RATE: 17 BRPM | SYSTOLIC BLOOD PRESSURE: 130 MMHG | HEIGHT: 65 IN | DIASTOLIC BLOOD PRESSURE: 82 MMHG | WEIGHT: 149.91 LBS | OXYGEN SATURATION: 98 % | TEMPERATURE: 98 F

## 2023-08-10 DIAGNOSIS — F20.9 SCHIZOPHRENIA, UNSPECIFIED TYPE (HCC): ICD-10-CM

## 2023-08-10 DIAGNOSIS — F15.10 METHAMPHETAMINE ABUSE (HCC): ICD-10-CM

## 2023-08-10 PROCEDURE — 96372 THER/PROPH/DIAG INJ SC/IM: CPT

## 2023-08-10 PROCEDURE — 700111 HCHG RX REV CODE 636 W/ 250 OVERRIDE (IP): Mod: JZ,UD | Performed by: EMERGENCY MEDICINE

## 2023-08-10 PROCEDURE — 99284 EMERGENCY DEPT VISIT MOD MDM: CPT

## 2023-08-10 RX ORDER — RISPERIDONE 2 MG/1
2 TABLET ORAL 2 TIMES DAILY
Qty: 60 TABLET | Refills: 0 | Status: SHIPPED | OUTPATIENT
Start: 2023-08-10 | End: 2024-02-06

## 2023-08-10 RX ORDER — HALOPERIDOL 5 MG/ML
5 INJECTION INTRAMUSCULAR ONCE
Status: COMPLETED | OUTPATIENT
Start: 2023-08-10 | End: 2023-08-10

## 2023-08-10 RX ADMIN — HALOPERIDOL LACTATE 5 MG: 5 INJECTION, SOLUTION INTRAMUSCULAR at 08:09

## 2023-08-10 ASSESSMENT — FIBROSIS 4 INDEX: FIB4 SCORE: 0.44

## 2023-08-10 NOTE — DISCHARGE INSTRUCTIONS
Please take your medications as directed, I have sent these to your pharmacy. furthermore stop doing meth
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
## 2023-08-10 NOTE — ED NOTES
ERP came to bedside with me to talk to pt about taking the medicine. Pt was agreeable to take medication while erp in room. Pt was then given coffee per her request

## 2023-08-10 NOTE — ED NOTES
To dc pt and pt asks if she can just sleep some more. Pt made aware she is dc'd and can no longer sleep in ER. Pt unwilling to listen to dc instructions.

## 2023-08-10 NOTE — ED NOTES
"Pt demanding coffee when told doctor ordered her medication she told me to \"fuck off\" and just give her the coffee.   "
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
## 2023-08-16 ENCOUNTER — HOSPITAL ENCOUNTER (EMERGENCY)
Facility: MEDICAL CENTER | Age: 38
End: 2023-08-17
Attending: STUDENT IN AN ORGANIZED HEALTH CARE EDUCATION/TRAINING PROGRAM
Payer: MEDICAID

## 2023-08-16 VITALS
HEART RATE: 105 BPM | TEMPERATURE: 97.9 F | DIASTOLIC BLOOD PRESSURE: 76 MMHG | RESPIRATION RATE: 18 BRPM | WEIGHT: 149.91 LBS | HEIGHT: 65 IN | OXYGEN SATURATION: 96 % | SYSTOLIC BLOOD PRESSURE: 137 MMHG | BODY MASS INDEX: 24.98 KG/M2

## 2023-08-16 DIAGNOSIS — F20.9 SCHIZOPHRENIA, UNSPECIFIED TYPE (HCC): ICD-10-CM

## 2023-08-16 DIAGNOSIS — F15.10 METHAMPHETAMINE ABUSE (HCC): ICD-10-CM

## 2023-08-16 PROCEDURE — 99284 EMERGENCY DEPT VISIT MOD MDM: CPT

## 2023-08-16 ASSESSMENT — FIBROSIS 4 INDEX: FIB4 SCORE: 0.44

## 2023-08-17 ENCOUNTER — HOSPITAL ENCOUNTER (EMERGENCY)
Facility: MEDICAL CENTER | Age: 38
End: 2023-08-18
Attending: STUDENT IN AN ORGANIZED HEALTH CARE EDUCATION/TRAINING PROGRAM
Payer: MEDICAID

## 2023-08-17 DIAGNOSIS — F15.10 METHAMPHETAMINE ABUSE (HCC): ICD-10-CM

## 2023-08-17 PROCEDURE — 99284 EMERGENCY DEPT VISIT MOD MDM: CPT

## 2023-08-17 ASSESSMENT — FIBROSIS 4 INDEX: FIB4 SCORE: 0.44
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
## 2023-08-17 NOTE — DISCHARGE INSTRUCTIONS
You were seen in the emergency room for psychiatric evaluation.  It is imperative that you stop using methamphetamine.  A prescription for risperidone has been sent to your [facility] pharmacy on [address].  Please return to the emergency room for any suicidal ideation, thoughts of hurting others or any other concerns.  Follow-up with your outpatient psychiatrist.

## 2023-08-17 NOTE — ED PROVIDER NOTES
"ED Provider Note    CHIEF COMPLAINT  Chief Complaint   Patient presents with    Psych Eval     Pt arrives with PD with word salad and verbal aggression. When asked why she is in the hospital, pt [place]\" pt placed on L2K by PD       EXTERNAL RECORDS REVIEWED  External ED Note patient seen at [facility] 08/14/2023 after released from longterm and not able to meet basic needs but was ultimately discharged    HPI/ROS  LIMITATION TO HISTORY   Select: : None  OUTSIDE HISTORIAN(S):  None    Tierney Alex Mayer is a 38 y.o. female with history of schizophrenia as well as meth use disorder who presents for psychiatric evaluation.  She is brought in by police due to abnormal comments [place] She was placed on legal 2000 by police.  On my evaluation of the patient, she is requesting for food and wanting to smoke a cigarette.  She does have a history of meth use but denies using any today.  She denies that she arrived to the emergency room by police.  She has no SI, HI, hallucinations.  She has no somatic complaints such as no fever, chest pain, shortness of breath, abdominal pain, nausea, vomiting, diarrhea.    PAST MEDICAL HISTORY   has a past medical history of Hypertension, Psychiatric disorder, and Schizophrenia (HCC).    SURGICAL HISTORY   has a past surgical history that includes pelviscopy (2/22/2011) and incision and drainage general (2/22/2011).    FAMILY HISTORY  No family history on file.    SOCIAL HISTORY  Social History     Tobacco Use    Smoking status: Every Day     Packs/day: 1     Types: Cigarettes    Smokeless tobacco: Never    Tobacco comments:     knows she is suppose to quit but hasn't   Vaping Use    Vaping Use: Never used   Substance and Sexual Activity    Alcohol use: Yes    Drug use: Not Currently     Types: Inhaled, Oral     Comment: Marijuana, meth    Sexual activity: Not on file       CURRENT " "MEDICATIONS  Home Medications       Reviewed by Jeannine Brito R.N. (Registered Nurse) on 08/16/23 at 2252  Med List Status: <None>     Medication Last Dose Status   divalproex ER (DEPAKOTE ER) 500 MG TABLET SR 24 HR  Active   risperiDONE (RISPERDAL) 2 MG Tab  Active                    ALLERGIES  Allergies   Allergen Reactions    Clonazepam Unspecified     Pt reports getting a stroke.  Per historical: Twitches and paranoia.    Paliperidone Unspecified     Twitches and paranoia. On risperidone as outpatient with no issues     Invega Sustenna [Paliperidone Palmitate]      On risperidone outpatient without issues.     Olanzapine      Pt reports worsening and threatening voices.       PHYSICAL EXAM  VITAL SIGNS: /76   Pulse (!) 105   Temp 36.6 °C (97.9 °F) (Temporal)   Resp 18   Ht 1.651 m (5' 5\")   Wt 68 kg (149 lb 14.6 oz)   LMP 07/26/2023 (Approximate)   SpO2 96%   BMI 24.95 kg/m²      Constitutional: Well developed, Well nourished, No acute respiratory distress, Non-toxic appearance.   HENT: Normocephalic, Atraumatic, Bilateral external ears normal, Oropharynx clear, mucous membranes are moist.  Eyes: Conjunctiva normal, No discharge. No icterus.  Neck: Normal range of motion. Supple.  Cardiovascular: Normal heart rate, Normal rhythm, No murmurs, No rubs, No gallops.   Thorax & Lungs: Clear to auscultation bilaterally, No respiratory distress, No wheezing.  Abdomen: Soft nontender no rebound masses or peritoneal signs  Skin: Warm, Dry, No erythema, No rash.   Extremities: Intact distal pulses, No edema, No tenderness  Musculoskeletal: Good range of motion in all major joints. Normal gait.  Neurologic: Alert & oriented x 3. No focal deficits noted.   Psych: Tangential thought processes but has good eye contact, denies SI or HI. Not responding to internal stimuli.  Does have some bizarre thoughts    COURSE & MEDICAL DECISION MAKING    ED Observation Status? No; Patient does not meet criteria for ED " Observation.     INITIAL ASSESSMENT, COURSE AND PLAN  Care Narrative: This is a 38-year-old female with a history of schizophrenia and meth use disorder who presents after she was brought to the emergency room by police for psychiatric evaluation.  On arrival, her vital signs are stable.  On my evaluation, the patient is resting comfortably in bed.  She is not aggressive.  She is not responding to internal stimuli.  She has no SI or HI.  She denies any hallucinations.  She does have a history of methamphetamine use however she denies using any today.  She does have some bizarre thoughts including meeting up with people behind the hospital but she states that she is hungry and wants to smoke a cigarette.  She does not appear to be gravely disabled and does not appear to be a threat to herself or others.  She does go to [facility] for outpatient psychiatric services however she has poor compliance with appointments and medications.  She had her risperidone refilled 7 days ago therefore she is instructed to  this prescription at the pharmacy.  I did discuss with psychiatric services who also evaluated her and know her very well.  They state that she is at her baseline and did not feel that she meets criteria for legal hold.  I agree with this assessment.    The patient was provided food.  She is instructed to stop using methamphetamine, take her medication and follow-up with her psychiatrist as an outpatient.  Strict ER return precautions were discussed.  Patient is agreeable to discharge plan with no further questions.    HTN/IDDM FOLLOW UP:  The patient is referred to a primary physician for blood pressure management, diabetic screening, and for all other preventive health concerns        DISPOSITION AND DISCUSSIONS    Discussion of management with other [facility] or appropriate source(s): Behavioral Health        Patient discharged home in stable condition with instructions to follow-up with psychiatrist.   Strict ER return precautions were discussed.  Patient is agreeable to discharge plan with no further questions.    FINAL DIAGNOSIS  1. Schizophrenia, unspecified type (HCC)    2. Methamphetamine abuse (HCC)         Electronically signed by: Mel Hogue M.D., 8/16/2023 11:13 PM

## 2023-08-17 NOTE — DISCHARGE PLANNING
Alert Team:    ERP and this writer met with pt to discuss plan of care. Pt denies having any medical complaints. Denies SI/HI/hallucinations. Pt well-known to alert team and behaving at base line. Pt wants to go out and smoke cigarettes. Psychiatric diagnoses include schizoaffective d/o, Bipolar type. She is established at [facility] for outpatient psychiatric services; poor compliance with appointments and medications. Hx of methamphetamine abuse, though pt often denies using, despite testing positive for amphetamines. Requesting food. Contracts for safety. Pt given sandwich and hot meal. ERP agrees pt safe to discharge after she eats. Legal hold discontinued. Updated RN.
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
## 2023-08-18 VITALS
SYSTOLIC BLOOD PRESSURE: 132 MMHG | HEART RATE: 97 BPM | DIASTOLIC BLOOD PRESSURE: 72 MMHG | HEIGHT: 65 IN | TEMPERATURE: 98.7 F | BODY MASS INDEX: 32.51 KG/M2 | OXYGEN SATURATION: 97 % | RESPIRATION RATE: 16 BRPM | WEIGHT: 195.11 LBS

## 2023-08-18 PROCEDURE — 99284 EMERGENCY DEPT VISIT MOD MDM: CPT

## 2023-08-18 NOTE — ED TRIAGE NOTES
"Chief Complaint   Patient presents with    Psych Eval     Pt is having flights of idea. Pt reports not taking her psych meds  Pt is unable to formulate sentences with meaning   Pt reports hearing voices. Voices are telling her to go out the back door      BP (!) 144/100   Pulse (!) 105   Temp 37.2 °C (98.9 °F) (Temporal)   Resp 16   Ht 1.651 m (5' 5\")   Wt 88.5 kg (195 lb 1.7 oz)   LMP 07/26/2023 (Approximate)   SpO2 98%   BMI 32.47 kg/m²     Charge called for a room. Pt to go to 31  "

## 2023-08-18 NOTE — ED PROVIDER NOTES
ED Provider Note    CHIEF COMPLAINT  Chief Complaint   Patient presents with    Psych Eval     Pt is having flights of idea. Pt reports not taking her psych meds  Pt is unable to formulate sentences with meaning   Pt reports hearing voices. Voices are telling her to go out the back door        EXTERNAL RECORDS REVIEWED  External ED Note [facility] 8/14/2023 ultimately discharged    HPI/ROS  LIMITATION TO HISTORY   Select: Decompensated schizophrenia  OUTSIDE HISTORIAN(S):  None    Tierney Mayer is a 38 y.o. female who presents for psychiatric evaluation.  Patient with a history of meth use and schizophrenia recently seen here in the ED 2 days ago.  Patient requesting food and wanting blood drawn but unable to articulate why.  Endorses recent meth use.  Does endorse hearing voices denies SI/HI.  Reports not taking her Resporal because it makes her feel bad.    PAST MEDICAL HISTORY   has a past medical history of Hypertension, Psychiatric disorder, and Schizophrenia ([facility]).    SURGICAL HISTORY   has a past surgical history that includes pelviscopy (2/22/2011) and incision and drainage general (2/22/2011).    FAMILY HISTORY  No family history on file.    SOCIAL HISTORY  Social History     Tobacco Use    Smoking status: Every Day     Packs/day: 1     Types: Cigarettes    Smokeless tobacco: Never    Tobacco comments:     knows she is suppose to quit but hasn't   Vaping Use    Vaping Use: Never used   Substance and Sexual Activity    Alcohol use: Yes    Drug use: Not Currently     Types: Inhaled, Oral     Comment: Marijuana, meth    Sexual activity: Not on file       CURRENT MEDICATIONS  Home Medications    **Home medications have not yet been reviewed for this encounter**         ALLERGIES  Allergies   Allergen Reactions    Clonazepam Unspecified     Pt reports getting a stroke.  Per historical: Twitches and paranoia.    Paliperidone Unspecified     Twitches and paranoia. On risperidone as outpatient with no  "issues     Invega Fidencioenna [Paliperidone Palmitate]      On risperidone outpatient without issues.     Olanzapine      Pt reports worsening and threatening voices.       PHYSICAL EXAM  VITAL SIGNS: /72   Pulse 97   Temp 37.1 °C (98.7 °F) (Temporal)   Resp 16   Ht 1.651 m (5' 5\")   Wt 88.5 kg (195 lb 1.7 oz)   LMP 07/26/2023 (Approximate)   SpO2 97%   BMI 32.47 kg/m²    Physical Exam  Vitals and nursing note reviewed.   Constitutional:       Comments: Disheveled   HENT:      Head: Normocephalic.   Cardiovascular:      Rate and Rhythm: Normal rate and regular rhythm.      Heart sounds: Normal heart sounds.   Pulmonary:      Effort: Pulmonary effort is normal.      Breath sounds: Normal breath sounds.   Abdominal:      General: Abdomen is flat.      Palpations: Abdomen is soft.      Tenderness: There is no abdominal tenderness.   Musculoskeletal:         General: Normal range of motion.   Neurological:      General: No focal deficit present.      Mental Status: She is alert.   Psychiatric:      Comments: Thought blocking, hyperactive denies SI/HI disorganized thought process         COURSE & MEDICAL DECISION MAKING    ED Observation Status? No; Patient does not meet criteria for ED Observation.     INITIAL ASSESSMENT, COURSE AND PLAN  Care Narrative: 38-year-old female with a history of schizophrenia and methamphetamine abuse presents voluntarily for complaints of hunger and requesting blood to be drawn patient disorganized, hyperactive and disheveled on exam. per chart review patient does appear to be at her psychiatric baseline.  She has a history of denying drug use with positive drug test for amphetamines.  I do believe she could be under the influence of amphetamines at this time due to her clinical presentation.  She endorses drug use earlier than denies a minute later.  Differential diagnosis includes decompensated schizophrenia, methamphetamine induced psychosis, bipolar, anxiety, " malingering    Given patient appears to be at her psychiatric baseline, is adamantly denying SI/HI and landen for safety with multiple recent ED presentations I do not feel the patient is meeting any criteria for inpatient admission nor would benefit from admission at this time.  She has a longstanding history of medication nonadherence and continues to not take her medications.  Her Resporal was refilled just over a week ago and she has still not picked it up.  I counseled the patient on drug cessation and medication compliance however her thought process is disorganized and she reiterated that she does not like taking medication because it is not good for her.  I provided the patient with return precautions for development of SI/HI and provided her with outpatient resources to follow-up with behavioral health specialists on an outpatient basis.        DISPOSITION AND DISCUSSIONS      Escalation of care considered, and ultimately not performed:acute inpatient care management, however at this time, the patient is most appropriate for outpatient management    Barriers to care at this time, including but not limited to: Patient is homeless.     Decision tools and prescription drugs considered including, but not limited to:  Risperdal .    FINAL DIAGNOSIS  1. Methamphetamine abuse (HCC)           Electronically signed by: Memo Rogers M.D., 8/18/2023 12:15 AM

## 2023-08-19 ENCOUNTER — HOSPITAL ENCOUNTER (EMERGENCY)
Facility: MEDICAL CENTER | Age: 38
End: 2023-08-20
Attending: STUDENT IN AN ORGANIZED HEALTH CARE EDUCATION/TRAINING PROGRAM
Payer: MEDICAID

## 2023-08-19 DIAGNOSIS — F30.9 MANIA (HCC): ICD-10-CM

## 2023-08-19 DIAGNOSIS — F29 PSYCHOSIS, UNSPECIFIED PSYCHOSIS TYPE (HCC): ICD-10-CM

## 2023-08-19 DIAGNOSIS — F19.90 DRUG USE: ICD-10-CM

## 2023-08-19 DIAGNOSIS — R45.1 AGITATION: ICD-10-CM

## 2023-08-19 PROCEDURE — 700102 HCHG RX REV CODE 250 W/ 637 OVERRIDE(OP): Mod: UD | Performed by: STUDENT IN AN ORGANIZED HEALTH CARE EDUCATION/TRAINING PROGRAM

## 2023-08-19 PROCEDURE — 99284 EMERGENCY DEPT VISIT MOD MDM: CPT

## 2023-08-19 PROCEDURE — A9270 NON-COVERED ITEM OR SERVICE: HCPCS | Mod: UD | Performed by: STUDENT IN AN ORGANIZED HEALTH CARE EDUCATION/TRAINING PROGRAM

## 2023-08-19 RX ORDER — HALOPERIDOL 5 MG/1
5 TABLET ORAL ONCE
Status: COMPLETED | OUTPATIENT
Start: 2023-08-19 | End: 2023-08-19

## 2023-08-19 RX ORDER — LORAZEPAM 2 MG/1
2 TABLET ORAL ONCE
Status: COMPLETED | OUTPATIENT
Start: 2023-08-19 | End: 2023-08-19

## 2023-08-19 RX ADMIN — LORAZEPAM 2 MG: 2 TABLET ORAL at 22:19

## 2023-08-19 RX ADMIN — HALOPERIDOL 5 MG: 5 TABLET ORAL at 22:19

## 2023-08-19 ASSESSMENT — FIBROSIS 4 INDEX: FIB4 SCORE: 0.44

## 2023-08-20 VITALS
HEART RATE: 80 BPM | RESPIRATION RATE: 16 BRPM | DIASTOLIC BLOOD PRESSURE: 84 MMHG | TEMPERATURE: 98.7 F | SYSTOLIC BLOOD PRESSURE: 135 MMHG | HEIGHT: 65 IN | BODY MASS INDEX: 32.49 KG/M2 | WEIGHT: 195 LBS | OXYGEN SATURATION: 97 %

## 2023-08-20 NOTE — ED NOTES
Beside report given to Wendy DAN and Harvey DAN. Patient resting with steady chest rise. On observation status.

## 2023-08-20 NOTE — ED NOTES
Bedside report received from NI Guerrero. Patient resting comfortably in bed. Plan pending ERP re-eval. Medically necessary medical equipment in room.

## 2023-08-20 NOTE — ED NOTES
Rounded on patient who remains resting in bed. Medically necessary monitors in room. Respirations even and unlabored. No needs at this time.

## 2023-08-20 NOTE — ED NOTES
Pt discharged, all appropriate hospital equipment removed (IV, monitor, pulse ox, etc.). Pt left unit via self with stable gait to ED lobby. Personal belongings with pt when leaving unit. Pt given discharge instructions prior to leaving unit including where to  prescriptions and when to follow-up; verbalizes understanding. Pt informed to return to ED if symptoms worsen/return or altered status develop. Copy of discharge instructions signed and turned into DC basket and copy sent with pt.

## 2023-08-20 NOTE — ED PROVIDER NOTES
"ED Provider Note    CHIEF COMPLAINT  Chief Complaint   Patient presents with    Psych Eval       EXTERNAL RECORDS REVIEWED  External ED Note [facility] visit on 8/14/2023 for psychosis    HPI/ROS  LIMITATION TO HISTORY   Select: Behavior patient is acutely psychotic  OUTSIDE HISTORIAN(S):  Fire department transfer to the patient at this facility after patient was acting abnormally    Tierney Mayer is a 38 y.o. female who presents with bizarre behavior, rapid speech, bizarre thought process.  Patient was reportedly wandering around [employer], soiled and not making sense.  Patient reports that she was taking narcotics.  Patient denies suicidal homicidal ideation.  Patient denied any auditory visual hallucinations to me.  Patient reported that she was \"playing with the dead\".    PAST MEDICAL HISTORY   has a past medical history of Hypertension, Psychiatric disorder, and Schizophrenia (HCC).    SURGICAL HISTORY   has a past surgical history that includes pelviscopy (2/22/2011) and incision and drainage general (2/22/2011).    FAMILY HISTORY  No family history on file.    SOCIAL HISTORY  Social History     Tobacco Use    Smoking status: Every Day     Packs/day: 1     Types: Cigarettes    Smokeless tobacco: Never    Tobacco comments:     knows she is suppose to quit but hasn't   Vaping Use    Vaping Use: Never used   Substance and Sexual Activity    Alcohol use: Yes    Drug use: Not Currently     Types: Inhaled, Oral     Comment: Marijuana, meth    Sexual activity: Not on file       CURRENT MEDICATIONS  Home Medications       Reviewed by Yolanda Shafer R.N. (Registered Nurse) on 08/19/23 at 2155  Med List Status: Unable to Obtain     Medication Last Dose Status   divalproex ER (DEPAKOTE ER) 500 MG TABLET SR 24 HR  Active   risperiDONE (RISPERDAL) 2 MG Tab  Active                    ALLERGIES  Allergies   Allergen Reactions    Clonazepam Unspecified     Pt reports getting a stroke.  Per " "historical: Twitches and paranoia.    Paliperidone Unspecified     Twitches and paranoia. On risperidone as outpatient with no issues     Invega Sustenna [Paliperidone Palmitate]      On risperidone outpatient without issues.     Olanzapine      Pt reports worsening and threatening voices.       PHYSICAL EXAM  VITAL SIGNS: BP (!) 140/85   Pulse 78   Temp 37.2 °C (99 °F) (Temporal)   Resp 18   Ht 1.651 m (5' 5\")   Wt 88.5 kg (195 lb)   LMP 07/26/2023 (Approximate)   SpO2 97%   BMI 32.45 kg/m²    Physical Exam  Vitals and nursing note reviewed.   Constitutional:       Comments: Patient is lying in bed supine, pleasant, conversant, speaking in complete sentences disheveled, unkempt, soiled   HENT:      Head: Normocephalic and atraumatic.   Eyes:      Extraocular Movements: Extraocular movements intact.      Conjunctiva/sclera: Conjunctivae normal.      Pupils: Pupils are equal, round, and reactive to light.   Cardiovascular:      Pulses: Normal pulses.      Comments: HR 78  Pulmonary:      Effort: Pulmonary effort is normal. No respiratory distress.   Musculoskeletal:         General: No swelling. Normal range of motion.      Cervical back: Normal range of motion. No rigidity.   Skin:     General: Skin is warm and dry.      Capillary Refill: Capillary refill takes less than 2 seconds.   Neurological:      Mental Status: She is alert.   Psychiatric:         Mood and Affect: Affect is labile and inappropriate.         Speech: Speech is rapid and pressured.         Behavior: Behavior is agitated and hyperactive.         Thought Content: Thought content is delusional.         Judgment: Judgment is inappropriate.           COURSE & MEDICAL DECISION MAKING    ED Observation Status? Yes; I am placing the patient in to an observation status due to a diagnostic uncertainty as well as therapeutic intensity. Patient placed in observation status at 10:31 PM, 8/19/2023.     Observation plan is as follows: pending sobriety " and improved mental status      INITIAL ASSESSMENT, COURSE AND PLAN  Care Narrative: Patient well-known to this facility for repeat presentations for acute psychosis.  I do suspect patient is suffering from drug-induced psychosis once again.  Patient will be given Haldol and Ativan for treatment of acute psychosis likely drug-induced.  Patient demonstrates no evidence of acute trauma or acute medical process.  Patient will be observed pending sobriety and repeat exam to see if she remains psychotic.  Care patient handed off to oncoming provider.    This dictation has been created using voice recognition software. I am continuously working with the software to minimize the number of voice recognition errors and I have made every attempt to manually correct the errors within my dictation. However errors  related to this voice recognition software may still exist and should be interpreted within the appropriate context.     Electronically signed by: Jamin Wilkerson M.D., 8/19/2023 10:35 PM      FINAL DIAGNOSIS  1. Psychosis, unspecified psychosis type (HCC)    2. Shaila (HCC)    3. Agitation    4. Drug use           Electronically signed by: Jamin Wilkerson M.D., 8/19/2023 10:31 PM

## 2023-08-20 NOTE — ED NOTES
Rounded on patient who remains resting in bed. Respirations even and unlabored. Patient has no current needs at this time.

## 2023-08-20 NOTE — ED TRIAGE NOTES
"Chief Complaint   Patient presents with    Psych Eval     BIB fire department. Patient found wondering around [employer]. Asked what was she doing there, responds with \" playing with the dead\", reports chicken coming out of her breast. Non-compliant to medication and reports to only taking \"narcotics\". Denies SI/HI.  "

## 2023-08-20 NOTE — ED NOTES
Rounded on patient who remains resting in bed. Respirations even and unlabored. Patient express no current needs at this time.

## 2023-08-20 NOTE — PROGRESS NOTES
"ED Observation Progress Note    Date of Service: 08/19/23    Interval History and Interventions  11:58 PM patient signed out to me by off going provider pending sober reevaluation.  Multiple emergency department visits for decompensated schizophrenia and psychosis secondary to methamphetamine use.  Suspected methamphetamine use and medication nonadherence resulting in decompensation tonight.  Suspects patient will improve as she historically does with sobriety.  No suicidal or homicidal ideology.  Haldol and Ativan provided orally for symptom management.  Will reassess once sober.    5:44 AM patient reassessed at bedside.  Awake, alert, no longer responding to internal stimuli.  Is slightly hyperactive and paranoid however does not at all appear gravely disabled.  Is requesting discharge.  Patient is ambulatory at her baseline.  I encouraged her to take her medications and follow-up with her outpatient care team.  I encouraged her to explore options for drug cessation and return if she would like assistance with this.  Ultimately she was discharged in stable condition.    Physical Exam  BP (!) 140/85   Pulse 78   Temp 37.2 °C (99 °F) (Temporal)   Resp 18   Ht 1.651 m (5' 5\")   Wt 88.5 kg (195 lb)   LMP 07/26/2023 (Approximate)   SpO2 97%   BMI 32.45 kg/m² .    Constitutional: Awake and alert. Nontoxic  HENT:  Grossly normal  Eyes: Grossly normal  Neck: Normal range of motion  Cardiovascular: Normal heart rate   Thorax & Lungs: No respiratory distress  Abdomen: Nontender  Skin:  No pathologic rash.   Extremities: Well perfused  Psychiatric: Hyperactive, paranoid    Labs  Results for orders placed or performed during the hospital encounter of 08/07/23   URINE DRUG SCREEN   Result Value Ref Range    Amphetamines Urine Positive (A) Negative    Barbiturates Negative Negative    Benzodiazepines Negative Negative    Cocaine Metabolite Negative Negative    Fentanyl, Urine Negative Negative    Methadone Negative " Negative    Opiates Negative Negative    Oxycodone Negative Negative    Phencyclidine -Pcp Negative Negative    Propoxyphene Negative Negative    Cannabinoid Metab Negative Negative   BETA-HCG QUALITATIVE URINE   Result Value Ref Range    Beta-Hcg Urine Negative Negative       Radiology  No orders to display       Problem List  1.  Decompensated schizophrenia  2.  Medication nonadherence  3.  Methamphetamine abuse disorder    Electronically signed by: Yousuf Brian M.D., 8/19/2023 11:58 PM

## 2023-08-21 ENCOUNTER — HOSPITAL ENCOUNTER (EMERGENCY)
Facility: MEDICAL CENTER | Age: 38
End: 2023-08-21
Attending: EMERGENCY MEDICINE
Payer: MEDICAID

## 2023-08-21 VITALS
OXYGEN SATURATION: 100 % | HEART RATE: 76 BPM | BODY MASS INDEX: 30.61 KG/M2 | TEMPERATURE: 98.8 F | WEIGHT: 195 LBS | RESPIRATION RATE: 16 BRPM | DIASTOLIC BLOOD PRESSURE: 88 MMHG | SYSTOLIC BLOOD PRESSURE: 155 MMHG | HEIGHT: 67 IN

## 2023-08-21 DIAGNOSIS — F15.10 METHAMPHETAMINE ABUSE (HCC): ICD-10-CM

## 2023-08-21 PROCEDURE — A9270 NON-COVERED ITEM OR SERVICE: HCPCS | Mod: UD | Performed by: EMERGENCY MEDICINE

## 2023-08-21 PROCEDURE — 99284 EMERGENCY DEPT VISIT MOD MDM: CPT

## 2023-08-21 PROCEDURE — 90791 PSYCH DIAGNOSTIC EVALUATION: CPT

## 2023-08-21 PROCEDURE — 700102 HCHG RX REV CODE 250 W/ 637 OVERRIDE(OP): Mod: UD | Performed by: EMERGENCY MEDICINE

## 2023-08-21 RX ORDER — RISPERIDONE 2 MG/1
2 TABLET ORAL ONCE
Status: COMPLETED | OUTPATIENT
Start: 2023-08-21 | End: 2023-08-21

## 2023-08-21 RX ADMIN — RISPERIDONE 2 MG: 2 TABLET ORAL at 15:53

## 2023-08-21 ASSESSMENT — FIBROSIS 4 INDEX: FIB4 SCORE: 0.44

## 2023-08-21 ASSESSMENT — LIFESTYLE VARIABLES: DO YOU DRINK ALCOHOL: NO

## 2023-08-21 NOTE — ED PROVIDER NOTES
"  ER Provider Note    Scribed for Randolph Youssef M.d. by Juan Luis Melendez. 8/21/2023  12:49 PM    Primary Care Provider: Pcp Pt States None    CHIEF COMPLAINT  Chief Complaint   Patient presents with    Psych Eval     BIB EMS from [facility] where pt. Was reported to have been kicked out by staff there.   were called by [facility] staff and  called EMS and pt. Was then transported here for psych eval.       EXTERNAL RECORDS REVIEWED  Outpatient Notes Patient was seen on the 17 and the 19 after using methamphetamine on the 18th. Patient was handed off to Dr. Brian.       HPI/ROS  LIMITATION TO HISTORY   Select: Behavior  OUTSIDE HISTORIAN(S):  None.    Tierney Mayer is a 38 y.o. female with a history of methamphetamine abuse who presents to the ED via law enforcement for a psych evaluation onset today. Patient was picked up by EMS outside of [facility] today after she was kicked out by staff members, and having the  called. At bedside, patient denies any drug use today and states \"please do not eat me\". She reports associated nightmares, but denies any auditory hallucinations, visual hallucinations, suicidal ideations, or homicidal ideations. She states that someone in the hospital has a weapon, and she is concerned that they may hurt her. Patient denies any alcohol or methamphetamine abuse today. She denies taking her medications, and tapia snot want to receive any today as \"they all make her ill\".      PAST MEDICAL HISTORY  Past Medical History:   Diagnosis Date    Hypertension     Psychiatric disorder     Schizophrenia    Schizophrenia (HCC)        SURGICAL HISTORY  Past Surgical History:   Procedure Laterality Date    PELVISCOPY  2/22/2011    Performed by BABITA SHEETS at SURGERY SAME DAY [facility] ORS    INCISION AND DRAINAGE GENERAL  2/22/2011    Performed by BABITA SHEETS at SURGERY SAME DAY [facility] ORS       FAMILY HISTORY  History reviewed. No pertinent family history.    SOCIAL " "HISTORY   reports that she has been smoking cigarettes. She has been smoking an average of .5 packs per day. She has never used smokeless tobacco. She reports current alcohol use. She reports that she does not currently use drugs after having used the following drugs: Inhaled and Oral.    CURRENT MEDICATIONS  Previous Medications    DIVALPROEX ER (DEPAKOTE ER) 500 MG TABLET SR 24 HR    Take 1,000 mg by mouth every day.    RISPERIDONE (RISPERDAL) 2 MG TAB    Take 1 Tablet by mouth 2 times a day.       ALLERGIES  Clonazepam, Paliperidone, Invega sustenna [paliperidone palmitate], and Olanzapine    PHYSICAL EXAM  BP (!) 155/88   Pulse 76   Temp 37.1 °C (98.8 °F) (Temporal)   Resp 16   Ht 1.702 m (5' 7\")   Wt 88.5 kg (195 lb)   LMP 08/21/2023 (Exact Date) Comment: Pt. currently on menstrual cycle.  SpO2 100%   BMI 30.54 kg/m²   Constitutional: Well developed, Well nourished, moderate distress.   HENT: Normocephalic, Atraumatic, Oropharynx moist, No oral exudates.   Eyes: Conjunctiva normal, No discharge.   Cardiovascular: Normal heart rate, Normal rhythm, No murmurs, equal pulses.   Pulmonary: Normal breath sounds, No respiratory distress, No wheezing, No rales, No rhonchi.  Chest: No chest wall tenderness or deformity.   Abdomen:Soft, No tenderness, No masses, no rebound, no guarding.   Back: No CVA tenderness.   Musculoskeletal: No major deformities noted, No tenderness.   Skin: Warm, Dry, No erythema, No rash.   Neurologic: Alert & oriented x 3, Normal motor function,  No focal deficits noted.   Psychiatric: Pressured speech, tangential and mildly disorganized, denies any HI or SI.     DIAGNOSTIC STUDIES    Labs:   Results for orders placed or performed during the hospital encounter of 08/07/23   URINE DRUG SCREEN   Result Value Ref Range    Amphetamines Urine Positive (A) Negative    Barbiturates Negative Negative    Benzodiazepines Negative Negative    Cocaine Metabolite Negative Negative    Fentanyl, Urine " Negative Negative    Methadone Negative Negative    Opiates Negative Negative    Oxycodone Negative Negative    Phencyclidine -Pcp Negative Negative    Propoxyphene Negative Negative    Cannabinoid Metab Negative Negative   BETA-HCG QUALITATIVE URINE   Result Value Ref Range    Beta-Hcg Urine Negative Negative        COURSE & MEDICAL DECISION MAKING     ED Observation Status? Yes; I am placing the patient in to an observation status due to a diagnostic uncertainty as well as therapeutic intensity. Patient placed in observation status at 12:57 PM, 8/21/2023.     Observation plan is as follows: continue psychiatric evaluations and monitor patients condition.     Upon Reevaluation, the patient's condition has: Improved; and will be discharged.    Patient discharged from ED Observation status at 7:15 PM (Time) 8/21/2023 (Date).     INITIAL ASSESSMENT, COURSE AND PLAN  Care Narrative:     12:58 PM - Patient seen and examined at bedside. Patient is a 38 year old female who presents today for a psychiatric evaluation onset today. They are currently having pressured speech, but denies any SI or HI. See HPI for further details. Ordered for POC breathalyzer and Urine drug screen to evaluate her symptoms. Patient is refusing medications at this time.   7:15 PM patient seen and evaluated by life skills at this point time they states she is at her psychiatric baseline.  Patient does not want any help.  She is not suicidal or homicidal.  Is felt that she does not meet criteria for legal hold.  At this point time this appears to be secondary to methamphetamine abuse    PROBLEM LIST  Problem #1 psychosis at this point time this appears to be secondary to methamphetamine abuse.  Patient is near her psychiatric baseline.  We have offered her her home medications but she is refusing these at this point time she does not meet criteria for legal hold.  Therefore I think she can be discharged.  Patient is noncompliant with her medication  regiment and does not want them at this time.    DISPOSITION AND DISCUSSIONS  I have discussed management of the patient with the following physicians and PIEDAD's:      Discussion of management with other [facility] or appropriate source(s): Behavioral Health        Escalation of care considered, and ultimately not performed: blood analysis and diagnostic imaging.  I do not think the patient needs blood work or imaging.    Barriers to care at this time, including but not limited to: Patient does not have established PCP.      The patient will return for new or worsening symptoms and is stable at the time of discharge.    The patient is referred to a primary physician for blood pressure management, diabetic screening, and for all other preventative health concerns.        DISPOSITION:  Patient will be discharged home in stable condition.    FOLLOW UP:  Your doctor          [facility]  [address]  [place] [address]  [phone]    Go in 1 day        OUTPATIENT MEDICATIONS:  New Prescriptions    No medications on file       FINAL DIANGOSIS  1. Methamphetamine abuse (HCC)           The note accurately reflects work and decisions made by me.  Randolph Youssef M.D.  8/21/2023  7:22 PM     Juan Luis DONG (Chika), am scribing for, and in the presence of, SHA Aguayo*.    Electronically signed by: Juan Luis Melendez (Chika), 8/21/2023    Randolph DONG M.* personally performed the services described in this documentation, as scribed by Juan Luis Melendez in my presence, and it is both accurate and complete.      PAST MEDICAL HISTORY:  Anxiety     Diabetes     High cholesterol     Hypothyroid     Major depression

## 2023-08-21 NOTE — ED TRIAGE NOTES
"Chief Complaint   Patient presents with    Psych Eval     BIB EMS from [facility] where pt. Was reported to have been kicked out by staff there.   were called by [facility] staff and  called EMS and pt. Was then transported here for psych eval.       When asked about medical history pt. States \"I have sexual agitation\"  and when asked about daily medications pt. States \"I am supposed to be taking crystal meth everyday but I haven't had any today.\"  Pt. States she is in [place] and tapia not answer any other questions.  When asked questions pt. Just starts laughing hysterically.      Pt. Belongings removed and pt. Placed into gown and strings cut off.  Pt. Pants/underwear have blood in them and pt. Reports being on her menstrual cycle.  Provided with washcloths to cleanse self and panties and pad provided as well.  Pt. Was able to follow all instructions, just not really answering questions appropriately.       "

## 2023-08-21 NOTE — ED NOTES
"Patient refusing Breathalizer and UDS. Patient stating \"get the fuck out of my hospital room.\"  "

## 2023-08-21 NOTE — ED NOTES
Assumed care of patient, bedside report received from off coming RN Diana.  Introduced self as pt RN, POC discussed, room safety checklist in use.    coffee

## 2023-08-22 NOTE — ED NOTES
Pt given fresh underwear, pt changed into clothes, pt refused to allow primary RN to take a new set of vital signs

## 2023-08-22 NOTE — ED NOTES
Pt given a pair of shoes prior to DC, DC paperwork provided, pt ambulated with steady gait to FRANCHESKA manzo for DC
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
## 2023-08-23 ENCOUNTER — HOSPITAL ENCOUNTER (EMERGENCY)
Facility: MEDICAL CENTER | Age: 38
End: 2023-08-23
Attending: STUDENT IN AN ORGANIZED HEALTH CARE EDUCATION/TRAINING PROGRAM
Payer: MEDICAID

## 2023-08-23 ENCOUNTER — HOSPITAL ENCOUNTER (EMERGENCY)
Facility: MEDICAL CENTER | Age: 38
End: 2023-08-23
Attending: EMERGENCY MEDICINE
Payer: MEDICAID

## 2023-08-23 VITALS
RESPIRATION RATE: 16 BRPM | BODY MASS INDEX: 31.34 KG/M2 | DIASTOLIC BLOOD PRESSURE: 82 MMHG | WEIGHT: 195 LBS | TEMPERATURE: 97.8 F | SYSTOLIC BLOOD PRESSURE: 136 MMHG | OXYGEN SATURATION: 94 % | HEART RATE: 88 BPM | HEIGHT: 66 IN

## 2023-08-23 VITALS
WEIGHT: 155 LBS | BODY MASS INDEX: 25.83 KG/M2 | TEMPERATURE: 97.8 F | HEART RATE: 73 BPM | DIASTOLIC BLOOD PRESSURE: 81 MMHG | RESPIRATION RATE: 16 BRPM | SYSTOLIC BLOOD PRESSURE: 117 MMHG | OXYGEN SATURATION: 99 % | HEIGHT: 65 IN

## 2023-08-23 DIAGNOSIS — F29 PSYCHOSIS, UNSPECIFIED PSYCHOSIS TYPE (HCC): ICD-10-CM

## 2023-08-23 DIAGNOSIS — F15.10 METHAMPHETAMINE ABUSE (HCC): ICD-10-CM

## 2023-08-23 DIAGNOSIS — F20.9 CHRONIC SCHIZOPHRENIA (HCC): ICD-10-CM

## 2023-08-23 LAB — ETHANOL BLD-MCNC: <10.1 MG/DL

## 2023-08-23 PROCEDURE — A9270 NON-COVERED ITEM OR SERVICE: HCPCS | Mod: UD | Performed by: STUDENT IN AN ORGANIZED HEALTH CARE EDUCATION/TRAINING PROGRAM

## 2023-08-23 PROCEDURE — 82077 ASSAY SPEC XCP UR&BREATH IA: CPT

## 2023-08-23 PROCEDURE — 99284 EMERGENCY DEPT VISIT MOD MDM: CPT

## 2023-08-23 PROCEDURE — 700102 HCHG RX REV CODE 250 W/ 637 OVERRIDE(OP): Mod: UD | Performed by: STUDENT IN AN ORGANIZED HEALTH CARE EDUCATION/TRAINING PROGRAM

## 2023-08-23 RX ORDER — HALOPERIDOL 5 MG/1
5 TABLET ORAL ONCE
Status: DISCONTINUED | OUTPATIENT
Start: 2023-08-23 | End: 2023-08-23

## 2023-08-23 RX ORDER — RISPERIDONE 2 MG/1
2 TABLET ORAL ONCE
Status: COMPLETED | OUTPATIENT
Start: 2023-08-23 | End: 2023-08-23

## 2023-08-23 RX ADMIN — RISPERIDONE 2 MG: 2 TABLET ORAL at 01:33

## 2023-08-23 ASSESSMENT — PAIN DESCRIPTION - PAIN TYPE: TYPE: ACUTE PAIN

## 2023-08-23 ASSESSMENT — FIBROSIS 4 INDEX
FIB4 SCORE: 0.44
FIB4 SCORE: 0.44

## 2023-08-23 NOTE — DISCHARGE INSTRUCTIONS
Follow-up at [facility] as recommended by our behavioral health team.  Return with new or worsening symptoms.  Establish with a primary care physician.

## 2023-08-23 NOTE — ED NOTES
"Pt was walking down the hallway when yelled out \"I didn't get to sleep long enough\" pt then proceeded to run back to room and laid down. Discussed w/ pt how she was medically cleared and it was time to go. Pt refused to get up from bed.   "

## 2023-08-23 NOTE — ED NOTES
Pt has discharge orders. Pt educated on discharge instructions.  Pt verbalizes understanding.   PIV removed. Pt ambulatoy to lobby with alert team. [facility] going [facility].

## 2023-08-23 NOTE — ED TRIAGE NOTES
Chief Complaint   Patient presents with    Psych Eval     BIB EMS, pt reportedly walked into a hotel and asked for 911 to be called because she was scared. Pt was acting erratically so individual called 911. Pt denies SI/HI but states she can see 2 individuals who she knows from [place] and they make her nervous. Pt denies any alcohol/drug use. GCS 14, FSBG 86 per EMS.      Pt connected to monitoring equipment. Pt ambulatory to room from ambulance. Chart up for ERP.
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
## 2023-08-23 NOTE — ED PROVIDER NOTES
ED Provider Note    CHIEF COMPLAINT  Chief Complaint   Patient presents with    Psych Eval     BIB [facility] after patient was wandering around the street a few blocks away and a bystander called EMS. Patient denies SI/HI or drug/etoh use       EXTERNAL RECORDS REVIEWED  Other -patient was seen in this facility earlier today for psychiatric evaluation.  Patient noted to be nonadherent with home antipsychotic medication.  She was reportedly having visual hallucinations and was given 1 dose of her home Risperdal.  Her repeat exam was reassuring.  She was discharged to follow-up as an outpatient.    HPI/ROS  LIMITATION TO HISTORY   Select: Behavior  OUTSIDE HISTORIAN(S):  None    Tierney Mayer is a 38 y.o. female who presents after a bystander found the patient walking on the street and was concerned for her safety.  EMS brought the patient to the ER for evaluation.  The patient denies any medical complaints.  She has no fevers, chills, chest pain, shortness of breath, abdominal pain, nausea, vomiting, dysuria.  She denies any alcohol or drug use.    PAST MEDICAL HISTORY   has a past medical history of Hypertension, Psychiatric disorder, and Schizophrenia (HCC).    SURGICAL HISTORY   has a past surgical history that includes pelviscopy (2/22/2011) and incision and drainage general (2/22/2011).    FAMILY HISTORY  History reviewed. No pertinent family history.    SOCIAL HISTORY  Social History     Tobacco Use    Smoking status: Every Day     Current packs/day: 0.50     Types: Cigarettes    Smokeless tobacco: Never    Tobacco comments:     knows she is suppose to quit but hasn't   Vaping Use    Vaping Use: Never used   Substance and Sexual Activity    Alcohol use: Yes    Drug use: Not Currently     Types: Inhaled, Oral     Comment: Marijuana, meth    Sexual activity: Not on file       CURRENT MEDICATIONS  Home Medications       Reviewed by Erinn Short R.N. (Registered Nurse) on 08/23/23 at 123  Med List Status:  "Partial     Medication Last Dose Status   divalproex ER (DEPAKOTE ER) 500 MG TABLET SR 24 HR  Active   risperiDONE (RISPERDAL) 2 MG Tab  Active                    ALLERGIES  Allergies   Allergen Reactions    Clonazepam Unspecified     Pt reports getting a stroke.  Per historical: Twitches and paranoia.    Paliperidone Unspecified     Twitches and paranoia. On risperidone as outpatient with no issues     Invega Sustenna [Paliperidone Palmitate]      On risperidone outpatient without issues.     Olanzapine      Pt reports worsening and threatening voices.       PHYSICAL EXAM  VITAL SIGNS: /77   Pulse 88   Temp 36.6 °C (97.8 °F)   Resp 16   Ht 1.651 m (5' 5\")   Wt 70.3 kg (155 lb)   LMP 08/21/2023 (Exact Date) Comment: Pt. currently on menstrual cycle.  SpO2 99%   BMI 25.79 kg/m²    Physical Exam  Vitals and nursing note reviewed.   Constitutional:       Appearance: Normal appearance.   HENT:      Head: Normocephalic and atraumatic.      Right Ear: External ear normal.      Left Ear: External ear normal.      Nose: Nose normal.      Mouth/Throat:      Mouth: Mucous membranes are moist.      Pharynx: Oropharynx is clear.   Eyes:      Extraocular Movements: Extraocular movements intact.      Conjunctiva/sclera: Conjunctivae normal.      Pupils: Pupils are equal, round, and reactive to light.   Cardiovascular:      Rate and Rhythm: Normal rate and regular rhythm.   Pulmonary:      Effort: Pulmonary effort is normal.      Breath sounds: Normal breath sounds.   Abdominal:      Palpations: Abdomen is soft.      Tenderness: There is no abdominal tenderness.   Musculoskeletal:         General: Normal range of motion.      Cervical back: Normal range of motion and neck supple.   Skin:     General: Skin is warm and dry.   Neurological:      General: No focal deficit present.      Mental Status: She is alert.   Psychiatric:      Comments: Cooperative.       DIAGNOSTIC STUDIES / PROCEDURES  LABS  Results for orders " "placed or performed during the hospital encounter of 08/23/23   DIAGNOSTIC ALCOHOL   Result Value Ref Range    Diagnostic Alcohol <10.1 <10.1 mg/dL     RADIOLOGY  Radiologist interpretation:   No orders to display     COURSE & MEDICAL DECISION MAKING    ED Observation Status? No; Patient does not meet criteria for ED Observation.     INITIAL ASSESSMENT, COURSE AND PLAN  Care Narrative: This is a 38 year old female with a history of psychiatric disease with multiple ER visits, most for decompensated psychiatric disease, now here again, about 10 hours after her most recent ED evaluation, after a passerby contacted EMS when she was seen \"wandering around the street\". Patient is AF with normal VS. She appears well hydrated and non-toxic. She has no medical complaints. Denies any SI/HI. Based on her prior notes and chart review she appears to be at her psychiatric baseline and does not meet criteria for legal hold. I spoke with Taty Pryor, behavioral [facility], who concurs that she does not think the patient meets legal hold criteria. She has spoken with case management from [place] and [place] and patient will meet him at [facility] for further management. Patient will transport with [facility]. Discharged in stable condition with strict return precautions.    ADDITIONAL PROBLEM LIST  None  DISPOSITION AND DISCUSSIONS  I have discussed management of the patient with the following physicians and PIEDAD's:  None    Discussion of management with other [facility] or appropriate source(s): Behavioral Health - Taty Pryor      Escalation of care considered, and ultimately not performed:blood analysis, diagnostic imaging, and acute inpatient care management, however at this time, the patient is most appropriate for outpatient management    Barriers to care at this time, including but not limited to: Patient does not have established PCP.     Decision tools and prescription drugs considered including, but not limited to:  None .    FINAL " DIAGNOSIS  1. Psychosis, unspecified psychosis type (HCC)      Electronically signed by: Cali Urena M.D., 8/23/2023 12:43 PM

## 2023-08-23 NOTE — ED TRIAGE NOTES
Chief Complaint   Patient presents with    Psych Eval     BIB [facility] after patient was wandering around the street a few blocks away and a bystander called EMS. Patient denies SI/HI or drug/etoh use     BGL per EMS 97

## 2023-08-23 NOTE — ED NOTES
Bedside report with NI Morrell.  Pt asleep in [facility] with even rise and fall of chest visible.  No signs of distress at this time. Awaiting alert team to see pt.

## 2023-08-23 NOTE — ED PROVIDER NOTES
ED Provider Note    CHIEF COMPLAINT  Chief Complaint   Patient presents with    Psych Eval     BIB EMS, pt reportedly walked into a hotel and asked for 911 to be called because she was scared. Pt was acting erratically so individual called 911. Pt denies SI/HI but states she can see 2 individuals who she knows from [place] and they make her nervous. Pt denies any alcohol/drug use. GCS 14, FSBG 86 per EMS.        EXTERNAL RECORDS REVIEWED  Inpatient Notes psychiatry consult on 4/23/2023 for psychosis    HPI/ROS  LIMITATION TO HISTORY   Select: : None  OUTSIDE HISTORIAN(S):  EMS reports that patient called one-on-one because she was scared and had visual hallucinations    Tierney Mayer is a 38 y.o. female who presents with reports of EMS visual hallucinations although the patient is denying visual or audio hallucinations at this time.  Patient denies HI or SI.  Patient reports drug use but remotely 8 days ago.  Patient denies chest pain shortness of breath, headache, trauma.  Patient reports being nonadherent with her home psychiatric medication.  Patient requesting for a place to sleep at this time.    PAST MEDICAL HISTORY   has a past medical history of Hypertension, Psychiatric disorder, and Schizophrenia ([facility]).    SURGICAL HISTORY   has a past surgical history that includes pelviscopy (2/22/2011) and incision and drainage general (2/22/2011).    FAMILY HISTORY  No family history on file.    SOCIAL HISTORY  Social History     Tobacco Use    Smoking status: Every Day     Current packs/day: 0.50     Types: Cigarettes    Smokeless tobacco: Never    Tobacco comments:     knows she is suppose to quit but hasn't   Vaping Use    Vaping Use: Never used   Substance and Sexual Activity    Alcohol use: Yes    Drug use: Not Currently     Types: Inhaled, Oral     Comment: Marijuana, meth    Sexual activity: Not on file       CURRENT MEDICATIONS  Home Medications       Reviewed by Cecilia Marshall R.N. (Registered  "Nurse) on 08/23/23 at 0051  Med List Status: Partial     Medication Last Dose Status   divalproex ER (DEPAKOTE ER) 500 MG TABLET SR 24 HR  Active   risperiDONE (RISPERDAL) 2 MG Tab  Active                    ALLERGIES  Allergies   Allergen Reactions    Clonazepam Unspecified     Pt reports getting a stroke.  Per historical: Twitches and paranoia.    Paliperidone Unspecified     Twitches and paranoia. On risperidone as outpatient with no issues     Invega Sustenna [Paliperidone Palmitate]      On risperidone outpatient without issues.     Olanzapine      Pt reports worsening and threatening voices.       PHYSICAL EXAM  VITAL SIGNS: /83   Pulse 96   Temp 36.3 °C (97.3 °F) (Temporal)   Resp 18   Ht 1.676 m (5' 6\")   Wt 88.5 kg (195 lb)   LMP 08/21/2023 (Exact Date) Comment: Pt. currently on menstrual cycle.  SpO2 96%   BMI 31.47 kg/m²    Vitals and nursing note reviewed.   Physical Exam  Vitals and nursing note reviewed.   Constitutional:       Comments: Patient is lying in bed supine, pleasant, conversant, speaking in complete sentences   HENT:      Head: Normocephalic and atraumatic.   Eyes:      Extraocular Movements: Extraocular movements intact.      Conjunctiva/sclera: Conjunctivae normal.      Pupils: Pupils are equal, round, and reactive to light.   Cardiovascular:      Pulses: Normal pulses.      Comments:   Pulmonary:      Effort: Pulmonary effort is normal. No respiratory distress.   Musculoskeletal:         General: No swelling. Normal range of motion.      Cervical back: Normal range of motion. No rigidity.   Skin:     General: Skin is warm and dry.      Capillary Refill: Capillary refill takes less than 2 seconds.   Neurological:      Mental Status: She is alert.   Psychiatric:         Attention and Perception: She is inattentive.         Mood and Affect: Affect is blunt.         Speech: Speech is delayed.         Behavior: Behavior is withdrawn.         Thought Content: Thought " content normal.           COURSE & MEDICAL DECISION MAKING        INITIAL ASSESSMENT, COURSE AND PLAN  Care Narrative: Patient has no evidence of acute organic injury at this time.  Patient has low-grade tachycardia however unclear when last sympathomimetic drug use was.  Patient is nonadherent with her home antipsychotic medication.  Patient having visual hallucinations per EMS, likely secondary to nonadherence.  Patient will be given 1 dose of her home Risperdal at this facility.  Disposition pending observation and medication administration.    Electronically signed by: Jamin Wilkerson M.D., 8/23/2023 1:27 AM    Patient feeling better at this time, tolerating oral intake, will follow-up with [facility].  Denies HI SI or hallucinations    Repeat physical exam benign.  I doubt any serious emergency process at this time.  Patient and/or family, friends given strict return precautions for worsening symptoms and care instructions. They have demonstrated understanding of discharge instructions through teach back mechanism. Advised PCP follow-up in 1-2 days.  Patient/family/friend expresses understanding and agrees to plan.    This dictation has been created using voice recognition software. I am continuously working with the software to minimize the number of voice recognition errors and I have made every attempt to manually correct the errors within my dictation. However errors  related to this voice recognition software may still exist and should be interpreted within the appropriate context.     Electronically signed by: Jamin Wilkerson M.D., 8/23/2023 2:29 AM    DISPOSITION AND DISCUSSIONS    Escalation of care considered, and ultimately not performed:blood analysis and acute inpatient care management, however at this time, the patient is most appropriate for outpatient management    Barriers to care at this time, including but not limited to: Patient is homeless and Patient lacks  transportation .         FINAL DIAGNOSIS  1. Methamphetamine abuse (HCC)    2. Chronic schizophrenia (HCC)           Electronically signed by: Jamin Wilkerson M.D., 8/23/2023 1:23 AM

## 2023-08-23 NOTE — ED NOTES
Pt discharged to home. Pt was given follow up instructions. Pt verbalized understanding of all instructions for discharge and is ambulatory out of ED room with steady gait. Aox4

## 2023-08-23 NOTE — ED NOTES
Security called to bedside to assist w/ pt discharge. Pt provided w/ bus pass and turkey sandwich and juice upon discharge.

## 2023-08-28 NOTE — DISCHARGE SUMMARY
"  ED Observation Discharge Summary    Patient:Tierney Mayer  Patient : 1985  Patient MRN: [id number]  Patient PCP: Pcp Pt States None    Admit Date: 10/30/2021  Discharge Date and Time: 21 8:57 AM  Discharge Diagnosis:   1. Acute psychosis (HCC)    2. Methamphetamine abuse (HCC)        Discharge Attending: Christopher Stockton M.D.  Discharge Service: ED Observation    ED Course  Tierney is a 36 y.o. female who was evaluated at [facility] for psychosis and methamphetamine use.  Patient has done well during her stay in the emergency department.  Head steady improvement.  Seen by psychiatry today.  They are recommending an injection of long-acting Haldol.  He is discontinuing the legal hold.  Patient is discharged in improved condition.  Follow-up arranged with the alert team and as per psychiatry recommendations.    Discharge Exam:  /86   Pulse 85   Temp 36.2 °C (97.2 °F) (Temporal)   Resp 16   Ht 1.702 m (5' 7\")   Wt 76.2 kg (168 lb)   SpO2 98%   BMI 26.31 kg/m² .    Constitutional: Awake and alert. Nontoxic  HENT:  Grossly normal  Eyes: Grossly normal  Neck: Normal range of motion  Cardiovascular: Normal heart rate   Thorax & Lungs: No respiratory distress  Abdomen: Nontender  Skin:  No pathologic rash.   Extremities: Well perfused  Psychiatric: Calm, cooperative.  Not responding to internal stimuli at this time.    Labs  Results for orders placed or performed during the hospital encounter of 10/30/21   URINE DRUG SCREEN   Result Value Ref Range    Amphetamines Urine Positive (A) Negative    Barbiturates Negative Negative    Benzodiazepines Negative Negative    Cocaine Metabolite Negative Negative    Methadone Negative Negative    Opiates Negative Negative    Oxycodone Negative Negative    Phencyclidine -Pcp Negative Negative    Propoxyphene Negative Negative    Cannabinoid Metab Negative Negative   SARS-COV Antigen FLORINDA: Collect dry nasal swab   Result Value Ref " Range    SARS-CoV-2 Source Nasal Swab     SARS-COV ANTIGEN FLORINDA NotDetected NotDetected   URINALYSIS,CULTURE IF INDICATED    Specimen: Urine   Result Value Ref Range    Color Yellow     Character Clear     Specific Gravity 1.002 <1.035    Ph 7.5 5.0 - 8.0    Glucose Negative Negative mg/dL    Ketones Negative Negative mg/dL    Protein Negative Negative mg/dL    Bilirubin Negative Negative    Urobilinogen, Urine 0.2 Negative    Nitrite Negative Negative    Leukocyte Esterase Trace (A) Negative    Occult Blood Negative Negative    Micro Urine Req Microscopic    URINE MICROSCOPIC (W/UA)   Result Value Ref Range    WBC 5-10 (A) /hpf    RBC Rare /hpf    Bacteria Few (A) None /hpf    Epithelial Cells Rare /hpf   POC BREATHALIZER   Result Value Ref Range    POC Breathalizer 0.00 0.00 - 0.01 Percent   EKG   Result Value Ref Range    Report       [facility] Emergency Dept.    Test Date:  2021  Pt Name:    GÉNESIS THACKER               Department: ER  MRN:        [id number]                      Room:       [facility]  Gender:     Female                       Technician: [id number]  :        1985                   Requested By:SHAHANA NI  Order #:    [id number]                    Reading MD: NICHOLE CANTRELL MD    Measurements  Intervals                                Axis  Rate:       96                           P:          36  KS:         176                          QRS:        54  QRSD:       88                           T:          50  QT:         364  QTc:        460    Interpretive Statements  SINUS RHYTHM  Compared to ECG 2021 14:12:26  T-wave abnormality no longer present  Electronically Signed On 2021 11:27:49 PST by NICHOLE CANTRELL MD         Radiology  No orders to display       Disposition: Discharged in improved condition see alert team note.    Follow up: No follow-ups on file.  As per psychiatry and the alert team.    Medications:   New Prescriptions    No medications on  file       Discharge Condition: Stable    Electronically signed by: Christopher Stockton M.D., 11/19/2021 8:57 AM      Helical Rim Advancement Flap Text: Because of the tightness of the surrounding skin, the full-thickness nature of the defect with exposed cartilage, and to avoid deformity, a helical rim advancement flap was planned.  After prep and anesthesia, any protuberant cartilage or cartilage in the way of tissue movement and approximation was excised.  Then, an incision was made in the anterior portion of the ear through the skin to the posterior ear at the level of the perichondrium both superiorly and inferiorly.  Inferiorly, this extended to the lobule where a Burow’s triangle was excised anteriorly.  The flaps were then  from the ear posteriorly at the level of the perichondrium to the postauricular sulcus.  After hemostasis obtained, the flaps were advanced and closed in a layered fashion

## 2023-10-27 ENCOUNTER — HOSPITAL ENCOUNTER (EMERGENCY)
Facility: MEDICAL CENTER | Age: 38
End: 2023-10-27
Attending: EMERGENCY MEDICINE
Payer: MEDICAID

## 2023-10-27 VITALS
HEIGHT: 64 IN | RESPIRATION RATE: 16 BRPM | OXYGEN SATURATION: 95 % | DIASTOLIC BLOOD PRESSURE: 81 MMHG | WEIGHT: 130 LBS | HEART RATE: 99 BPM | BODY MASS INDEX: 22.2 KG/M2 | TEMPERATURE: 98.7 F | SYSTOLIC BLOOD PRESSURE: 134 MMHG

## 2023-10-27 DIAGNOSIS — F15.10 METHAMPHETAMINE ABUSE (HCC): Primary | ICD-10-CM

## 2023-10-27 DIAGNOSIS — F29 PSYCHOSIS, UNSPECIFIED PSYCHOSIS TYPE (HCC): ICD-10-CM

## 2023-10-27 LAB — GLUCOSE BLD STRIP.AUTO-MCNC: 94 MG/DL (ref 65–99)

## 2023-10-27 PROCEDURE — 700102 HCHG RX REV CODE 250 W/ 637 OVERRIDE(OP): Mod: UD | Performed by: EMERGENCY MEDICINE

## 2023-10-27 PROCEDURE — A9270 NON-COVERED ITEM OR SERVICE: HCPCS | Mod: UD | Performed by: EMERGENCY MEDICINE

## 2023-10-27 PROCEDURE — 82962 GLUCOSE BLOOD TEST: CPT

## 2023-10-27 PROCEDURE — 99285 EMERGENCY DEPT VISIT HI MDM: CPT

## 2023-10-27 RX ORDER — ZIPRASIDONE HYDROCHLORIDE 40 MG/1
80 CAPSULE ORAL ONCE
Status: COMPLETED | OUTPATIENT
Start: 2023-10-27 | End: 2023-10-27

## 2023-10-27 RX ORDER — LORAZEPAM 1 MG/1
1 TABLET ORAL ONCE
Status: COMPLETED | OUTPATIENT
Start: 2023-10-27 | End: 2023-10-27

## 2023-10-27 RX ADMIN — ZIPRASIDONE HYDROCHLORIDE 80 MG: 40 CAPSULE ORAL at 04:04

## 2023-10-27 RX ADMIN — LORAZEPAM 1 MG: 1 TABLET ORAL at 04:03

## 2023-10-27 NOTE — ED TRIAGE NOTES
"Pt BIB EMS for   Chief Complaint   Patient presents with    Psych Eval     Pt reporting to ERP and RN that her \"brains are all over the sidewalk in [place]\". No obvious head trauma noted. Pt also stating \"I am tea\".    Ems reports pt was walking around [facility] and security was called. Pt flighty of thoughts.   "

## 2023-10-27 NOTE — ED NOTES
Med rec complete per patients home pharmacy  Patient unable to participate in interview  No recent medications filled with [facility] or [facility]

## 2023-10-27 NOTE — ED NOTES
Patient resting calmly on gurney.  Active chest rise noted.  1 to 1 sitter in direct view of patient.

## 2023-10-27 NOTE — ED NOTES
Attempt to get BP and complete breathalyzer.  Pt pulling arm from tech and refusing to cooperate with either.  Pt remains 97% on RA and bed alarm in place.

## 2023-10-27 NOTE — ED NOTES
Bedside report received from NI Cedeno. Assumed care of patient. Fall precautions in place. 1 to 1 sitter observing patient.

## 2023-10-27 NOTE — ED NOTES
ED tech Lux at bedside attempting to obtain breathalyzer and urine sample from patient. Patient resisting requested tests, remains resting on gurney.

## 2023-10-27 NOTE — PROGRESS NOTES
"ED Observation Progress Note    Date of Service: 10/27/23    Interval History and Interventions  This is a 38-year-old female who admits to methamphetamine use and has presented to the ER in the past with similar issues.  She was evaluated earlier today for psychiatric evaluation.  Patient reported that her brains were all over the ground/floor.  Nonsensical.    10:27 AM  Patient reevaluated.  She is resting comfortably.  She is arousable but somewhat groggy.  UDS, breathalyzer, and Accu-Chek ordered.    Accu-Chek 94.    1:06 PM  Patient reevaluated.  She is asleep but arousable and states her name.  Patient be signed out to oncoming [facility] for reevaluation after sobering, presumably from methamphetamines and the medications she received here earlier today, including Geodon and Ativan.  UDS and breathalyzer pending.    Physical Exam  /56   Pulse 83   Temp 36.2 °C (97.1 °F) (Temporal)   Resp 16   Ht 1.626 m (5' 4\")   Wt 59 kg (130 lb)   SpO2 95%   BMI 22.31 kg/m² .    Constitutional: Awake and alert. Nontoxic  HENT:  Grossly normal  Eyes: Grossly normal  Neck: Normal range of motion  Cardiovascular: Normal heart rate   Thorax & Lungs: No respiratory distress  Abdomen: Nontender  Skin:  No pathologic rash.   Extremities: Well perfused  Psychiatric: Affect normal    Labs  Results for orders placed or performed during the hospital encounter of 10/27/23   POCT glucose device results   Result Value Ref Range    POC Glucose, Blood 94 65 - 99 mg/dL       Radiology  No orders to display       Problem List  1. Methamphetamine abuse (HCC)        2. Psychosis, unspecified psychosis type (HCC)              Electronically signed by: Rani Garcia M.D., 10/27/2023 1:05 PM          "
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
## 2023-10-27 NOTE — ED NOTES
Patient resting calmly on gurney.  Active chest rise noted. Patient within view of nurses station.

## 2023-10-27 NOTE — ED NOTES
Pt repositioned in bed. Pt resting comfortably with even chest rise and fall, bed alarm remains on.

## 2023-10-27 NOTE — ED NOTES
Pt up and awake.  Pt ambulated with steady gait to restroom.  Pt voided but didn't provided any urine in cup.  Pt back to room.  ERP informed.

## 2023-10-27 NOTE — ED NOTES
Bedside report rec'd from NI Guillen.  Pt resting in [place] with eyes closed.  No distress noted.    Pt 95% on RA.  Bed alarm in place.

## 2023-10-27 NOTE — ED NOTES
"Patient refusing urine sample and breathalyzer stating \"leave me the fuck alone, I just need to sleep.\"  "

## 2023-10-28 NOTE — DISCHARGE SUMMARY
"  ED Observation Discharge Summary    Patient:Tierney Mayer  Patient : 1985  Patient MRN: [id number]  Patient PCP: Pcp Pt States None    Admit Date: 10/27/2023  Discharge Date and Time: 10/27/23 5:10 PM  Discharge Diagnosis:   1. Methamphetamine abuse (HCC)        2. Psychosis, unspecified psychosis type (HCC)           Discharge Attending: Enoc Key M.D.  Discharge Service: ED Observation    ED Course  Tierney is a 38 y.o. female who was evaluated at [facility] with acute psychosis in the setting of methamphetamine use, was here overnight on ED observation, her mentation has cleared significantly at this point and she is appropriate for discharge after being evaluated by the psychiatric evaluator.  Resources have been provided as well as education on methamphetamine cessation.    Discharge Exam:  /81   Pulse 99   Temp 37.1 °C (98.7 °F) (Temporal)   Resp 16   Ht 1.626 m (5' 4\")   Wt 59 kg (130 lb)   SpO2 95%   BMI 22.31 kg/m² .    Constitutional: Awake and alert. Nontoxic  HENT:  Grossly normal  Eyes: Grossly normal  Neck: Normal range of motion      Labs  Results for orders placed or performed during the hospital encounter of 10/27/23   POCT glucose device results   Result Value Ref Range    POC Glucose, Blood 94 65 - 99 mg/dL       Radiology  No orders to display       Medications:   New Prescriptions    No medications on file       My final assessment includes psychosis, methamphetamine abuse  Upon Reevaluation, the patient's condition has: Improved; and will be discharged.    Patient discharged from ED Observation status at 5:10 PM (Time) 10/27/2023 (Date).     Total time spent on this ED Observation discharge encounter is < 30 Minutes    Electronically signed by: Enoc Key M.D., 10/27/2023 5:10 PM       "

## 2023-10-28 NOTE — ED NOTES
Discharge instructions given.  All questions answered.  Pt to follow-up with Behavioral Health, return to ER if symptoms worsen as discussed.  Pt verbalized understanding.  All belongings with pt.  Pt ambulated to lobby.

## 2023-10-29 ENCOUNTER — HOSPITAL ENCOUNTER (EMERGENCY)
Facility: MEDICAL CENTER | Age: 38
End: 2023-10-29
Attending: STUDENT IN AN ORGANIZED HEALTH CARE EDUCATION/TRAINING PROGRAM
Payer: MEDICAID

## 2023-10-29 VITALS
OXYGEN SATURATION: 100 % | DIASTOLIC BLOOD PRESSURE: 106 MMHG | RESPIRATION RATE: 18 BRPM | SYSTOLIC BLOOD PRESSURE: 132 MMHG | TEMPERATURE: 98.4 F | HEIGHT: 64 IN | BODY MASS INDEX: 31.58 KG/M2 | WEIGHT: 184.97 LBS | HEART RATE: 100 BPM

## 2023-10-29 DIAGNOSIS — F15.10 METHAMPHETAMINE USE (HCC): ICD-10-CM

## 2023-10-29 DIAGNOSIS — R44.3 HALLUCINATIONS: ICD-10-CM

## 2023-10-29 PROCEDURE — 99284 EMERGENCY DEPT VISIT MOD MDM: CPT

## 2023-10-29 RX ORDER — LORAZEPAM 1 MG/1
1 TABLET ORAL ONCE
Status: DISCONTINUED | OUTPATIENT
Start: 2023-10-29 | End: 2023-10-29

## 2023-10-29 ASSESSMENT — PAIN DESCRIPTION - PAIN TYPE
TYPE: ACUTE PAIN
TYPE: ACUTE PAIN

## 2023-10-29 ASSESSMENT — FIBROSIS 4 INDEX: FIB4 SCORE: 0.44

## 2023-10-29 NOTE — ED PROVIDER NOTES
ED Provider Note    CHIEF COMPLAINT  Chief Complaint   Patient presents with    Hallucinations     Pt states there is a talking toiled.       EXTERNAL RECORDS REVIEWED  Other patient was seen in the emergency room October 27, 2023 for psychiatric evaluation and was ultimately discharged home    HPI/ROS  LIMITATION TO HISTORY   Select: Altered mental status / Confusion  OUTSIDE HISTORIAN(S):  None    Tierney Mayer is a 38 y.o. female who presents for evaluation of delusions.  She reports that there is a talking toilet that she encountered prior to coming to the emergency room.  She has a history of methamphetamine use and last used earlier this evening.  She denies any suicidal or homicidal ideation.  She has no fever, chills, nausea, vomiting.  She is requesting food.    PAST MEDICAL HISTORY   has a past medical history of Hypertension, Psychiatric disorder, Psychiatric disorder, and Schizophrenia (HCC).    SURGICAL HISTORY   has a past surgical history that includes pelviscopy (2/22/2011) and incision and drainage general (2/22/2011).    FAMILY HISTORY  No family history on file.    SOCIAL HISTORY  Social History     Tobacco Use    Smoking status: Unknown    Smokeless tobacco: Never   Vaping Use    Vaping Use: Never used   Substance and Sexual Activity    Alcohol use: Yes     Comment: unable to assess amount    Drug use: Not on file     Comment: Marijuana, meth    Sexual activity: Not on file       CURRENT MEDICATIONS  Home Medications    **Home medications have not yet been reviewed for this encounter**         ALLERGIES  Allergies   Allergen Reactions    Clonazepam Unspecified     Pt reports getting a stroke.  Per historical: Twitches and paranoia.    Paliperidone Unspecified     Twitches and paranoia. On risperidone as outpatient with no issues     Invega Sustenna [Paliperidone Palmitate]      On risperidone outpatient without issues.     Olanzapine      Pt reports worsening and threatening voices.  "      PHYSICAL EXAM  VITAL SIGNS: BP (!) 132/106   Pulse 100   Temp 36.9 °C (98.4 °F) (Temporal)   Resp 18   Ht 1.626 m (5' 4\")   Wt 83.9 kg (184 lb 15.5 oz)   SpO2 100%   BMI 31.75 kg/m²      Constitutional: Well developed, Well nourished, sleeping comfortably in bed, easily arousable, disheveled  HEENT: Normocephalic, Atraumatic,  external ears normal, pharynx pink,  Mucous  Membranes moist, No rhinorrhea or mucosal edema  Eyes: PERRL, EOMI, Conjunctiva normal, No discharge.   Neck: Normal range of motion, No tenderness, Supple, No stridor.   Lymphatic: No lymphadenopathy    Cardiovascular: Regular Rate and Rhythm, No murmurs,  rubs, or gallops.   Thorax & Lungs: Lungs clear to auscultation bilaterally, No respiratory distress, No wheezes, rhales or rhonchi, No chest wall tenderness.   Abdomen: Bowel sounds normal, Soft, non tender, non distended,  No pulsatile masses., no rebound guarding or peritoneal signs.   Skin: Warm, Dry, No erythema, No rash,   Back:  No CVA tenderness,  No spinal tenderness, bony crepitance step offs or instability.   Extremities: Equal, intact distal pulses, No cyanosis, clubbing or edema,  No tenderness.   Musculoskeletal: Good range of motion in all major joints. No tenderness to palpation or major deformities noted.   Neurologic: Alert & oriented No focal deficits noted.  Moving all 4 extremities without difficulty  Psychiatric: Delusions present regarding talking toilet, not responding to internal stimuli, redirectable, demonstrates goal oriented thought process including asking for food, denies SI or HI    COURSE & MEDICAL DECISION MAKING    ED Observation Status? Yes; I am placing the patient in to an observation status due to a diagnostic uncertainty as well as therapeutic intensity. Patient placed in observation status at 3:33 AM, 10/29/2023.     Observation plan is as follows: Discussion with behavioral health team regarding need for legal hold    3:56 AM I discussed with " the behavioral health team regarding the patient.  She is very well-known to the alert team and typically presents for method of psychosis and hallucinations.  The alert team RN is very familiar with the patient.  He states that she is at her baseline.  She has no SI or HI.  She does have hallucinations however she is not gravely disabled as she demonstrates forward thinking and asking for something to eat.  She is not a danger to herself or others.    6:47 AM patient was reevaluated bedside.  She continues to sleep comfortably.  She has eaten a meal without difficulty.  Her vital signs are normal.  She is discharged home.  She states that she does not need a refill of any of her medications that she has them.  I have advised her to stop using methamphetamine.  She is advised to follow-up with her psychiatrist.  She is agreeable to plan with no further questions.      Upon Reevaluation, the patient's condition has: Improved; and will be discharged.    Patient discharged from ED Observation status at 6:47 AM (Time) 10/29/2023 (Date).     INITIAL ASSESSMENT, COURSE AND PLAN  Care Narrative: This is a 38-year-old female with history of schizophrenia and meth induced psychosis who is well-known to this facility who presents for evaluation of hallucinations.  On arrival her vital signs are stable.  She does have delusions regarding a talking toilet but denies SI or HI.  She is not responding to internal stimuli, she demonstrates forward thinking and is asking for food.  I discussed with alert team.  She is well-known to the alert team.  They state that she is at her baseline.  They do not feel that she meets criteria for legal hold as she is not a danger to herself and is not gravely disabled at this time.  I agree.  Patient was allowed to sleep in the emergency room.  She was provided a meal.  I offered to refill her medications but she declined.  I recommend she follow-up with her psychiatrist and stop using  methamphetamines.  Strict ER return precautions were discussed.  Patient is agreeable to discharge plan with no further questions.            DISPOSITION AND DISCUSSIONS  I have discussed management of the patient with the following physicians and PIEDAD's:    None    Discussion of management with other QHP or appropriate source(s): Behavioral Health          Barriers to care at this time, including but not limited to: Patient does not have established PCP, Patient is homeless, Patient does not have insurance, Patient had difficult affording medications, and Patient lacks financial resources.     Patient discharged home in stable condition with instructions to follow-up with primary care doctor and psychiatry.  Strict ER return precautions discussed.  Patient is agreeable to discharge plan with no further questions.    FINAL DIAGNOSIS  1. Hallucinations    2. Methamphetamine use (HCC)           Electronically signed by: Mel Hogue M.D., 10/29/2023 3:33 AM

## 2023-10-29 NOTE — ED NOTES
Pt discharged . GCS 15. Pt in possession of belongings. Pt provided discharge education and information pertaining to medications and follow up appointments. Pt received copy of discharge instructions and verbalized understanding.

## 2023-10-29 NOTE — ED NOTES
Bedside report given to RN Gladys    Oxygen:RA  Fall Risk status:minimal precautions according to JEREMIAH  Alert and oriented

## 2023-10-29 NOTE — ED NOTES
Assumed care of patient, patient bedside report received from NI Erwin . Pt resting in bed with respirations even and unlabored, on room air .

## 2023-10-29 NOTE — ED NOTES
"RN attempted to obtain a breathalizer, pt refused and stated \"I just want to sleep\", ERP notified   "

## 2023-10-29 NOTE — CONSULTS
Alert Team:     No full consult necessary per ERP.     PT OUSMANE REM from [facility] for hallucinations. PT is very well-known to Alert Team and was last seen in the ED on 10/27/23 for meth-induced psychosis and hallucinations. She presents with baseline mentation that may very well be perpetuated by methamphetamine abuse. It appears that on this visit as well as on 10/27/23 she is only demanding to sleep.     Psychiatric diagnoses include schizoaffective d/o, Bipolar type; hx of medication and treatment noncompliance; multiple psychiatric hospitalizations for decompensating mental health. Findings discussed with ERP who agrees pt is safe to discharge to self. Outpatient psychiatric referral faxed to [facility]. Pt will be given mental health resources and homelessness resources when she is Dced. No further needs.    Armando Rivas RN, JUDI

## 2023-12-18 NOTE — ED NOTES
"Attempt to obtain VS. Pt covered face with blanket stating \"No, you're not going to touch me.\" Education provided  "
"Attempted to check vitals and medicate pt, she is refusing both at this time. Pt stated \"please get out of my room\".   "
"Patient up to bathroom and back to room. Patient provided breakfast tray. Patient refuses vitals stating, \" No you can't I don't want them.\"  "
"Provided pt crackers and water per request. Offered pt a tooth brush and toothpaste to brush teeth, pt declined and stated \"What does thatmean, why would I want to do that?\". Pt sitting in bed, BRENDA, pt in direct view of sitter.   "
"Pt ambulates to restroom with upright gait, slams door in staff face. Pt screaming out at sitter \"stop fucking looking at me.\" security to restroom at this time. Patient escorted back to room. Pt continues yelling out at staff, not redirectable, remains agitated/irritable, remains with inappropriate speech/delusions. Instructed on acceptable behavior with staff.    "
"Pt awoken for AM medications, pt states \"No no no! Im sleeping! I dont care if I have a seizure I need to sleep.\" Pt asked RN to \"go away.\" Attempted to convince patient to take meds so that pt will not have to be woken up again, pt declines  "
"Pt continues to remove her arm band stating, \"I took it off cause it was scaring me\".  Pt reporting she put it on Rodriguez  room, RN unable to locate.  RN offered to put on pt ankle, pt stated \"no stop taking if on and off and just put it on and leave it on\".  RN able to put back on wrist again for now.  1:1 sitter remains at bedside.   "
"Pt medicated per MAR without issue, provided with cup of decaf coffee. Upon RN returning to room to provide eliazar alejandro patient states \"are you staring at my vagina you nasty bitch?\" then screams loudly, repetitively to \"get the fuck out.\" security standby at bedside. Pt remains resting on hospital bed. Remains under continuous 1:1 observation by sitter at bedside.   "
"Pt medicated per MAR, cooperative but delusional at this time stating \"cell phones are eating our brains\".  "
"Pt medicated per emar. Pt cooperative with VS and medicating. Pt inappropriately giggling throughout interaction. When asked why she was laughing, pt states \"the girl\".     Room remains secure, sitter present.   "
"Pt out of room requesting Thorazine. Still refusing vitals at this time. States she just wants to \"sleep and will allow it tomorrow during the daytime\". Pt calm otherwise, will re-attempt at a later time to avoid escalation.   "
"Pt refused to let RN obtain vital signs. Pt states \"No, you can't touch me, I'm more important than you, you're a stupid bitch.\" Pt educated on acceptable behavior with staff. Pt sitting in bed, talking to self, 1:1 sitter in direct sight of pt.    "
"Pt refusing to eat breakfast tray, states it \"tastes like it has already been eaten and someone vomited it on my tray.\"  Sitter in place.    "
"Pt reporting she wishes to be on risperadone and not thorazine, feels like \"the thorazine makes he sleep to deep and then she cannot get rest\" This has been noted in chart.   "
"Pt reports that \"the aliens are poking holes in my head and swing their dicks at me.\"  Pt offered ativan, refused stating that \"it is to strong\" for her.  Agreeable to taking her thorazine, but states that the depakote is the reason for her seeing aliens and is refusing medication at this time. Sitter remains in place.   "
"Pt resting in bed, pt having hallucinations, occasionally yelling out \"stop it\" while no on present in room.  1:1 sitter remains at bedside.   "
"Pt up to restroom back and forth frequently. Pt speaking in tangential, scattered speech telling sitter \"Don't touch me, don't touch me!\" Pt verbally re-directed and instructed yelling and aggressive behavior would not be tolerated. Pt now sitting in bed, respiration even and unlabored, BRENDA, pt in direct view of sitter.   "
"Pt up to use restroom, ambulated with steady gait. Primary RN attempted to obtain vitals and administer night meds upon return to room, pt became agitated, yelling \"get out of my room!\" and refused vitals and meds at this time.   "
"Pt walked out of room into hallway and began asking sitter if she could be sent to skilled nursing because she did not want to stay in the hospital any longer. Pt stated in a fast, pressured speech \"Just send me to skilled nursing, last time I was here I stayed here for three months, I can't stay here for three months, can I just go home?\" RN re-educated pt that she is on a legal hold for her safety and will remain in the hospital for the time being. Pt then began walking down the [place]on while ignoring RN's instructions to return to room. RN followed pt and eventuallly got pt to return to room. Pt now resting in bed, declined offer for new linens and blankets. Pt is direct view of sitter.   "
"Pt with intermittent anxious/aggressive behavior, pressured laughter and yelling at sitter for \"fucking coffee and my meds\". Pt medicated with scheduled and PRNs.   "
"Rounded on patient. Patient offered breakfast and use of bathroom. Patient states, \" I do not need to do that right now, I want to sleep.\"   "
"Tech at bedside to obtain vitals. Patient became agitated and stated \"you're a child molestor, someone is raping a baby.\" Patient knocked over vitals machine and threw blood pressure cuff across the room. Patient calmed down after staff left the room. Dr. Cain at bedside. Patient educated to stay calm and cooperate with medical treatment. Patient agreeable at this time.   "
"This Nurse went to obtain patients vitals. Patient asked nurse, \" Are you a spoon or a fork? I do not want the bed moved.\" Patient then yells at nurse stands up and pushes nurse out of room. Security called to bedside. Patient yelling at staff.  ERP notified order received for meds to medicate for aggressive behavior. Patient agreed to medications.  Patient medicated per MAR.   "
ALERT team member at bedside with security stand-by.   
Additional food given to pt. Q15 checks in place.  
Asked pt if I could retake her vitals, pt refused to wake up. Will try again later   
Assist RN  Sitter outside the room watching pt. Resting in bed, respiration unlabored.  
Assist RN: Patient sleeping, chest rise and fall. 1:1 sitter in direct view of patient.   
Assumed care from Autumn RN. Pt resting in bed. Sitter in place. Continued monitoring in place.   
Assumed care of patient for NI Perdomo. Pt sitting up in bed, drinking decaf coffee. Pt responds to questions appropriately, with short one word answers. Appears calm. Reoriented to time and plan for evening. Denies additional needs at this time. Safety sitter in direct continuous view of patient  
Assumed care of patient. Patient ambulated to bathroom. 1:1 sitter in unobstructed view of patient.   
Assumed care of pt with NI Gannon, pt was up to BR and now is back to room, resting quietly on bed.  Sitter in direct view of pt  
Assumed care of pt, report received. Asleep. Designee monitoring from [facility].   
Assumed care of pt. Pt resting comfortably in room.   
Assumed care of pt. Pt resting comfortably. Sitter in place for close obs.   
Assumed pt care, report received. Pt resting supine on gurney in NAD with 1:1 sitter in direct view of patient.   
Assumed pt care. Pt sleeping on gurney. Sitter at bedside   
Attempted to provide pt with full dose of thorazine but pt refusing and only agreeing to take 1 pill(50mg) of the thorazine.  
Attempted to take pts vitals, pt states she wants me to leave, and says that I already took her vitals, I explained no one has taken her vitals since 0430. She continued to tell me to get out of the room.   
Bedside report given to NI Proctor. Patient care transferred.   
Breakfast meal provided  
Breakfast tray given.  
Breaking primary RN:     Rounded on pt, pt resting in bed, eyes closed with unlabored equal respirations. Security safety sitter in direct view of pt.   
COVID swab collected and sent. Pt tolerated collection poorly but now resting comfortably. 1:1 sitter in direct view of patient. Report to NI Jernigan.  
Coffee provided upon request. One to one sitter in place outside of patient room.   
Davina escorted pt to restroom  
Detail Level: Simple
EKG completed.  
ERP at bedside. Pt resting on cart, sleeping with unlabored respirs. Remains under continuous 1:1 observation with precautions in place.  
Gave medications per MAR and provided pt with ice water.   
Gave report to Keara DAN   
Handoff given to Denia DAN  
Handoff report and care of pt given to Colleen DAN.     
Handoff report and care of pt given to Keara DAN.         
Linen change and personal hygiene provided.   
Maddi report from NI Roberts and assumed care of pt. Q15 checks.  
Med rec updated and complete. Per historical encounter from 10/29/21.   Pt recently discharged from [facility] [place] [facility] ON 10/28/21  
Medicated patient per MAR. Patient refused depakote reporting it makes her hallucinate. Patient nonsensically yelling at staff. Alert team at bedside. Patient calmed down. Boxed meal provided.   
Medication given per MAR.  
Medication given per MAR.  
Medication given per MAR. Pt refusing vitals.  
PT medicated as ordered and given a meal.  PT resting in no distress with sitted at bedside.  
Patient ambulatory to BR, steady gait.  Urine sent to lab.  
Patient ambulatory to bathroom.   
Patient asleep in bed, NAD, sitter in place. Rise and fall of chest noted.   
Patient asleep in bed. Remains calm and cooperative at this time. 1:1 sitter in direct view of patient  
Patient asleep in bed. Remains calm and cooperative. 1:1 sitter in direct view of patient  
Patient back from shower. Eating lunch.   
Patient back from shower. Patient laying on bed resting.   
Patient eating breakfast in bed.   
Patient given hot meal tray at bedside.    Sitter has unobstructed view of patient at all times.      1:1 Observation:  Continuous visual monitoring by Trained Personnel who remain in line of site of the patient with the intent to ensure safety of the patient and minimize the risk of suicide.         
Patient given hot meal tray.     Sitter has unobstructed view of patient at all times.      1:1 Observation:  Continuous visual monitoring by Trained Personnel who remain in line of site of the patient with the intent to ensure safety of the patient and minimize the risk of suicide.     Report to NI Lilly.  
Patient given hot meal tray.    Sitter has unobstructed view of patient at all times.      1:1 Observation:  Continuous visual monitoring by Trained Personnel who remain in line of site of the patient with the intent to ensure safety of the patient and minimize the risk of suicide.     
Patient is resting comfortably, continues sleeping on bed with visualized chest rise & fall. Pt remains in gown, blankets in place, under continuous 1:1 observation by sitter.   
Patient is resting comfortably, remains on bed, positioned with blankets in place. Patient visualized with unlabored respirs, remains under continuous 1:1 observation by sitter. Precautions in place, lights dimmed for comfort.   
Patient is resting comfortably, remains on hospital bed. Patient ambulates around room with upright gait. Remains under continuous 1:1 observation by sitter. Precautions in place.   
Patient is resting comfortably, remains sleeping on bed with easy, unlabored respirs. Remains under continuous 1:1 observation by sitter at bedside, precautions in place.   
Patient is resting comfortably.  
Patient is resting comfortably.  Sitter at bedside PT following directions and co operating with care at this time.  
Patient is resting comfortably. No requests at this time.   
Patient is resting comfortably. Sitter at bedside.   
Patient is resting comfortably. Sitter at bedside.     
Patient is resting comfortably. Sitter no longer required at bedside.   
Patient is resting comfortably. Sitter remains present  
Patient is resting on bed at this time, pt more calm, remains under continuous 1:1 monitoring by sitter. Precautions in place.  
Patient is resting on bed, medicated by assist RN. Pt remains under continuous 1:1 monitoring by sitter at bedside. Pt continues with intermittent yelling out, intermittent agitation.   
Patient medicated per ERP order, see MAR.    Provider aware of inability to care for self and elopement risk. Room previously stripped of all dangerous items, room safety checklist in use. Pt in hospital gown and no personal items. Legal hold in chart. No self harm discussed with pt, states will not harm self here.     Sitter has unobstructed view of patient at all times.      1:1 Observation:  Continuous visual monitoring by Trained Personnel who remain in line of site of the patient with the intent to ensure safety of the patient and minimize the risk of suicide.     
Patient medicated per MAR  
Patient refused depakote stating it is making her hallucinate. Dr. Cain aware. One to one sitter in place outside of room.   
Patient refused revital. Patient resting with equal chest rise and fall. Call light in place.  
Patient remains elopement risk, charge RN aware, pressured speech and anxiety continues, ERP made aware, new order received.   
Patient resting in bed with eyes closed, unlabored respirations. Safety sitter in direct view of patient. Will continue to monitor.   
Patient resting in bed, NAD, Sitter in place   
Patient resting in bed, sitter in [place]. NAD   
Patient resting in bed.   
Patient resting in bed. No acute distress noted. Hourly rounding in place, sitter present.   
Patient resting in bed. No acute distress noted. Hourly rounding in place.   
Patient resting in bed. No needs at this time. 1:1 sitter in direct view of patient  
Patient resting in bed. Sitter outside door.   
Patient resting in bed. Sitter outside room .  
Patient resting in bed. Sitter outside room.   
Patient resting in bed. Sitter outside room.   
Patient resting in bed. Sitter sitting outside of room.   
Patient resting in bed. Sitter sitting outside room.   
Patient resting in bed. Sitter sitting outside room.   
Patient resting on gurney. No acute distress. Active chest rise noted. No agitation noted. No behavioral pain indicators. One to one sitter outside of room in direct view of patient.   
Patient resting on stretcher in no acute distress. Equal chest rise and fall noted. Sitter in doorway for direct 1:1 observation. Room safety checklist in use. Lunch tray provided  
Patient resting on stretcher in no acute distress. Equal chest rise and fall noted. Sitter in doorway for direct 1:1 observation. Room safety checklist in use. No needs at this time, will continue to monitor    
Patient sitting in bed.   
Patient sleeping in bed. NAD noted. 1:1 sitter in direct view of patient  
Patient sleeping in bed. Patients equal rise and fall over chest. Will attempt vitals when patient is awake.    
Patient sleeping on gurney, respirations even and unlabored.     Sitter has unobstructed view of patient at all times.      1:1 Observation:  Continuous visual monitoring by Trained Personnel who remain in line of site of the patient with the intent to ensure safety of the patient and minimize the risk of suicide.     
Patient standing in door way yelling at staff. Patient asking for coffee and food. Patient given cup of coffee and crackers. Patient agreeable to take medications. Patient medicated per MAR.   
Patient to shower with security and RN.   
Patient to shower with tech and security.   
Patient up to bathroom. Patient yelling at staff in [address] way. Patient redirected to room.   
Patient up to bathroom.Patient back to bed. Patient medicated per MAR. Patient given a cup of coffee.   
Patient's home medications have been reviewed by the pharmacy team.     Pt admitted for exhibiting s/s of psychosis and collin and posing a threat to others.    Past Medical History:   Diagnosis Date   • Hypertension    • Psychiatric disorder     Schizophrenia   • Schizophrenia (HCC)        Patient's Medications   New Prescriptions    No medications on file   Previous Medications    CHLORPROMAZINE (THORAZINE) 200 MG TABLET    Take 600 mg by mouth 3 times a day.    HALOPERIDOL DECANOATE (HALDOL DECANOATE) 100 MG/ML INJECTION    Inject 100 mg into the shoulder, thigh, or buttocks every 30 days.    LEVOTHYROXINE (SYNTHROID) 100 MCG TAB    Take 100 mcg by mouth every morning.   Modified Medications    No medications on file   Discontinued Medications    No medications on file          A:  Medications do not appear to be contributing to current complaints.     P:    No recommendations at this time. Home medications have been reordered and adjusted by the psychiatrist as appropriate.    Deirdre Ortega, PharmD, PGY2 Emergency Medicine Pharmacy Resident          
Per ERP, patient may eat/drink, patient given box meal at bedside.  Patient continues with pressured speech, unable to follow conversation, respirations even and unlabored.     
Provided pt water and crackers per request. Sitting in bed eating, BRENDA, in direct view of RN.   
Provided pt with coffee and crackers. Safety sitter in direct view of pt.   
Provider previously aware of SI. Room previously stripped of all dangerous items, room safety checklist in use. Pt in hospital gown and no personal items. Legal hold in chart. No self harm discussed with pt, states will not harm self here.     Sitter has unobstructed view of patient at all times.      1:1 Observation:  Continuous visual monitoring by Trained Personnel who remain in line of site of the patient with the intent to ensure safety of the patient and minimize the risk of suicide.     
Psych at bedside  
Psychiatry at bedside  
Pt added to shower list per request.  
Pt ambulated to BR w/ steady gait, NAD noted.   
Pt ambulated to bathroom and given coffee per request.  
Pt ambulated to bathroom to brush teeth.   
Pt ambulated to bathroom with 1:1 supervision.   
Pt ambulated to bathroom with supervision.   
Pt ambulated to bathroom, +void, provided with hot meal  
Pt ambulated to bathroom.  
Pt ambulated to bathroom.   
Pt ambulated to bathroom. 1:1 sitter in place in direct observation of patient.   
Pt ambulated to bathroom. 1:1 sitter in place.   
Pt ambulated to bathroom. 1:1 sitter in place.   
Pt ambulated to bathroom. 1:1 sitter in place. No further needs at this time.   
Pt ambulated to bathroom. Morning meds given. 1:1 sitter in place.   
Pt ambulated to bathroom. Pt refusing midnight vitals. 1:1 sitter in place.   
Pt ambulated to bathroom. While walking back from bathroom, patient attempted to enter a different patient room. Patient redirected to her room aggravated and yelling. Patient slammed door and is currently resting. 1:1 sitter in place.   
Pt ambulated to restroom with steady gait   
Pt ambulated to restroom, escorted by sitter.  
Pt ambulated to the bathroom.   
Pt ambulated x 2 around unit and to bathroom. 1:1 sitter in place with unobstructed view.  
Pt ambulates to restroom & back with steady, upright gait. Remains under continuous 1:1 observation. Pt continues refusing vital signs, attempted to educate multiple times with no evidence of understanding.   
Pt ambulates to restroom & back with upright gait. Pt in doorway yelling she wants coffee, pt educated on recently given full cup of coffee, provided with additional cup of decaf coffee at this time. Returns to bed, positions self for comfort. Pt consuming meal tray without difficulty. Remains under continuous 1:1 observation by sitter.   
Pt ambulates to restroom, screaming at security at this time. Patient slams door and attempts to close/lock, yelling out aggressively towards staff. Pt grabs security vest/badge en route. Escorted back to room by security. Pt remains verbally aggressive towards staff. Patient agreeable to receiving ordered medication for agitation. ED tech and security at bedside for med admin. Provided with coffee and food per request.   
Pt ambulating in room, restless, NAD noted.   
Pt ambulatory to BR to void independently.  1:1 sitter remains at bedside.   
Pt ambulatory to bathroom with steady gait. No complaints. Sitter 1:1  
Pt ambulatory to restroom & back with steady, upright gait without assistance. Patient returns to hospital bed and positions self for comfort. RN to bedside to obtain vitals & medicate per MAR, pt adamantly refuses vitals, yelling at RN, yells to get out and leave her alone. Patient educated on importance, refuses at this time. Pt takes ordered medication with repetitive requesting. Pt remains under continuous 1:1 monitoring by sitter at bedside, pt remains with rapid, inappropriate speech, states she needs to get to a nursing home because she is 36 and she's tired, continues with flight of ideas, disorganized.   
Pt ambulatory to restroom and back to green 32 with steady gait. Pt sitting in bed, NADN, Q15 minute obs in progress.   
Pt ambulatory to restroom with a steady gait.   
Pt ambulatory to restroom.   
Pt ambulatory to restroom. 1:1 sitter at bedside.    
Pt ambulatory to restroom. 1:1 sitter at bedside.    
Pt ambulatory to restroom. Pt now back in room. Pt medicated per MAR on second attempt. First attempt to medicate pt was agitated and refusing to take meds. Pt now observed to be calm and cooperative.   Sitter remains outside pt room, unobstructed view of pt.    
Pt appears to be sleeping, even unlabored resps noted.   
Pt appears to be sleeping- respirations even and unlabored.  at bedside  
Pt asleep at this time. 1:1 sitter in place.  
Pt asleep at this time. Chest rise and fall noted. 1:1 sitter in place.   
Pt asleep in a position of comfort with equal chest rise and fall.   
Pt asleep in bed at this time. 1:1 sitter in place.   
Pt asleep in bed at this time. 1:1 sitter in view of patient.   
Pt asleep in ra[place], NAD, +chest rise  1:1 sitter in direct view of pt  
Pt asleep in raoulrmich, NAD +chest rise  1:1 sitter in direct view of pt  
Pt asleep in raoulrmich, NAD +chest rise  1:1 sitter in direct view of pt  
Pt asleep, no acute distress noted. Even rise and fall of chest. Sitter remains present  
Pt asleep, no acute distress noted. Even rise and fall of chest. Sitter remains present  
Pt asleep, no acute distress noted. Sitter remains at bedside  
Pt ate 100% of meal tray and provided water.   
Pt ate breakfast tray. Pt asked to have a walk and was able to do so with tech around nursing station. Pt requested coffee and was given some decaf coffee. No other needs expressed at this time  
Pt ate lunch, no needs at this time.  Back to sleep  
Pt attempted to walk the hallway. Pt redirected to room and provided sandwich box. 1:1 sitter in place.   
Pt awake and requesting coffee. 1:1 sitter is at bedside in direct patient view.   
Pt awake and resting in bed, calm and cooperative.  1:1 sitter remains at bedside.   
Pt awake, no needs at this time. 1:1 sitter at bedside in direct patient view.  
Pt awake, sitting in bed, NADN, in direct view of RN (sitter on lunch break, no relief available).   
Pt awakened to obtain vitals and medicate. All VSS, calm and cooperative at this time. No complaints, sitter remains at bedside  
Pt cont to sleep  
Pt continues resting comfortably on bed, sleeping with easy, unlabored respirs with blankets in place. Pt turns & repositions self, lights dimmed, remains under continuous 1:1 observation by sitter at bedside.   
Pt continues to rest with even chest rise and fall. 1:1 sitter in direct view of patient.    
Pt continuously asking for phone. Pt wants to call HR to get a job. Pt ambulatory to restroom. 1:1 sitter at bedside.    
Pt cooperative with medication administration. Provided decaf coffee per request. Pt siting in bed, NADN, Q 15 minute obs in progress.   
Pt escalating and yelling at RN. Pt told to go back into room and provided with water. Will notify MD for medication.   
Pt escalating in room. Screaming and attempting to slam door. Security to bedside. Pt medicated per order.   
Pt escorted to the shower room in [place] pod with this tech and security.  
Pt given belongings in preparation for transport.   
Pt given decaf coffee per request.   
Pt given hot beverage  
Pt given lunch tray, verified tray is safe with plastic utensils.  
Pt given meal tray   
Pt given snacks. Sitter in direct view of pt at all times.  
Pt given warm meal  
Pt laying in bed quietly, NADN, q 15 minute obs in progress.   
Pt laying in what appears to be a comfortable position, even rise and fall of chest. Sitter remains at bedside, pt acting appropriately at this time.   
Pt medicated per MAR on 3rd attempt. 1:1 sitter at bedside.   
Pt medicated per MAR, NAD noted. Given orange juice, sitter in direct view of pt   
Pt medicated per MAR, becoming increasingly agitated, yelling at staff and wanting to wander off. NAD noted.   
Pt medicated per MAR, vitals taken, refused BP to be taken. Pt started yelling when attempting to get BP cuff. NAD noted.   
Pt medicated per MAR.   
Pt medicated per MAR. Given coffee. Ambulatory to bathroom with steady gait.   
Pt medicated per emar. Calm and cooperative with care. Denies further needs.  
Pt medicated per orders, updated on POC .attemped to take pts vitals signs and pt refused.   
Pt medicated per scheduled morning meds. Pt calm and cooperative. 1:1 sitter in place with direct line of sight of patient. Pt refused VS.   
Pt medicated with AM medication, calm and cooperative with RN.  1:1 sitter remains at bedside.   
Pt now resting in room with no complaints. Sitter 1:1  
Pt okay with EKG. Tech aware.   
Pt pacing in room, NAD noted  
Pt placed in hospital bed. Remains calm and cooperative. Sitter present. Room remains secure.   
Pt provide lunch meal tray, ate 100% of meal.  1:1 sitter remains at bedside.   
Pt provided boxed meal, VS obtained. Pt cooperative with this RN. Sitting in bed eating currently  Pt medicated per MAR  
Pt provided breakfast tray, ate 100% of meal.  1:1 sitter remains at bedside.   
Pt provided coffee per request.  1:1 sitter remains at bedside.    
Pt provided herbal tea, calm and cooperative at this time.   
Pt provided with dinner tray.   
Pt provided with water. Sitter 1:1  
Pt refused 18:00 meds, yelled at this RN to 'get out, you're trying to get me to take those meds', explained to pt the therapy of ordered meds and she again yelled to 'get out'. Pt now resting calmly in bed.   
Pt refused EKG.  
Pt refused VS.   
Pt refused midnight vitals. 1:1 sitter in place with direct observation.   
Pt refused morning vitals. 1:1 sitter in place.   
Pt refused vitals.  
Pt refusing 2100 dose of Depakote and Chlorpromazine.    
Pt refusing EKG. Pt escalating, coming out of room, asking to use phone and have a computer, ERP aware, orders received, pt medicated with PO ativan.  
Pt refusing depakote, states she is allergic to it and it gives her seizures  
Pt refusing medications at this time. Sitter 1:1  
Pt refusing to take medications.   
Pt refusing vitals and medication despite education.  
Pt refusing vitals at this time, denies further needs.   
Pt refusing vitals at this time. RN aware   
Pt refusing vitals signs.   
Pt report given to Alicia DAN. Sitter remains outside pt room, unobstructed view of pt.    
Pt report given to Alicia DAN. Sitter remains outside pt room, unobstructed view of pt.      
Pt report received from Regina DAN. Sitter outside pt room, unobstructed view of pt.   Pt lying right side, eyes closed. NAD noted.   
Pt report to Armando DAN.   
Pt report to Latonia DAN  
Pt report to Sharri DAN.   
Pt requesting additional food- given lunch box. Sitting up in bed.  at bedside  
Pt requesting shower, security called. Security aware and reports they will be here for shower once peds finishes their showers.  
Pt requesting to use phone to call mom.   
Pt resting calmly in bed, coffee provided per request.  Pt spoke with Guardian office and wishes to stay and have hold evaluated by Psych.  Pt reports willing to continue L2K if needed.  1:1 sitter at bedside.   
Pt resting in bed and denies needs at this time. 1:1 sitter at bedside.    
Pt resting in bed at this time. VSS. Continued monitoring in place.   
Pt resting in bed with eyes closed, bilateral chest rise, 1:1 sitter at bedside, NAD noted  
Pt resting in bed with eyes closed, bilateral chest rise, NAD noted, 1:1 sitter at bedside  
Pt resting in bed with eyes closed, bilateral chest rise, NAD noted.  1:1 sitter at bedside  
Pt resting in bed, 1:1 sitter remains at bedside.   
Pt resting in bed, NAD, behavior calm/appropriate, 1:1 sitter at door, will continue to monitor.   
Pt resting in bed, NADN, 1:1 sitter pulled to sit for another pt, Q15 minute observation in progress.   
Pt resting in bed, sitter in place.   
Pt resting in bed. 1:1 sitter at bedside.    
Pt resting in bed. 1:1 sitter in place.   
Pt resting in bed. Chest rise and fall noted. 1:1 sitter in place.   
Pt resting in bed. Sitter in place. Pt refused morning vital signs.   
Pt resting in gurney, NAD noted  
Pt resting in gurney, NAD noted   
Pt resting in gurney, NAD noted.   
Pt resting in gurney, NAD noted.   
Pt resting in gurney, respirations even and unlabored. NAD noted.   
Pt resting in gurney, respirations even and unlabored. NAD noted.   
Pt resting in hospital bed; awake/alert and in no distress. Room remains secure, sitter present  
Pt resting on bed, with regular chest rise and fall.     
Pt resting on gurney with even chest rise and fall. 1:1 sitter in direct view of patient.    
Pt resting on gurney with unlabored even chest rise and fall, awakes easily to stimuli, 1:1 sitter in place, will continue to monitor.   
Pt resting quietly in room. Sitter 1:1  
Pt resting supine with even and unlabored respirations on RA. 1:1 sitter in direct view of patient.    
Pt resting with even and unlabored breaths. 1:1 sitter at bedside.    
Pt resting with even, unlabored respirations on RA. 1:1 sitter in direct view of patient.    
Pt resting with eyes closed, respirations even and unlabored. 1:1 sitter at bedside in direct patient view  
Pt resting with eyes closed, respirations even and unlabored. 1:1 sitter at bedside in direct patient view.   
Pt resting, has no needs at this time, updated on POC  
Pt resting, no acute distress noted   
Pt resting, vitals reassessed, updated on POC  
Pt sitting awake in bed, NADN, in direct view of sitter.   
Pt sitting in bed, NADN, in direct view of sitter.   
Pt sitting quietly in bed, BRENDA. Provided pt decaf coffee per request. Q15 minute obs in progress.  
Pt sitting up in bed, appears calm and quite, NADN, pt in direct view of sitter.    
Pt sitting up in hospital bed. Room secure; sitter present. Pt hyper-aware and cooperative with staff  
Pt sleeping comfortably. Sitter in direct view of pt at all times.  
Pt sleeping in bed, respiration even and unlabored, NADN, Q 15 minute obs in progress.   
Pt sleeping in bed. 1:1 sitter in place.   
Pt sleeping in bed. Chest rise and fall noted. 1:1 sitter in place with unobstructed view of patient.   
Pt sleeping in bed. Chest rise and fall noted. 1:1 sitter in place.   
Pt sleeping in room. No complaints, sitter 1:1  
Pt sleeping on gurney, respirations even and unlabored, NAD noted.     Sitter has unobstructed view of patient at all times.      1:1 Observation:  Continuous visual monitoring by Trained Personnel who remain in line of site of the patient with the intent to ensure safety of the patient and minimize the risk of suicide.         
Pt sleeping on hospital bed, respirations even and unlabored, NAD noted.     Sitter has unobstructed view of patient at all times.      1:1 Observation:  Continuous visual monitoring by Trained Personnel who remain in line of site of the patient with the intent to ensure safety of the patient and minimize the risk of suicide.         
Pt sleeping on hospital bed, respirations even and unlabored, NAD noted.     Sitter has unobstructed view of patient at all times.      1:1 Observation:  Continuous visual monitoring by Trained Personnel who remain in line of site of the patient with the intent to ensure safety of the patient and minimize the risk of suicide.         
Pt sleeping, no needs at this time.   
Pt sleeping, no needs at this time.   
Pt sleeping, respirations even and unlabored. 1:1 sitter at bedside in direct patient view.   
Pt sleeping. Equal rise and fall of chest observed. Q15 checks in place.  
Pt sleeping. Equal rise and fall of chest observed. Sitter in direct view of pt at all times.  
Pt sleeping. No needs at this time.  
Pt sleeping. Sitter in direct view of patient  
Pt sleeping. Smooth even respirations noted  
Pt sleeping. equal rise and fall of chest observed. Sitter in direct view of pt at all times.  
Pt standing in doorway, asking for clothes, complaining that they are too small and won't fit. Pt informed that she will receive her items when [facility] arrives to  pt. Pt demonstrated understanding verbally. No other requests at this time.   
Pt still refusing to take medications.   
Pt to be dc'd to [facility] at 1430. Pt updated on plan of care by Alert team and nurses. Pt demonstrated understanding, verbally. Pt to get clothes at time of DC.   
Pt took depakote without issue. Breakfast provided. Oriented to person and place, disoriented to time and situation. Pt laughing inappropriately, responding to internal stimuli. VSS. 1:1 sitter in direct view of patient.    
Pt took morning dose of chlorpromazine. Pt did refuse morning vital signs.  Pt is now a q15 for day shift and if the patient requires 1:1 sitter that can be escalated to charge RN.  
Pt up to BR self with steady gait. Back to room. Sitter in place.   
Pt up to restroom and back with steady gait. Sitting in room, NADN, pt in direct view of sitter.   
Pt up to restroom, complaining of dysuria. Urine sample and sent to lab for analysis.   
Pt walked out into [place], asking sitter if she can leave. Pt sat down in hallway and refused to return to room. Pt became agitated whe instructed by RN to return to room and yelled at RN.  passing by instructed pt to return to room and pt did so. Pt sitting in bed, awake and calm, in direct view of sitter.   
Pt walking with staff around pod.   
Pt was accompanied to shower with tech and security - new gown and items provided to patient. Pt denies any additional needs at this time.  
Pt. Given breakfast tray and tray has been removed    
Pt. Resting denies needs at this time, updated on POC  
Pt. Resting, updated on POC  
Received report from AUBRIE Barrera in direct view of   pt at all times.   
Received report from Elida DAN. Assuming care of pt at this time. Pt resting in bed, NADN, pt in direct view of sitter.     
Received report from Ileana Castaneda.  
Received report from Keara DAN. Assuming care of pt at this time.    
Received report from Lisa DAN  
Received report from NI Donato. Pt sitting in bed, safety sitter in direct view of pt. Will continue to monitor  
Received report from NI Jernigan. Assumed care of patient. 1:1 sitter in place.   
Received report from NI Watters. Safety sitter in direct view of pt. Will continue to monitor.   
Report from Keara DAN  
Report from Michael DAN. Legal hold paper work in patients chart.  1:1 sitter in direct patient view.   
Report from Michele DAN, pt currently asking for paper and pencil.  Pt given crayon and paper.  1:1 sitter at bedside in direct patient view.   
Report from NI Alegria. Pt ambulating in room w/ steady gait. NAD noted. Sitter in direct view of pt.   
Report from NI Lilly. Care assumed of patient. Patient asleep in bed. NAD noted. 1:1 sitter in direct view of patient  
Report from NI Menchaca and assumed care. Davina remains in direct view of pt.   
Report from NI Sepulveda. 1:1 sitter at bedside.   
Report from Nikolai DAN.  Patient care assumed at this time.  
Report from Pao DAN. Sitter present, room secure. Pt calm and cooperative.  
Report from RN Chika    Patient resting on stretcher in no acute distress. Equal chest rise and fall noted. Sitter in doorway for direct 1:1 observation. Room safety checklist in use. No needs at this time, will continue to monitor    
Report from previous RN, legal hold in place with 1:1 sitter in direct view of pt.  Night meds given, VSS.  Pt given water and crackers after dinner tray.  Pt behaving appropriately.  
Report given to Caitlyn DAN, given dinner tray, sitter outside the room.  
Report given to Elida DAN.    
Report given to NI Donato.   
Report given to NI Hughes  
Report given to NI Isabel.   
Report given to NI Nielsen  
Report given to RN.  
Report off to israel ehrrmann  
Report received from NI Gannon. Assumed care of patient. Legal 2000 paperwork present in chart. One to one sitter in place outside of patient room.   
Report received from NI Johnson. Pt resting in gurney, even and unlabored chest rise and fall noted. 1:1 sitter in place, Legal 2K in chart   
Report received from NI Sepulveda. Patient resting in bed. Sitting outside room/.   
Report to Giovana DAN  
Report to Leonel Banegas  
Report to NI Barrera  
Report to NI Barrera.   
Report to NI Bautista  
Report to NI Donato.  
Report to NI Gonzalez.   
Report to NI Plasencia  
Report to Pao RN  
Report to new sitter. Pt sleeping. Equal rise and fall of chest observed. Sitter in direct view of pt at all times.   
Resting in bed quietly, occasionally giggling to self.  in direct view of patient   
Rounded on pt. Pt refused to take medications and refused vitals at this time.   
Rounded on pt. Pt sititng in bed calmly, NAD at this time, pt in direct view of sitter.   
Sitter at bedside   
Sitter remains outside pt room, unobstructed view of pt.    
Tech able to obtain vitals. Patient provided meal tray and coffee. No further needs noted at this time. Sitter in place outside of patient room.   
This RN assumes care of patient, report from NI Lilly. Patient visualized resting on bed, blanket in place, under continuous 1:1 observation by sitter. Lights dimmed, precautions in place.   
VS obtained. Medicated per MAR.   
VS taken on patient and stable. She was provided with water and crackers. Sitter in doorway for direct 1:1 observation. Room safety checklist in use. Will continue to monitor  
VS taken on patient. She was provided with coffee  
Vitals rechecked ,stable  
Medical Necessity Clause: Botulinum toxin hyperhidrosis therapy is medically necessary because this interferes with ADL.
Medical Necessity Information: LCD Guidelines vary from payer to payer. Please check with your payer's policy to determine medical necessity.

## 2024-01-19 ENCOUNTER — HOSPITAL ENCOUNTER (EMERGENCY)
Facility: MEDICAL CENTER | Age: 39
End: 2024-01-19
Attending: EMERGENCY MEDICINE

## 2024-01-19 VITALS
WEIGHT: 165 LBS | HEART RATE: 98 BPM | SYSTOLIC BLOOD PRESSURE: 150 MMHG | OXYGEN SATURATION: 98 % | RESPIRATION RATE: 20 BRPM | DIASTOLIC BLOOD PRESSURE: 62 MMHG | TEMPERATURE: 97.7 F

## 2024-01-19 DIAGNOSIS — F15.10 METHAMPHETAMINE ABUSE (HCC): ICD-10-CM

## 2024-01-19 DIAGNOSIS — Z59.00 HOMELESSNESS: ICD-10-CM

## 2024-01-19 PROCEDURE — 99283 EMERGENCY DEPT VISIT LOW MDM: CPT

## 2024-01-19 SDOH — ECONOMIC STABILITY - HOUSING INSECURITY: HOMELESSNESS UNSPECIFIED: Z59.00

## 2024-01-19 NOTE — ED NOTES
Assumed care of patient. At this time, patient resting calmly on gurney, however does frequently verbalize random sentences and words.

## 2024-01-19 NOTE — ED TRIAGE NOTES
.  Chief Complaint   Patient presents with    Psych Eval     Found to be wondering outdoors. Pt unable to give any demographics or name.      BIBA from [facility] by ems. Pt wondering in road unable to give name or date of birth. Pt familiar to ed staff however unable to verify name. Pt appears to have hallucinations. Cooperative at this time. Denies si/hi. Requesting food on arrival.   Fsbs 136 pe ems.

## 2024-01-19 NOTE — ED PROVIDER NOTES
"ED Provider Note    CHIEF COMPLAINT  Chief Complaint   Patient presents with    Psych Eval     Found to be wondering outdoors. Pt unable to give any demographics or name.        EXTERNAL RECORDS REVIEWED  None available    HPI/ROS  LIMITATION TO HISTORY   Patient arrives with altered mental status  OUTSIDE HISTORIAN(S):  EMS provided limited history    ENTREE Fifty-One is a 89 y.o. adult who presents for evaluation of altered mental status being found wandering around the streets disheveled.  The patient appears to be well-known to the nursing staff and police.  I have never seen this patient to my recollection.    She is she does have a history of amphetamine abuse with some underlying psychiatric illness.  There is no report of any traumatic injuries.  She was found wandering partially clothed muttering on intelligible words.  There is no objective trauma to the head chest abdomen and pelvis per EMS.  She denies any complaints.  She simply kept muttering the term \"blue \"and was following commands.  When asked if she has any somatic complaints such as headache chest pain fevers or chills she did not positively or negatively answer.    PAST MEDICAL HISTORY   None none    SURGICAL HISTORY  patient denies any surgical history  Unknown  FAMILY HISTORY  No family history on file.  Unknown  SOCIAL HISTORY  Social History     Tobacco Use    Smoking status: Not on file    Smokeless tobacco: Not on file   Substance and Sexual Activity    Alcohol use: Not on file    Drug use: Not on file    Sexual activity: Not on file     Unknown  CURRENT MEDICATIONS  Home Medications       Reviewed by Breann Gonzales R.N. (Registered Nurse) on 01/19/24 at 1108  Med List Status: Unable to Obtain     Medication Last Dose Status        Patient Mike Taking any Medications                         Unknown  ALLERGIES  No Known Allergies    PHYSICAL EXAM  VITAL SIGNS: /77   Pulse 109   Temp 36.5 °C (97.7 °F) (Temporal)   Resp 20   " Wt 74.8 kg (165 lb)   SpO2 98%    Pulse ox interpretation: I interpret this pulse ox as normal.  Constitutional: Oriented to person only  HEENT: Atraumatic normocephalic, pupils are equal round reactive to light extraocular movements are intact. The nares is clear, external ears are normal, mouth shows moist mucous membranes normal dentition for age  Neck: Supple, no JVD no tracheal deviation  Cardiovascular: Regular rate and rhythm no murmur rub or gallop 2+ pulses peripherally x4  Thorax & Lungs: No respiratory distress, no wheezes rales or rhonchi, No chest tenderness.   GI: Soft nontender nondistended positive bowel sounds, no peritoneal signs  Skin: Warm dry no acute rash or lesion  Musculoskeletal: Moving all extremities with full range and 5 of 5 strength no acute  deformity  Neurologic: Cranial nerves II through XII grossly intact no ataxia moving upper and lower extremities without pronator drift no facial droop.  Oriented to person only.  No seizure activity.          DIAGNOSTIC STUDIES / PROCEDURES      LABS  Pending    RADIOLOGY  Considered but not performed  COURSE & MEDICAL DECISION MAKING    ED Observation Status? No; Patient does not meet criteria for ED Observation.     INITIAL ASSESSMENT, COURSE AND PLAN  Care Narrative:     The patient was initially registered under an alias but nursing staff was able to sort out her name with the patient became more lucid.  We were able to review her past records.  She has a known history of chronic schizophrenia.  Over several hours she was starting to become more lucid and oriented.  Life skills knows the patient quite well and feels that when she is back at her baseline she will likely be able to be discharged.  She has a known history of transient methamphetamine induced psychosis that typically resolves after a brief period of observation.  I considered but did not feel that extensive workup such as laboratory studies brain imaging is clinically indicated.   Patient will be signed out to Dr. BOATENG pending evaluation and will likely be discharged      ADDITIONAL PROBLEM LIST    DISPOSITION AND DISCUSSIONS  I have discussed management of the patient with the following physicians and PIEDAD's: None    Discussion of management with other QHP or appropriate source(s): Discussed with life skills liaison    Escalation of care considered, and ultimately not performed:blood analysis and diagnostic imaging    Barriers to care at this time, including but not limited to: Patient does not have established PCP.     Decision tools and prescription drugs considered including, but not limited to: None.    FINAL DIAGNOSIS  Methamphetamine abuse       Electronically signed by: Saul Askew M.D., 1/19/2024 2:54 PM

## 2024-01-20 NOTE — ED NOTES
The patient has been provided with discharge education and information.  The patient was also provided with instructions on follow up care and return precautions.  The patient verbalizes understanding of discharge instructions, follow up care, and return precautions.  All questions have been answered.  No RX prescribed by ERP.  NAD, A/Ox4, good color and appropriate at time of discharge.  Patient ambulated out of department.      Provided patient warm meal and long sleeve shirt at time of discharge

## 2024-02-01 ENCOUNTER — HOSPITAL ENCOUNTER (EMERGENCY)
Facility: MEDICAL CENTER | Age: 39
End: 2024-02-02
Attending: EMERGENCY MEDICINE
Payer: MEDICAID

## 2024-02-01 DIAGNOSIS — F15.959 METHAMPHETAMINE-INDUCED PSYCHOTIC DISORDER (HCC): ICD-10-CM

## 2024-02-01 PROCEDURE — A9270 NON-COVERED ITEM OR SERVICE: HCPCS | Performed by: EMERGENCY MEDICINE

## 2024-02-01 PROCEDURE — 700102 HCHG RX REV CODE 250 W/ 637 OVERRIDE(OP): Performed by: EMERGENCY MEDICINE

## 2024-02-01 PROCEDURE — 99284 EMERGENCY DEPT VISIT MOD MDM: CPT

## 2024-02-01 RX ORDER — HALOPERIDOL 5 MG/1
5 TABLET ORAL ONCE
Status: COMPLETED | OUTPATIENT
Start: 2024-02-01 | End: 2024-02-01

## 2024-02-01 RX ADMIN — HALOPERIDOL 5 MG: 5 TABLET ORAL at 20:07

## 2024-02-02 VITALS
SYSTOLIC BLOOD PRESSURE: 113 MMHG | RESPIRATION RATE: 18 BRPM | HEIGHT: 65 IN | TEMPERATURE: 98.4 F | HEART RATE: 68 BPM | OXYGEN SATURATION: 98 % | WEIGHT: 130 LBS | DIASTOLIC BLOOD PRESSURE: 60 MMHG | BODY MASS INDEX: 21.66 KG/M2

## 2024-02-02 NOTE — ED NOTES
Discharge teaching with paperwork provided to pt. Pt verbalized understanding of teaching and all questions answered. Pt has stable vital signs and stable physical assessment upon discharge. Pt ambulatory out of ED with steady gait with all personal belongings. Provided pt with bus pass.

## 2024-02-02 NOTE — ED NOTES
Bedside report received from off going RN Yaima, assumed care of patient. All needs addressed at this time.       Fall risk interventions in place: Not Applicable (all applicable per [facility] Fall risk assessment)   Continuous monitoring: Not Applicable   IVF/IV medications: Not Applicable   Oxygen: Room Air  Bedside sitter: Not Applicable   Isolation: Not Applicable

## 2024-02-02 NOTE — ED PROVIDER NOTES
"ED Provider Note    CHIEF COMPLAINT  Chief Complaint   Patient presents with    ALOC     Pt states she is tired of walking. Pt refusing to answer triage questions. Pt unable to answer orientation questions.        EXTERNAL RECORDS REVIEWED  Inpatient Notes   and Outpatient Notes   long history of drug use, schizophrenia/psychosis.  Has not been here recently however.    HPI/ROS  LIMITATION TO HISTORY   Select: Behavior      Spread Eighty is a 89 y.o. adult who presents initially not wanting to give her name or information.  However she is well-known to the alert team, and under MRN [id number].  Patient initially complained that she was tired of walking.  She would like something to \"help her sleep.\"  She talks about someone named \"pinky\" she denies any hallucinations.  She is somewhat evasive when asked about drug use.  She just uses \"a little\" methamphetamines.  Denies any recent illness.    PAST MEDICAL HISTORY   Schizophrenia    SURGICAL HISTORY  patient denies any surgical history    FAMILY HISTORY  No family history on file.    SOCIAL HISTORY  Methamphetamines    CURRENT MEDICATIONS  Apparently none      ALLERGIES  No Known Allergies    PHYSICAL EXAM  VITAL SIGNS: BP (!) 129/96   Pulse 96   Temp 36.6 °C (97.8 °F) (Temporal)   Resp 16   Ht 1.651 m (5' 5\")   Wt 59 kg (130 lb)   SpO2 97%   BMI 21.63 kg/m²    Constitutional: Well appearing patient in no acute distress.  Intermittently talking to herself, laughing inappropriately.  HENT: Head is without trauma.  Oropharynx is clear.  Mucous membranes are moist.  Eyes: Sclerae are nonicteric, pupils are equally round.  Neck: Supple with grossly normal range of motion.  Cardiovascular: Heart is regular rate and rhythm without murmur rub or gallop.  Peripheral pulses are intact and symmetric throughout.  Thorax & Lungs: Breathing easily.  Good air movement.  There is no wheeze, rhonchi or rales.  Abdomen: Bowel sounds normal, soft, non-distended, nontender, no " mass nor pulsatile mass. I do not appreciate hepatosplenomegaly.  Skin: No apparent rash.  I do not see petechiae or purpura.  Extremities: No evidence of acute trauma.  No clubbing, cyanosis, edema, no Homans or cords.  Neurologic: Alert. Moving all extremities.  Intact sensation and strength throughout.  Gait is normal.  Psychiatric: Affect is abnormal, sometimes flat, sometimes inappropriately expressive.  Thinking seems to be mildly hyperreligious, she is nonsensical answering some questions.      DIAGNOSTIC STUDIES / PROCEDURES      LABS  I ordered a U tox        COURSE & MEDICAL DECISION MAKING    ED Observation Status? Yes; I am placing the patient in to an observation status due to a diagnostic uncertainty as well as therapeutic intensity. Patient placed in observation status at 1845 PM, 2/1/2024.     Observation plan is as follows: We will give her a dose of Haldol, see her response to this.  Will check a urine tox      INITIAL ASSESSMENT, COURSE AND PLAN  Care Narrative: This a patient presents initially as an unknown person, however she is well-known to the psychiatric nurses.  They recommended an antipsychotic which I have ordered.  The patient has history of both methamphetamine use as well as what sounds to be a primary, formal thought disorder.  Potentially schizophrenia.  Will see her response to the antipsychotics, reevaluate her mental status with this as well as time for potential methamphetamines to metabolize.  At that time we will whether she will need an involuntary hold and transfer to inpatient psychiatry.          DISPOSITION AND DISCUSSIONS    Discussion of management with other [facility] or appropriate source(s): Behavioral Health        Escalation of care considered, and ultimately not performed:IV fluids, blood analysis, and diagnostic imaging however, patient has known psychiatric disorder, I do not think that further workup is indicated based upon her history and exam  presently.      Working DIAGNOSIS  1.Psychosis  2. Methamphetamine abuse by history       Electronically signed by: Sony Ivory M.D., 2/1/2024 9:31 PM

## 2024-02-02 NOTE — ED NOTES
Charge RN notified of patient. Pt placed back in the lobby waiting for room. ED triage tech notified of patient and patient placed in front of triage desk.

## 2024-02-02 NOTE — ED NOTES
Bedside report received by NI Olivier    Oxygen:RA  No SI/HI at this time  Fall Risk status:bed in lowest position/locked  Pending: UDS, assessment by risk team

## 2024-02-02 NOTE — ED NOTES
ED Tech & this RN attempted to find pt in the lobby, triage staff state pt is wearing hot pink & was recently seen going in to BR. One person is in BR currently, but unable to check wristband. Will check lobby again. Triage staff to call pod if they see pt in the lobby.

## 2024-02-02 NOTE — DISCHARGE SUMMARY
"  ED Observation Discharge Summary    Patient:Tierney Mayer  Patient : 1985  Patient MRN: [id number]  Patient PCP: Pcp Pt States None    Admit Date: 2024  Discharge Date and Time: 24 7:40 AM  Discharge Diagnosis: Methamphetamine induced psychosis  Discharge Attending: Memo Rogers M.D.  Discharge Service: ED Observation    ED Course  Tierney is a 38 y.o. female who was evaluated at [facility] for psychosis.  Patient with a history of schizophrenia in addition to methamphetamine use disorder.  Patient initially presented with disorganized thought process concerning for acute psychosis.  She was given Haldol and after prolonged observation period and further metabolizing began to develop more linear thought process.  This was thought to be secondary to washout of methamphetamine and less likely due to decompensation of schizophrenia.  Given this patient was ultimately discharged in good condition as she was denying any SI/HI and did not appear to be responding to internal stimuli.    Discharge Exam:  /60   Pulse 68   Temp 36.9 °C (98.4 °F)   Resp 18   Ht 1.651 m (5' 5\")   Wt 59 kg (130 lb)   SpO2 98%   BMI 21.63 kg/m² .    Constitutional: Awake and alert. Nontoxic  HENT:  Grossly normal  Eyes: Grossly normal  Neck: Normal range of motion  Cardiovascular: Normal heart rate   Thorax & Lungs: No respiratory distress  Abdomen: Nontender  Skin:  No pathologic rash.   Extremities: Well perfused  Psychiatric: Affect normal    Labs  Results for orders placed or performed during the hospital encounter of 10/27/23   POCT glucose device results   Result Value Ref Range    POC Glucose, Blood 94 65 - 99 mg/dL       Radiology  No orders to display       Medications:   There are no discharge medications for this patient.      My final assessment includes methamphetamine induced psychosis  Upon Reevaluation, the patient's condition has: Improved; and will be discharged.    Patient discharged from ED " Observation status at 01 54 (Time) 2/2/2024 (Date).     Total time spent on this ED Observation discharge encounter is < 30 Minutes    Electronically signed by: Memo Rogers M.D., 2/2/2024 7:40 AM

## 2024-02-02 NOTE — ED TRIAGE NOTES
Chief Complaint   Patient presents with    ALOC     Pt states she is tired of walking. Pt refusing to answer triage questions. Pt unable to answer orientation questions.       BP (!) 135/100   Pulse 107   Resp 16   SpO2 98%

## 2024-02-06 ENCOUNTER — HOSPITAL ENCOUNTER (EMERGENCY)
Facility: MEDICAL CENTER | Age: 39
End: 2024-02-07
Attending: EMERGENCY MEDICINE
Payer: MEDICAID

## 2024-02-06 DIAGNOSIS — F29 PSYCHOSIS, UNSPECIFIED PSYCHOSIS TYPE (HCC): ICD-10-CM

## 2024-02-06 PROCEDURE — 90791 PSYCH DIAGNOSTIC EVALUATION: CPT

## 2024-02-06 PROCEDURE — 99285 EMERGENCY DEPT VISIT HI MDM: CPT

## 2024-02-06 ASSESSMENT — FIBROSIS 4 INDEX: FIB4 SCORE: 0.49

## 2024-02-07 VITALS
DIASTOLIC BLOOD PRESSURE: 86 MMHG | BODY MASS INDEX: 24.13 KG/M2 | HEART RATE: 86 BPM | RESPIRATION RATE: 20 BRPM | TEMPERATURE: 98 F | OXYGEN SATURATION: 99 % | WEIGHT: 145 LBS | SYSTOLIC BLOOD PRESSURE: 149 MMHG

## 2024-02-07 PROCEDURE — A9270 NON-COVERED ITEM OR SERVICE: HCPCS | Mod: UD | Performed by: STUDENT IN AN ORGANIZED HEALTH CARE EDUCATION/TRAINING PROGRAM

## 2024-02-07 PROCEDURE — 700102 HCHG RX REV CODE 250 W/ 637 OVERRIDE(OP): Mod: UD | Performed by: STUDENT IN AN ORGANIZED HEALTH CARE EDUCATION/TRAINING PROGRAM

## 2024-02-07 RX ORDER — RISPERIDONE 1 MG/1
1 TABLET ORAL ONCE
Status: COMPLETED | OUTPATIENT
Start: 2024-02-07 | End: 2024-02-07

## 2024-02-07 RX ADMIN — RISPERIDONE 1 MG: 1 TABLET, FILM COATED ORAL at 04:18
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
## 2024-02-07 NOTE — ED NOTES
Bedside report received from NI Harrell, assumed care of patient.      Fall risk interventions in place: Move the patient closer to the nurse's station, Patient's personal possessions are with in their safe reach, Place socks on patient, and Keep floor surfaces clean and dry (all applicable per [place] Fall risk assessment)   Continuous monitoring: Pulse Ox or Blood Pressure  IVF/IV medications: Not Applicable   Oxygen: Room Air  Bedside sitter:1:1 sitter  Isolation: Not Applicable

## 2024-02-07 NOTE — DISCHARGE PLANNING
Alert Team:     Referral: Adult Patient Transfer to Mental Health Facility     Intervention: Received call from [facility] intake stating that [facility] has accepted the patient for admission.  Facility requests that transport be arranged for 10:00 AM.     Arranged for transportation to be set up through [facility] with [facility] for [facility] transport.     The pt will be picked up at 1000.      Notified the RN of the departure time as well as accepting facility.      Created transfer packet and placed on chart.      Plan: Pt will transfer to [facility] at 1000.    normal... Well appearing, well nourished, awake, alert, oriented to person, place, time/situation. Tachypneic

## 2024-02-07 NOTE — ED NOTES
Pt restless and fidgeting on bed but remains resting  Sitter remains outside of room in direct, unobstructed 1:1 view for safety

## 2024-02-07 NOTE — ED NOTES
Checked on bed, with unlabored respirations. No safety risk noted  Awake on bed  Sitter - 1:1 with continuous visual monitoring by Trained Personnel  Continued safety precaution  Margaritormich in low position, side rail up for pt safety.   No needs identified at the moment

## 2024-02-07 NOTE — ED NOTES
Bedside report given to the next shift NI Watters for continuity of care and management.  Provided opportunity to asks questions.  All pt belongings at bedside    On legal hold with 1:1 sitter  Pt going to [facility] at 1000 am

## 2024-02-07 NOTE — ED NOTES
NADEEM here to take pt [facility]  Vitals rechecked ,stable.  All belongings given to [facility] crew.   Provided pt with socks to wear but refuse to wear it.

## 2024-02-07 NOTE — ED NOTES
Checked on bed, with unlabored respirations. No safety risk noted  Awake on bed, still preoccupied  Sitter - 1:1 with continuous visual monitoring by Trained Personnel  Continued safety precaution  Margaritormich in low position, side rail up for pt safety.   No needs identified at the moment

## 2024-02-07 NOTE — ED NOTES
Pt continues to be verbal in her room. Verbal redirection has been provided and pt was informed that her behavior won't be tolerated while in the ER. Pt laying in bed NAD w/ equal chest rise/fall.

## 2024-02-07 NOTE — PROGRESS NOTES
Patient's home medications have been reviewed by the pharmacy team.     Past Medical History:   Diagnosis Date    Hypertension     Psychiatric disorder     Schizophrenia    Psychiatric disorder     Schizophrenia (HCC)        Patient's Medications   New Prescriptions    No medications on file   Previous Medications    No medications on file   Modified Medications    No medications on file   Discontinued Medications    DIVALPROEX ER (DEPAKOTE ER) 500 MG TABLET SR 24 HR    Take 1,000 mg by mouth every day.    RISPERIDONE (RISPERDAL) 2 MG TAB    Take 1 Tablet by mouth 2 times a day.          Medications do not appear to be contributing to current complaints as patient not taking any medications.   No recommendations at this time    Naif Marshall, PharmD

## 2024-02-07 NOTE — ED NOTES
Bedside report received from off going RN/tech: Tyrese, assumed care of patient.  POC discussed with patient. Call light within reach, all needs addressed at this time.       Fall risk interventions in place: Not Applicable (all applicable per [place] Fall risk assessment)   Continuous monitoring: Not Applicable   IVF/IV medications: Not Applicable   Oxygen: Room Air  Bedside sitter: Pt on L2k SI with 1:1 sitter Josefina (name)  Isolation: Not Applicable          X-ray of knee, Pain Control, Reassess

## 2024-02-07 NOTE — ED NOTES
Bedside report received from previous shift.   Assumed patient care. Verified patient identification.  Checked on bed, with 1:1 sitter  with unlabored respirations. Discussed plan of care.   Vital signs is stable. Denied any new complaints.   Gurney in low position, side rail up for pt safety.   No needs identified at the moment. Instructed to use call light when needed.    Contraptions: None  Alert and Oriented: x 4  Ambulatory: Yes  Oxygen: None  Pending: Transfer// on legal hold      ______________    Patient is a 37 y/o female well known to Alert Team staff, with a diagnose history of schizoaffective dis. Bipolar type and struggles with methamphetamine addiction. Patient presented to ER on a legal hold initiated by [facility]. Patient released from short period of incarceration displaying irritable, erratic behavior, pressured speech and nonsensical. Unable to coherently vocalize how to care for herself and where to access resources in the community; periodically streaking naked both in the community and in ER. Patient with noted paranoid delusions about angels and demons, word salad, tangential, responding to internal stimuli. Patient medically cleared, legal hold completed accordingly and awaits transfer to psychiatric facility.      Recommended Plan of Care:  Actively being addressed by Legal Hold, [facility], [facility], [facility], and [facility] and 1:1 Observation; unpredictable behavior and elopement risk; No phone/phone calls, belongings or visitors until further evaluated by psychiatry.

## 2024-02-07 NOTE — DISCHARGE PLANNING
Alert Team:    Referral faxed to [facility], [facility] and [facility]

## 2024-02-07 NOTE — ED NOTES
Pt moved to green 30. Report to NI Liao. Safety sitter remains in direct observation of pt at all times.    Detail Level: Detailed Add 71282 Cpt? (Important Note: In 2017 The Use Of 84485 Is Being Tracked By Cms To Determine Future Global Period Reimbursement For Global Periods): no

## 2024-02-07 NOTE — ED NOTES
Assisted pt to the bath room  Instructed pt to collect urine sample but said she was not able to have it

## 2024-02-07 NOTE — ED NOTES
Med rec updated and complete. Allergies reviewed. No current medications.      Pt recently released from  my detention, no medications given in detention.

## 2024-02-07 NOTE — ED TRIAGE NOTES
[facility]'s on a legal hold for inability to care for self. Pt has been released from long term and is no longer in custody. Pt has not been cooperative and came in with hand cuffs. Security called to standby when the [facility] removes her restraints.  All personal belongings removed from her room.

## 2024-02-07 NOTE — ED PROVIDER NOTES
ED Provider Note    CHIEF COMPLAINT  Chief Complaint   Patient presents with    Legal 2000     From [facility], released from custody, inability to care for self.        EXTERNAL RECORDS REVIEWED  External ED Note patient has had several visits to both [facility] in our facility for psychosis and delusions hallucinations.    HPI/ROS  LIMITATION TO HISTORY   Select: Behavior  OUTSIDE HISTORIAN(S):  Law Enforcement patient was placed on a legal hold today for inability to care for self after she was released from [facility].    Tierney Mayer is a 38 y.o. female who presents to the emergency department on legal hold by PD for concern for inability to care for self.  Patient is rambling on my examination she does not know where she is but cannot give me a straight answer as to what who she is what she is doing where she lives just keeps rambling with flight of ideas.    PAST MEDICAL HISTORY   has a past medical history of Hypertension, Psychiatric disorder, Psychiatric disorder, and Schizophrenia (HCC).    SURGICAL HISTORY   has a past surgical history that includes pelviscopy (2/22/2011) and incision and drainage general (2/22/2011).    FAMILY HISTORY  No family history on file.    SOCIAL HISTORY  Social History     Tobacco Use    Smoking status: Unknown    Smokeless tobacco: Never   Vaping Use    Vaping Use: Never used   Substance and Sexual Activity    Alcohol use: Yes     Comment: unable to assess amount    Drug use: Not on file     Comment: Marijuana, meth    Sexual activity: Not on file       CURRENT MEDICATIONS  Home Medications    **Home medications have not yet been reviewed for this encounter**         ALLERGIES  Allergies   Allergen Reactions    Clonazepam Unspecified     Pt reports getting a stroke.  Per historical: Twitches and paranoia.    Paliperidone Unspecified     Twitches and paranoia. On risperidone as outpatient with no issues     Pierre Feldman [Paliperidone Palmitate]      On risperidone outpatient  without issues.     Olanzapine      Pt reports worsening and threatening voices.       PHYSICAL EXAM  VITAL SIGNS: /75   Pulse 85   Temp 37 °C (98.6 °F) (Temporal)   Resp 16   Wt 65.8 kg (145 lb)   SpO2 97%   BMI 24.13 kg/m²    Pulse OX: Pulse Oxygen level is within normal limit  Constitutional: Alert in no apparent distress.  Laying exposed and not covering herself  HENT: Normocephalic, Atraumatic, MMM  Eyes: PERound. Conjunctiva normal, non-icteric.   Heart: Regular rate and rhythm, intact distal pulses   Lungs: Symmetrical movement, no resp distress   Abdomen: Non-tender, non-distended, normal bowel sounds  Skin: Warm, Dry, No erythema, No rash.   Neurologic: Alert and oriented to place wont answer other questions  Psych: Rambling speech with flight of ideas      DIAGNOSTIC STUDIES / PROCEDURES      LABS  Labs Reviewed   URINE DRUG SCREEN   HCG QUALITATIVE UR   URINALYSIS,CULTURE IF INDICATED         COURSE & MEDICAL DECISION MAKING    ED Observation Status? Yes; I am placing the patient in to an observation status due to a diagnostic uncertainty as well as therapeutic intensity. Patient placed in observation status at 6:47 PM, 2/6/2024.     Observation plan is as follows: behavioral health meds if needed        INITIAL ASSESSMENT, COURSE AND PLAN  Care Narrative:     Patient is a 38-year-old female with a history of psychosis amphetamine abuse who presents after prison for inability to care for self she is got flight of ideas she is rambling she is alert and oriented to place at least she said she was in the hospital at 1 point.  I do have concern about her ability to care for self.  She is lying in the gurney and completely exposed and does not wish to cover self and any attempt made to cover herself she completely takes off.      DISPOSITION AND DISCUSSIONS  9:27 PM  Spoke w behavioral health team who agrees with my assessment the patient likely benefit from inpatient help as well as some psychiatric  medications.  She will be continued on her legal hold and medically cleared for inpatient psychiatric care.        Discussion of management with other [facility] or appropriate source(s): Behavioral Health for legal hold        FINAL DIAGNOSIS  1. Psychosis, unspecified psychosis type (HCC)           Electronically signed by: Katerin Ronquillo M.D., 2/6/2024 6:47 PM

## 2024-02-07 NOTE — ED NOTES
BS report from Lisette DAN  Pt transferred to [address]  Resting in NAD, on L2K w/ all precautions in place  Sitter outside of room in direct, unobstructed 1:1 view of pt for safety/obs  Pt is ambulatory and here for failure to care for self     Still needing a urine at this time

## 2024-02-07 NOTE — ED NOTES
Checked on bed, with unlabored respirations. No safety risk noted  Sleeping  Sitter - 1:1 with continuous visual monitoring by Trained Personnel  Continued safety precaution  Margaritormich in low position, side rail up for pt safety.   No needs identified at the moment

## 2024-02-07 NOTE — ED PROVIDER NOTES
ED Observation Discharge Summary    Patient:Tierney Mayer  Patient : 1985  Patient MRN: [id number]  Patient PCP: Pcp Pt States None    Admit Date: 2024  Discharge Date and Time: 24 9:50 AM  Discharge Diagnosis:   1. Psychosis, unspecified psychosis type (HCC)            Discharge Attending: Saul Askew M.D.  Discharge Service: ED Observation    ED Course  Tierney is a 38 y.o. female who was evaluated at [facility] last night for previous concern of inability to care for self and acute psychosis.  She was medically cleared.  She has been accepted at [facility] and is coordinated for transfer    Discharge Exam:  BP (!) 149/86   Pulse 86   Temp 36.7 °C (98 °F) (Temporal)   Resp 20   Wt 65.8 kg (145 lb)   SpO2 99%   BMI 24.13 kg/m² .    Constitutional: Awake and alert. Nontoxic disheveled  HENT:  Grossly normal  Eyes: Grossly normal  Neck: Normal range of motion  Cardiovascular: Normal heart rate   Thorax & Lungs: No respiratory distress  Abdomen: Nontender  Skin:  No pathologic rash.   Extremities: Well perfused  Psychiatric: Agitated, anxious  Labs  Results for orders placed or performed during the hospital encounter of 10/27/23   POCT glucose device results   Result Value Ref Range    POC Glucose, Blood 94 65 - 99 mg/dL       Radiology  No orders to display       Medications:   New Prescriptions    No medications on file       My final assessment includes 38-year-old female with acute decompensated psychosis on a legal hold requiring psychiatric hospitalization  Upon Reevaluation, the patient's condition has: not improved; and will be escalated to hospitalization.    Patient discharged from ED Observation status at 951 (Time)  (Date).     Total time spent on this ED Observation discharge encounter is < 30 Minutes    Electronically signed by: Saul Askew M.D., 2024 9:50 AM

## 2024-02-07 NOTE — ED NOTES
Pt walking around hallway without a shirt earlier. Safety sitter requested. Sitter now initiated and at bedside due to elopement risk and for pt safety. Pt on legal 2000.

## 2024-02-07 NOTE — ED NOTES
Attempted to have pt provide urine sample as pt ambulated to restroom  Pt took cup but cup was empty upon returning to room  Pt expressing word salad  Denies any needs at this time   Sitter remains outside of room in direct unobstructed 1:1 view for safety/obs

## 2024-02-07 NOTE — ED NOTES
Diet order placed for patient per protocol. Pt agreeable to wear paper scrubs at this time. Paper scrubs provided to patient. Pt provided with juice and sandwich. Sitter remains in direct observation of pt at all times.

## 2024-02-07 NOTE — ED NOTES
Informed the provider that pt has episode of aggression, and has not been sleeping  Awaiting for order

## 2024-02-07 NOTE — ED NOTES
Information was offered by Alert Team that she is accepted in [facility], ETA 10 AM  Notified to the provider

## 2024-03-06 ENCOUNTER — HOSPITAL ENCOUNTER (EMERGENCY)
Facility: MEDICAL CENTER | Age: 39
End: 2024-03-07
Attending: EMERGENCY MEDICINE
Payer: MEDICAID

## 2024-03-06 DIAGNOSIS — R46.2 BIZARRE BEHAVIOR: ICD-10-CM

## 2024-03-06 DIAGNOSIS — F15.10 METHAMPHETAMINE ABUSE (HCC): ICD-10-CM

## 2024-03-06 PROCEDURE — 99284 EMERGENCY DEPT VISIT MOD MDM: CPT

## 2024-03-06 PROCEDURE — A9270 NON-COVERED ITEM OR SERVICE: HCPCS | Performed by: EMERGENCY MEDICINE

## 2024-03-06 PROCEDURE — 700102 HCHG RX REV CODE 250 W/ 637 OVERRIDE(OP): Performed by: EMERGENCY MEDICINE

## 2024-03-06 RX ORDER — DIPHENHYDRAMINE HCL 25 MG
25 TABLET ORAL ONCE
Status: COMPLETED | OUTPATIENT
Start: 2024-03-06 | End: 2024-03-06

## 2024-03-06 RX ORDER — HALOPERIDOL 5 MG/1
5 TABLET ORAL ONCE
Status: COMPLETED | OUTPATIENT
Start: 2024-03-06 | End: 2024-03-06

## 2024-03-06 RX ADMIN — HALOPERIDOL 5 MG: 5 TABLET ORAL at 10:32

## 2024-03-06 RX ADMIN — DIPHENHYDRAMINE HYDROCHLORIDE 25 MG: 25 TABLET ORAL at 10:32

## 2024-03-06 NOTE — ED PROVIDER NOTES
ED Provider Note    CHIEF COMPLAINT  Chief Complaint   Patient presents with    ALOC     Pt BIBA ALOC. No trauma noted. Pt admits to non specific drug use. L foot toe pain. A/o x 1 GCS 14, off for confusion to year, birthday, and city. Pt not cooperating with triage questions.Denies SI/HI.        EXTERNAL RECORDS REVIEWED  Patient initially was triaged as a Mary Hardwicke, thankfully were able to find out her name, Omayra Maeyr, records from her alternative MRN were reviewed.  She is seen regularly and often in the emergency department for psychosis secondary to methamphetamine abuse, most recently last month.  She has had multiple labs in the past which were reassuring, and a CT head which was unremarkable last year under similar circumstances.    HPI/ROS  LIMITATION TO HISTORY   Select: Behavior Cereal Twenty-Three is a 89 y.o. adult who presents with bizarre behavior.  Patient admits to using methamphetamine earlier today.  Patient states she is homeless.  Patient states that she feels weird after using the methamphetamine.  She denies any chest pain or shortness of breath.  She states that she feels confused.  She states this occurs when she uses methamphetamine and this is relatively normal for her.   PAST MEDICAL HISTORY       SURGICAL HISTORY  patient denies any surgical history    FAMILY HISTORY  No family history on file.    SOCIAL HISTORY  Social History     Tobacco Use    Smoking status: Not on file    Smokeless tobacco: Not on file   Substance and Sexual Activity    Alcohol use: Not on file    Drug use: Not on file    Sexual activity: Not on file       CURRENT MEDICATIONS  Home Medications    **Home medications have not yet been reviewed for this encounter**         ALLERGIES  No Known Allergies    PHYSICAL EXAM  VITAL SIGNS: BP (!) 120/92   Pulse 91   Temp 36.5 °C (97.7 °F) (Temporal)   Resp 20   SpO2 96%    Physical Exam  Constitutional:       Appearance: Normal appearance.   HENT:      Head:  Normocephalic.      Right Ear: Tympanic membrane normal.      Left Ear: Tympanic membrane normal.      Nose: Nose normal.      Mouth/Throat:      Mouth: Mucous membranes are moist.   Eyes:      Extraocular Movements: Extraocular movements intact.      Pupils: Pupils are equal, round, and reactive to light.   Cardiovascular:      Rate and Rhythm: Normal rate and regular rhythm.   Pulmonary:      Effort: Pulmonary effort is normal. No respiratory distress.      Breath sounds: Normal breath sounds. No stridor. No wheezing or rales.   Chest:      Chest wall: No tenderness.   Abdominal:      General: Abdomen is flat. There is no distension.      Palpations: Abdomen is soft. There is no mass.      Tenderness: There is no abdominal tenderness.   Musculoskeletal:      Cervical back: Normal range of motion.   Skin:     General: Skin is warm.      Capillary Refill: Capillary refill takes less than 2 seconds.   Neurological:      Mental Status: Cereal Twenty-Three is alert.      Comments: ANO x 2, moving all extremities.  Strength in bilateral upper lower extremities is 5 out of 5.  No associated dysmetria.  Normal gait.   Psychiatric:      Comments: Pressured speech, occasionally appears to be responding to internal stimuli, she denies any SI or HI.           DIAGNOSTIC STUDIES / PROCEDURES      COURSE & MEDICAL DECISION MAKING    ED Observation Status? Yes; I am placing the patient in to an observation status due to a diagnostic uncertainty as well as therapeutic intensity. Patient placed in observation status at 10:45 AM, 3/6/2024.     Observation plan is as follows: Observe patient in the emergency department, consider further workup if patient is not sobering as expected    INITIAL ASSESSMENT, COURSE AND PLAN  Care Narrative:   Patient here with presentation most consistent with likely methamphetamine intoxication and methamphetamine induced psychosis.  Patient given Haldol.     Patient is relatively well-known to our  "emergency department, though she does not have her ID on her and is unable to provide us with her birthdate and therefore I am unable to perform EHR review however I and nursing recognize patient, and patient has been seen here multiple times for similar issues.    Patient finally will tell us her name, Omayra Mayer, records from her alternative MRN were reviewed.  She is seen regularly and often in the emergency department for psychosis secondary to methamphetamine abuse, most recently last month.  She has had multiple labs in the past which were reassuring, and a CT head which was unremarkable last year under similar circumstances.    Following Haldol patient is significantly more improved but still remains with ongoing mild psychosis.  Patient will be observed in the emergency department, anticipate discharge later this evening once patient is more lucid.  Patient remains without any medical complaints; she tells me \"go away, I am trying to sleep.\"      DISPOSITION AND DISCUSSIONS    Escalation of care considered, and ultimately not performed: Patient with longstanding chronic methamphetamine abuse, symptoms consistent with methamphetamine induced psychosis, given presentation is consistent with this, and patient with improvement as she debi further workup including CT head, basic labs deferred.    Barriers to care at this time, including but not limited to: Patient does not have established PCP.       FINAL DIAGNOSIS  Methamphetamine induced psychosis    "

## 2024-03-06 NOTE — ED NOTES
Pt placed on bed alarm. Pt calm and cooperative, states no needs at this time, will continue to monitor.

## 2024-03-06 NOTE — ED NOTES
Med Rec complete per patient   Allergies reviewed  Antibiotics in the past 30 days:no  Anticoagulant in past 14 days:no  Pharmacy patient utilizes[facility]    Pt states she does not take any RX medications

## 2024-03-06 NOTE — ED NOTES
Report to Marium DAN. Pt on BP, HR, and oxygen monitor. Bed alarm in place, Pt GCS 14 off for confusion. Oncoming RN aware of pt GCS.

## 2024-03-06 NOTE — ED TRIAGE NOTES
Chief Complaint   Patient presents with    ALOC     Pt BIBA ALOC. No trauma noted. Pt admits to non specific drug use. L foot toe pain. A/o x 1 GCS 14, off for confusion to year, birthday, and city. Pt not cooperating with triage questions.Denies SI/HI.         BP (!) 120/92   Pulse 91   Temp 36.5 °C (97.7 °F) (Temporal)   SpO2 96%

## 2024-03-07 ENCOUNTER — HOSPITAL ENCOUNTER (EMERGENCY)
Facility: MEDICAL CENTER | Age: 39
End: 2024-03-07
Attending: EMERGENCY MEDICINE
Payer: MEDICAID

## 2024-03-07 VITALS
SYSTOLIC BLOOD PRESSURE: 134 MMHG | HEIGHT: 67 IN | WEIGHT: 166.89 LBS | DIASTOLIC BLOOD PRESSURE: 88 MMHG | TEMPERATURE: 97.5 F | OXYGEN SATURATION: 100 % | HEART RATE: 82 BPM | BODY MASS INDEX: 26.19 KG/M2 | RESPIRATION RATE: 18 BRPM

## 2024-03-07 VITALS
SYSTOLIC BLOOD PRESSURE: 121 MMHG | HEART RATE: 64 BPM | DIASTOLIC BLOOD PRESSURE: 65 MMHG | TEMPERATURE: 98 F | RESPIRATION RATE: 18 BRPM | OXYGEN SATURATION: 90 %

## 2024-03-07 DIAGNOSIS — Z59.00 HOMELESSNESS: ICD-10-CM

## 2024-03-07 PROCEDURE — 99281 EMR DPT VST MAYX REQ PHY/QHP: CPT

## 2024-03-07 SDOH — ECONOMIC STABILITY - HOUSING INSECURITY: HOMELESSNESS UNSPECIFIED: Z59.00

## 2024-03-07 ASSESSMENT — FIBROSIS 4 INDEX: FIB4 SCORE: 0.36

## 2024-03-07 NOTE — ED NOTES
ERP at bedside for re eval  Patient calm and cooperative with ERP, requesting shoes and something to eat.

## 2024-03-07 NOTE — ED NOTES
Cab voucher provider by case management, primary RN called on behalf of the pt, pt became agitated when told she was being discharged, security at bedside to assist with DC

## 2024-03-07 NOTE — ED NOTES
Break RN: Patient provided discharge instructions. Patient verbalized understanding. Patient leaving ER in stable condition. Patient ambulatory with steady gait. Shoes provided.

## 2024-03-07 NOTE — ED NOTES
Bedside report received from off going RN/tech: Marium, assumed care of patient.  POC discussed with patient. All needs addressed at this time.       Fall risk interventions in place: Not Applicable (all applicable per [place] Fall risk assessment)   Continuous monitoring: Not Applicable   IVF/IV medications: Not Applicable   Oxygen: Room Air  Bedside sitter: Not Applicable   Isolation: Not Applicable    /64   Pulse 75   Temp 36.4 °C (97.5 °F) (Temporal)   Resp 16   SpO2 97%

## 2024-03-07 NOTE — ED PROVIDER NOTES
ED Provider Note    CHIEF COMPLAINT  Chief Complaint   Patient presents with    Other     Homelessness  Pt said she doesn't have place to stay  Denied SI/HI    Pt was just seen and discharged in ED due to abnormal behavior       EXTERNAL RECORDS REVIEWED  Patient multiple emergency department visits, multiple emergency department visits every month.  Most recently seen at [facility] for chronic psychosis    HPI/ROS      Tierney Mayer is a 38 y.o. female who presents back to the emergency department.  Patient has a longstanding history of longstanding methamphetamine abuse and homelessness.  Possible underlying psychiatric disorder.  Patient was recently seen in the emergency department where she initially refused to give us her name, she was observed in the emergency department for associated methamphetamine induced psychosis, once this cleared patient was discharged.  Patient was discharged approximately 2 hours ago, she does not have anywhere to go and therefore returned back to the emergency department.     PAST MEDICAL HISTORY   has a past medical history of Hypertension, Psychiatric disorder, Psychiatric disorder, and Schizophrenia (HCC).    SURGICAL HISTORY   has a past surgical history that includes pelviscopy (2/22/2011) and incision and drainage general (2/22/2011).    FAMILY HISTORY  History reviewed. No pertinent family history.    SOCIAL HISTORY  Social History     Tobacco Use    Smoking status: Unknown    Smokeless tobacco: Never   Vaping Use    Vaping Use: Never used   Substance and Sexual Activity    Alcohol use: Yes     Comment: unable to assess amount    Drug use: Not on file     Comment: Marijuana, meth    Sexual activity: Not on file       CURRENT MEDICATIONS  Home Medications       Reviewed by Tyrese Marina R.N. (Registered Nurse) on 03/07/24 at 7921  Med List Status: Partial     Medication Last Dose Status        Patient Mike Taking any Medications         "                   ALLERGIES  Allergies   Allergen Reactions    Clonazepam Unspecified     Pt reports getting a stroke.  Per historical: Twitches and paranoia.    Paliperidone Unspecified     Twitches and paranoia. On risperidone as outpatient with no issues     Invmichelle Nicolasenna [Paliperidone Palmitate]      On risperidone outpatient without issues.     Olanzapine      Pt reports worsening and threatening voices.       PHYSICAL EXAM  VITAL SIGNS: /88   Pulse 82   Temp 36.4 °C (97.5 °F) (Temporal)   Resp 18   Ht 1.702 m (5' 7\")   Wt 75.7 kg (166 lb 14.2 oz)   SpO2 100%   BMI 26.14 kg/m²    Physical Exam  Constitutional:       Appearance: Normal appearance.   HENT:      Mouth/Throat:      Mouth: Mucous membranes are moist.   Cardiovascular:      Rate and Rhythm: Normal rate and regular rhythm.   Pulmonary:      Effort: Pulmonary effort is normal.   Abdominal:      General: Abdomen is flat.      Palpations: Abdomen is soft.   Neurological:      General: No focal deficit present.      Mental Status: She is alert and oriented to person, place, and time.   Psychiatric:      Comments: Denies SI or HI, tangential but redirectable, denies SI or HI           DIAGNOSTIC STUDIES / PROCEDURES      COURSE & MEDICAL DECISION MAKING      INITIAL ASSESSMENT, COURSE AND PLAN  Care Narrative: Patient here, she appears very well, she has some mild tangential thoughts but is easily redirectable.  Patient denies any SI or HI.  I do not believe that she currently requires a legal hold that she does appear to be resource seeking, it does not appear to be a risk to herself or others.  She understands to follow-[facility].  Patient given a Voucher in order to get back to the shelter.        DISPOSITION AND DISCUSSIONS    Escalation of care considered, and ultimately not performed: Patient without any significant change to her chronic issues, therefore " diagnostic labs and imaging were deferred    Barriers to care at this time, including but not limited to: Patient is homeless.  Patient has no primary care provider      FINAL DIAGNOSIS  1. Homelessness

## 2024-03-07 NOTE — ED NOTES
Patient sleeping comfortably, resp even and unlabored, NAD, and no needs at this time.  Fall safety precautions in place per policy.

## 2024-03-07 NOTE — DISCHARGE SUMMARY
ED Observation Discharge Summary    Patient:Tierney Mayer  Patient : 1985  Patient MRN: [id number]  Patient PCP: No primary care provider on file.    Admit Date: 3/6/2024  Discharge Date and Time: 24 12:52 AM  Discharge Diagnosis: Methamphetamine abuse, bizarre behavior  Discharge Attending: Ebenezer Weber  Discharge Service: ED Observation    ED Course  Tierney is a 38 y.o. female who was evaluated at [facility] for evaluation of altered level of consciousness and some odd behavior, in the setting of methamphetamine abuse, patient was somewhat agitated received antipsychotic medication in addition to Benadryl, patient placed in observation status with plan to reevaluate patient for assessment of mental stability versus psychotic break versus medical etiology to her symptoms.  Patient was resting in the emergency department and her neurologic and psychiatric examination improved upon reevaluation and she is not responding to internal stimuli and she is alert and oriented her affect is somewhat odd and she does have some flight of ideas but she does appear to have insight into her current state and feel she is safe to be discharged to homeless shelter she is requesting a pair of shoes which will be provided  Discharge Exam:  /64   Pulse 77   Temp 36.4 °C (97.5 °F) (Temporal)   Resp 14   SpO2 98% .    Constitutional: Awake and alert. Nontoxic  HENT:  Grossly normal  Eyes: Grossly normal  Neck: Normal range of motion  Cardiovascular: Normal heart rate   Thorax & Lungs: No respiratory distress  Abdomen: Nontender  Skin:  No pathologic rash.   Extremities: Well perfused  Psychiatric: Affect is odd patient denies suicidal ideation or homicidal ideation not responding to internal stimuli    Labs  No results found for this or any previous visit.    Radiology  No orders to display       Medications:   New Prescriptions    No medications on file       My final assessment includes methamphetamine  abuse, bizarre behavior  Upon Reevaluation, the patient's condition has: Improved; and will be discharged.    Patient discharged from ED Observation status at 12:52 AM March 7, 2024  Total time spent on this ED Observation discharge encounter is > 30 Minutes    Electronically signed by: Ebenezre Weber, 3/7/2024 12:52 AM

## 2024-03-07 NOTE — ED NOTES
Bedside report received by NI Luke    Oxygen:ESPERANZA GRIFFIN Precautions: N/A  Fall Risk status: bed in lowest position/locked  Patient Mentation: A+O x 4  Continuous cardiac monitoring: N/A

## 2024-03-07 NOTE — ED TRIAGE NOTES
Chief Complaint   Patient presents with    Other     Homelessness  Pt said she doesn't have place to stay  Denied SI/HI    Pt was just seen and discharged in ED due to abnormal behavior       Pain: 0/10    Pt came in to triage ambulatory with steady gait for the above complaints.     Pt is alert and oriented x 4, speaking in full sentences, follows commands and responds appropriately to questions.     Respirations are even and unlabored.    Pt placed in lobby. Pt educated on triage process.     Pt encouraged to inform staff for any changes in condition or if needs help while waiting to be room in.    Vitals:    03/07/24 0306   BP: 134/88   Pulse: 82   Resp: 18   Temp: 36.4 °C (97.5 °F)   SpO2: 100%

## 2024-03-10 ENCOUNTER — HOSPITAL ENCOUNTER (EMERGENCY)
Facility: MEDICAL CENTER | Age: 39
End: 2024-03-10
Attending: EMERGENCY MEDICINE
Payer: MEDICAID

## 2024-03-10 VITALS
WEIGHT: 165 LBS | SYSTOLIC BLOOD PRESSURE: 136 MMHG | HEART RATE: 91 BPM | DIASTOLIC BLOOD PRESSURE: 77 MMHG | BODY MASS INDEX: 25.9 KG/M2 | HEIGHT: 67 IN | TEMPERATURE: 98.7 F | OXYGEN SATURATION: 98 % | RESPIRATION RATE: 16 BRPM

## 2024-03-10 DIAGNOSIS — F29 CHRONIC PSYCHOSIS (HCC): ICD-10-CM

## 2024-03-10 PROCEDURE — A9270 NON-COVERED ITEM OR SERVICE: HCPCS | Mod: UD | Performed by: EMERGENCY MEDICINE

## 2024-03-10 PROCEDURE — 99284 EMERGENCY DEPT VISIT MOD MDM: CPT

## 2024-03-10 PROCEDURE — 700102 HCHG RX REV CODE 250 W/ 637 OVERRIDE(OP): Mod: UD | Performed by: EMERGENCY MEDICINE

## 2024-03-10 RX ORDER — ZIPRASIDONE HYDROCHLORIDE 20 MG/1
20 CAPSULE ORAL ONCE
Status: COMPLETED | OUTPATIENT
Start: 2024-03-10 | End: 2024-03-10

## 2024-03-10 RX ADMIN — ZIPRASIDONE HYDROCHLORIDE 20 MG: 20 CAPSULE ORAL at 14:48

## 2024-03-10 ASSESSMENT — FIBROSIS 4 INDEX: FIB4 SCORE: 0.36

## 2024-03-10 NOTE — DISCHARGE INSTRUCTIONS
Please discontinue your methamphetamine use.  Take all of your medications as prescribed.  Return with new or worsening symptoms.

## 2024-03-10 NOTE — ED NOTES
Pt discharged to lobby with steady gait with security assistance. GCS 15. Pt in possession of belongings. Pt provided discharge education and information pertaining to medications and follow up appointments. Pt received copy of discharge instructions and verbalized understanding.     Vitals:    03/10/24 1351   BP: 136/77   Pulse: 91   Resp: 16   Temp: 37.1 °C (98.7 °F)   SpO2: 98%

## 2024-03-10 NOTE — ED PROVIDER NOTES
"ED Provider Note    CHIEF COMPLAINT  Chief Complaint   Patient presents with    Psych Eval     [place]. Pt is exhibiting flight of ideas and unable to appropriately answer questions       EXTERNAL RECORDS REVIEWED  External ED Note seen at [facility] 5 days ago for chronic psychosis.  She denied any SI/HI.  Her vital signs were stable.  She was able to \"contract for safety\".  She was at her psychiatric baseline and did not meet legal hold criteria.  She was given a dose of Geodon and discharged to follow-up as an outpatient.  One week prior to that ED visit she had been evaluated by psychiatry and they recommended outpatient follow-up and to continue her Geodon.    HPI/ROS  LIMITATION TO HISTORY   Select: Behavior  OUTSIDE HISTORIAN(S):  None    Tierney Mayer is a 38 y.o. female with a history of methamphetamine abuse and chronic psychosis who presents via EMS after she was trespassed from the railroad tracks. When police arrived she was not making sense and medical personnel were summoned. The patient has no current medical complaints. She admits to methamphetamine use.    PAST MEDICAL HISTORY   has a past medical history of Hypertension, Psychiatric disorder, Psychiatric disorder, and Schizophrenia (HCC).    SURGICAL HISTORY   has a past surgical history that includes pelviscopy (2/22/2011) and incision and drainage general (2/22/2011).    FAMILY HISTORY  No family history on file.    SOCIAL HISTORY  Social History     Tobacco Use    Smoking status: Unknown    Smokeless tobacco: Never   Vaping Use    Vaping Use: Never used   Substance and Sexual Activity    Alcohol use: Yes     Comment: unable to assess amount    Drug use: Not on file     Comment: Marijuana, meth    Sexual activity: Not on file       CURRENT MEDICATIONS  Home Medications       Reviewed by Jeannine Brito R.N. (Registered Nurse) on 03/10/24 at 5221  Med List Status: Not Addressed     Medication " "Last Dose Status        Patient Mike Taking any Medications                           ALLERGIES  Allergies   Allergen Reactions    Clonazepam Unspecified     Pt reports getting a stroke.  Per historical: Twitches and paranoia.    Paliperidone Unspecified     Twitches and paranoia. On risperidone as outpatient with no issues     Invega Fidencioenna [Paliperidone Palmitate]      On risperidone outpatient without issues.     Olanzapine      Pt reports worsening and threatening voices.       PHYSICAL EXAM  VITAL SIGNS: /77   Pulse 91   Temp 37.1 °C (98.7 °F) (Temporal)   Resp 16   Ht 1.702 m (5' 7\")   Wt 74.8 kg (165 lb)   SpO2 98%   BMI 25.84 kg/m²    Physical Exam  Vitals and nursing note reviewed.   Constitutional:       Appearance: Normal appearance.   HENT:      Head: Normocephalic and atraumatic.      Right Ear: External ear normal.      Left Ear: External ear normal.      Nose: Nose normal.      Mouth/Throat:      Mouth: Mucous membranes are moist.      Pharynx: Oropharynx is clear.   Eyes:      Extraocular Movements: Extraocular movements intact.      Conjunctiva/sclera: Conjunctivae normal.      Pupils: Pupils are equal, round, and reactive to light.   Cardiovascular:      Rate and Rhythm: Normal rate and regular rhythm.   Pulmonary:      Effort: Pulmonary effort is normal.      Breath sounds: Normal breath sounds.   Abdominal:      Palpations: Abdomen is soft.      Tenderness: There is no abdominal tenderness.   Musculoskeletal:         General: Normal range of motion.      Cervical back: Normal range of motion and neck supple.   Skin:     General: Skin is warm and dry.   Neurological:      General: No focal deficit present.      Mental Status: She is alert.   Psychiatric:      Comments: Flight of ideas. Nonsensical speech but can answer direct questions.       DIAGNOSTIC STUDIES / PROCEDURES  RADIOLOGY  Radiologist interpretation:   No orders to display     COURSE & MEDICAL DECISION MAKING    ED " Observation Status? No; Patient does not meet criteria for ED Observation.     INITIAL ASSESSMENT, COURSE AND PLAN  Care Narrative: This is a 38 year old female with a history of methamphetamine abuse and chronic psychosis, well known to this and other local EDs, who was brought here by EMS for odd behavior. She has normal vital signs and appears well hydrated/non-toxic. She has no medical complaints. Looking back through her extensive ED notes it appears that she is at her baseline. She actually was seen by the psychiatry team at [facility] less than two weeks ago for the same behaviors and it was recommended that she continue outpatient treatment. She does not currently meet criteria for legal hold despite her underlying psychiatric diagnoses. She is able to answer direct questions and has shown that she will seek care in the ED when necessary. She states that she is aware of community resources available to her and denies any SI/HI. She was seen by our alert team personnel who is familiar with her and confirms that she unfortunately is at her baseline. Patient was given a dose of Geodon. Discharged in stable condition with strict return precautions.    ADDITIONAL PROBLEM LIST  None  DISPOSITION AND DISCUSSIONS  I have discussed management of the patient with the following physicians and PIEDAD's:  None    Discussion of management with other QHP or appropriate source(s): Behavioral Health        Escalation of care considered, and ultimately not performed: N/A.    Barriers to care at this time, including but not limited to: Patient does not have established PCP, Patient is homeless, Patient lacks transportation , Patient does not have insurance, Patient had difficult affording medications, and Patient lacks financial resources.     Decision tools and prescription drugs considered including, but not limited to:  N/A .    FINAL DIAGNOSIS  1. Chronic psychosis (HCC)      Electronically signed by: Cali Urena M.D.,  3/10/2024 1:55 PM

## 2024-03-10 NOTE — ED TRIAGE NOTES
Chief Complaint   Patient presents with    Psych Eval     Pt was [place]assing [place] the railroad tracks. Pt is exhibiting flight of ideas and unable to appropriately answer questions       BIB [facility] to [facility], pt on monitor and in gown, labs drawn and sent.   Per EMS, pt was found wandering the railroad tracks, AMS, flight of ideas denies Si/HI  Pt arrives [facility]    Medications given en route:none    Vitals:    03/10/24 1351   BP: 136/77   Pulse: 91   Resp: 16   Temp: 37.1 °C (98.7 °F)   SpO2: 98%

## 2024-03-21 ENCOUNTER — HOSPITAL ENCOUNTER (EMERGENCY)
Facility: MEDICAL CENTER | Age: 39
End: 2024-03-21
Attending: EMERGENCY MEDICINE
Payer: MEDICAID

## 2024-03-21 VITALS
SYSTOLIC BLOOD PRESSURE: 113 MMHG | BODY MASS INDEX: 25.9 KG/M2 | HEART RATE: 100 BPM | HEIGHT: 67 IN | DIASTOLIC BLOOD PRESSURE: 69 MMHG | OXYGEN SATURATION: 97 % | WEIGHT: 165 LBS | RESPIRATION RATE: 18 BRPM | TEMPERATURE: 97.7 F

## 2024-03-21 DIAGNOSIS — F29 PSYCHOSIS, UNSPECIFIED PSYCHOSIS TYPE (HCC): ICD-10-CM

## 2024-03-21 PROCEDURE — 96372 THER/PROPH/DIAG INJ SC/IM: CPT

## 2024-03-21 PROCEDURE — 700111 HCHG RX REV CODE 636 W/ 250 OVERRIDE (IP): Mod: UD | Performed by: EMERGENCY MEDICINE

## 2024-03-21 PROCEDURE — 700102 HCHG RX REV CODE 250 W/ 637 OVERRIDE(OP): Mod: UD | Performed by: EMERGENCY MEDICINE

## 2024-03-21 PROCEDURE — 99285 EMERGENCY DEPT VISIT HI MDM: CPT

## 2024-03-21 PROCEDURE — 90791 PSYCH DIAGNOSTIC EVALUATION: CPT

## 2024-03-21 PROCEDURE — A9270 NON-COVERED ITEM OR SERVICE: HCPCS | Mod: UD | Performed by: EMERGENCY MEDICINE

## 2024-03-21 RX ORDER — RISPERIDONE 4 MG/1
4 TABLET ORAL DAILY
COMMUNITY

## 2024-03-21 RX ORDER — ZIPRASIDONE HYDROCHLORIDE 20 MG/1
20 CAPSULE ORAL DAILY
Status: DISCONTINUED | OUTPATIENT
Start: 2024-03-21 | End: 2024-03-21

## 2024-03-21 RX ORDER — ZIPRASIDONE HYDROCHLORIDE 20 MG/1
20 CAPSULE ORAL 2 TIMES DAILY
Status: DISCONTINUED | OUTPATIENT
Start: 2024-03-21 | End: 2024-03-22 | Stop reason: HOSPADM

## 2024-03-21 RX ORDER — DIAZEPAM 5 MG/ML
5 INJECTION, SOLUTION INTRAMUSCULAR; INTRAVENOUS ONCE
Status: COMPLETED | OUTPATIENT
Start: 2024-03-21 | End: 2024-03-21

## 2024-03-21 RX ORDER — RISPERIDONE 3 MG/1
3 TABLET ORAL
COMMUNITY

## 2024-03-21 RX ADMIN — DIAZEPAM 5 MG: 10 INJECTION, SOLUTION INTRAMUSCULAR; INTRAVENOUS at 15:00

## 2024-03-21 RX ADMIN — ZIPRASIDONE HYDROCHLORIDE 20 MG: 20 CAPSULE ORAL at 19:38

## 2024-03-21 RX ADMIN — ZIPRASIDONE HYDROCHLORIDE 20 MG: 20 CAPSULE ORAL at 17:17

## 2024-03-21 ASSESSMENT — FIBROSIS 4 INDEX: FIB4 SCORE: 0.33
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
## 2024-03-21 NOTE — ED TRIAGE NOTES
.  Chief Complaint   Patient presents with    Legal 2000   Pt BIBA from [facility] when placed on L2K for mental illness, poor medication compliance and in ability to care for self. Placed on L2k from [facility] .   Per EMS, [facility] in and [facility] of [facility] due to insurance purposes. [facility] believes [facility] is to be a better facility for pt and insurance.   Pt denies SI/HI. On arrival, pt has flight of ideas, is not making complete sentence and unable to participate in questions and assessment with this RN.   All legal hold protocol initiated. Pt placed in legal hold attire and security called for belongings check.  Pt refusing to participate in breathalyzer. Educated on need for urine.

## 2024-03-21 NOTE — ED NOTES
Pt becomes verbally aggressive and starts to become combative. Security called to bedside. Situation deescalated. Turkey sandwich provide rot pt.  Pt mediated per MAR.

## 2024-03-21 NOTE — ED PROVIDER NOTES
ED Provider Note    CHIEF COMPLAINT  Chief Complaint   Patient presents with    Legal 2000       EXTERNAL RECORDS REVIEWED  External ED Note seen in [facility] 3/14/2024 for psychosis.  She was ultimately excepted to a local mental health facility and transferred in stable condition.    HPI/ROS  LIMITATION TO HISTORY   Select: Behavior  OUTSIDE HISTORIAN(S):  None    Tierney Mayer is a 38 y.o. female who presents from the [facility] due to behavioral disorder.  Patient has a longstanding history of underlying psychiatric disease with chronic psychosis and uses methamphetamines.  She was apparently at the [facility] but her behavior made her unwell, and EMS was called.  Apparently [facility] staff believe that she would be better served in a mental health facility.  The patient herself is unable to provide any information about her presentation.    PAST MEDICAL HISTORY   has a past medical history of Hypertension, Psychiatric disorder, Psychiatric disorder, and Schizophrenia (HCC).    SURGICAL HISTORY   has a past surgical history that includes pelviscopy (2/22/2011) and incision and drainage general (2/22/2011).    FAMILY HISTORY  No family history on file.    SOCIAL HISTORY  Social History     Tobacco Use    Smoking status: Unknown    Smokeless tobacco: Never   Vaping Use    Vaping Use: Never used   Substance and Sexual Activity    Alcohol use: Yes     Comment: unable to assess amount    Drug use: Not on file     Comment: Marijuana, meth    Sexual activity: Not on file       CURRENT MEDICATIONS  Home Medications       Reviewed by Diana Almendarez R.N. (Registered Nurse) on 03/21/24 at 1404  Med List Status: Partial     Medication Last Dose Status        Patient Mike Taking any Medications                           ALLERGIES  Allergies   Allergen Reactions    Clonazepam Unspecified     Pt reports getting a stroke.  Per historical: Twitches and paranoia.    Paliperidone Unspecified     Twitches and  "paranoia. On risperidone as outpatient with no issues     Invega Fidencioenna [Paliperidone Palmitate]      On risperidone outpatient without issues.     Olanzapine      Pt reports worsening and threatening voices.       PHYSICAL EXAM  VITAL SIGNS: /79   Pulse (!) 106   Temp 36.5 °C (97.7 °F) (Oral)   Resp 18   Ht 1.702 m (5' 7\")   Wt 74.8 kg (165 lb)   LMP  (LMP Unknown)   SpO2 96%   BMI 25.84 kg/m²    Physical Exam  Vitals and nursing note reviewed.   Constitutional:       Appearance: Normal appearance.   HENT:      Head: Normocephalic and atraumatic.      Right Ear: External ear normal.      Left Ear: External ear normal.      Nose: Nose normal.      Mouth/Throat:      Mouth: Mucous membranes are moist.      Pharynx: Oropharynx is clear.   Eyes:      Extraocular Movements: Extraocular movements intact.      Conjunctiva/sclera: Conjunctivae normal.      Pupils: Pupils are equal, round, and reactive to light.   Cardiovascular:      Rate and Rhythm: Regular rhythm. Tachycardia present.   Pulmonary:      Effort: Pulmonary effort is normal.      Breath sounds: Normal breath sounds.   Abdominal:      Palpations: Abdomen is soft.      Tenderness: There is no abdominal tenderness.   Musculoskeletal:         General: Normal range of motion.      Cervical back: Normal range of motion and neck supple.   Skin:     General: Skin is warm and dry.   Neurological:      General: No focal deficit present.      Mental Status: She is alert.   Psychiatric:      Comments: Tangential.  Unable to answer direct questions.  Word salad.  Appears to be responding to internal stimuli.       DIAGNOSTIC STUDIES / PROCEDURES  LABS  HCG  POC breathalyzer  UDS    RADIOLOGY  Radiologist interpretation:   No orders to display     COURSE & MEDICAL DECISION MAKING    ED Observation Status? Yes; I am placing the patient in to an observation status due to a diagnostic uncertainty as well as therapeutic intensity. Patient placed in observation " status at 2:34 PM, 3/21/2024.     Observation plan is as follows: Labs, imaging, consultation    INITIAL ASSESSMENT, COURSE AND PLAN  Care Narrative: This is a 38-year-old female with a history of underlying psychiatric disease and drug abuse, well-known to this and other local facilities, who was brought here by EMS from the [facility] due to behavioral disturbances.  Patient is very slightly tachycardic upon arrival but has no focal abnormalities on her exam.  She does appear to be psychotic and is tangential with word salad and inability to answer any questions.  She does appear to be responding to internal stimuli and cannot have a conversation with staff.  She was placed on a legal hold prior to arrival and this will be certified as she is gravely disabled and unable to care for herself.  Urine drug screen, hCG, and alcohol levels will be obtained.  She will be seen by behavioral health.  At this point I do not feel that there is any indication for labs or imaging given her benign exam.  She will be given a dose of Valium for mild agitation.  Patient will require transfer to inpatient psychiatric facility.  She will be kept in the emergency department in observation status until placement.  Patient care transferred to my colleague pending ultimate disposition.    ADDITIONAL PROBLEM LIST  None  DISPOSITION AND DISCUSSIONS  I have discussed management of the patient with the following physicians and PIEDAD's: None    Discussion of management with other QHP or appropriate source(s): Behavioral Health will assist with psychiatric evaluation.      Escalation of care considered, and ultimately not performed: N/A.    Barriers to care at this time, including but not limited to:  None .     Decision tools and prescription drugs considered including, but not limited to:  None .    FINAL DIAGNOSIS  1.  Psychosis     Electronically signed by: Cali Urena M.D., 3/21/2024 2:13 PM

## 2024-03-21 NOTE — ED NOTES
.Patient remains comfortable on gurney, no identifiable needs at this time. Equal chest rise and fall bilaterally. Pending behaviroal RN. Safety measures in place

## 2024-03-21 NOTE — ED NOTES
Pt ambulate to bathroom to provide urine sample. Pt stands in middle of bathroom, pulls pants down and urinates all over bathroom. Refusing to provide sample. Pt provided with new legal hold paper scrubs.   Will try again to obtain urine at another time.

## 2024-03-22 NOTE — ED NOTES
Patient unable to participate in interview.  Med rec completed via info from pharmacies:  [facility] ([phone]) & [facility] ([phone])  [facility] states they filled 2  different strengths of risperidone on  2/19/24.  [facility] has not filled for this patient since 2022.  Patient was unable to specify strength/ names of medications or if she is taking anything on a daily basis.

## 2024-03-22 NOTE — PROGRESS NOTES
"ED Observation Progress Note    Scribed for Randolph Youssef M.d. by Macrina Moctezuma. 3/21/2024  6:37 PM    Date of Service: 03/21/24    Interval History and Interventions  5:10 PM- [facility] is aware of the patient.     Physical Exam  /69   Pulse 100   Temp 36.5 °C (97.7 °F) (Oral)   Resp 18   Ht 1.702 m (5' 7\")   Wt 74.8 kg (165 lb)   LMP  (LMP Unknown)   SpO2 97%   BMI 25.84 kg/m² .    Constitutional: Awake and alert. Nontoxic  HENT:  Grossly normal  Eyes: Grossly normal  Neck: Normal range of motion  Cardiovascular: Normal heart rate   Thorax & Lungs: No respiratory distress  Abdomen: Nontender  Skin:  No pathologic rash.   Extremities: Well perfused  Psychiatric: Affect normal    Labs  Results for orders placed or performed during the hospital encounter of 10/27/23   POCT glucose device results   Result Value Ref Range    POC Glucose, Blood 94 65 - 99 mg/dL       Radiology  No orders to display       Problem List  1. ***    {ERP Attestation (ERP ONLY):200109}          "

## 2024-03-22 NOTE — DISCHARGE SUMMARY
"  ED Observation Discharge Summary    Patient:Tierney Mayer  Patient : 1985  Patient MRN: [id number]  Patient PCP: Pcp Pt States None    Admit Date: 3/21/2024  Discharge Date and Time: 24 10:56 PM  Discharge Diagnosis: Insert insert diagnosis  Discharge Attending: Randolph Youssef M.D.  Discharge Service: ED Observation    ED Course  Tierney is a 38 y.o. female who was evaluated at [facility] and odd behavior.  At this point in time patient appears to be in acute psychosis.  She was seen and evaluated by life skills and felt that she needs acute inpatient psychiatric treatment.    Discharge Exam:  /69   Pulse 100   Temp 36.5 °C (97.7 °F) (Oral)   Resp 18   Ht 1.702 m (5' 7\")   Wt 74.8 kg (165 lb)   LMP  (LMP Unknown)   SpO2 97%   BMI 25.84 kg/m² .    Constitutional: Awake and alert. Nontoxic  HENT:  Grossly normal  Eyes: Grossly normal  Neck: Normal range of motion  Cardiovascular: Normal heart rate   Thorax & Lungs: No respiratory distress  Extremities: Well perfused  Psychiatric: Odd affect with word salad    Labs  Results for orders placed or performed during the hospital encounter of 10/27/23   POCT glucose device results   Result Value Ref Range    POC Glucose, Blood 94 65 - 99 mg/dL       Radiology  No orders to display       Medications:   Discharge Medication List as of 3/21/2024 10:47 PM          My final assessment includes insert diagnosis  Upon Reevaluation, the patient's condition has: not improved; and will be escalated to hospitalization.    Patient discharged from ED Observation status at 10:40 PM (Time) insert date (Date).     Total time spent on this ED Observation discharge encounter is < 30 Minutes    Electronically signed by: Randolph Youssef M.D., 3/21/2024 10:56 PM       "

## 2024-03-22 NOTE — DISCHARGE PLANNING
Alert Team:    Pt accepted by Dr Prior    Requested Time: 2300    Informed RN and bedside EN

## 2024-03-22 NOTE — DISCHARGE PLANNING
Alert Team    Pt accepted to [facility]    Accepting physician:Prior     Requested time: 2300    [facility] PCS form faxed to [facility] and uploaded to     Spoke to Neo from [facility] and authorized trip.    Informed ERP and beside RN    Made transfer packet with original Legal hold    Odilia Godfrey RN

## 2024-03-22 NOTE — ED NOTES
Bedside report received from off going RN/tech: Diana, assumed care of patient.  POC discussed with patient. Call light within reach, all needs addressed at this time.       Fall risk interventions in place: Move the patient closer to the nurse's station, Place socks on patient, Place fall risk sign on patient's door, Keep floor surfaces clean and dry, and Tele-sitter (all applicable per [place] Fall risk assessment)   Continuous monitoring: Not Applicable   IVF/IV medications: Not Applicable   Oxygen: Room Air  Bedside sitter: checklist completed and checklist completed, stop sign in doorway, tele sitter in room  Isolation: Not Applicable

## 2024-03-22 NOTE — ED NOTES
Pt resting in her room. Continues to intermittently laugh and respond to internal stimuli, no indicators of agitation.

## 2024-03-22 NOTE — DISCHARGE PLANNING
[facility] ED Behavioral Health Fax Referral      [facility] ED Behavioral Health Alert Team:  [phone]    Referral: Legal Hold    Intervention: Patient referral to [facility] inpatient  facillity    Legal Hold Initiated: Date: 03/21/24 Time: 1317    Patient’s Insurance Listed on Face Sheet: [place] Medicaid    Referrals sent to: [facility], [facility], [place]    Referrals faxed by Armando Rivas RN    This referral contains the following information:  Face sheet __x__  Page 1 and Page 2 of Legal Hold _x___  Alert Team Assessment/Psych Assessment __x__  Head to toe physical exam _x___  Urine Drug Screen ____  Blood Alcohol ____  Vital signs __x__  Pregnancy test when applicable ___  Medications list ____  Covid screening ____    Plan: Patient will transfer to mental health facility once acceptance is obtained

## 2024-03-22 NOTE — ED NOTES
Pt transported off the department with remsa. Pt ambulatory, refused vitals, patient alert and oriented to baseline, locker number given to ems staff.

## 2024-04-20 ENCOUNTER — HOSPITAL ENCOUNTER (EMERGENCY)
Facility: MEDICAL CENTER | Age: 39
End: 2024-04-22
Attending: EMERGENCY MEDICINE
Payer: MEDICAID

## 2024-04-20 LAB — ETHANOL BLD-MCNC: <10.1 MG/DL

## 2024-04-20 PROCEDURE — 36415 COLL VENOUS BLD VENIPUNCTURE: CPT

## 2024-04-20 PROCEDURE — 99285 EMERGENCY DEPT VISIT HI MDM: CPT

## 2024-04-20 PROCEDURE — 96372 THER/PROPH/DIAG INJ SC/IM: CPT

## 2024-04-20 PROCEDURE — 82077 ASSAY SPEC XCP UR&BREATH IA: CPT

## 2024-04-20 PROCEDURE — 700111 HCHG RX REV CODE 636 W/ 250 OVERRIDE (IP): Mod: JZ,UD | Performed by: EMERGENCY MEDICINE

## 2024-04-20 RX ORDER — DIPHENHYDRAMINE HYDROCHLORIDE 50 MG/ML
50 INJECTION INTRAMUSCULAR; INTRAVENOUS ONCE
Status: COMPLETED | OUTPATIENT
Start: 2024-04-20 | End: 2024-04-20

## 2024-04-20 RX ORDER — HALOPERIDOL 5 MG/ML
5 INJECTION INTRAMUSCULAR ONCE
Status: DISCONTINUED | OUTPATIENT
Start: 2024-04-20 | End: 2024-04-20

## 2024-04-20 RX ORDER — HALOPERIDOL 5 MG/ML
5 INJECTION INTRAMUSCULAR ONCE
Status: COMPLETED | OUTPATIENT
Start: 2024-04-20 | End: 2024-04-20

## 2024-04-20 RX ORDER — LORAZEPAM 2 MG/ML
0.5 INJECTION INTRAMUSCULAR ONCE
Status: COMPLETED | OUTPATIENT
Start: 2024-04-20 | End: 2024-04-20

## 2024-04-20 RX ORDER — LORAZEPAM 2 MG/ML
0.5 INJECTION INTRAMUSCULAR ONCE
Status: DISCONTINUED | OUTPATIENT
Start: 2024-04-20 | End: 2024-04-20

## 2024-04-20 RX ADMIN — LORAZEPAM 0.5 MG: 2 INJECTION INTRAMUSCULAR; INTRAVENOUS at 16:27

## 2024-04-20 RX ADMIN — HALOPERIDOL LACTATE 5 MG: 5 INJECTION, SOLUTION INTRAMUSCULAR at 16:27

## 2024-04-20 RX ADMIN — DIPHENHYDRAMINE HYDROCHLORIDE 50 MG: 50 INJECTION, SOLUTION INTRAMUSCULAR; INTRAVENOUS at 16:25

## 2024-04-20 ASSESSMENT — FIBROSIS 4 INDEX: FIB4 SCORE: 0.33

## 2024-04-20 NOTE — ED NOTES
Patient medicated for agitation with security at bedside. Patient changed into paper scrubs. Belongings given to security to search.

## 2024-04-20 NOTE — ED PROVIDER NOTES
"ED Provider Note    CHIEF COMPLAINT  Chief Complaint   Patient presents with    Legal 2000     Patient brought in by nicolas from the custodial after she was released from custody today. Patient was placed on a hold because unable to verbalize how she would care from herself after release from custodial and was not current on prescription medications     Aggressive Behavior     Patient verbally aggressive with staff on arrival. Patient refusing to change in to paper scrubs. Patient refusing vitals.        EXTERNAL RECORDS REVIEWED  External ED Note from neighboring facility as well as multiple ER notes here.  Both demonstrating recurrent psychotic behavior and medication noncompliance as well as methamphetamine abuse.    HPI/ROS  LIMITATION TO HISTORY   Select: Altered mental status / Confusion  OUTSIDE HISTORIAN(S):      Tierney Mayer is a 38 y.o. female who presents to the emergency department with acute psychosis.  Patient apparently was released from custodial today.  Upon release from custodial they were concerned that patient was not on current medications and cannot care for self therefore she was placed on a hold and brought here.  Patient clearly psychotic.  Upon my entering my room she stated \"this is not can no work as you just vaporized from the door\".  I then asked her how she would take care of herself she left and she stated \"fuck you you sick bitch\".  Patient posturing during encounter.  She is refusing vital signs review she is refusing answer any further questions she is responding to internal stimuli.    PAST MEDICAL HISTORY   has a past medical history of Hypertension, Psychiatric disorder, Psychiatric disorder, and Schizophrenia (HCC).    SURGICAL HISTORY   has a past surgical history that includes pelviscopy (2/22/2011) and incision and drainage general (2/22/2011).    FAMILY HISTORY  History reviewed. No pertinent family history.    SOCIAL HISTORY  Social History     Tobacco Use    Smoking status: " Unknown    Smokeless tobacco: Never   Vaping Use    Vaping Use: Never used   Substance and Sexual Activity    Alcohol use: Yes     Comment: unable to assess amount    Drug use: Not on file     Comment: Marijuana, meth    Sexual activity: Not on file       CURRENT MEDICATIONS  Home Medications    **Home medications have not yet been reviewed for this encounter**         ALLERGIES  Allergies   Allergen Reactions    Clonazepam Unspecified     Pt reports getting a stroke.  Per historical: Twitches and paranoia.    Paliperidone Unspecified     Twitches and paranoia. On risperidone as outpatient with no issues     Invega Sustenna [Paliperidone Palmitate]      On risperidone outpatient without issues.     Olanzapine      Pt reports worsening and threatening voices.       PHYSICAL EXAM  VITAL SIGNS: /66   Pulse 88   Resp 20   Wt 74.8 kg (165 lb)   LMP  (LMP Unknown)   SpO2 95%   BMI 25.84 kg/m²      Pulse ox interpretation: I interpret this pulse ox as normal.  Constitutional: Alert agitated, moderate distress  HEENT: Atraumatic normocephalic, pupils are equal round reactive to light extraocular movements are intact. The nares is clear, external ears are normal, mouth shows moist mucous membranes normal dentition for age  Neck: Supple, no JVD no tracheal deviation  Cardiovascular: Regular rate and rhythm no murmur rub or gallop 2+ pulses peripherally x4  Thorax & Lungs: No respiratory distress, no wheezes rales or rhonchi, No chest tenderness.   GI: Soft nontender nondistended positive bowel sounds, no peritoneal signs  Skin: Warm dry no acute rash or lesion  Musculoskeletal: Moving all extremities with full range and 5 of 5 strength no acute  deformity  Neurologic: Cranial nerves III through XII are grossly intact no sensory deficit no cerebellar dysfunction   Psychiatric: Agitated combative acute psychosis responding to internal stimuli    EKG/LABS  Results for orders placed or performed during the hospital  encounter of 04/20/24   Diagnostic Alcohol   Result Value Ref Range    Diagnostic Alcohol <10.1 <10.1 mg/dL       I have independently interpreted this EKG    RADIOLOGY/PROCEDURES   I have independently interpreted the diagnostic imaging associated with this visit and am waiting the final reading from the radiologist.   My preliminary interpretation is as follows:     Radiologist interpretation:  No orders to display       COURSE & MEDICAL DECISION MAKING    ASSESSMENT, COURSE AND PLAN        ED OBS: Yes; I am placing the patient in to an observation status due to a diagnostic uncertainty as well as therapeutic intensity. Patient placed in observation status at 4:37 PM, 4/20/2024.     Observation plan is as follows: Urine drug screen alcohol level psychiatric consultation and likely psychiatric placement.        Care Narrative: 38-year-old female, history of recurrent psychosis.  Patient is very verbally aggressive posturing at arrival.  She is very obviously in a state of psychosis at this time.  She does not seem to be safe for herself or others at this time.  Legal hold will be continued.  Patient pending psychiatric consultation and definitive psychiatric plan at time shift change transferred oncoming ER physician.      FINAL DIAGNOSIS  1.  Agitation  2.  Aggressive behavior  3.  Acute psychosis       Electronically signed by: Alberto Ron M.D.

## 2024-04-20 NOTE — ED TRIAGE NOTES
Chief Complaint   Patient presents with    Legal 2000     Patient brought in by nicolas from the assisted after she was released from custody today. Patient was placed on a hold because unable to verbalize how she would care from herself after release from assisted and was not current on prescription medications     Aggressive Behavior     Patient verbally aggressive with staff on arrival. Patient refusing to change in to paper scrubs. Patient refusing vitals.

## 2024-04-20 NOTE — ED NOTES
PT agreed to vitals. When an attempt was made to obtain a temporal temp, PT became aggressive and grabbed staff. UTO temp.

## 2024-04-21 PROCEDURE — A9270 NON-COVERED ITEM OR SERVICE: HCPCS | Mod: UD | Performed by: STUDENT IN AN ORGANIZED HEALTH CARE EDUCATION/TRAINING PROGRAM

## 2024-04-21 PROCEDURE — 700102 HCHG RX REV CODE 250 W/ 637 OVERRIDE(OP): Mod: UD | Performed by: STUDENT IN AN ORGANIZED HEALTH CARE EDUCATION/TRAINING PROGRAM

## 2024-04-21 RX ORDER — RISPERIDONE 2 MG/1
4 TABLET ORAL DAILY
Status: DISCONTINUED | OUTPATIENT
Start: 2024-04-21 | End: 2024-04-22 | Stop reason: HOSPADM

## 2024-04-21 RX ADMIN — RISPERIDONE 4 MG: 2 TABLET ORAL at 05:40

## 2024-04-21 RX ADMIN — RISPERIDONE 3 MG: 2 TABLET ORAL at 21:17

## 2024-04-21 NOTE — ED NOTES
Bedside report received by NI Coulter    Oxygen:RA  1:1 sitter, L2K, pt in paper scrubs, all items removed from room, checklist in place  Fall Risk status: N/A  Patient Mentation: Alert and orientated  Continuous cardiac monitoring: N/A  Sitter at bedside  Pending: transfer

## 2024-04-21 NOTE — CONSULTS
"[facility] PSYCHIATRIC CONSULT:Intake  Reason for admission: Patient brought in by nicolas from the intermediate after she was released from custody today. Patient was placed on a hold because unable to verbalize how she would care from herself after release from intermediate and was not current on prescription medications . Patient verbally aggressive with staff on arrival. Patient refusing to change in to paper scrubs. Patient refusing vitals. External ED Note from neighboring facility as well as multiple ER notes here.  Both demonstrating recurrent psychotic behavior and medication noncompliance as well as methamphetamine abuse.    Consulting Physician/APN/PA: Anna Mitchell M.D.   Reason for Consult:psychosis  Consultant: Melvi Lopez MD    Legal Status  on hold      CC: unable to communicate effectively  HPI: 39 yo female  who is so disorganized, making up words and confused that she is unable to give meaningful information. She mentions she was taking off handcuffs, then a Band-Aid, points to the light in the room and asks \"are you up there?\" Then \"the  in the sink\".........    Notes:  4/20/2024: Upon my entering my room she stated \"this is not can no work as you just vaporized from the door\".  I then asked her how she would take care of herself she left and she stated \"fuck you you sick bitch\".  Patient posturing during encounter.  She is refusing vital signs review she is refusing answer any further questions she is responding to internal stimuli.   4/20/2024: medicated for agitation with security at bedside   4/21/2024 meds are 3/4 mg    Chart(s) Review:  2014 +: seen multiple times: reviewed. Please review. Summary: psych hospitalizations, group homes, SI, SAs and HI, psychosis. Long acting abilify, Latuda ,depakote, Saphris, lithium, Cogentin, Prolixin, Remeron, geodon, zyprexa, benadryl, valium, thorazine, invega, prolixen, Topamax, loxapine, propranolol, zoloft.  Dx of schizoaffective " disorder, schizophrenia, polydipsia.  History of methamphetamine use disorder, remission unspecified Opioid use disorder, remission unspecified.    Outside records: back to 2012. please review    Medical ROS:  Review of systems unable to participate.    Psychiatric Exam (MSE):  Vitals:Blood pressure 104/58, pulse 64, temperature 36.9 °C (98.5 °F), resp. rate 16, weight 74.8 kg (165 lb), SpO2 96%.    Constitutional: obese, good eye contact, strabismus?, unable to cooperate  General Appearance:as noted above  Musculoskeletal: no overt movement issues  Alert/Orientation;alert and when asked location, she named something that I could not understand  Attn/Concentration: intact in that she paused to hear the next questions  Fund of Knowledge: not tested  Memory recent/remote:unable to assess  Speech: unable to assess  Language:  almost dysarthric at times because of neologims  Thought Content: very psychotic. Unable to assess for    SI/HI     Thought Process: in coherent  Insight/Judgement: impaired  Mood: not identified  Affect: anxious at times?    Past Medical Hx:     Past Medical History:   Diagnosis Date    Hypertension     Psychiatric disorder     Schizophrenia    Psychiatric disorder     Schizophrenia (HCC)        Past Psychiatric Hx:  SI/SAs: + per notes  Hospitalizations: +per notes  Dx: as noted  Medication Trials: as noted       Family Psych Hx:  History reviewed. No pertinent family history.    Social Hx:  Housing: unknown.   Financial: unknown  Support: unknown     Drugs/Alcohol: amphetamine use by hx.    Labs:  Lab Results   Component Value Date/Time    AMPHUR Positive (A) 08/07/2023 1056    BARBSURINE Negative 08/07/2023 1056    BENZODIAZU Negative 08/07/2023 1056    COCAINEMET Negative 08/07/2023 1056    METHADONE Negative 08/07/2023 1056    ECSTASY Negative 10/26/2015 0415    OPIATES Negative 08/07/2023 1056    OXYCODN Negative 08/07/2023 1056    PCPURINE Negative 08/07/2023 1056    PROPOXY Negative  08/07/2023 1056    CANNABINOID Negative 08/07/2023 1056      Latest Reference Range & Units 04/20/24 16:43   Diagnostic Alcohol <10.1 mg/dL <10.1     Other labs 2023.     Cranial Imaging: personally reviewed  Cranial CT 2023: neg    EKG: QTc:  none       Meds Current:  Scheduled Medications   Medication Dose Frequency    risperiDONE  3 mg QHS    risperiDONE  4 mg DAILY     Allergies: Clonazepam, Paliperidone, Invega sustenna [paliperidone palmitate], and Olanzapine      Assessement    1. Schizophrenia paranoid type    Medical:   -none acutely      Recommendations:  Legal Status:  extended  Observation status:   -line of site with sitter      Visitors:  no  Personal belongings:  no    Discussed/voalted: SANKET Key MD    Medication and Other Recommendations: final orders as per Tx Tm  Agree with meds       Will continue to follow with you.    Thank you for the consult.        Discharge recommendations: inpt psych     If released from [facility]: Discharge Instructions:  -Reviewed safety plan: 911, ER, PCM, MHC, Suicide crisis line  -Please assist with outpatient Psychiatric/substance use follow up appointments at discharge once medically cleared.

## 2024-04-21 NOTE — DISCHARGE PLANNING
Received Voalte message from [place] regarding [facility] Behavioral health declined patient due to aggressive behavior.

## 2024-04-21 NOTE — ED NOTES
Pt sleeping supine position. Active chest rise and fall noted. Respirations equal and unlabored. 1:1 sitter within direct view of pt.

## 2024-04-21 NOTE — DISCHARGE PLANNING
Alert Team/Behavioral Health   Note:      - [facility]: Talked to Charge RN (Nithya). Patient declined by doctor due to aggressive behavior. Notified Alert Team RN (Odilia GA) via Voalte message.    - [facility]): Talked to Intake RN (Kristin). Referral is on pending list and 2 other patients ahead for doctor staffing. No beds currently available. Will have adult bed discharges tomorrow.    - [facility] : Talked to Ashlie. Referral is on pending list. No beds currently available.

## 2024-04-21 NOTE — ED NOTES
Bedside report received from off going RN: Yaima, assumed care of patient. Pt sleeping right lateral side. Active chest rise and fall noted. Respirations equal and unlabored. Call light within reach, all needs addressed at this time.       Fall risk interventions in place: Not Applicable (all applicable per [place] Fall risk assessment)   Continuous monitoring: Not Applicable   IVF/IV medications: Not Applicable   Oxygen: Room Air  Bedside sitter: Report to 1:1 sitter AUBREE Sanchez, stop sign in use.   Isolation: Not Applicable

## 2024-04-21 NOTE — ED NOTES
Pt lying left lateral side. 1:1 sitter within direct view of pt. Active chest rise and fall noted. Respirations equal and unlabored.

## 2024-04-21 NOTE — ED NOTES
Pt cooperates with vitals assessment. Pt given warm blanket. Educated on not placing blanket over head.

## 2024-04-21 NOTE — DISCHARGE PLANNING
[facility] ED Behavioral Health Fax Referral      [facility] ED Behavioral Health Alert Team:  [phone]    Referral: Legal Hold    Intervention: Patient referral to [facility] inpatient  facillity    Legal Hold Initiated: Date: 4/20/24 Time: 1316    Patient’s Insurance Listed on Face Sheet: [place] Medicaid    Referrals sent to: [facility], [facility], [facility]    Referrals faxed by Jeannine GA    This referral contains the following information:  Face sheet ___x_  Page 1 and Page 2 of Legal Hold _x___  Alert Team Assessment/Psych Assessment ___x_  Head to toe physical exam ____  Urine Drug Screen ____  Blood Alcohol ____  Vital signs ___x_  Pregnancy test when applicable ___  Medications list ____  Covid screening ____    Plan: Patient will transfer to mental health facility once acceptance is obtained

## 2024-04-21 NOTE — PROGRESS NOTES
ED Observation Progress Note    Date of Service: 04/21/24    Interval History and Interventions  Briefly this is a 38-year-old female who presented on 4/20/2024 for aggressive behavior.  Was placed on a legal hold for psychosis, does have a history of this.  At this time patient is pending transfer to inpatient psychiatric facility.  This morning patient resting comfortably with no acute complaints.    Physical Exam  /56   Pulse 68   Temp 36.9 °C (98.5 °F)   Resp 16   Wt 74.8 kg (165 lb)   SpO2 100%   BMI 25.84 kg/m² .    Constitutional: Awake and alert. Nontoxic  HENT:  Grossly normal  Eyes: Grossly normal  Neck: Normal range of motion  Cardiovascular: Normal heart rate   Thorax & Lungs: No respiratory distress  Skin:  No pathologic rash.   Extremities: Well perfused  Psychiatric: Affect normal    Labs  Results for orders placed or performed during the hospital encounter of 04/20/24   Diagnostic Alcohol   Result Value Ref Range    Diagnostic Alcohol <10.1 <10.1 mg/dL       Radiology  No orders to display       Problem List  1.  Decompensated psychosis on legal hold pending inpatient psychiatric placement-    Electronically signed by: Barbie Bhatia M.D., 4/21/2024 8:30 AM

## 2024-04-21 NOTE — ED NOTES
Pt resting on gurney, calm and cooperative for staff, 1:1 sitter within direct view, pt has no needs at this time

## 2024-04-21 NOTE — ED NOTES
Pt ate all pasta. Pt sleeping. Left lateral side. Active chest rise and fall noted. Respirations equal and unlabored. 1:1 sitter within direct view.

## 2024-04-21 NOTE — DISCHARGE PLANNING
Alert Team    Spoke to [facility] admissions. PT is pending acceptance, however, no beds currently available.     Armando EDENA

## 2024-04-21 NOTE — ED NOTES
Pt lying left lateral side, 1:1 sitter within direct view of pt. Active chest rise and fall noted. Respirations equal and unlabored.

## 2024-04-21 NOTE — ED NOTES
Pt resting on gurney, respirations even and unlabored.  1:1 Observation:  Continuous visual monitoring by Trained Personnel who remain in line of site of the patient with the intent to ensure safety of the patient

## 2024-04-21 NOTE — ED NOTES
Pt lying left lateral side, sleeping. Active chest rise and fall noted. Respirations equal and unlabored. 1:1 sitter within direct view of pt.

## 2024-04-21 NOTE — ED NOTES
Patient's home medications have been reviewed by the pharmacy team.     Past Medical History:   Diagnosis Date    Hypertension     Psychiatric disorder     Schizophrenia    Psychiatric disorder     Schizophrenia (HCC)        Patient's Medications   New Prescriptions    No medications on file   Previous Medications    RISPERIDONE (RISPERDAL) 3 MG TAB    Take 3 mg by mouth at bedtime.    RISPERIDONE (RISPERDAL) 4 MG TABLET    Take 4 mg by mouth every day.   Modified Medications    No medications on file   Discontinued Medications    No medications on file          A:  Medications do not appear to be contributing to current complaints.     P:    No recommendations at this time. Home medications have been reordered as appropriate.    Andres Márquez, PharmD

## 2024-04-21 NOTE — ED NOTES
Bedside report received from off going RN/tech: Carmencita, assumed care of patient.  POC discussed with patient. All needs addressed at this time. 1:1 sitter across hallway. Report to 1:1 sitter .

## 2024-04-21 NOTE — ED NOTES
Med rec updated and complete. Allergies reviewed.  Per historical encouner from 03/21/24. Pt was unable to participate in an interview.
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
## 2024-04-22 VITALS
SYSTOLIC BLOOD PRESSURE: 119 MMHG | WEIGHT: 165 LBS | OXYGEN SATURATION: 95 % | BODY MASS INDEX: 25.84 KG/M2 | RESPIRATION RATE: 20 BRPM | HEART RATE: 81 BPM | DIASTOLIC BLOOD PRESSURE: 84 MMHG | TEMPERATURE: 98 F

## 2024-04-22 LAB
AMPHET UR QL SCN: NEGATIVE
BARBITURATES UR QL SCN: NEGATIVE
BENZODIAZ UR QL SCN: NEGATIVE
BZE UR QL SCN: NEGATIVE
CANNABINOIDS UR QL SCN: NEGATIVE
FENTANYL UR QL: NEGATIVE
METHADONE UR QL SCN: NEGATIVE
OPIATES UR QL SCN: NEGATIVE
OXYCODONE UR QL SCN: NEGATIVE
PCP UR QL SCN: NEGATIVE
PROPOXYPH UR QL SCN: NEGATIVE

## 2024-04-22 PROCEDURE — 80307 DRUG TEST PRSMV CHEM ANLYZR: CPT

## 2024-04-22 NOTE — ED NOTES
RN spoke with alert team RN.  Pt to transfer to [facility] at 1500. Pt remains asleep. Chest rise noted.  1:1 sitter remains in direct view of patient.

## 2024-04-22 NOTE — ED NOTES
Break NI  Pt transported to [facility] via [facility], all belongings accounted for

## 2024-04-22 NOTE — ED NOTES
Patient ambulatory to restroom with steady gait. No other needs at this time  1:1 sitter remains outside room in direct view of patient

## 2024-04-22 NOTE — ED NOTES
Pt refuses medication, also refuses to allow RN to assess VS. Pt requesting eliazar crackers which are provided. Pt asked if she can leave, pt reminded that she is on a legal hold and cannot leave at this time. Pt updated on POC to transfer today. 1:1 sitter remains in direct view of patient.

## 2024-04-22 NOTE — ED NOTES
Patient resting in bed, NAD noted. Patient denies needs or complaints at this time  1:1 sitter remains outside room in direct view of patient

## 2024-04-22 NOTE — ED NOTES
Pt resting on gurney, respirations even and unlabored.  1:1 Observation:  Continuous visual monitoring by Trained Personnel who remain in line of site of the patient with the intent to ensure safety of the patient and

## 2024-04-22 NOTE — ED NOTES
Bedside report received from off going RN/tech: ASSIGNED RN, assumed care of patient.  POC discussed with patient. Call light within reach, all needs addressed at this time.       Fall risk interventions in place: Patient's personal possessions are with in their safe reach, Place socks on patient, Place fall risk sign on patient's door, Keep floor surfaces clean and dry, Accompanied to restroom, and Human-sitter if tele-sitter fails (all applicable per [place] Fall risk assessment)   Continuous monitoring: Not Applicable   IVF/IV medications: Not Applicable   Oxygen: Room Air  Bedside sitter: Pt on L2k SI with 1:1 sitter ASSIGNED SITTER (name)  Isolation: Not Applicable

## 2024-04-22 NOTE — ED NOTES
"[place] attempted to assess vital signs.  When asked if this was ok, pt mumbled incoherently.  When asked to repeat herself pt states \"then go fuck yourself.\"  RN unable to assess VS at this time. 1:1 sitter remains in direct view of patient.    "

## 2024-04-22 NOTE — ED NOTES
Rounded on pt. Pt sleeping in [facility]. 1:1 sitter remains in direct view of patient.

## 2024-04-22 NOTE — ED NOTES
Pt in bed resting w/ bl eyes closed. 1:1 sitter at line of site for close obs. Environmental check completed. UA sample needed.

## 2024-04-22 NOTE — ED NOTES
Report given to garry mack. Pt awake in [facility], 1:1 sitter remains in direct view of patient.

## 2024-04-22 NOTE — ED NOTES
Bedside report received from NI [place].  Pt asleep in [facility] with even, unlabored respirations. Pt on legal hold for inability to care for self, all dangerous items removed from room, Stop sign at door, legal hold paperwork in folder, report given to sitter.  Bed in lowest position. No acute distress at this time. 1:1 sitter remains in direct view of patient.

## 2024-04-22 NOTE — ED NOTES
Rounded on pt. Pt sleeping in gurney, visible chest rise and fall noted. NAD at this time. 1:1 sitter remains in direct view of patient.

## 2024-04-22 NOTE — DISCHARGE PLANNING
Alert Team:     Referral: Adult Patient Transfer to Mental Health Facility     Intervention: Received call from Teresa at [facility] stating that Dr. Barreto has accepted the patient for admission.  Facility requests that transport be arranged for 1500.     Arranged for transportation to be set up through [facility] with [facility] for [facility] transport.     The pt will be picked up at 1500.      Notified the RN of the departure time as well as accepting facility.      Created transfer packet and placed on chart.      Plan: Pt will transfer to [facility] at 1500.

## 2024-04-22 NOTE — ED NOTES
Bedside report received from NI [place], assumed care of patient.  POC discussed with patient. All needs addressed at this time.       Fall risk interventions in place: Not Applicable (all applicable per [place] Fall risk assessment)   Continuous monitoring: Not Applicable   IVF/IV medications: Not Applicable   Oxygen: Room Air and No oxygen tank in room  Bedside sitter: Pt on L2k SI with 1:1 sitter   (name), Report given to sitter, and checklist completed, stop sign in doorway  Isolation: Not Applicable

## 2024-04-22 NOTE — ED NOTES
Bedside report from Felix DAN. Bed locked in lowest position side rails up x 2. Sitter monitoring pt with pt in line of sight. Pt in tear away hospital clothing.

## 2024-04-22 NOTE — ED NOTES
Pt bed linen changed. Sitter monitoring pt with in line of sight. Pt in tear away hospital clothing.

## 2024-05-09 ENCOUNTER — HOSPITAL ENCOUNTER (EMERGENCY)
Facility: MEDICAL CENTER | Age: 39
End: 2024-05-14
Payer: MEDICAID

## 2024-05-09 ENCOUNTER — HOSPITAL ENCOUNTER (EMERGENCY)
Facility: MEDICAL CENTER | Age: 39
End: 2024-05-09
Attending: EMERGENCY MEDICINE
Payer: MEDICAID

## 2024-05-09 VITALS
HEART RATE: 84 BPM | OXYGEN SATURATION: 97 % | DIASTOLIC BLOOD PRESSURE: 77 MMHG | TEMPERATURE: 97.1 F | SYSTOLIC BLOOD PRESSURE: 123 MMHG | RESPIRATION RATE: 16 BRPM

## 2024-05-09 DIAGNOSIS — Z59.00 HOMELESSNESS: ICD-10-CM

## 2024-05-09 DIAGNOSIS — F15.10 METHAMPHETAMINE ABUSE (HCC): ICD-10-CM

## 2024-05-09 RX ORDER — RISPERIDONE 2 MG/1
4 TABLET ORAL ONCE
Status: COMPLETED | OUTPATIENT
Start: 2024-05-09 | End: 2024-05-09

## 2024-05-09 RX ORDER — LORAZEPAM 1 MG/1
1 TABLET ORAL ONCE
Status: COMPLETED | OUTPATIENT
Start: 2024-05-09 | End: 2024-05-09

## 2024-05-09 RX ADMIN — RISPERIDONE 4 MG: 2 TABLET, FILM COATED ORAL at 18:45

## 2024-05-09 RX ADMIN — LORAZEPAM 1 MG: 1 TABLET ORAL at 18:45

## 2024-05-09 SDOH — ECONOMIC STABILITY - HOUSING INSECURITY: HOMELESSNESS UNSPECIFIED: Z59.00

## 2024-05-10 ENCOUNTER — HOSPITAL ENCOUNTER (EMERGENCY)
Facility: MEDICAL CENTER | Age: 39
End: 2024-05-10
Attending: EMERGENCY MEDICINE
Payer: MEDICAID

## 2024-05-10 VITALS
HEART RATE: 83 BPM | OXYGEN SATURATION: 98 % | SYSTOLIC BLOOD PRESSURE: 128 MMHG | TEMPERATURE: 97.1 F | DIASTOLIC BLOOD PRESSURE: 82 MMHG | WEIGHT: 178 LBS | RESPIRATION RATE: 16 BRPM

## 2024-05-10 DIAGNOSIS — Z59.00 HOMELESSNESS: ICD-10-CM

## 2024-05-10 SDOH — ECONOMIC STABILITY - HOUSING INSECURITY: HOMELESSNESS UNSPECIFIED: Z59.00

## 2024-05-10 NOTE — ED NOTES
Patient approached triage staff asking for a bed to sleep in, patient informed about homeless resources at the [facility]. Patient expressed understanding and does not wish to go to the [facility] and sat back down in triage lobby.

## 2024-05-10 NOTE — ED NOTES
Verbal instructions provided to pt. Pt instructed to follow up with PCP and instructed to continue taking medications. Pt instructed to return to emergency department for new or worsening symptoms. Pt ambulatory upon discharge with security.

## 2024-05-10 NOTE — ED NOTES
Discharge education provided. Discharge paperwork reviewed with pt.  All questions answered. All belongings with pt. Pt ambulated to lobby unassisted with steady gait accompanied by security. Pt refused to sing d/c paperwork.

## 2024-05-10 NOTE — ED PROVIDER NOTES
"ED Provider Note    CHIEF COMPLAINT  Chief Complaint   Patient presents with    Psych Eval     BIB EMS. Pt found sleeping in bushes and reports feeling \"unwell\".        EXTERNAL RECORDS REVIEWED  External ED Note from neighboring facility as well as multiple ER notes here.  Both demonstrating recurrent psychotic behavior and medication noncompliance as well as methamphetamine abuse.   HPI/ROS  LIMITATION TO HISTORY   Select: Behavior  OUTSIDE HISTORIAN(S):  none    Tierney Mayer is a 39 y.o. female who presents to the emergency room for concerns regarding some irritable behavior, possible acute psychosis.  The patient was sleeping in the bushes and was talking incoherently to herself.  She was brought in by EMS personnel for this.  She has normal vital signs and during my initial assessment she appears somewhat disorganized, is well-known to the emergency department for both intermittent methamphetamine use and noncompliance with her medications.  She is denying any headaches, neck pain, no chest discomfort and no belly pain.    PAST MEDICAL HISTORY   Psychiatric illness, methamphetamine abuse    SURGICAL HISTORY  patient denies any surgical history    FAMILY HISTORY  No family history on file.    SOCIAL HISTORY  Social History     Tobacco Use    Smoking status: Not on file    Smokeless tobacco: Not on file   Substance and Sexual Activity    Alcohol use: Not on file    Drug use: Not on file    Sexual activity: Not on file       CURRENT MEDICATIONS  Home Medications       Reviewed by Kenisha Day R.N. (Registered Nurse) on 05/09/24 at 1739  Med List Status: Not Addressed     Medication Last Dose Status        Patient Mike Taking any Medications                           ALLERGIES  No Known Allergies    PHYSICAL EXAM  VITAL SIGNS: /77   Pulse 84   Temp 36.2 °C (97.1 °F) (Temporal)   Resp 16   SpO2 97%    Genl: F sitting in chair comfortably, speaking clearly though pressured, appears in no acute " distress   Head: NC/AT   ENT: Mucous membranes moist, posterior pharynx clear, uvula midline, nares patent bilaterally   Pulmonary: Lungs are clear to auscultation bilaterally  CV:  RRR, no murmur appreciated, pulses 2+ in both upper and lower extremities,  Abdomen: soft, NT/ND; no rebound/guarding, no masses palpated  Musculoskeletal: Pain free ROM of the neck. Moving upper and lower extremities in spontaneous and coordinated fashion  Neuro: A&Ox4 (person, place, time, situation), speech fluent, gait steady, no focal deficits appreciated  Psych: Patient has a suspicious affect and behavior.  She is forward thinking, does have some slight disorganization but is very redirectable.  No reported auditory or visual hallucinations, no SI or HI, limited insight and judgment based on history  Skin: No rash or lesions.  No pallor or jaundice.  No cyanosis.  Warm and dry.     EKG/LABS  Labs Reviewed - No data to display    RADIOLOGY/PROCEDURES   none    COURSE & MEDICAL DECISION MAKING    ASSESSMENT, COURSE AND PLAN  Care Narrative: Patient is seen and evaluated for symptoms as described above.  The patient appears to have some element of subtle psychosis secondary to her missed medication doses and this may be complicated by intermittent methamphetamine use.  Patient does not have hemodynamic instability, clinical exam shows no localizing tenderness or areas of concern and she has no hypoxia or tachypnea.  Overall the patient is amenable to trying these medications and was given doses of Risperdal per patient's response to her prior ER visits.  Overall serial exams performed did not show any decompensation.  She is able to track and was amenable to following up with [facility] for medications and at this time there is no acute medical causes, there is no vital sign instability, she does not have psychosis that would necessitate hold as she is able to care for self and make decisions.  Patient is discharged home in stable  condition.    DISPOSITION AND DISCUSSIONS  I have discussed management of the patient with the following physicians and PIEDAD's:  none    Discussion of management with other [facility] or appropriate source(s): None     Escalation of care considered, and ultimately not performed:IV fluids, Laboratory analysis, and diagnostic imaging    Barriers to care at this time, including but not limited to: homeless.     Decision tools and prescription drugs considered including, but not limited to: none.    FINAL DIAGNOSIS  1. Methamphetamine abuse (HCC)    2. Homelessness      Electronically signed by: Laith Garcia M.D., 5/9/2024 5:48 PM

## 2024-05-10 NOTE — ED TRIAGE NOTES
"Chief Complaint   Patient presents with    Unable to Sleep     When asked what brought the patient to the ER she states \"I was just outside wandering around\"        Patient educated on triage process. Informed to notified ED staff of change in symptoms.     Vitals:    05/09/24 2340   BP: 131/84   Pulse: 85   Resp: 20   Temp: (!) 35.6 °C (96 °F)   SpO2: 100%           "

## 2024-05-10 NOTE — ED TRIAGE NOTES
"Chief Complaint   Patient presents with    Psych Eval     BIB EMS. Pt found sleeping in bushes and reports feeling \"unwell\".    GCS 14. Pt will not answer a&o questions.       "

## 2024-05-10 NOTE — DISCHARGE INSTRUCTIONS
Please follow-up with the pharmacy regarding taking your previously prescribed psychiatric medications.

## 2024-05-10 NOTE — ED PROVIDER NOTES
"ED Provider Note    CHIEF COMPLAINT  Chief Complaint   Patient presents with    Unable to Sleep     When asked what brought the patient to the ER she states \"I was just outside wandering around\"     EXTERNAL RECORDS REVIEWED  As well prior ER notes are reviewed and the patient was actually seen by myself earlier tonight for some disorganized behavior in the setting of noncompliance with her psychiatric medications and polysubstance abuse.  She had no acute medical complaints at that time and was safely discharged.    HPI/ROS  LIMITATION TO HISTORY   Select: : None  OUTSIDE HISTORIAN(S):  none    Tierney Mayer is a 39 y.o. female who presents to the emergency room for concerns about \"it is cold outside and I need a place to sleep.\"  Patient was seen earlier [facility] for some nonspecific complaints and has a history of psychiatric illness, intermittent compliance with his medications and was showing some signs of psychiatric illness on initial assessment earlier tonight.  She was restarted on home medications and observed during that time but had no acute medical complaints thereafter.  She was back to her baseline, was able to be discharged home.  Upon being discharged she wandered outside and was feeling cold and came back and asked to be assessed.  During the course of my discussion she says that she is hungry, she does not have localizing pain and denies any recent traumatic injury.    PAST MEDICAL HISTORY   Psychiatric illness, methamphetamine abuse     SURGICAL HISTORY  patient denies any surgical history    FAMILY HISTORY  History reviewed. No pertinent family history.    SOCIAL HISTORY  Social History     Tobacco Use    Smoking status: Some Days     Types: Cigarettes    Smokeless tobacco: Never   Substance and Sexual Activity    Alcohol use: Yes     Comment: occasionally    Drug use: Never    Sexual activity: Not on file       CURRENT MEDICATIONS  Home Medications       Reviewed by Amirah Mike R.N. " (Registered Nurse) on 05/09/24 at 2349  Med List Status: Partial     Medication Last Dose Status        Patient Mike Taking any Medications                         ALLERGIES  No Known Allergies    PHYSICAL EXAM  VITAL SIGNS: /82   Pulse 83   Temp 36.2 °C (97.1 °F) (Temporal)   Resp 16   Wt 80.7 kg (178 lb)   SpO2 98%    Genl: F sitting in chair comfortably, speaking clearly, appears in no acute distress   Head: NC/AT   Pulmonary: Lungs are clear to auscultation bilaterally  CV:  RRR, no murmur appreciated  Abdomen: soft, NT/ND; no rebound/guarding  Musculoskeletal: Pain free ROM of the neck. Moving upper and lower extremities in spontaneous and coordinated fashion  Neuro: A&Ox4 (person, place, time, situation), speech fluent, gait steady, no focal deficits appreciated  Skin: No rash or lesions.  No pallor or jaundice.  No cyanosis.  Warm and dry.     EKG/LABS  none    RADIOLOGY/PROCEDURES   none    COURSE & MEDICAL DECISION MAKING    ASSESSMENT, COURSE AND PLAN  Care Narrative: Presents emergency room for symptoms as described above.  The patient presents with complaints of being cold on initial assessment and is able to be wrapped in a blanket, has no further acute medical complaints and had not gone to the [facility] as previously directed.  She does not have findings at this time that would necessitate advanced medical workup.  She is able to tolerate p.o. intake without difficulty and understands where her previously prescribed medications have been sent.  Again the patient also has chronic homelessness and information about location of shelters and other shelter resources are provided.  There is no further medical emergent process identified and she is discharged home in stable condition     DISPOSITION AND DISCUSSIONS  I have discussed management of the patient with the following physicians and PIEDAD's:  none    Discussion of management with other [facility] or appropriate source(s): None     Escalation  of care considered, and ultimately not performed:IV fluids, Laboratory analysis, and diagnostic imaging    Barriers to care at this time, including but not limited to: Patient is homeless and Patient lacks transportation .     Decision tools and prescription drugs considered including, but not limited to: none.    FINAL DIAGNOSIS  1. Homelessness      Electronically signed by: Laith Garcia M.D., 5/10/2024 12:34 AM

## 2024-05-14 NOTE — ED NOTES
Alert team at bedside.  
Alert team aware of neg UDS, and neg ETOH  
Have security stand by for doing covid test.   
PT talking to the wall, .laying in bed. Sitter at bedside.   
PT unable to do Breathalizer. Adding ETOH blood.   
Patient given belongings to change into. VSS. Patient is A+Ox3 which is baseline. Patient walked out to [facility] where her ride was waiting for her.   
Placed on legal hold by erp. Security called for belongings check.   
Pt ambulated to BR. RN changed linen on bed for pt, gave new blanket. Pt thanked RN , was appropriate behaving.   
Pt awake, resting in bed. No needs at this time. 1:1 sitter at bedside.  
Pt given coffee declines food  
Pt resting in bed, asleep. RR even and unlabored. 1:1 sitter remains at pt bedside.  
Pt resting in bed, asleep. RR even and unlabored. No needs at this time. 1:1 sitter at bedside.  
Pt resting in bed, awake. No needs/concerns at this time. Sitter at doorway.  
Pt sleeping and in nad, sitter is at bedside  
Received report from Heide DAN. Patient resting comfortably in bed. Chest rise noted. Sitter outside room.   
Received report from NI Hall. Assumed care of patient. Patient awake, resting in bed. Pt calm and cooperative. Pt disoriented x4. Pt aware of staff change, denies needs at this time. Sitter remains at pt bedside for monitoring.  
Report given to NI Montes  
Report to Isabel DAN  
Sent Alert team another message, pt ready for Evalutation.   
Waiting on transportation arrangements, then will d/c pt to group home.  
Face to face

## 2024-05-25 ENCOUNTER — HOSPITAL ENCOUNTER (EMERGENCY)
Facility: MEDICAL CENTER | Age: 39
End: 2024-05-25
Attending: EMERGENCY MEDICINE

## 2024-05-25 ENCOUNTER — HOSPITAL ENCOUNTER (EMERGENCY)
Facility: MEDICAL CENTER | Age: 39
End: 2024-05-25
Payer: MEDICAID

## 2024-05-25 VITALS
OXYGEN SATURATION: 92 % | TEMPERATURE: 98 F | WEIGHT: 140 LBS | RESPIRATION RATE: 20 BRPM | WEIGHT: 140 LBS | HEART RATE: 102 BPM | SYSTOLIC BLOOD PRESSURE: 114 MMHG | DIASTOLIC BLOOD PRESSURE: 82 MMHG | TEMPERATURE: 98 F | DIASTOLIC BLOOD PRESSURE: 82 MMHG | HEART RATE: 102 BPM | RESPIRATION RATE: 20 BRPM | OXYGEN SATURATION: 92 % | SYSTOLIC BLOOD PRESSURE: 114 MMHG

## 2024-05-25 DIAGNOSIS — F15.10 METHAMPHETAMINE ABUSE (HCC): ICD-10-CM

## 2024-05-25 DIAGNOSIS — F29 CHRONIC PSYCHOSIS (HCC): ICD-10-CM

## 2024-05-25 PROCEDURE — 99283 EMERGENCY DEPT VISIT LOW MDM: CPT

## 2024-05-25 PROCEDURE — A9270 NON-COVERED ITEM OR SERVICE: HCPCS | Mod: UD | Performed by: EMERGENCY MEDICINE

## 2024-05-25 PROCEDURE — 700102 HCHG RX REV CODE 250 W/ 637 OVERRIDE(OP): Mod: UD | Performed by: EMERGENCY MEDICINE

## 2024-05-25 PROCEDURE — A9270 NON-COVERED ITEM OR SERVICE: HCPCS | Performed by: EMERGENCY MEDICINE

## 2024-05-25 PROCEDURE — 700102 HCHG RX REV CODE 250 W/ 637 OVERRIDE(OP): Performed by: EMERGENCY MEDICINE

## 2024-05-25 RX ORDER — RISPERIDONE 2 MG/1
4 TABLET ORAL ONCE
Status: COMPLETED | OUTPATIENT
Start: 2024-05-25 | End: 2024-05-25

## 2024-05-25 RX ADMIN — RISPERIDONE 4 MG: 2 TABLET, FILM COATED ORAL at 17:18

## 2024-05-25 NOTE — ED PROVIDER NOTES
ED Provider Note    CHIEF COMPLAINT  Chief Complaint   Patient presents with    Psych Eval       EXTERNAL RECORDS REVIEWED  5/11/2024, [facility] emergency department, chronic psychosis and homelessness    HPI/ROS  LIMITATION TO HISTORY   Select: Behavior  OUTSIDE HISTORIAN(S):  Law Enforcement      Vidhya Khan is a 89 y.o. adult who is brought in by police for psychiatric evaluation, well-known to this department.  She has a history of schizophrenia and methamphetamine abuse.  Apparently was incarcerated overnight for trespassing, the  at the long term facility felt as though she was not be able to provide for her basic needs upon release from the long term so she was put on a legal hold and brought to the emergency room.  The patient as usual does not offer any specific meaningful complaints.    PAST MEDICAL HISTORY   Paranoid schizophrenia, methamphetamine use    SURGICAL HISTORY  patient denies any surgical history    FAMILY HISTORY  No family history on file.    SOCIAL HISTORY  Social History     Tobacco Use    Smoking status: Not on file    Smokeless tobacco: Not on file   Substance and Sexual Activity    Alcohol use: Not on file    Drug use: Not on file    Sexual activity: Not on file       CURRENT MEDICATIONS  Home Medications    **Home medications have not yet been reviewed for this encounter**         ALLERGIES  Not on File    PHYSICAL EXAM  VITAL SIGNS: /82   Pulse 102   Temp 36.7 °C (98 °F) (Temporal)   Resp 20   Wt 63.5 kg (140 lb)   SpO2 92%    Constitutional: Awake, alert, somewhat agitated  HENT: No signs of significant acute trauma.   Eyes: Conjunctiva normal, non-icteric.  Pupils are equal and reactive.  Chest: Normal nonlabored respirations  Skin: No appreciable rash on the exposed skin  Musculoskeletal: No obvious acute trauma appreciated  Neurologic: Moves all 4 extremities with normal strength, no obvious focal deficit appreciated     COURSE & MEDICAL DECISION  MAKING    ASSESSMENT, COURSE AND PLAN  Care Narrative: This is a well-known psychiatrically ill homeless patient who is brought in today by police after released from incarceration, she is at her baseline in terms of her mental status and psychiatric disease.  She is receiving a dose of risperidone here in the emergency department.  At this point, there is no need for formal psychiatric evaluation.  The patient is at her baseline and is appropriate to be discharged.  Case also discussed with Taty from the alert team who knows this patient well.      DISPOSITION AND DISCUSSIONS    Discussion of management with other QHP or appropriate source(s): Behavioral Health          FINAL DIAGNOSIS  1. Methamphetamine abuse (HCC)    2. Chronic psychosis (HCC)           Electronically signed by: Enoc Key M.D., 5/25/2024 4:53 PM

## 2024-05-25 NOTE — ED TRIAGE NOTES
Chief Complaint   Patient presents with    Psych Eval     Pt BIB PD. Per officer pt was trespassed 36 hours ago and brought to halfway. Pt has been refusing to eat, shower or wear clothes since. Pt placed on legal hold this afternoon by LCSW at the halfway. Pt refusing to answer questions and yelling at staff on arrival.

## 2024-05-26 NOTE — ED NOTES
Pt provided discharge instructions and follow-up information. Pt verbalized understanding and all questions answered. Pt ambulated from ED with steady gait carrying all belongings.

## 2024-06-05 ENCOUNTER — HOSPITAL ENCOUNTER (EMERGENCY)
Facility: MEDICAL CENTER | Age: 39
End: 2024-06-05
Attending: EMERGENCY MEDICINE
Payer: MEDICAID

## 2024-06-05 VITALS
BODY MASS INDEX: 27.94 KG/M2 | TEMPERATURE: 98.2 F | HEIGHT: 67 IN | DIASTOLIC BLOOD PRESSURE: 74 MMHG | RESPIRATION RATE: 18 BRPM | SYSTOLIC BLOOD PRESSURE: 120 MMHG | HEART RATE: 72 BPM | OXYGEN SATURATION: 96 % | WEIGHT: 178 LBS

## 2024-06-05 DIAGNOSIS — Z59.00 HOMELESSNESS: ICD-10-CM

## 2024-06-05 DIAGNOSIS — F20.9 SCHIZOPHRENIA, UNSPECIFIED TYPE (HCC): ICD-10-CM

## 2024-06-05 PROCEDURE — 99285 EMERGENCY DEPT VISIT HI MDM: CPT

## 2024-06-05 RX ORDER — RISPERIDONE 3 MG/1
3 TABLET ORAL
Qty: 60 TABLET | Refills: 0 | Status: SHIPPED | OUTPATIENT
Start: 2024-06-05

## 2024-06-05 SDOH — ECONOMIC STABILITY - HOUSING INSECURITY: HOMELESSNESS UNSPECIFIED: Z59.00

## 2024-06-05 ASSESSMENT — FIBROSIS 4 INDEX: FIB4 SCORE: 0.77

## 2024-06-06 ENCOUNTER — HOSPITAL ENCOUNTER (EMERGENCY)
Facility: MEDICAL CENTER | Age: 39
End: 2024-06-06
Attending: EMERGENCY MEDICINE

## 2024-06-06 VITALS
WEIGHT: 140 LBS | TEMPERATURE: 98.9 F | HEIGHT: 67 IN | TEMPERATURE: 98.9 F | OXYGEN SATURATION: 99 % | BODY MASS INDEX: 21.97 KG/M2 | HEART RATE: 68 BPM | RESPIRATION RATE: 19 BRPM | SYSTOLIC BLOOD PRESSURE: 118 MMHG | HEART RATE: 68 BPM | RESPIRATION RATE: 19 BRPM | BODY MASS INDEX: 21.97 KG/M2 | WEIGHT: 140 LBS | HEIGHT: 67 IN | DIASTOLIC BLOOD PRESSURE: 81 MMHG | SYSTOLIC BLOOD PRESSURE: 118 MMHG | OXYGEN SATURATION: 99 % | DIASTOLIC BLOOD PRESSURE: 81 MMHG

## 2024-06-06 DIAGNOSIS — F15.10 METHAMPHETAMINE ABUSE (HCC): ICD-10-CM

## 2024-06-06 DIAGNOSIS — Z91.199 NON-COMPLIANCE: ICD-10-CM

## 2024-06-06 DIAGNOSIS — F20.9 SCHIZOPHRENIA, UNSPECIFIED TYPE (HCC): Primary | ICD-10-CM

## 2024-06-06 DIAGNOSIS — F23 ACUTE PSYCHOSIS (HCC): ICD-10-CM

## 2024-06-06 PROCEDURE — 99283 EMERGENCY DEPT VISIT LOW MDM: CPT

## 2024-06-06 NOTE — ED TRIAGE NOTES
Chief Complaint   Patient presents with    Psych Eval     Pt BIB EMS from [facility] where bystanders found pt laying on ground making unintelligible statements.        Pt arrives in blue paper scrubs. Pt non-cooperative with triage questions

## 2024-06-06 NOTE — ED PROVIDER NOTES
"ED Provider Note    Scribed for Ishan Hooks by Antwan Agustin. 6/6/2024  4:50 PM    Primary care provider: None noted  Means of arrival: EMS  History obtained from: Patient  History limited by: None    CHIEF COMPLAINT  Chief Complaint   Patient presents with    Psych Eval     Pt BIB EMS from [facility] complex where bystanders found pt laying on ground making unintelligible statements.        EXTERNAL RECORDS REVIEWED  External ED Note History of frequent ED visits with schizophrenia and meth abuse    HPI/ROS    LIMITATION TO HISTORY   Select: Behavior, psychosis    HPI  Tierney Mayer is a 39 y.o. female who presents to the Emergency Department for psych eval onset prior to arrival. She was seen here yesterday for meth induced psychosis. When asked where she is and what this hospital is called, she does not respond. She does endorse using meth today, no other drugs. She is asking for something to eat.       REVIEW OF SYSTEMS  As above, all other systems reviewed and are negative.   See HPI for further details.     PAST MEDICAL HISTORY     SURGICAL HISTORY  patient denies any surgical history  SOCIAL HISTORY      Social History     Substance and Sexual Activity   Drug Use Not on file     FAMILY HISTORY  No family history on file.  CURRENT MEDICATIONS  Home Medications    **Home medications have not yet been reviewed for this encounter**       ALLERGIES  Not on File    PHYSICAL EXAM    VITAL SIGNS:   Vitals:    06/06/24 1634   BP: 118/81   Pulse: 68   Resp: 19   Temp: 37.2 °C (98.9 °F)   TempSrc: Temporal   SpO2: 99%   Weight: 63.5 kg (140 lb)   Height: 1.702 m (5' 7\")     Vitals: My interpretation: normotensive, not tachycardic, afebrile, not hypoxic    Reinterpretation of vitals: Unchanged unremarkable    PE:   Gen: sitting comfortably, speaking clearly, appears in no acute distress   ENT: Mucous membranes moist, posterior pharynx clear, uvula midline, nares patent bilaterally   Neck: Supple, FROM  Pulmonary: " Lungs are clear to auscultation bilaterally. No tachypnea  CV:  RRR, no murmur appreciated, pulses 2+ in both upper and lower extremities  Abdomen: soft, NT/ND; no rebound/guarding  : no CVA or suprapubic tenderness   Neuro: A&Ox4 (person, place, time, situation), speech fluent, gait steady, no focal deficits appreciated  Skin: No rash or lesions.  No pallor or jaundice.  No cyanosis.  Warm and dry.   Psych: Patient is able to state that she is at the hospital, assessing send in the ED, denies suicidal homicidal ideation, and is disheveled appearing.    COURSE & MEDICAL DECISION MAKING  Nursing notes, VS, PMSFHx, labs, imaging, EKG reviewed in chart.    ED Observation Status? No; Patient does not meet criteria for ED Observation.     Ddx: Schizophrenia, methamphetamine abuse, homelessness, malingering, noncompliance    MDM: 4:50 PM Tierney Mayer is a 39 y.o. female who presented with evaluation of repeated visits to the hospital for altered mental status in the setting of homelessness, schizophrenia, methamphetamine abuse, psychosocial challenges, and noncompliance.  Patient was seen here almost on a daily basis.  Generally methamphetamine related and polysubstance abuse related.  Upon arrival here today, she seems to be at her baseline.  She is cooperative with me.  Her vital signs are normal.  She has no complaints.  She is asking for something to eat and drink.  At this time with a benign physical exam and a normal neurological exam, benign vitals, and she is eating and drinking and ambulating without difficulty, no signs of trauma, she is appropriate for outpatient follow-up.  I counseled her for 5 minutes regarding her methamphetamine abuse.  She is not interested in detox or treatment facilities at this time.  Verbalized understanding strict return precautions outpatient follow-up plan and is amenable.    Z71.9 is a billable ICD-10 code used to specify a medical diagnosis of counseling, unspecified. I spent  4 minutes counseling patint on methamphetamine abuse counseling.    ADDITIONAL PROBLEM LIST AND DISPOSITION    I have discussed management of the patient with the following physicians and PIEDAD's:  None    Discussion of management with other QHP or appropriate source(s): None     Barriers to care at this time, including but not limited to: Patient is homeless and Patient lacks financial resources.     FINAL IMPRESSION  1. Schizophrenia, unspecified type (HCC) Acute   2. Methamphetamine abuse (HCC) Acute   3. Acute psychosis (HCC) Acute   4. Non-compliance Acute      Antwan DONG (Scribe), am scribing for, and in the presence of, Ishan Hooks.    Electronically signed by: Antwan Agustin (Scribe), 6/6/2024    I, Ishan Hooks personally performed the services described in this documentation, as scribed by Antwan Agustin in my presence, and it is both accurate and complete.    The note accurately reflects work and decisions made by me.  Ishan Hooks  6/6/2024  5:32 PM

## 2024-06-06 NOTE — ED PROVIDER NOTES
"ER Provider Note    Scott Fierro M.D.. 6/5/2024  8:25 PM    Primary Care Provider: Pcp Pt States None    CHIEF COMPLAINT  Chief Complaint   Patient presents with    Psych Eval     Pt BIB EMS from [facility] across from [place], PD was called and pt was placed on legal hold by [facility] for inability to care for basic needs. Pt thinks she is a 1 year old and lives in [place].   Pt changed into paper scrubs, refuses to use breathlyzer. Pt is well known to the department.      EXTERNAL RECORDS REVIEWED  This patient has a well-documented history of schizophrenia, with some degree of disorganization, complicated by medication noncompliance and substance abuse.  She was just discharged earlier today from [facility], with no indications for a legal hold.  She is well-known to our alert team, and is essentially at baseline.    HPI/ROS  LIMITATION TO HISTORY   History of slightly disorganized schizophrenia.  Patient believes she is here because \"I would like some water.\"    OUTSIDE HISTORIAN(S):  EMS reports that the [employer] called EMS because of the patient's behavior and she would not leave the front of the store.    Tierney Mayer is a 39 y.o. female who presents to the ED brought in by EMS, but has no complaints, and is only asking for water.  She does have a history of using the emergency department for secondary gain, such as her May 10 visit when she reported \"it is cold outside and I need a place to sleep.\"  She is often seen multiple times per day.  She is well-known to this facility.  She is slightly disorganized, but remains goal-directed, is not suicidal or homicidal, Who is organized enough to be quite rude to by her bedside nurse, refusing to answer questions, telling the bedside nurse to \"fuck off, and get the fuck away from me.\"  But then smiling pleasantly at me and asking for water.    PAST MEDICAL HISTORY  Past Medical History:   Diagnosis Date    Hypertension     Psychiatric disorder     " "Schizophrenia    Psychiatric disorder     Schizophrenia (HCC)        SURGICAL HISTORY  Past Surgical History:   Procedure Laterality Date    PELVISCOPY  2/22/2011    Performed by BABITA SHEETS at SURGERY [facility] DAY [facility]    INCISION AND DRAINAGE GENERAL  2/22/2011    Performed by BABITA SHEETS at SURGERY SAME DAY [facility]       FAMILY HISTORY  History reviewed. No pertinent family history.    SOCIAL HISTORY   reports that she has been smoking cigarettes. She has never used smokeless tobacco. She reports current alcohol use. She reports that she does not use drugs.    CURRENT MEDICATIONS  Discharge Medication List as of 6/5/2024  8:36 PM          ALLERGIES  Clonazepam, Paliperidone, Invega sustenna [paliperidone palmitate], and Olanzapine    PHYSICAL EXA  M  VITAL SIGNS: /74   Pulse 72   Temp 36.8 °C (98.2 °F) (Temporal)   Resp 18   Ht 1.702 m (5' 7\")   Wt 80.7 kg (178 lb)   SpO2 96%   BMI 27.88 kg/m²   Pulse ox interpretation: I interpret this pulse ox as normal.  Constitutional: Alert in no apparent distress.  HENT: No signs of trauma, Bilateral external ears normal, Nose normal.   Eyes: Pupils are equal and reactive, Conjunctiva normal, Non-icteric.   Neck: Normal range of motion, Supple, No stridor.    Cardiovascular: Normal peripheral perfusion  Thorax & Lungs: Unlabored respirations, equal chest expansion, no accessory muscle use  Abdomen: Non-distended  Skin:  No erythema, No rash.   Back: Normal alignment and ROM  Extremities: No gross deformity  Musculoskeletal: Good range of motion in all major joints.   Neurologic: Alert, Normal motor function, No focal deficits noted.   Psychiatric: Affect slightly agitated, occasionally verbally aggressive towards some staff members, no suicidal homicidal thoughts, judgment limited, mood slightly elevated.      COURSE & MEDICAL DECISION MAKING     INITIAL ASSESSMENT, COURSE AND PLAN  Care Narrative:       8:25 PM Patient presents to the ED with " no medical complaints, having just been discharged from [facility].  She is at her mental status baseline, neither suicidal nor homicidal, goal-directed, not appropriate for legal hold, though hold was placed by the most team, who may not be nearly as familiar with the patient as this facility.  Her medications will be refilled, and compliance will be left to the patient, who has no signs of trauma or systemic illness, and can be safely discharged.      DISPOSITION AND DISCUSSIONS    Escalation of care considered, and ultimately not performed: Laboratory analysis and diagnostic imaging.  Considered, but there is been no change in the patient's clinical condition from established baseline.    Barriers to care at this time, including but not limited to: Patient does not have established PCP.     Decision tools and prescription drugs considered including, but not limited to: Medication modification considered, but patient simply needs to be compliant with her prescribed medication and abstain from recreational drugs .  No additional medication changes indicated at this time.     The patient will return for new or worsening symptoms and is stable at the time of discharge.    The patient is referred to a primary physician for blood pressure management, diabetic screening, and for all other preventative health concerns.      DISPOSITION:  Patient will be discharged home in stable condition.    FOLLOW UP:  [facility]  [address] [place] [id number]  [phone]  Schedule an appointment as soon as possible for a visit         OUTPATIENT MEDICATIONS:  Discharge Medication List as of 6/5/2024  8:36 PM            FINAL DIAGNOSIS  1. Schizophrenia, unspecified type (HCC)    2. Homelessness            Scott DONG M.D. (Chika), am scribing for, and in the presence of, Scott Fierro M.D..    Electronically signed by: Scott Fierro M.D. (Chika), 6/5/2024    Scott DONG  SURY Fierro M.D. personally performed the services described in this documentation, as scribed by Scott Fierro M.D. in my presence, and it is both accurate and complete.

## 2024-06-06 NOTE — ED NOTES
Pt was discharged from [facility] today after she was found sleeping on the sidewalk, pt reports to [facility] that she is here because she wants water.

## 2024-06-06 NOTE — ED NOTES
Discharge instructions reviewed with patient and she refused to sign.  They were instructed on how to  prescriptions. They verbalized understanding of follow up instructions. All belongings with patient. They ambulate with a steady gait with security

## 2024-06-06 NOTE — ED TRIAGE NOTES
Chief Complaint   Patient presents with    Psych Eval     Pt BIB EMS from [facility] across from [place], PD was called and pt was placed on legal hold by [facility] for inability to care for basic needs. Pt thinks she is a 1 year old and lives in [place].   Pt changed into paper scrubs, refuses to use breathlyzer. Pt is well known to the department.      Unable to answer SI/HI questions in triage.

## 2024-06-06 NOTE — ED NOTES
Pt L2K from [facility] was discontinued, pt was escorted out by security due to being uncooperative and refusing to leave.

## 2024-06-07 ENCOUNTER — HOSPITAL ENCOUNTER (EMERGENCY)
Facility: MEDICAL CENTER | Age: 39
End: 2024-06-07
Attending: STUDENT IN AN ORGANIZED HEALTH CARE EDUCATION/TRAINING PROGRAM

## 2024-06-07 VITALS
RESPIRATION RATE: 16 BRPM | DIASTOLIC BLOOD PRESSURE: 61 MMHG | OXYGEN SATURATION: 98 % | HEART RATE: 75 BPM | TEMPERATURE: 97.6 F | SYSTOLIC BLOOD PRESSURE: 109 MMHG | SYSTOLIC BLOOD PRESSURE: 109 MMHG | OXYGEN SATURATION: 98 % | TEMPERATURE: 97.6 F | HEART RATE: 75 BPM | DIASTOLIC BLOOD PRESSURE: 61 MMHG | RESPIRATION RATE: 16 BRPM

## 2024-06-07 DIAGNOSIS — Z78.9 ALCOHOL USE: ICD-10-CM

## 2024-06-07 PROCEDURE — 99284 EMERGENCY DEPT VISIT MOD MDM: CPT

## 2024-06-07 NOTE — ED NOTES
Written and verbal instructions provided to pt. Pt instructed to follow up with PCP and stop drinking etoh. Pt provided with sandwich. Pt instructed to return to emergency department for new or worsening symptoms. Security at bedside for discharge. Pt ambulatory upon discharge with all belongings.

## 2024-06-07 NOTE — DISCHARGE INSTRUCTIONS
Follow-up with your primary care team for further evaluation treatment.  Come back any worse symptoms.  Thank for coming in today.  Stop using methamphetamine.

## 2024-06-07 NOTE — ED NOTES
Pt provided socks. Pt offered new pants and shoes, pt refused. Discharge instructions reviewed with patient and signed.  They verbalized understanding of follow up instructions. All belongings with patient. They ambulate with a steady gait

## 2024-06-07 NOTE — ED TRIAGE NOTES
Chief Complaint   Patient presents with    Psych Eval     Patient to triage independently with steady gait. Patient states that she needs to be seen. Patient appears very disheveled and has no medical complaint at this time. Patient uncooperative with triage questioning.    Pt is alert and oriented, speaking in full sentences, follows commands and responds appropriately to questions. Resp are even and unlabored.      Pt placed in lobby. Pt educated on triage process. Pt encouraged to alert staff for any changes.     Patient and staff wearing appropriate PPE.    Pulse 85   Temp 36.4 °C (97.6 °F) (Temporal)   Resp 18   SpO2 98%   - RA

## 2024-06-07 NOTE — ED PROVIDER NOTES
"ED Provider Note    CHIEF COMPLAINT  Chief Complaint   Patient presents with    Psych Eval       EXTERNAL RECORDS REVIEWED  Other alternative medical record reviewed.  Patient with multiple self presentations to this and other emergency departments almost daily in the setting of alcohol intoxication, drug use, psychiatric evaluation, homelessness.  Patient was seen in our emergency department last night requesting a place to sleep, was verbally abusive to staff and was discharged    HPI/ROS  LIMITATION TO HISTORY   Select: Behavior Maguro Fifty-Eight is a 89 y.o. female who presents to the emergency department stating \"I need a nap\".  Patient reports that she drank alcohol yesterday.  She denies drug use.  She denies any pain, nausea, vomiting, diarrhea, trouble breathing or additional acute complaints.  She is also requesting something to eat.    PAST MEDICAL HISTORY       SURGICAL HISTORY  patient denies any surgical history    FAMILY HISTORY  No family history on file.    SOCIAL HISTORY  Social History     Tobacco Use    Smoking status: Not on file    Smokeless tobacco: Not on file   Substance and Sexual Activity    Alcohol use: Not on file    Drug use: Not on file    Sexual activity: Not on file       CURRENT MEDICATIONS  Home Medications    **Home medications have not yet been reviewed for this encounter**         ALLERGIES  Not on File    PHYSICAL EXAM  VITAL SIGNS: /61   Pulse 75   Temp 36.4 °C (97.6 °F) (Temporal)   Resp 16   SpO2 98%    Constitutional: Sluggish but awakens easily and answers questions intermittently, disheveled  HENT: Normocephalic, Atraumatic, Bilateral external ears normal. Nose normal.   Eyes: Pupils are equal and reactive. Conjunctiva normal, non-icteric.   Heart: Regular rate and rythm,   Lungs: No respiratory distress  GI: Soft nontender nondistended   Musculoskeletal: No obvious deformity. No leg edema.  Skin: Warm, Dry, No erythema, No rash.   Neurologic: Alert and " "oriented x 3, ambulates with a steady gait  Psychiatric: Appropriate affect for situation, no suicidal or homicidal ideation, not responding to internal stimuli      COURSE & MEDICAL DECISION MAKING    ASSESSMENT, COURSE AND PLAN  Care Narrative:     Patient self presented to the emergency department requesting a place to \"take a nap\".  Is somewhat sluggish but does awaken easily and answer questions intermittently though is not very cooperative otherwise.  She appears to be volitionally disinterested in participation in her care.  She is also quite verbally aggressive with staff members.  She does report alcohol use earlier in the evening but is currently ambulatory with no difficulty, tolerating oral food and liquids with no difficulty and does awaken and answer questions appropriately when desiring.  She was provided with scrubs given disheveled clothing.  Her vital signs are stable and her examination is otherwise unremarkable.  At this point I do not feel any further period of observation, or diagnostic testing is indicated.  I encouraged the patient to follow-up with her primary care doctor for ongoing medical needs.  She was discharged in stable condition.    ADDITIONAL PROBLEMS MANAGED  Homelessness    DISPOSITION AND DISCUSSIONS  I have discussed management of the patient with the following physicians and BOLIVAR's: None    Discussion of management with other [facility] or appropriate source(s): None     Barriers to care at this time, including but not limited to: Patient is homeless and Patient had difficult affording medications.       FINAL DIAGNOSIS  1. Alcohol use           Electronically signed by: Ashwin Chavez M.D., 6/7/2024 3:33 AM      "

## 2024-06-07 NOTE — ED NOTES
Attempted to obtain blood pressure. Pt refused. Attempted to redirect pt and take blood pressure. Pt removed blood pressure while blood pressure cuff was inflating. Unable to obtain blood pressure at this time.

## 2024-06-09 ENCOUNTER — HOSPITAL ENCOUNTER (EMERGENCY)
Facility: MEDICAL CENTER | Age: 39
End: 2024-06-10
Attending: EMERGENCY MEDICINE
Payer: MEDICAID

## 2024-06-09 DIAGNOSIS — F29 PSYCHOSIS, UNSPECIFIED PSYCHOSIS TYPE (HCC): ICD-10-CM

## 2024-06-09 DIAGNOSIS — F43.0 AGITATION STATES AS ACUTE REACTION TO EXCEPTIONAL (GROSS) STRESS: ICD-10-CM

## 2024-06-09 LAB
HCG SERPL QL: NEGATIVE
HIV 1+2 AB+HIV1 P24 AG SERPL QL IA: NORMAL
POC BREATHALIZER: 0 PERCENT (ref 0–0.01)
TSH SERPL DL<=0.005 MIU/L-ACNC: 1.73 UIU/ML (ref 0.38–5.33)
VIT B12 SERPL-MCNC: 560 PG/ML (ref 211–911)

## 2024-06-09 PROCEDURE — 82607 VITAMIN B-12: CPT

## 2024-06-09 PROCEDURE — 99284 EMERGENCY DEPT VISIT MOD MDM: CPT | Performed by: PSYCHIATRY & NEUROLOGY

## 2024-06-09 PROCEDURE — 94760 N-INVAS EAR/PLS OXIMETRY 1: CPT

## 2024-06-09 PROCEDURE — 700111 HCHG RX REV CODE 636 W/ 250 OVERRIDE (IP): Performed by: EMERGENCY MEDICINE

## 2024-06-09 PROCEDURE — 700111 HCHG RX REV CODE 636 W/ 250 OVERRIDE (IP): Mod: JZ | Performed by: PSYCHIATRY & NEUROLOGY

## 2024-06-09 PROCEDURE — 84703 CHORIONIC GONADOTROPIN ASSAY: CPT

## 2024-06-09 PROCEDURE — 700102 HCHG RX REV CODE 250 W/ 637 OVERRIDE(OP): Performed by: PSYCHIATRY & NEUROLOGY

## 2024-06-09 PROCEDURE — 96372 THER/PROPH/DIAG INJ SC/IM: CPT

## 2024-06-09 PROCEDURE — 84443 ASSAY THYROID STIM HORMONE: CPT

## 2024-06-09 PROCEDURE — A9270 NON-COVERED ITEM OR SERVICE: HCPCS | Performed by: PSYCHIATRY & NEUROLOGY

## 2024-06-09 PROCEDURE — 302970 POC BREATHALIZER: Performed by: EMERGENCY MEDICINE

## 2024-06-09 PROCEDURE — 99285 EMERGENCY DEPT VISIT HI MDM: CPT

## 2024-06-09 PROCEDURE — 87389 HIV-1 AG W/HIV-1&-2 AB AG IA: CPT

## 2024-06-09 PROCEDURE — 36415 COLL VENOUS BLD VENIPUNCTURE: CPT

## 2024-06-09 RX ORDER — HALOPERIDOL 5 MG/ML
5 INJECTION INTRAMUSCULAR EVERY 6 HOURS PRN
Status: DISCONTINUED | OUTPATIENT
Start: 2024-06-09 | End: 2024-06-10 | Stop reason: HOSPADM

## 2024-06-09 RX ORDER — RISPERIDONE 1 MG/1
1 TABLET ORAL 2 TIMES DAILY
Status: DISCONTINUED | OUTPATIENT
Start: 2024-06-09 | End: 2024-06-10 | Stop reason: HOSPADM

## 2024-06-09 RX ORDER — ZIPRASIDONE MESYLATE 20 MG/ML
20 INJECTION, POWDER, LYOPHILIZED, FOR SOLUTION INTRAMUSCULAR ONCE
Status: COMPLETED | OUTPATIENT
Start: 2024-06-09 | End: 2024-06-09

## 2024-06-09 RX ORDER — OLANZAPINE 5 MG/1
5 TABLET ORAL EVERY EVENING
Status: DISCONTINUED | OUTPATIENT
Start: 2024-06-09 | End: 2024-06-09

## 2024-06-09 RX ADMIN — HALOPERIDOL LACTATE 5 MG: 5 INJECTION, SOLUTION INTRAMUSCULAR at 19:21

## 2024-06-09 RX ADMIN — RISPERIDONE 1 MG: 1 TABLET, FILM COATED ORAL at 19:20

## 2024-06-09 RX ADMIN — ZIPRASIDONE MESYLATE 20 MG: 20 INJECTION, POWDER, LYOPHILIZED, FOR SOLUTION INTRAMUSCULAR at 04:18

## 2024-06-09 NOTE — ED NOTES
"Pt offered food and water, pt states \"no, maybe later\". Pt also still refusing to get clean at this time.   "

## 2024-06-09 NOTE — ED NOTES
"Pt refusing blood drawn.Pt verbalized \" I don't want blood drawn because its hurt and I hate you\" Security at bedside      ERP informed     "

## 2024-06-09 NOTE — ED NOTES
Pt resting in bed, with even rise and fall of chest noted. No obvious signs of pain or distress, tele-sitter in room performing direct observation, reposition self occasionally, bed in low position, safety rooms features in place.

## 2024-06-09 NOTE — ED NOTES
Pt in hospital paper scrubs, all personal belongings removed. All potentially harmful non medically essential items removed from room. Belongings placed in locker #1. Legal hold paper work in pt's chart, filled up by alert teamDoreen

## 2024-06-09 NOTE — ED NOTES
"Asked pt if she needs to use the BR at this time, pt responds \"no\" and requested cup of coffee instead. Pt provided coffee upon pt request.   "

## 2024-06-09 NOTE — ED NOTES
Tried to offer patient washcloths and clean scrubs. PT flat out refused to clean up. Clothes set aside in room for later.

## 2024-06-09 NOTE — ED NOTES
Bedside report from LAURENCE [facility]ckin[facility]. Pt on gurney in lowest, locked position, on RA, and continuous pulse ox monitoring. Fall precautions in place, including fall risk sign. Verbal order received from ERP to discontinue all pending orders.

## 2024-06-09 NOTE — ED TRIAGE NOTES
"89 y.o.  Female    Chief Complaint   Patient presents with    Other     Pt homeless. No complain. Pt keep talking to herself.  Poor historian       Pt ambulatory to triage with above complaint. Noted Self- neglect.      Pt to ER lobby . Pt educated on alerting staff in changes to condition. Pt verbalized understanding.     /89   Pulse 87   Temp 36.7 °C (98 °F) (Temporal)   Resp 20   Ht 1.651 m (5' 5\")   Wt 71.8 kg (158 lb 4.6 oz)   LMP  (LMP Unknown)   SpO2 99%   BMI 26.34 kg/m²     "

## 2024-06-09 NOTE — PROGRESS NOTES
"ED Observation Progress Note    Date of Service: 06/09/24    Interval History and Interventions  This is a unknown age female initially presented with altered mental status.  This was in the setting of methamphetamine abuse.  She was given Geodon, she has cleared somewhat from this although certainly still with findings of psychosis.  Evaluated by behavioral health, legal hold initiated.  Psychiatry will consult on the patient, I will start her on Zyprexa    Physical Exam  /75   Pulse 75   Temp 36.7 °C (98 °F) (Temporal)   Resp 18   Ht 1.651 m (5' 5\")   Wt 71.8 kg (158 lb 4.6 oz)   LMP  (LMP Unknown)   SpO2 97%   BMI 26.34 kg/m² .    Constitutional: Awake and alert. Nontoxic  HENT:  Grossly normal  Eyes: Grossly normal  Neck: Normal range of motion  Cardiovascular: Normal heart rate   Thorax & Lungs: No respiratory distress  Abdomen: Nontender  Skin:  No pathologic rash.   Extremities: Well perfused  Psychiatric: Affect normal    Labs  No results found for this or any previous visit.    Radiology  No orders to display       Problem List  1.  Psychosis    Electronically signed by: Vasu Sanders M.D., 6/9/2024 11:29 AM          "

## 2024-06-09 NOTE — CONSULTS
"PSYCHIATRIC INTAKE EVALUATION(new)  Reason for admission:   Chief Complaint   Patient presents with    Other     Pt homeless. No complain. Pt keep talking to herself.  Poor historian       Reason for consult:\"acute psychosis\"  Requesting Provider:   Vasu Sanders M.D.      Legal Hold Status:   on hold. Per hold, psychosis and commendatory hallucinations to kill self.          Chart reviewed.           Security and nursing staff confirmed this is Josephine Lou, 40yo F. MRN [id number]    *HPI:     Pt is lying on the bed, calm and partially cooperative. She is able to tell me her name is Olivia, but can't provide her last name, age, . She can't provide any meaningful information due to her significant disorganized thought process. She denies SI/HI/AVH. She denies feeling someone is after her. When asked if she agrees with starting Risperdal, she says \"I already took all the medicine in the cabinet\". No clear suicide attempt. She asked me why I was wearing a costume, (referring to my white coat). She denied having family. Denies using drugs or alcohol. Confirms she smokes cigarettes. Unable to obtain further information a this time, as patient was becoming increasingly agitated with evaluation.     Medical ROS (as pertinent):     ROS  unable to assess      *Psychiatric Examination:  Vitals:   Vitals:    24 0954   BP:    Pulse: 75   Resp:    Temp:    SpO2: 97%   Appearance: disheveled, malodorous   Abnormal movements: none appreciated  Gait/posture: not assessed  Speech: normal volume, but difficult to understand  Though process: disorganized   Associations: loose associations  Thought content: difficult to assess, responding to internal stimuli,  internally preoccupied.   Judgement and Insight: poor/poor  Orientation:unable to assess  Recent and Remote Memory:unable to assess  Attention Span and Concentration: intact  Language: fluid   Fund of Knowledge: not tested   Mood and Affect:irritable "   SI/HI:denies any active or passive SI/HI    Past Medical History:   Past Medical History:   Diagnosis Date    Psychiatric disorder         Past Psychiatric History:  Per chart MRN [id number] hx of schizophrenia, previous SA, hospitalizations, frequent ED visit. Recently seen at [facility]. Most recent medications per chart include cogentin 1mg BID, haldol 1mg TID  and Depakote 125mg BID     Family Hx: unable to obtain    Social Hx: homeless. Unable to obtain    Substances: hx of methamphetamine per chart  Drugs: denies  Alcohol:  denies  Nicotine:  yes    Current Medications:  Current Facility-Administered Medications   Medication Dose Route Frequency Provider Last Rate Last Admin    risperiDONE (RisperDAL) tablet 1 mg  1 mg Oral BID Angie Cottrell M.D.        haloperidol lactate (Haldol) injection 5 mg  5 mg Intramuscular Q6HRS PRN Angie Cottrell M.D.         No current outpatient medications on file.        Allergies:  Patient has no allergy information on record.       Labs personally reviewed:   No results found for this or any previous visit (from the past 72 hour(s)).        EKG:  in 2023  Brain Imaging: normal HCT in 2023  EEG: n/a         Assessment: Pt has Schizophrenia, and is grossly psychotic at this time. She is unable to care for self at this time. Legal hold extended.       Dx:  Schizophrenia     Medical:  Reviewed, see medical note       Plan:  Legal hold: extended  Psychotropic medications: start Risperdal 1mg Po BID for psychosis.   Please transfer pt to inpatient psychiatric hospital when medically cleared and bed is available  Reviewed recent CBC, CMP.  Labs ordered: hcg, TSH, B12, UDS, EKG.   Psychiatry will follow up    Thank you for the consult.     Sitter: telesitter  Phone:no  Visitors:no  Personal belongings:no      This note was created using voice recognition software (Dragon). The accuracy of the dictation is limited by the abilities of the software. I have reviewed the  note prior to signing. However, error related to voice recognition software and /or scribes may still exist and should be interpreted within the appropriate context.

## 2024-06-09 NOTE — ED PROVIDER NOTES
"ED Provider Note    CHIEF COMPLAINT  Chief Complaint   Patient presents with    Other     Pt homeless. No complain. Pt keep talking to herself.  Poor historian       EXTERNAL RECORDS REVIEWED  Other none available in electronic medical record    HPI/ROS  LIMITATION TO HISTORY   Select: Altered mental status / Confusion and Behavior  OUTSIDE HISTORIAN(S):  None available    Olivia is a female adult who presents to the emergency department to be evaluated.  Patient is a very poor historian, she is rambling and is responding to internal stimuli.  She relates no complaints but when I asked her what brings her to the emergency department she relates \"I [place] woods\".  She can tell me very little other than her name.  She is very disheveled and dirty clothes.  No apparent trauma.  Patient is afebrile, otherwise history is extremely limited.    PAST MEDICAL HISTORY   has a past medical history of Psychiatric disorder.    SURGICAL HISTORY  Cannot reliably obtain    FAMILY HISTORY  Cannot reliably obtain    SOCIAL HISTORY  Social History     Tobacco Use    Smoking status: Unknown    Smokeless tobacco: Not on file   Substance and Sexual Activity    Alcohol use: Not on file    Drug use: Not on file    Sexual activity: Not on file       CURRENT MEDICATIONS  Home Medications       Reviewed by Cecilia Mata R.N. (Registered Nurse) on 06/09/24 at 0312  Med List Status: Not Addressed     Medication Last Dose Status        Patient Cade Taking any Medications                           ALLERGIES  Not on File    PHYSICAL EXAM  VITAL SIGNS: /68   Pulse 81   Temp 36.7 °C (98 °F) (Temporal)   Resp 20   Ht 1.651 m (5' 5\")   Wt 71.8 kg (158 lb 4.6 oz)   LMP  (LMP Unknown)   SpO2 97%   BMI 26.34 kg/m²    General: Alert, no acute distress, disheveled  Skin: Warm, dry, normal for ethnicity  Head: Normocephalic, atraumatic  Neck: Trachea midline, no tenderness  Eye: PERRL, normal conjunctiva  ENMT: " Oral mucosa dry  Cardiovascular: Regular rate and rhythm, No murmur, Normal peripheral perfusion  Respiratory: Lungs CTA, respirations are non-labored, breath sounds are equal  Musculoskeletal: No swelling, no deformity  Neurological: Alert and oriented to person, place, time, and situation  Lymphatics: No lymphadenopathy  Psychiatric: Cooperative, anxious, rocking back and forth in the bed.  Rambling loose associations, responding to internal stimuli    EKG/LABS  Pending  I have independently interpreted this EKG          COURSE & MEDICAL DECISION MAKING    ASSESSMENT, COURSE AND PLAN  Care Narrative: This is a middle-aged female presents for evaluation of bizarre behavior.  I have spoken with the behavioral health evaluator who notes patient does have a history of methamphetamine abuse.  Patient does appear to be acutely psychotic but endorses no suicidal ideation.  Will treat with Geodon which patient has tolerated well before and continue to observe for clinical improvement.    ED OBS: Yes; I am placing the patient in to an observation status due to a diagnostic uncertainty as well as therapeutic intensity. Patient placed in observation status at 3:36 AM, 6/9/2024.     Observation plan is as follows: Metabolic/toxicologic workup will be obtained.  Differential diagnosis at this point includes but is not restricted to acute psychosis, electrolyte derangement, dehydration.  Behavioral health consultation will be obtained.    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  Patient is on 4 to becoming more agitated and aggressive to staff.  As such I have ordered Geodon 20 mg IM.        Patient Vitals for the past 24 hrs:   BP Temp Temp src Pulse Resp SpO2 Height Weight   06/09/24 0613 111/68 -- -- 81 -- 97 % -- --   06/09/24 0401 131/89 -- -- 76 -- 98 % -- --   06/09/24 0331 141/75 -- -- 71 -- 98 % -- --  "  06/09/24 0307 -- -- -- -- -- -- 1.651 m (5' 5\") 71.8 kg (158 lb 4.6 oz)   06/09/24 0302 120/89 36.7 °C (98 °F) Temporal 87 20 99 % -- --        ADDITIONAL PROBLEMS MANAGED  Agitation    DISPOSITION AND DISCUSSIONS  I have discussed management of the patient with the following physicians and KIMI's:  Patient endorsed to my partner Dr. Vasu Sanders while in ED observation status as workup is still pending with regard to final disposition.    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  Patient is on 4 to becoming more agitated and aggressive to staff.     Escalation of care considered, and ultimately not performed:acute inpatient care management, however at this time, the patient is most appropriate for outpatient management    Barriers to care at this time, including but not limited to: Patient does not have established PCP.     Decision tools and prescription drugs considered including, but not limited to:  NA .    FINAL DIAGNOSIS  1. Agitation states as acute reaction to exceptional (gross) stress           Electronically signed by: Ethan Cai M.D., 6/9/2024 3:36 AM      "

## 2024-06-10 VITALS
DIASTOLIC BLOOD PRESSURE: 55 MMHG | BODY MASS INDEX: 26.37 KG/M2 | SYSTOLIC BLOOD PRESSURE: 112 MMHG | WEIGHT: 158.29 LBS | TEMPERATURE: 97.5 F | HEIGHT: 65 IN | HEART RATE: 55 BPM | RESPIRATION RATE: 16 BRPM | OXYGEN SATURATION: 98 %

## 2024-06-10 PROCEDURE — 700102 HCHG RX REV CODE 250 W/ 637 OVERRIDE(OP): Mod: UD | Performed by: PSYCHIATRY & NEUROLOGY

## 2024-06-10 PROCEDURE — A9270 NON-COVERED ITEM OR SERVICE: HCPCS | Mod: UD | Performed by: PSYCHIATRY & NEUROLOGY

## 2024-06-10 RX ORDER — HYDROXYZINE PAMOATE 50 MG/1
50 CAPSULE ORAL EVERY 4 HOURS PRN
COMMUNITY

## 2024-06-10 RX ADMIN — RISPERIDONE 1 MG: 1 TABLET, FILM COATED ORAL at 05:20

## 2024-06-10 NOTE — ED NOTES
-- DO NOT REPLY / DO NOT REPLY ALL --  -- Message is from Engagement Center Operations (ECO) --    General Patient Message:     Please contact to schedule a new patient appointment     Caller Information       Type Contact Phone/Fax    03/27/2023 03:28 PM CDT Phone (Incoming) Ani Olson (Self) [phone] (M)        Alternative phone number: none    Can a detailed message be left? Yes    Message Turnaround: [place]:    Refer to site's KB page for routing instructions    Please give this turnaround time to the caller:   \"You can expect to receive a response 1-3 business days after your provider's clinical team reviews the message\"               .Bedside report received from off going RN/tech: Tee RN, assumed care of patient.  POC discussed with patient. Call light within reach, all needs addressed at this time.  Waiting on placement      Fall risk interventions in place: Not Applicable (all applicable per [facility] Fall risk assessment)   Continuous monitoring: Not Applicable   IVF/IV medications: Not Applicable   Oxygen: Room Air  Bedside sitter: checklist completed, stop sign in doorway tele sitter in place  Isolation: Not Applicable

## 2024-06-10 NOTE — DISCHARGE PLANNING
Legal Hold Transfer     Referral: Legal hold transfer to Mental Health Facility     Intervention: Received call from Maranda at [facility] stating pt has been accepted.      Pt's accepting physcian is Dr. Rojas    Spoke to Pat at [facility]     Transport arranged through [facility]     The pt will be picked up at 1400      Notified Bedside LAURENCE Coleman, Alert Team LAURENCE Leo, and Dr. Blackwell of the departure time as well as accepting facility.      Transfer packet to be created with original legal hold and placed on chart.      Plan: Pt will be transferring to [facility] today at 1400 via [facility].

## 2024-06-10 NOTE — DISCHARGE PLANNING
Alert Team Note:    Received call from Lidya at [facility] stating pt has been declined due to not being adequate for facility and current milieu.

## 2024-06-10 NOTE — ED NOTES
Patient has period blood on her pants, she is refusing to change her pants. Patient has new pants at the bedside

## 2024-06-10 NOTE — ED NOTES
Checked on bed, with unlabored respirations. No safety risk noted  Awake on bed  On Telesitter with continuous visual monitoring by Trained Personnel  Continued safety precaution  Camilorkody in low position, side rail up for pt safety.   No needs identified at the moment

## 2024-06-10 NOTE — ED NOTES
The pt is laying in bed with eyes closed. Breathing is even and unlabored. Telesitter in the room ensuring the pt's safety

## 2024-06-10 NOTE — DISCHARGE PLANNING
Alert Team Note:    Contacted [facility] and spoke to Yulissa, regla adv their Intake counselor Elsy is not at her desk but she will forward my message and have her call me back once she has an update on referral.

## 2024-06-10 NOTE — DISCHARGE SUMMARY
"  ED Observation Discharge Summary    Patient:Josephine Lou  Patient : 1985  Patient MRN: [id number]  Patient PCP: No primary care provider on file.    Admit Date: 2024  Discharge Date and Time: 06/10/24 1:18 PM  Discharge Diagnosis:   1. Agitation states as acute reaction to exceptional (gross) stress        2. Psychosis, unspecified psychosis type (HCC)           Discharge Attending: Tay Sosa M.D.  Discharge Service: ED Observation    ED Course  Josephine is a 39 y.o. female who was evaluated at [facility] with acute psychosis, first evaluated yesterday, legal hold initiated and the patient was kept here in the emergency department after being evaluated by psychiatry until a bed became available at a local psychiatric facility.  The patient has been transferred to an inpatient psychiatric facility for ongoing care    Discharge Exam:  /55   Pulse (!) 55   Temp 36.4 °C (97.5 °F) (Temporal)   Resp 16   Ht 1.651 m (5' 5\")   Wt 71.8 kg (158 lb 4.6 oz)   LMP  (LMP Unknown)   SpO2 98%   BMI 26.34 kg/m² .    Constitutional: Awake and alert. Nontoxic  HENT:  Grossly normal  Eyes: Grossly normal  Neck: Normal range of motion      Labs  Results for orders placed or performed during the hospital encounter of 24   TSH WITH REFLEX TO FT4   Result Value Ref Range    TSH 1.730 0.380 - 5.330 uIU/mL   VITAMIN B12   Result Value Ref Range    Vitamin B12 -True Cobalamin 560 211 - 911 pg/mL   HIV AG/AB COMBO ASSAY SCREENING   Result Value Ref Range    HIV Ag/Ab Combo Assay Non-Reactive Non Reactive   HCG QUAL SERUM   Result Value Ref Range    Beta-Hcg Qualitative Serum Negative Negative   POC BREATHALIZER   Result Value Ref Range    POC Breathalizer 0.000 0.00 - 0.01 Percent       Radiology  No orders to display       Medications:   New Prescriptions    No medications on file       My final assessment includes   1. Agitation states as acute reaction to exceptional (gross) " stress        2. Psychosis, unspecified psychosis type (HCC)           Upon Reevaluation, the patient's condition has: not improved; and will be escalated to psychiatric hospitalization.    Patient discharged from ED Observation status at 2:01 PM (Time) 6/10/2024 (Date).     Total time spent on this ED Observation discharge encounter is > 30 Minutes    Electronically signed by: Tay Sosa M.D., 6/10/2024 1:18 PM

## 2024-06-10 NOTE — ED NOTES
Rounded on pt. Respirations equal and unlabored. No acute distress at this time. Telesitter in line of site of site of patient. Safety measures in place.

## 2024-06-10 NOTE — ED NOTES
Med rec is complete per  [facility]. No allergies on file at [facility].    Has patient had any outside antibiotics in the last 30 days? N    Any Anticoagulants (rivaroxaban, apixaban, edoxaban, dabigatran, warfarin, enoxaparin) taken in the last 14 days? N       Pharmacy/Pharmacies Pt utilizes : [facility] [phone]

## 2024-06-10 NOTE — ED NOTES
Patient refused to be revitaled. Belongings given to [facility], patient given new pants. Discharge packet to [facility]. Legal hold to [facility]. Patient ambulatory to ambulance bay with [facility]

## 2024-06-10 NOTE — ED NOTES
Bedside report received from previous shift.   Assumed patient care. Verified patient identification.  Checked on bed, with unlabored respirations. Discussed plan of care.   Vital signs is stable. Denied any new complaints.   Gurney in low position, side rail up for pt safety.   No needs identified at the moment.    Contraptions: None  Alert and Oriented: x 0 (psychosis) - Alert but disoriented x 4  Ambulatory: yes  Oxygen: None  Pending:on legal hold with Telesitter    ---  Psychiatry Notes:  Assessment: Pt has Schizophrenia, and is grossly psychotic at this time. She is unable to care for self at this time. Legal hold extended.         Dx:  Schizophrenia      Medical:  Reviewed, see medical note         Plan:  Legal hold: extended  Psychotropic medications: start Risperdal 1mg Po BID for psychosis.   Please transfer pt to inpatient psychiatric hospital when medically cleared and bed is available  Reviewed recent CBC, CMP.  Labs ordered: hcg, TSH, B12, UDS, EKG.   Psychiatry will follow up     Thank you for the consult.      Sitter: telesitter  Phone:no  Visitors:no  Personal belongings:no

## 2024-06-10 NOTE — ED NOTES
Patient medicated as reflected in MAR. Patient verbalized understanding of all education provided. Patient tolerated medication at this time. Rounded on pt. Respirations equal and unlabored. No acute distress at this time. Telesitter in line of site of site of patient. Safety measures in place.

## 2024-06-10 NOTE — ED NOTES
Checked on bed, with unlabored respirations. No safety risk noted  Sleeping  On Telesitter with continuous visual monitoring by Trained Personnel  Continued safety precaution  Camilorkody in low position, side rail up for pt safety.   No needs identified at the moment

## 2024-06-10 NOTE — DISCHARGE PLANNING
Alert Team:    Referral faxed to [facility], [facility] and [facility] [employer] [facility] reports pt must be staffed by their Day Admin Team.

## 2024-06-10 NOTE — ED NOTES
"Attempted to medicate pt, but pt refused and answers \"no\". Offered pt juice or water, pt also refused and answers \"no\".   "

## 2024-06-10 NOTE — ED NOTES
Patient's home medications have been reviewed by the pharmacy team.     Past Medical History:   Diagnosis Date    Psychiatric disorder        Patient's Medications   New Prescriptions    No medications on file   Previous Medications    HYDROXYZINE PAMOATE (VISTARIL) 50 MG CAP    Take 50 mg by mouth every four hours as needed for Anxiety.   Modified Medications    No medications on file   Discontinued Medications    No medications on file          Medication history completed  Defer to psychiatrist           Loida Gamez, PharmD

## 2024-06-10 NOTE — PROGRESS NOTES
"ED Observation Progress Note    Date of Service: 06/10/24    Interval History and Interventions  Patient here with history of psychosis, medically cleared by my procedure partners for psychosis.  Patient placed on legal hold, now awaiting placement in psychiatric facility.    Physical Exam  /55   Pulse (!) 55   Temp 36.4 °C (97.5 °F) (Temporal)   Resp 16   Ht 1.651 m (5' 5\")   Wt 71.8 kg (158 lb 4.6 oz)   LMP  (LMP Unknown)   SpO2 98%   BMI 26.34 kg/m² .    Constitutional: Awake and alert. Nontoxic  HENT:  Grossly normal  Eyes: Grossly normal  Neck: Normal range of motion  Cardiovascular: Normal heart rate   Thorax & Lungs: No respiratory distress  Abdomen: Nontender  Skin:  No pathologic rash.   Extremities: Well perfused  Psychiatric: Affect normal    Labs  Results for orders placed or performed during the hospital encounter of 06/09/24   TSH WITH REFLEX TO FT4   Result Value Ref Range    TSH 1.730 0.380 - 5.330 uIU/mL   VITAMIN B12   Result Value Ref Range    Vitamin B12 -True Cobalamin 560 211 - 911 pg/mL   HIV AG/AB COMBO ASSAY SCREENING   Result Value Ref Range    HIV Ag/Ab Combo Assay Non-Reactive Non Reactive   HCG QUAL SERUM   Result Value Ref Range    Beta-Hcg Qualitative Serum Negative Negative   POC BREATHALIZER   Result Value Ref Range    POC Breathalizer 0.000 0.00 - 0.01 Percent       Radiology  No orders to display       Problem List    1. Agitation states as acute reaction to exceptional (gross) stress    2. Psychosis, unspecified psychosis type (HCC)            "

## 2024-06-10 NOTE — ED NOTES
.Pt. Resting, no changes, 3 p's addressed, call light at bedside, poc updated  Tele sitter in place

## 2024-06-10 NOTE — ED NOTES
Bedside report received from off going RN/tech: Aneudy DANG, assumed care of patient.  POC discussed with patient. Call light within reach, all needs addressed at this time.       Fall risk interventions in place: Not Applicable (all applicable per [place] Fall risk assessment)   Continuous monitoring: Not Applicable   IVF/IV medications: Not Applicable   Oxygen: Room Air  Bedside sitter: Pt on L2k SI with 1:1 sitter Telesitter (name), checklist completed, and checklist completed, stop sign in doorway  Isolation: Not Applicable

## 2024-06-10 NOTE — ED NOTES
Bedside report given to the next shift RN Aneudy for continuity of care and management.  Provided opportunity to asks questions.    Contraptions: None  Alert and Oriented: x 0 (psychosis)  Ambulatory: yes  Oxygen: None  Pending: on legal hold with Telesitter    Called Telesitter that pt will be transferred to [place] [id number], and acknowledged  Sent pt to [place] [id number]

## 2024-06-10 NOTE — ED NOTES
Bedside report received from off going RN/tech: Jose E, assumed care of patient.  POC discussed with patient. Call light within reach, all needs addressed at this time.       Fall risk interventions in place: Not Applicable (all applicable per [place] Fall risk assessment)   Continuous monitoring: Not Applicable   IVF/IV medications: Not Applicable   Oxygen: Room Air  Bedside sitter: Pt on L2k SI with 1:1 sitter Telesitter (name) and checklist completed, stop sign in doorway  Isolation: Not Applicable

## 2024-06-29 ENCOUNTER — APPOINTMENT (OUTPATIENT)
Dept: RADIOLOGY | Facility: MEDICAL CENTER | Age: 39
End: 2024-06-29
Attending: STUDENT IN AN ORGANIZED HEALTH CARE EDUCATION/TRAINING PROGRAM
Payer: MEDICAID

## 2024-06-29 ENCOUNTER — HOSPITAL ENCOUNTER (EMERGENCY)
Facility: MEDICAL CENTER | Age: 39
End: 2024-06-30
Attending: STUDENT IN AN ORGANIZED HEALTH CARE EDUCATION/TRAINING PROGRAM
Payer: MEDICAID

## 2024-06-29 VITALS
RESPIRATION RATE: 16 BRPM | TEMPERATURE: 98.5 F | HEART RATE: 80 BPM | HEIGHT: 67 IN | WEIGHT: 150 LBS | BODY MASS INDEX: 23.54 KG/M2 | DIASTOLIC BLOOD PRESSURE: 80 MMHG | SYSTOLIC BLOOD PRESSURE: 127 MMHG | OXYGEN SATURATION: 100 %

## 2024-06-29 DIAGNOSIS — F29 PSYCHOSIS, UNSPECIFIED PSYCHOSIS TYPE (HCC): ICD-10-CM

## 2024-06-29 LAB
ALBUMIN SERPL BCP-MCNC: 4 G/DL (ref 3.2–4.9)
ALBUMIN/GLOB SERPL: 1.3 G/DL
ALP SERPL-CCNC: 108 U/L (ref 30–99)
ALT SERPL-CCNC: 19 U/L (ref 2–50)
AMPHET UR QL SCN: NEGATIVE
ANION GAP SERPL CALC-SCNC: 13 MMOL/L (ref 7–16)
AST SERPL-CCNC: 16 U/L (ref 12–45)
BARBITURATES UR QL SCN: NEGATIVE
BASOPHILS # BLD AUTO: 0.3 % (ref 0–1.8)
BASOPHILS # BLD: 0.04 K/UL (ref 0–0.12)
BENZODIAZ UR QL SCN: NEGATIVE
BILIRUB SERPL-MCNC: 0.2 MG/DL (ref 0.1–1.5)
BUN SERPL-MCNC: 10 MG/DL (ref 8–22)
BZE UR QL SCN: NEGATIVE
CALCIUM ALBUM COR SERPL-MCNC: 9.2 MG/DL (ref 8.5–10.5)
CALCIUM SERPL-MCNC: 9.2 MG/DL (ref 8.5–10.5)
CANNABINOIDS UR QL SCN: NEGATIVE
CHLORIDE SERPL-SCNC: 105 MMOL/L (ref 96–112)
CO2 SERPL-SCNC: 21 MMOL/L (ref 20–33)
CREAT SERPL-MCNC: 0.48 MG/DL (ref 0.5–1.4)
EOSINOPHIL # BLD AUTO: 0.18 K/UL (ref 0–0.51)
EOSINOPHIL NFR BLD: 1.3 % (ref 0–6.9)
ERYTHROCYTE [DISTWIDTH] IN BLOOD BY AUTOMATED COUNT: 45.1 FL (ref 35.9–50)
ETHANOL BLD-MCNC: <10.1 MG/DL
FENTANYL UR QL: NEGATIVE
GFR SERPLBLD CREATININE-BSD FMLA CKD-EPI: 122 ML/MIN/1.73 M 2
GLOBULIN SER CALC-MCNC: 3 G/DL (ref 1.9–3.5)
GLUCOSE SERPL-MCNC: 107 MG/DL (ref 65–99)
HCG SERPL QL: NEGATIVE
HCT VFR BLD AUTO: 39.3 % (ref 37–47)
HGB BLD-MCNC: 13.1 G/DL (ref 12–16)
IMM GRANULOCYTES # BLD AUTO: 0.05 K/UL (ref 0–0.11)
IMM GRANULOCYTES NFR BLD AUTO: 0.4 % (ref 0–0.9)
LYMPHOCYTES # BLD AUTO: 2.89 K/UL (ref 1–4.8)
LYMPHOCYTES NFR BLD: 20.3 % (ref 22–41)
MCH RBC QN AUTO: 28.4 PG (ref 27–33)
MCHC RBC AUTO-ENTMCNC: 33.3 G/DL (ref 32.2–35.5)
MCV RBC AUTO: 85.2 FL (ref 81.4–97.8)
METHADONE UR QL SCN: NEGATIVE
MONOCYTES # BLD AUTO: 0.81 K/UL (ref 0–0.85)
MONOCYTES NFR BLD AUTO: 5.7 % (ref 0–13.4)
NEUTROPHILS # BLD AUTO: 10.26 K/UL (ref 1.82–7.42)
NEUTROPHILS NFR BLD: 72 % (ref 44–72)
NRBC # BLD AUTO: 0 K/UL
NRBC BLD-RTO: 0 /100 WBC (ref 0–0.2)
OPIATES UR QL SCN: NEGATIVE
OXYCODONE UR QL SCN: NEGATIVE
PCP UR QL SCN: NEGATIVE
PLATELET # BLD AUTO: 366 K/UL (ref 164–446)
PMV BLD AUTO: 9.9 FL (ref 9–12.9)
POTASSIUM SERPL-SCNC: 3.7 MMOL/L (ref 3.6–5.5)
PROPOXYPH UR QL SCN: NEGATIVE
PROT SERPL-MCNC: 7 G/DL (ref 6–8.2)
RBC # BLD AUTO: 4.61 M/UL (ref 4.2–5.4)
SODIUM SERPL-SCNC: 139 MMOL/L (ref 135–145)
WBC # BLD AUTO: 14.2 K/UL (ref 4.8–10.8)

## 2024-06-29 PROCEDURE — 85025 COMPLETE CBC W/AUTO DIFF WBC: CPT

## 2024-06-29 PROCEDURE — 70450 CT HEAD/BRAIN W/O DYE: CPT

## 2024-06-29 PROCEDURE — 80307 DRUG TEST PRSMV CHEM ANLYZR: CPT

## 2024-06-29 PROCEDURE — A9270 NON-COVERED ITEM OR SERVICE: HCPCS | Mod: UD | Performed by: STUDENT IN AN ORGANIZED HEALTH CARE EDUCATION/TRAINING PROGRAM

## 2024-06-29 PROCEDURE — 82077 ASSAY SPEC XCP UR&BREATH IA: CPT

## 2024-06-29 PROCEDURE — 80053 COMPREHEN METABOLIC PANEL: CPT

## 2024-06-29 PROCEDURE — 36415 COLL VENOUS BLD VENIPUNCTURE: CPT

## 2024-06-29 PROCEDURE — 99284 EMERGENCY DEPT VISIT MOD MDM: CPT

## 2024-06-29 PROCEDURE — 700102 HCHG RX REV CODE 250 W/ 637 OVERRIDE(OP): Mod: UD | Performed by: STUDENT IN AN ORGANIZED HEALTH CARE EDUCATION/TRAINING PROGRAM

## 2024-06-29 PROCEDURE — 84703 CHORIONIC GONADOTROPIN ASSAY: CPT

## 2024-06-29 RX ORDER — ZIPRASIDONE HYDROCHLORIDE 60 MG/1
60 CAPSULE ORAL ONCE
Status: COMPLETED | OUTPATIENT
Start: 2024-06-29 | End: 2024-06-29

## 2024-06-29 RX ADMIN — ZIPRASIDONE HYDROCHLORIDE 60 MG: 60 CAPSULE ORAL at 23:17

## 2024-06-30 RX ORDER — HYDROXYZINE PAMOATE 50 MG/1
50 CAPSULE ORAL EVERY 4 HOURS PRN
Status: SHIPPED | COMMUNITY
End: 2024-08-02

## 2024-06-30 RX ORDER — RISPERIDONE 3 MG/1
3 TABLET ORAL
Status: SHIPPED | COMMUNITY
End: 2024-08-22

## 2024-06-30 NOTE — ED NOTES
Med rec complete per pharmacy/historical from merged charts, patient unable to participate in interview

## 2024-06-30 NOTE — ED NOTES
Bedside report from ADDIE Durbin. Pt resting in [facility] comfortably with visible chest rise and fall. standard fall precautions in place.

## 2024-06-30 NOTE — ED PROVIDER NOTES
"ED Provider Note    CHIEF COMPLAINT  Chief Complaint   Patient presents with    Other     Pt BIB EMS from [facility] gas [facility] where she was wandering around, pt disheveled, not wearing pants, speech incoherent, she is not answering questions appropriately, states \"I need a new brain\"       EXTERNAL RECORDS REVIEWED  Other none available    HPI/ROS  LIMITATION TO HISTORY   Select: Altered mental status / Confusion  OUTSIDE HISTORIAN(S):  EMS reported the patient was picked up outside of a gas station where she was found wandering around, disheveled with incoherent speech and not answering appropriate questions but repeatedly stating that she needs a new brain.    Stefani Amaro is a 89 y.o. adult who presents to the emergency department brought in by EMS for evaluation of bizarre behavior.  Patient with borderline unintelligible speech currently though continues to reiterate that she needs a new brain.  She otherwise is mumbling and not answering questions appropriately but is calm and cooperative resting in the gurney.    PAST MEDICAL HISTORY   Not available    SURGICAL HISTORY  patient denies any surgical history    FAMILY HISTORY  Not available    SOCIAL HISTORY  Social History     Tobacco Use    Smoking status: Not on file    Smokeless tobacco: Not on file   Substance and Sexual Activity    Alcohol use: Not on file    Drug use: Not on file    Sexual activity: Not on file       CURRENT MEDICATIONS  Home Medications       Reviewed by Shanae Whipple (Pharmacy Tech) on 06/30/24 at 0143  Med List Status: Complete     Medication Last Dose Status   hydrOXYzine pamoate (VISTARIL) 50 MG Cap UNK Active   risperiDONE (RISPERDAL) 3 MG Tab UNK Active                  Audit from Redirected Encounters    **Home medications have not yet been reviewed for this encounter**         ALLERGIES  No Known Allergies    PHYSICAL EXAM  VITAL SIGNS: /80   Pulse 80   Temp 36.9 °C (98.5 °F) (Temporal)   Resp 16   Ht " "1.702 m (5' 7\")   Wt 68 kg (150 lb)   SpO2 100%   BMI 23.49 kg/m²    Constitutional: Disheveled in appearance  HEENT: Atraumatic, normocephalic, dry mucous membranes, midrange reactive pupils bilaterally  Neck: Supple, no JVD, no tracheal deviation  Cardiovascular: Regular rate and rhythm, no murmur, rub or gallop, 2+ pulses peripherally x4  Thorax & Lungs: No respiratory distress, no wheezes, rales or rhonchi, no chest wall tenderness.  GI: Soft, non-distended, non-tender, no rebound  Skin: Warm, dry, cracked skin, dirt present diffusely  Musculoskeletal: Moving all extremities, no acute deformity, no edema, no tenderness  Neurologic: A&Ox0, appears globally encephalopathic moving all extremities without focal deficits  Psychiatric: Tangential, inappropriate with bizarre speech and not answering questions appropriately          EKG/LABS  Labs Reviewed   CBC WITH DIFFERENTIAL - Abnormal; Notable for the following components:       Result Value    WBC 14.2 (*)     Lymphocytes 20.30 (*)     Neutrophils (Absolute) 10.26 (*)     All other components within normal limits   COMP METABOLIC PANEL - Abnormal; Notable for the following components:    Glucose 107 (*)     Creatinine 0.48 (*)     Alkaline Phosphatase 108 (*)     All other components within normal limits   DIAGNOSTIC ALCOHOL   URINE DRUG SCREEN   HCG QUAL SERUM   ESTIMATED GFR         RADIOLOGY/PROCEDURES   I have independently interpreted the diagnostic imaging associated with this visit and am waiting the final reading from the radiologist.   My preliminary interpretation is as follows: CT head with no intracranial hemorrhage    Radiologist interpretation:  CT-HEAD W/O   Final Result         1.  No acute intracranial abnormality.                   COURSE & MEDICAL DECISION MAKING    ASSESSMENT, COURSE AND PLAN  Care Narrative:     Patient presents to the emergency department from [facility] brought in by EMS for bizarre behavior, disheveled " appearance.  Patient arrives under an alias initially and unfamiliar to staff members in the department.  Presents as if intoxicated likely on methamphetamine though without records broad differential was considered including acute intracranial hemorrhage, infection, electrolyte abnormality, additional toxic metabolic encephalopathy.  Screening laboratory and imaging workup including CT scan of the brain, CBC CMP diagnostic alcohol pregnancy and urine drug screen obtained.  Testing demonstrated a nonspecific leukocytosis but was otherwise unremarkable.  Case was discussed with the alert team and team member Papi assessed the patient.  He states he is quite familiar with the patient his name is Mally Multani and comes to the emergency department frequently in the setting of missed doses of ziprasidone with transient psychosis.  Recommends trial of this medication.  Patient was administered 60 mg and allowed to sleep after which she awoke, became quite pleasant with clear linear thinking and requesting discharge.  Patient was then ambulatory without difficulty and tolerating p.o.  Patient asymptomatic.  I encouraged patient to continue to take her medications as prescribed and follow-up with her care team on an outpatient basis.  At this time no evidence of grave disability, suicidal or homicidal ideation.  Patient discharged in stable condition with return precautions discussed all questions answered.        ADDITIONAL PROBLEMS MANAGED  Homelessness, nonadherence to medical therapy    DISPOSITION AND DISCUSSIONS  I have discussed management of the patient with the following physicians and SHEA's: None    Discussion of management with other QHP or appropriate source(s): Behavioral Health alert team member Papi      Escalation of care considered, and ultimately not performed:acute inpatient care management, however at this time, the patient is most appropriate for outpatient management    Barriers to care at this time,  including but not limited to: Patient is homeless, Patient does not have insurance, and Patient had difficult affording medications.       FINAL DIAGNOSIS  1. Psychosis, unspecified psychosis type (HCC)           Electronically signed by: Monty Alamo M.D., 6/29/2024 9:16 PM

## 2024-07-03 ENCOUNTER — HOSPITAL ENCOUNTER (EMERGENCY)
Facility: MEDICAL CENTER | Age: 39
End: 2024-07-03
Attending: EMERGENCY MEDICINE
Payer: MEDICAID

## 2024-07-03 ENCOUNTER — HOSPITAL ENCOUNTER (EMERGENCY)
Facility: MEDICAL CENTER | Age: 39
End: 2024-07-03
Attending: STUDENT IN AN ORGANIZED HEALTH CARE EDUCATION/TRAINING PROGRAM
Payer: MEDICAID

## 2024-07-03 VITALS
DIASTOLIC BLOOD PRESSURE: 86 MMHG | TEMPERATURE: 98.8 F | OXYGEN SATURATION: 98 % | HEART RATE: 106 BPM | SYSTOLIC BLOOD PRESSURE: 123 MMHG | RESPIRATION RATE: 18 BRPM | HEIGHT: 68 IN | BODY MASS INDEX: 25.01 KG/M2 | WEIGHT: 165 LBS

## 2024-07-03 VITALS
TEMPERATURE: 98.8 F | RESPIRATION RATE: 16 BRPM | SYSTOLIC BLOOD PRESSURE: 114 MMHG | DIASTOLIC BLOOD PRESSURE: 62 MMHG | BODY MASS INDEX: 27.88 KG/M2 | OXYGEN SATURATION: 95 % | HEART RATE: 94 BPM | WEIGHT: 178 LBS

## 2024-07-03 DIAGNOSIS — F20.9 SCHIZOPHRENIA, UNSPECIFIED TYPE (HCC): ICD-10-CM

## 2024-07-03 DIAGNOSIS — F29 PSYCHOSIS, UNSPECIFIED PSYCHOSIS TYPE (HCC): ICD-10-CM

## 2024-07-03 DIAGNOSIS — F15.10 METHAMPHETAMINE USE (HCC): ICD-10-CM

## 2024-07-03 DIAGNOSIS — F15.10 METHAMPHETAMINE ABUSE (HCC): ICD-10-CM

## 2024-07-03 PROCEDURE — 96372 THER/PROPH/DIAG INJ SC/IM: CPT

## 2024-07-03 PROCEDURE — 99283 EMERGENCY DEPT VISIT LOW MDM: CPT | Mod: 27

## 2024-07-03 PROCEDURE — 99285 EMERGENCY DEPT VISIT HI MDM: CPT | Mod: 25

## 2024-07-03 PROCEDURE — 700111 HCHG RX REV CODE 636 W/ 250 OVERRIDE (IP): Mod: JZ,UD | Performed by: STUDENT IN AN ORGANIZED HEALTH CARE EDUCATION/TRAINING PROGRAM

## 2024-07-03 RX ORDER — HALOPERIDOL 5 MG/ML
5 INJECTION INTRAMUSCULAR ONCE
Status: COMPLETED | OUTPATIENT
Start: 2024-07-03 | End: 2024-07-03

## 2024-07-03 RX ORDER — LORAZEPAM 2 MG/ML
2 INJECTION INTRAMUSCULAR ONCE
Status: COMPLETED | OUTPATIENT
Start: 2024-07-03 | End: 2024-07-03

## 2024-07-03 RX ORDER — DIPHENHYDRAMINE HYDROCHLORIDE 50 MG/ML
50 INJECTION INTRAMUSCULAR; INTRAVENOUS ONCE
Status: COMPLETED | OUTPATIENT
Start: 2024-07-03 | End: 2024-07-03

## 2024-07-03 RX ADMIN — LORAZEPAM 2 MG: 2 INJECTION INTRAMUSCULAR; INTRAVENOUS at 03:00

## 2024-07-03 RX ADMIN — DIPHENHYDRAMINE HYDROCHLORIDE 50 MG: 50 INJECTION, SOLUTION INTRAMUSCULAR; INTRAVENOUS at 03:00

## 2024-07-03 RX ADMIN — HALOPERIDOL LACTATE 5 MG: 5 INJECTION, SOLUTION INTRAMUSCULAR at 03:00

## 2024-07-03 ASSESSMENT — FIBROSIS 4 INDEX: FIB4 SCORE: 0.87

## 2024-07-07 ENCOUNTER — HOSPITAL ENCOUNTER (EMERGENCY)
Facility: MEDICAL CENTER | Age: 39
End: 2024-07-08
Attending: EMERGENCY MEDICINE
Payer: MEDICAID

## 2024-07-07 DIAGNOSIS — R45.1 AGITATION: ICD-10-CM

## 2024-07-07 DIAGNOSIS — F15.10 METHAMPHETAMINE ABUSE (HCC): ICD-10-CM

## 2024-07-07 PROCEDURE — 700111 HCHG RX REV CODE 636 W/ 250 OVERRIDE (IP): Mod: JZ | Performed by: EMERGENCY MEDICINE

## 2024-07-07 PROCEDURE — 96372 THER/PROPH/DIAG INJ SC/IM: CPT

## 2024-07-07 PROCEDURE — 99285 EMERGENCY DEPT VISIT HI MDM: CPT

## 2024-07-07 PROCEDURE — 700111 HCHG RX REV CODE 636 W/ 250 OVERRIDE (IP): Performed by: EMERGENCY MEDICINE

## 2024-07-07 RX ORDER — DIPHENHYDRAMINE HYDROCHLORIDE 50 MG/ML
50 INJECTION INTRAMUSCULAR; INTRAVENOUS ONCE
Status: COMPLETED | OUTPATIENT
Start: 2024-07-07 | End: 2024-07-07

## 2024-07-07 RX ORDER — DIPHENHYDRAMINE HYDROCHLORIDE 50 MG/ML
50 INJECTION INTRAMUSCULAR; INTRAVENOUS PRN
Status: DISCONTINUED | OUTPATIENT
Start: 2024-07-07 | End: 2024-07-08

## 2024-07-07 RX ORDER — HALOPERIDOL 5 MG/ML
5 INJECTION INTRAMUSCULAR ONCE
Status: COMPLETED | OUTPATIENT
Start: 2024-07-07 | End: 2024-07-07

## 2024-07-07 RX ORDER — HALOPERIDOL 5 MG/ML
5 INJECTION INTRAMUSCULAR PRN
Status: DISCONTINUED | OUTPATIENT
Start: 2024-07-07 | End: 2024-07-08

## 2024-07-07 RX ORDER — LORAZEPAM 2 MG/ML
2 INJECTION INTRAMUSCULAR EVERY 4 HOURS PRN
Status: DISCONTINUED | OUTPATIENT
Start: 2024-07-07 | End: 2024-07-08

## 2024-07-07 RX ORDER — LORAZEPAM 2 MG/ML
2 INJECTION INTRAMUSCULAR ONCE
Status: COMPLETED | OUTPATIENT
Start: 2024-07-07 | End: 2024-07-07

## 2024-07-07 RX ADMIN — HALOPERIDOL LACTATE 5 MG: 5 INJECTION, SOLUTION INTRAMUSCULAR at 22:15

## 2024-07-07 RX ADMIN — DIPHENHYDRAMINE HYDROCHLORIDE 50 MG: 50 INJECTION, SOLUTION INTRAMUSCULAR; INTRAVENOUS at 13:43

## 2024-07-07 RX ADMIN — LORAZEPAM 2 MG: 2 INJECTION INTRAMUSCULAR; INTRAVENOUS at 22:15

## 2024-07-07 RX ADMIN — HALOPERIDOL LACTATE 5 MG: 5 INJECTION, SOLUTION INTRAMUSCULAR at 13:43

## 2024-07-07 RX ADMIN — LORAZEPAM 2 MG: 2 INJECTION INTRAMUSCULAR; INTRAVENOUS at 13:43

## 2024-07-07 RX ADMIN — DIPHENHYDRAMINE HYDROCHLORIDE 50 MG: 50 INJECTION, SOLUTION INTRAMUSCULAR; INTRAVENOUS at 22:15

## 2024-07-07 NOTE — ED NOTES
Patient screaming at staff, throwing objects at the wall, roaming the halls naked and is not directable, patient is verbally and physically aggressive. ERP made aware

## 2024-07-07 NOTE — ED NOTES
Bedside report received from off going RN/tech: Erinn DAN, assumed care of patient.  POC discussed with patient. Call light within reach, all needs addressed at this time.       Fall risk interventions in place: Place socks on patient, Keep floor surfaces clean and dry, and Accompanied to restroom (all applicable per [place] Fall risk assessment)   Continuous monitoring: Pulse Ox or Blood Pressure  IVF/IV medications: Not Applicable   Oxygen: Room Air  Bedside sitter: Not Applicable   Isolation: Not Applicable

## 2024-07-07 NOTE — ED PROVIDER NOTES
"ED Provider Note  CHIEF COMPLAINT  Chief Complaint   Patient presents with    Psych Eval     BIB [facility] from bus stop after patient was sitting there with no pants and blood on her leg. Patient refusing to answer questions, won't allow RN to evaluate leg and stating \"get the fuck out of my room you bitch.\"       HPI  Tierney Mayer is a 39 y.o. female who presents via EMS after being found at a bus stop with no pants on.  There was some blood on the leg but the patient was tangential, manic appearing, uncooperative in terms of questioning, verbally abusive, and seemed to have no insight into her presentation.  EXTERNAL RECORDS REVIEWED  Reviewed recent ED note from 7 June of this year.  Notable patient has multiple different charts in the system .  Also reviewed ED note from 29 June for agitation and alteration in level of consciousness.  ROS  Unable to obtain due to uncooperative patient        LIMITATION TO HISTORY   Select: Altered mental status / Confusion, Intoxication, and Behavior  OUTSIDE HISTORIAN(S):  EMS EMS transport crew        PAST FAM HISTORY  History reviewed. No pertinent family history.    PAST MEDICAL HISTORY   has a past medical history of Psychiatric disorder.    SOCIAL HISTORY  Social History     Tobacco Use    Smoking status: Every Day     Current packs/day: 1.00     Types: Cigarettes    Smokeless tobacco: Never   Vaping Use    Vaping status: Never Used   Substance and Sexual Activity    Alcohol use: Not on file    Drug use: Not on file     Comment: meth    Sexual activity: Not on file       SURGICAL HISTORY  patient denies any surgical history    CURRENT MEDICATIONS  Home Medications       Reviewed by Erinn Short R.N. (Registered Nurse) on 07/07/24 at 1124  Med List Status: Partial     Medication Last Dose Status        Patient Mike Taking any Medications                            ALLERGIES  Not on File    PHYSICAL EXAM  VITAL SIGNS: BP (!) 142/90   Pulse 82   Temp 36.9 °C (98.4 °F) " "(Temporal)   Resp 16   Ht 1.626 m (5' 4\")   Wt 81.6 kg (180 lb)   SpO2 98%   BMI 30.90 kg/m²    Gen: Extremely poor hygiene and malodorous, disheveled, agitated, somewhat restless  HEENT: No signs of trauma, Bilateral external ears normal, Nose normal. Conjunctiva normal, Non-icteric.   Neck: No JVD or tracheal deviation  Cardiovascular: Regular rate and rhythm  Thorax & Lungs: No tachypnea or increased work of breathing  Skin: Scabbed abrasion right knee  Extremities: Intact distal pulses, No edema  Neurologic: Alert , no facial droop, grossly normal coordination and strength.  Unable/unwilling to demonstrate orientation.  Psychiatric: Belligerent, verbally abusive, tangential    INITIAL IMPRESSION  Patient once again arrives for evaluation of symptoms that are highly suggestive of sympathomimetic abuse.  Both alcohol and methamphetamine are known problems for this patient and I do not feel further labs or imaging will benefit her based on the findings today.  She did have a small scrape on her knee which was seen by EMS prior to arrival, but the patient is not allowing staff to view her legs.  She is verbally abusive, profane, and has pressured speech but is staying in the gurney.  I suspect she will eventually sober and want to be discharged.     ED observation? No    Patient placed on ED observation at 12:45 PM, July 7, 2024 due to diagnostic uncertainty and therapeutic intensity.  Will plan on giving patient appropriate sedation and if she becomes more agitated while awaiting sobriety.  Unclear at this point whether alert team/behavioral health evaluation will be necessary.    ASSESSMENT, COURSE AND PLAN  Care Narrative:   1:39 PM  Nursing informed me that the patient is now becoming restless and anxious and I will write for Benadryl, Haldol, and Ativan for sedation.    1:48 PM  With security close by, I assisted nursing with injections.  Patient did raise her voice and yell but tolerated the injections " very well.  She did not need to be restrained.  Notable that was able to examine the patient's legs and did note a small scabbed abrasion of the left knee but no obvious source of new bleeding.  As patient was completely naked underneath the blanket, it was quite clear that she is menstruating which is the likely source of the blood today.    12:12 PM  Informed by nursing that patient is now become aggressive and pounding on the door.  She is not directable.  She will be placed in restraints as she is now becoming a danger to herself and others.          ADDITIONAL PROBLEMS MANAGED      I have discussed management of the patient with the following physicians and PIEDAD's: None    Escalation of care considered, and ultimately not performed: Consider labs and imaging but felt to be unnecessary given the history as the source of her behavior is likely substance abuse.    Barriers to care at this time, including but not limited to: Patient does not have established PCP, Patient is homeless, Patient lacks transportation , Patient does not have insurance, Patient had difficult affording medications, and Patient lacks financial resources.     Decision tools and Rx drugs considered including, but not limited to : None     Discussion of management with other QHP or appropriate source(s): None      FINAL IMPRESSION  1. Agitation    2. Methamphetamine abuse (HCC)        Electronically signed by: Sb Purvis M.D., 7/7/2024 12:37 PM

## 2024-07-07 NOTE — ED TRIAGE NOTES
"Chief Complaint   Patient presents with    Psych Eval     BIB [facility] from bus stop after patient was sitting there with no pants and blood on her leg. Patient refusing to answer questions, won't allow RN to evaluate leg and stating \"get the fuck out of my room you bitch.\"       "

## 2024-07-08 ENCOUNTER — HOSPITAL ENCOUNTER (EMERGENCY)
Facility: MEDICAL CENTER | Age: 39
End: 2024-07-08
Payer: MEDICAID

## 2024-07-08 VITALS
BODY MASS INDEX: 30.73 KG/M2 | DIASTOLIC BLOOD PRESSURE: 70 MMHG | WEIGHT: 180 LBS | OXYGEN SATURATION: 98 % | HEIGHT: 64 IN | HEART RATE: 88 BPM | TEMPERATURE: 98.4 F | RESPIRATION RATE: 16 BRPM | SYSTOLIC BLOOD PRESSURE: 108 MMHG

## 2024-07-08 VITALS
HEART RATE: 100 BPM | RESPIRATION RATE: 18 BRPM | OXYGEN SATURATION: 96 % | BODY MASS INDEX: 26.37 KG/M2 | SYSTOLIC BLOOD PRESSURE: 130 MMHG | HEIGHT: 65 IN | WEIGHT: 158.29 LBS | DIASTOLIC BLOOD PRESSURE: 88 MMHG

## 2024-07-08 PROCEDURE — 700102 HCHG RX REV CODE 250 W/ 637 OVERRIDE(OP): Performed by: EMERGENCY MEDICINE

## 2024-07-08 PROCEDURE — 302449 STATCHG TRIAGE ONLY (STATISTIC)

## 2024-07-08 PROCEDURE — A9270 NON-COVERED ITEM OR SERVICE: HCPCS | Performed by: EMERGENCY MEDICINE

## 2024-07-08 RX ORDER — HALOPERIDOL 5 MG/ML
5 INJECTION INTRAMUSCULAR EVERY 6 HOURS PRN
Status: DISCONTINUED | OUTPATIENT
Start: 2024-07-08 | End: 2024-07-08 | Stop reason: HOSPADM

## 2024-07-08 RX ORDER — DIPHENHYDRAMINE HYDROCHLORIDE 50 MG/ML
50 INJECTION INTRAMUSCULAR; INTRAVENOUS EVERY 6 HOURS PRN
Status: DISCONTINUED | OUTPATIENT
Start: 2024-07-08 | End: 2024-07-08 | Stop reason: HOSPADM

## 2024-07-08 RX ORDER — LORAZEPAM 2 MG/ML
2 INJECTION INTRAMUSCULAR EVERY 6 HOURS PRN
Status: DISCONTINUED | OUTPATIENT
Start: 2024-07-08 | End: 2024-07-08 | Stop reason: HOSPADM

## 2024-07-08 RX ORDER — RISPERIDONE 1 MG/1
1 TABLET ORAL 2 TIMES DAILY
Status: DISCONTINUED | OUTPATIENT
Start: 2024-07-08 | End: 2024-07-08 | Stop reason: HOSPADM

## 2024-07-08 RX ADMIN — RISPERIDONE 1 MG: 1 TABLET, FILM COATED ORAL at 12:15

## 2024-07-08 NOTE — DISCHARGE SUMMARY
"  ED Observation Discharge Summary    Patient:Tierney Mayer  Patient : 1985  Patient MRN: [id number]  Patient PCP: Pcp Pt States None    Admit Date: 2024  Discharge Date and Time: 24 2:27 PM  Discharge Diagnosis:   1. Agitation        2. Methamphetamine abuse (HCC)            Discharge Attending: Charline Forde M.D.  Discharge Service: ED Observation    ED Course  Tierney is a 39 y.o. female who was evaluated at [facility] for agitation.  She has a history of methamphetamine use she was tangential manic.  She is homeless.    2:27 PM  Patient reevaluated.  She is alert she plans on going to the park she has a blanket.  She is not wanting any resources.  She is not suicidal or homicidal she will be discharged.    Discharge Exam:  /68   Pulse 91   Temp 36.9 °C (98.4 °F) (Temporal)   Resp 16   Ht 1.626 m (5' 4\")   Wt 81.6 kg (180 lb)   SpO2 96%   BMI 30.90 kg/m² .    Constitutional: Awake and alert. Nontoxic  HENT:  Grossly normal  Eyes: Grossly normal  Neck: Normal range of motion  Cardiovascular: Normal heart rate   Thorax & Lungs: No respiratory distress  Abdomen: Nontender  Skin:  No pathologic rash.   Extremities: Well perfused  Psychiatric: Affect normal    Labs  No results found for this or any previous visit.    Radiology  No orders to display       Medications:   New Prescriptions    No medications on file       My final assessment includes coherent thinking  Upon Reevaluation, the patient's condition has: Improved; and will be discharged.    Patient discharged from ED Observation status at 2:35 PM (Time) 2024 (Date).     Total time spent on this ED Observation discharge encounter is < 30 Minutes    Electronically signed by: Charline Forde M.D., 2024 2:27 PM       "

## 2024-07-08 NOTE — ED NOTES
"Patient came out of room asking Tech \"Can I leave?\" Repeatedly. Patient redirectable and was directed to room. Patient ambulated by self into room and sat on gurney and yelled \"Don't fucking touch me\".Tech then closed door, Patient now resting in gurney.   "

## 2024-07-08 NOTE — ED NOTES
VS updated.  Pt. Curled up on sirisha in fetal position.  Refusing to answer any questions by this RN.  Respirations visible and non-labored.  Awaiting psych eval this AM.  Unable to determine any needs at this time.

## 2024-07-08 NOTE — ED NOTES
"Pt. Was provided with sandwiches per request for food.  Pt. had large amount of stool on floor in room.  When asked about pooping on the floor pt. States \"That wasn't me, a snake came out of me and it's not like I was flying high in the brittany and pretending to be attractive.\"  Pt. Ate the meat but started throwing the bread on the floor in the room.  Attempted to set limits with pt.  No evidence of learning noted.     "

## 2024-07-08 NOTE — ED PROVIDER NOTES
"ED Observation Progress Note    Date of Service: 07/08/24    Interval History  Pt pending psych eval     Physical Exam  /69   Pulse 74   Temp 36.7 °C (98 °F) (Temporal)   Resp 16   Ht 1.626 m (5' 4\")   Wt 81.6 kg (180 lb)   SpO2 96%   BMI 30.90 kg/m² .    Constitutional: Awake and alert. Nontoxic  HENT:  Grossly normal  Eyes: Grossly normal  Neck: Normal range of motion  Cardiovascular: Normal heart rate   Thorax & Lungs: No respiratory distress  Abdomen: Nontender  Skin:  No pathologic rash.   Extremities: Well perfused  Psychiatric: Affect normal    Labs  No results found for this or any previous visit.    Radiology  No orders to display       Problem List  1.   1. Agitation        2. Methamphetamine abuse (HCC)                Electronically signed by: Ravi Hunter D.O., 7/8/2024 6:53 AM    "

## 2024-07-08 NOTE — ED NOTES
Pt sleeping on gurney , equal chest rise and fall, bed locked and in lowest position , no needs at this time

## 2024-07-08 NOTE — ED NOTES
Security at bedside to change restraint position and to remove 2 restraints. Patient now in 2 point restraints with left arm restrained and right leg restrained.

## 2024-07-08 NOTE — ED NOTES
Report to NI Nunez  Patient resting comfortably, resp even and unlabored, NAD, and no needs at this time.  Fall safety precautions in place per policy.

## 2024-07-08 NOTE — ED NOTES
Patient's home medications have been reviewed by the pharmacy team.     Past Medical History:   Diagnosis Date    Psychiatric disorder        Patient's Medications    No medications on file          Unable to obtain medication history     Risperidone has been reordered per chart review of historic medication history    As needed benadryl, haldol and ativan for agitation ordered by provider      Samantha Huerta, PaulaD

## 2024-07-08 NOTE — ED NOTES
Patient resting comfortably, resp even and unlabored, NAD, and no needs at this time.  Patient continues on four point hard restraints..

## 2024-07-08 NOTE — ED NOTES
Pt. Continues resting on gurney with eyes closed.  Respirations remain visible and non-labored, no distress noted.

## 2024-07-08 NOTE — ED NOTES
Bedside report received from off going RN/tech: Emma, assumed care of patient.  POC discussed with patient. Call light within reach, all needs addressed at this time.       Fall risk interventions in place: Move the patient closer to the nurse's station, Patient's personal possessions are with in their safe reach, Place socks on patient, Place fall risk sign on patient's door, Give patient urinal if applicable, Keep floor surfaces clean and dry, and Accompanied to restroom (all applicable per [place] Fall risk assessment)   Continuous monitoring: Not Applicable   IVF/IV medications: Not Applicable   Oxygen: Room Air  Bedside sitter: Not Applicable   Isolation: Not Applicable

## 2024-07-08 NOTE — ED NOTES
"Bedside report received from off going RN/tech: Sayg, assumed care of patient.  POC discussed with patient. All needs addressed at this time. Patient remains in four point hard restraints at this time. Order renewed per ERP.      Fall risk interventions in place: Move the patient closer to the nurse's station, Keep floor surfaces clean and dry, and Other (comment required) HARD RESTRAINTS (all applicable per [place] Fall risk assessment)   Continuous monitoring: Not Applicable   IVF/IV medications: Not Applicable   Oxygen: Room Air  Bedside sitter: Not Applicable   Isolation: Not Applicable    /72   Pulse 88   Temp 36.9 °C (98.4 °F) (Temporal)   Resp 16   Ht 1.626 m (5' 4\")   Wt 81.6 kg (180 lb)   SpO2 96%  - RA  "

## 2024-07-08 NOTE — ED NOTES
Pt. Got up and ambulated to bathroom, paper scrub bottoms provided and pt. Was able to put them on herself and ambulate back to room with steady gait.  Pt. Is cooperative at this time.

## 2024-07-09 ENCOUNTER — HOSPITAL ENCOUNTER (EMERGENCY)
Facility: MEDICAL CENTER | Age: 39
End: 2024-07-09
Attending: EMERGENCY MEDICINE
Payer: MEDICAID

## 2024-07-09 VITALS
OXYGEN SATURATION: 96 % | TEMPERATURE: 97.5 F | SYSTOLIC BLOOD PRESSURE: 110 MMHG | DIASTOLIC BLOOD PRESSURE: 75 MMHG | RESPIRATION RATE: 20 BRPM | WEIGHT: 180 LBS | HEART RATE: 97 BPM | HEIGHT: 69 IN | BODY MASS INDEX: 26.66 KG/M2

## 2024-07-09 VITALS
HEIGHT: 67 IN | DIASTOLIC BLOOD PRESSURE: 70 MMHG | BODY MASS INDEX: 27.47 KG/M2 | WEIGHT: 175 LBS | SYSTOLIC BLOOD PRESSURE: 102 MMHG

## 2024-07-09 DIAGNOSIS — F20.9 SCHIZOPHRENIA, UNSPECIFIED TYPE (HCC): ICD-10-CM

## 2024-07-09 DIAGNOSIS — Z59.00 HOMELESS: ICD-10-CM

## 2024-07-09 PROCEDURE — 99283 EMERGENCY DEPT VISIT LOW MDM: CPT

## 2024-07-09 PROCEDURE — 99281 EMR DPT VST MAYX REQ PHY/QHP: CPT

## 2024-07-09 SDOH — ECONOMIC STABILITY - HOUSING INSECURITY: HOMELESSNESS UNSPECIFIED: Z59.00

## 2024-07-09 ASSESSMENT — FIBROSIS 4 INDEX: FIB4 SCORE: 0.87

## 2024-07-09 NOTE — ED TRIAGE NOTES
"Chief Complaint   Patient presents with    Psych Eval     Patient states \"I am trying to get back to my room\" when asked where her room is, patient states that she has a room in the hospital she is trying to go to.       Pt is alert and oriented, speaking in full sentences, follows commands. Resperations are even and unlabored.      Pt placed in lobby. Pt educated on triage process. Pt encouraged to alert staff for any changes.     Patient and staff wearing appropriate PPE.    /75   Pulse 97   Temp 36.4 °C (97.5 °F) (Temporal)   Resp 20   Ht 1.753 m (5' 9\")   Wt 81.6 kg (180 lb)   SpO2 96%    "

## 2024-07-09 NOTE — ED PROVIDER NOTES
"ER Provider Note    Scribed for Sagar Solis M.D. by Mehrdad Whitman. 7/9/2024  5:24 AM    Primary Care Provider: Pcp Pt States None    CHIEF COMPLAINT   Chief Complaint   Patient presents with    Psych Eval     Patient states \"I am trying to get back to my room\" when asked where her room is, patient states that she has a room in the hospital she is trying to go to.       HPI/ROS  LIMITATION TO HISTORY   Select: : None    Tierney Mayer is a 39 y.o. female who presents to the ED for evaluation of her concerns of trying to get back to her room and she is mildly confused but states that she is homeless and just really wants a cup of coffee.  She denies any suicidal or homicidal ideation.  She is known to have a chronic delusional disorder.  She does not have any pain complaints or other complaints    PAST MEDICAL HISTORY  Past Medical History:   Diagnosis Date    Psychiatric disorder        SURGICAL HISTORY  History reviewed. No pertinent surgical history.    FAMILY HISTORY  History reviewed. No pertinent family history.    SOCIAL HISTORY   reports that she has been smoking cigarettes. She has never used smokeless tobacco.    CURRENT MEDICATIONS  Previous Medications    No medications on file       ALLERGIES  Patient has no known allergies.    PHYSICAL EXAM  /75   Pulse 97   Temp 36.4 °C (97.5 °F) (Temporal)   Resp 20   Ht 1.753 m (5' 9\")   Wt 81.6 kg (180 lb)   SpO2 96%   BMI 26.58 kg/m²   Constitutional: Well developed, Well nourished, No acute distress, Non-toxic appearance.   HENT: Normocephalic, Atraumatic, Bilateral external ears normal, Oropharynx is clear mucous membranes are moist. No oral exudates or nasal discharge.   Eyes: Pupils are equal round and reactive, EOMI, Conjunctiva normal, No discharge.   Neck: Normal range of motion, No tenderness, Supple, No stridor. No meningismus.  Lymphatic: No lymphadenopathy noted.   Cardiovascular: Regular rate and rhythm without murmur rub or gallop.  Thorax & " Lungs: Clear breath sounds bilaterally without wheezes, rhonchi or rales. There is no chest wall tenderness.   Abdomen: Soft non-tender non-distended. There is no rebound or guarding. No organomegaly is appreciated. Bowel sounds are normal.  Skin: Normal without rash.   Back: No CVA or spinal tenderness.   Extremities: Intact distal pulses, No edema, No tenderness, No cyanosis, No clubbing. Capillary refill is less than 2 seconds.  Musculoskeletal: Good range of motion in all major joints. No tenderness to palpation or major deformities noted.   Neurologic: Alert & oriented x 3, Normal motor function, Normal sensory function, No focal deficits noted. Reflexes are normal.  Psychiatric: Affect normal, Judgment adequate, Mood normal. There is no current suicidal ideation or patient reported hallucinations.       DIAGNOSTIC STUDIES      COURSE & MEDICAL DECISION MAKING     ASSESSMENT, COURSE AND PLAN  Care Narrative: Patient presents with tangential thought and flight of ideas but certainly does not have any suicidal or homicidal ideation and seems to be psychosis not otherwise specified at baseline.  There is no need for legal 2000 and she seems to be able to care for herself on the street just fine.  She really only wants a cup of coffee which we will oblige.  The patient was not aggressive to me but was aggressive previously with staff.  Seems reasonable at this time and will discharge her in stable condition      DISPOSITION AND DISCUSSIONS  Encouraged to seek follow-up care for adult mental health at [facility]'s    FINAL DIAGNOSIS  1. Schizophrenia, unspecified type (HCC)           The note accurately reflects work and decisions made by me.  Sagar Solis M.D.  7/9/2024  10:08 AM

## 2024-07-12 ENCOUNTER — HOSPITAL ENCOUNTER (EMERGENCY)
Facility: MEDICAL CENTER | Age: 39
End: 2024-07-12
Payer: MEDICAID

## 2024-07-12 VITALS
DIASTOLIC BLOOD PRESSURE: 86 MMHG | WEIGHT: 175 LBS | HEIGHT: 67 IN | BODY MASS INDEX: 27.47 KG/M2 | OXYGEN SATURATION: 98 % | RESPIRATION RATE: 16 BRPM | SYSTOLIC BLOOD PRESSURE: 121 MMHG | TEMPERATURE: 97.8 F | HEART RATE: 96 BPM

## 2024-07-12 PROCEDURE — 302449 STATCHG TRIAGE ONLY (STATISTIC)

## 2024-07-12 ASSESSMENT — FIBROSIS 4 INDEX: FIB4 SCORE: 0.87

## 2024-07-14 ENCOUNTER — HOSPITAL ENCOUNTER (EMERGENCY)
Facility: MEDICAL CENTER | Age: 39
End: 2024-07-14
Attending: EMERGENCY MEDICINE
Payer: MEDICAID

## 2024-07-14 VITALS
DIASTOLIC BLOOD PRESSURE: 74 MMHG | HEIGHT: 67 IN | TEMPERATURE: 98.3 F | RESPIRATION RATE: 18 BRPM | WEIGHT: 150 LBS | BODY MASS INDEX: 23.54 KG/M2 | OXYGEN SATURATION: 98 % | SYSTOLIC BLOOD PRESSURE: 111 MMHG | HEART RATE: 84 BPM

## 2024-07-14 DIAGNOSIS — F29 PSYCHOSIS, UNSPECIFIED PSYCHOSIS TYPE (HCC): ICD-10-CM

## 2024-07-14 DIAGNOSIS — F15.10 METHAMPHETAMINE ABUSE (HCC): ICD-10-CM

## 2024-07-14 PROCEDURE — 99284 EMERGENCY DEPT VISIT MOD MDM: CPT

## 2024-07-14 PROCEDURE — A9270 NON-COVERED ITEM OR SERVICE: HCPCS | Mod: UD | Performed by: EMERGENCY MEDICINE

## 2024-07-14 PROCEDURE — 700102 HCHG RX REV CODE 250 W/ 637 OVERRIDE(OP): Mod: UD | Performed by: EMERGENCY MEDICINE

## 2024-07-14 RX ORDER — RISPERIDONE 2 MG/1
2 TABLET ORAL NIGHTLY
Qty: 30 TABLET | Refills: 0 | Status: SHIPPED | OUTPATIENT
Start: 2024-07-14 | End: 2024-08-02

## 2024-07-14 RX ORDER — RISPERIDONE 2 MG/1
2 TABLET ORAL ONCE
Status: COMPLETED | OUTPATIENT
Start: 2024-07-14 | End: 2024-07-14

## 2024-07-14 RX ADMIN — RISPERIDONE 2 MG: 2 TABLET, FILM COATED ORAL at 06:10

## 2024-07-14 ASSESSMENT — FIBROSIS 4 INDEX: FIB4 SCORE: 0.41

## 2024-07-14 NOTE — ED TRIAGE NOTES
Chief Complaint   Patient presents with    Psych Eval     Pt is hyper verbal in triage when asked about the complaint, but is redirectable  Pt said she wants bed pan  Unable to obtain good information from the pt  Alert, but is not answering orientation question       Pain: 0/10    Pt came in to triage ambulatory with steady gait for the above complaints.     Pt is alert, speaking in full sentences, follows commands and responds to questions.     Respirations are even and unlabored.    Pt placed in lobby. Pt educated on triage process.     Pt encouraged to inform staff for any changes in condition or if needs help while waiting to be room in.    Vitals:    07/14/24 0514   BP: 90/56   Pulse: 80   Resp: 18   Temp: 36.4 °C (97.6 °F)   SpO2: 99%

## 2024-07-14 NOTE — ED NOTES
Pt discharged . GCS 15.Pt in possession of belongings. Pt provided discharge education and information pertaining to medications and follow up appointments. Pt received copy of discharge instructions and need reenforcement. Patient refused to leave. Security called.    Vitals:    07/14/24 0644   BP: 111/74   Pulse: 84   Resp: 18   Temp: 36.8 °C (98.3 °F)   SpO2: 98%

## 2024-08-01 ENCOUNTER — HOSPITAL ENCOUNTER (EMERGENCY)
Facility: MEDICAL CENTER | Age: 39
End: 2024-08-02
Attending: EMERGENCY MEDICINE
Payer: MEDICAID

## 2024-08-01 DIAGNOSIS — F23 ACUTE PSYCHOSIS (HCC): ICD-10-CM

## 2024-08-01 DIAGNOSIS — R40.4 TRANSIENT ALTERATION OF AWARENESS: ICD-10-CM

## 2024-08-01 DIAGNOSIS — F29 PSYCHOSIS, UNSPECIFIED PSYCHOSIS TYPE (HCC): ICD-10-CM

## 2024-08-01 PROCEDURE — 99284 EMERGENCY DEPT VISIT MOD MDM: CPT

## 2024-08-01 RX ORDER — ZIPRASIDONE HYDROCHLORIDE 60 MG/1
60 CAPSULE ORAL ONCE
Status: COMPLETED | OUTPATIENT
Start: 2024-08-02 | End: 2024-08-02

## 2024-08-01 ASSESSMENT — FIBROSIS 4 INDEX: FIB4 SCORE: 0.41

## 2024-08-02 ENCOUNTER — PHARMACY VISIT (OUTPATIENT)
Dept: PHARMACY | Facility: MEDICAL CENTER | Age: 39
End: 2024-08-02
Payer: COMMERCIAL

## 2024-08-02 VITALS
SYSTOLIC BLOOD PRESSURE: 126 MMHG | DIASTOLIC BLOOD PRESSURE: 60 MMHG | RESPIRATION RATE: 16 BRPM | OXYGEN SATURATION: 97 % | HEART RATE: 82 BPM | TEMPERATURE: 97 F | HEIGHT: 67 IN | BODY MASS INDEX: 23.54 KG/M2 | WEIGHT: 150 LBS

## 2024-08-02 PROCEDURE — RXMED WILLOW AMBULATORY MEDICATION CHARGE: Performed by: EMERGENCY MEDICINE

## 2024-08-02 PROCEDURE — 90791 PSYCH DIAGNOSTIC EVALUATION: CPT

## 2024-08-02 PROCEDURE — A9270 NON-COVERED ITEM OR SERVICE: HCPCS | Mod: UD | Performed by: EMERGENCY MEDICINE

## 2024-08-02 PROCEDURE — 700102 HCHG RX REV CODE 250 W/ 637 OVERRIDE(OP): Mod: UD | Performed by: EMERGENCY MEDICINE

## 2024-08-02 RX ORDER — RISPERIDONE 2 MG/1
2 TABLET ORAL NIGHTLY
Qty: 30 TABLET | Refills: 0 | Status: SHIPPED | OUTPATIENT
Start: 2024-08-02 | End: 2024-09-01

## 2024-08-02 RX ORDER — HYDROXYZINE PAMOATE 50 MG/1
50 CAPSULE ORAL EVERY 4 HOURS PRN
Qty: 90 CAPSULE | Refills: 0 | Status: SHIPPED | OUTPATIENT
Start: 2024-08-02

## 2024-08-02 RX ADMIN — ZIPRASIDONE HYDROCHLORIDE 60 MG: 60 CAPSULE ORAL at 00:59

## 2024-08-02 NOTE — ED NOTES
"Pt ready for discharge. Discharge education was provided to pt by this RN. Pt refused to leave ED. Pt states she is \"in her home.\" Pt states she does not want to go to [facility] with a bus pass. Security called to help escort pt out of ED.     This RN got pt's medications from pharmacy and gave to pt.   "

## 2024-08-02 NOTE — ED NOTES
Pt is sleeping comfortably in [facility]. Breathing unlabored/equal chest rise. Bed in the lowest position.
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
## 2024-08-02 NOTE — ED NOTES
Pt sleeping comfortably in [facility]. Breathing unlabored/equal chest rise. Bed in the lowest position.

## 2024-08-02 NOTE — ED NOTES
Pt is sleeping comfortably in [facility]. Breathing unlabored/equal chest rise. Bed in the lowest position.

## 2024-08-02 NOTE — ED TRIAGE NOTES
"Chief Complaint   Patient presents with    Psych Eval     Pt found at [facility] waving down EMS stating \"I am dead\".     Pt BIB EMS for above complaint. Pt non compliant with medications. Pt unable to answer orientation questions other than she knows she is at a hospital.     EMS FSBG 142    BP (!) 137/95   Pulse (!) 110   Temp 36.1 °C (97 °F) (Temporal)   Resp 20   Ht 1.702 m (5' 7\")   Wt 68 kg (150 lb)   SpO2 98%   BMI 23.49 kg/m²     "

## 2024-08-02 NOTE — ED PROVIDER NOTES
"  ER Provider Note    Scribed for Maycol Sawyer M.D. by Cortez Shea. 8/1/2024   11:56 PM    Primary Care Provider: No primary care provider on file.    CHIEF COMPLAINT  Chief Complaint   Patient presents with    Psych Eval     Pt found at [facility] waving down EMS stating \"I am dead\".     EXTERNAL RECORDS REVIEWED  The patient was seen two separate times of acute psychosis, one on 7/14/24 the other on 6/29/24. Has been treated with ziprasidone for psychosis.   HPI/ROS  LIMITATION TO HISTORY   Select: Altered mental status / Confusion  OUTSIDE HISTORIAN(S):  None    Mally Multani is a 39 y.o. female who presents to the ED for evaluation of psych evaluation. The patient arrives here today after being found at the [facility] and waving down EMS stating \" I am dead\". The patient is not able to reports history due to altered mental status.     PAST MEDICAL HISTORY  Past Medical History:   Diagnosis Date    Psychiatric disorder        SURGICAL HISTORY  Past Surgical History:   Procedure Laterality Date    NO PERTINENT PAST SURGICAL HISTORY         FAMILY HISTORY  History reviewed. No pertinent family history.    SOCIAL HISTORY   reports that she has never smoked. She has never used smokeless tobacco. She reports current alcohol use. She reports current drug use.    CURRENT MEDICATIONS  Current Discharge Medication List        CONTINUE these medications which have NOT CHANGED    Details   !! risperiDONE (RISPERDAL) 3 MG Tab Take 3 mg by mouth at bedtime.       !! - Potential duplicate medications found. Please discuss with provider.          ALLERGIES  No Known Allergies     PHYSICAL EXAM  BP (!) 137/95   Pulse (!) 110   Temp 36.1 °C (97 °F) (Temporal)   Resp 20   Ht 1.702 m (5' 7\")   Wt 68 kg (150 lb)   SpO2 98%   BMI 23.49 kg/m²    Nursing note and vitals reviewed.  Constitutional: Well-developed and well-nourished. No distress.   HENT: Head is normocephalic and atraumatic. Oropharynx is clear and moist without " exudate or erythema.   Eyes: Pupils are equal, round, and reactive to light. Conjunctiva are normal.   Cardiovascular: Normal rate and regular rhythm. No murmur heard. Normal radial pulses.  Pulmonary/Chest: Breath sounds normal. No wheezes or rales.   Abdominal: Soft and non-tender. No distention    Musculoskeletal: Extremities exhibit normal range of motion without edema or tenderness.   Neurological: Awake, alert and oriented to person, place, and time. No focal deficits noted.  Skin: Skin is warm and dry. No rash.   Psychiatric: Normal mood and affect. Appropriate for clinical situation, pressure speech, word salad.     INITIAL ASSESSMENT AND PLAN    11:56 PM - Patient was evaluated at bedside. The patient will be medicated with Geodon 60 mg for her symptoms. Will consult with behavioral health for further evaluation. Differential diagnoses include but not limited to: Psychosis, drug or alcohol abuse    ED OBS: Yes; I am placing the patient in to an observation status due to a diagnostic uncertainty as well as therapeutic intensity. Patient placed in observation status at 11:56 AM, 8/2/2024.      COURSE AND MEDICAL DECISION MAKING  3:17 AM- The patient is feeling much better at this time after medication. The patient will be discharged with Vistaril 50 mg and Risperdal 2 mg and should return if symptoms worsen or if new symptoms arise. The patient understands and agrees to plan.        DISPOSITION AND DISCUSSIONS    I have discussed management of the patient with the following physicians and SHEA's:  None    Discussion of management with other QHP or appropriate source(s): None     Escalation of care considered, and ultimately not performed: acute inpatient care management, however at this time, the patient is most appropriate for outpatient management.    Barriers to care at this time, including but not limited to: Patient does not have established PCP.     Decision tools and prescription drugs considered  including, but not limited to:  Vistaril 50 mg and Risperdal 2 mg  .    The patient will return for new or worsening symptoms and is stable at the time of discharge.    DISPOSITION:  Patient will be discharged home in stable condition.    FOLLOW UP:  [facility], Emergency Dept  [address] [facility] [id number] [phone]    If symptoms worsen    [address] [address] [phone]  Today      OUTPATIENT MEDICATIONS:  Current Discharge Medication List        FINAL DIAGNOSIS  1. Acute psychosis (HCC)    2. Transient alteration of awareness    3. Psychosis, unspecified psychosis type (HCC)      Cortez WISE (Scribe), am scribing for, and in the presence of, Maycol Sawyer M.D..    Electronically signed by: Cortez Shea (Jean-Paulibe), 8/1/2024    IMaycol M.D. personally performed the services described in this documentation, as scribed by Cortez Shea in my presence, and it is both accurate and complete.      The note accurately reflects work and decisions made by me.  Maycol Sawyer M.D.  8/1/2024

## 2024-08-02 NOTE — ED NOTES
Bedside report received from off going RN/tech: Bebeto RN, assumed care of patient.  POC discussed with patient. Call light within reach, all needs addressed at this time.       Fall risk interventions in place: Not Applicable (all applicable per [place] Fall risk assessment)   Continuous monitoring: Not Applicable   IVF/IV medications: Not Applicable   Oxygen: Room Air  Bedside sitter: Not Applicable   Isolation: Not Applicable

## 2024-08-19 ENCOUNTER — HOSPITAL ENCOUNTER (EMERGENCY)
Facility: MEDICAL CENTER | Age: 39
End: 2024-08-19
Attending: EMERGENCY MEDICINE
Payer: MEDICAID

## 2024-08-19 VITALS
WEIGHT: 175 LBS | HEART RATE: 104 BPM | DIASTOLIC BLOOD PRESSURE: 88 MMHG | RESPIRATION RATE: 17 BRPM | OXYGEN SATURATION: 95 % | TEMPERATURE: 97.8 F | HEIGHT: 67 IN | SYSTOLIC BLOOD PRESSURE: 145 MMHG | BODY MASS INDEX: 27.47 KG/M2

## 2024-08-19 DIAGNOSIS — Z59.00 HOMELESSNESS: ICD-10-CM

## 2024-08-19 DIAGNOSIS — F20.9 SCHIZOPHRENIA, UNSPECIFIED TYPE (HCC): ICD-10-CM

## 2024-08-19 DIAGNOSIS — F15.91 HISTORY OF METHAMPHETAMINE USE: ICD-10-CM

## 2024-08-19 PROCEDURE — 700111 HCHG RX REV CODE 636 W/ 250 OVERRIDE (IP): Mod: JZ,UD | Performed by: EMERGENCY MEDICINE

## 2024-08-19 PROCEDURE — 96372 THER/PROPH/DIAG INJ SC/IM: CPT

## 2024-08-19 PROCEDURE — 99285 EMERGENCY DEPT VISIT HI MDM: CPT

## 2024-08-19 RX ORDER — DIPHENHYDRAMINE HYDROCHLORIDE 50 MG/ML
50 INJECTION INTRAMUSCULAR; INTRAVENOUS ONCE
Status: COMPLETED | OUTPATIENT
Start: 2024-08-19 | End: 2024-08-19

## 2024-08-19 RX ORDER — LORAZEPAM 2 MG/ML
2 INJECTION INTRAMUSCULAR ONCE
Status: COMPLETED | OUTPATIENT
Start: 2024-08-19 | End: 2024-08-19

## 2024-08-19 RX ORDER — HALOPERIDOL 5 MG/ML
5 INJECTION INTRAMUSCULAR ONCE
Status: COMPLETED | OUTPATIENT
Start: 2024-08-19 | End: 2024-08-19

## 2024-08-19 RX ADMIN — LORAZEPAM 2 MG: 2 INJECTION INTRAMUSCULAR; INTRAVENOUS at 12:41

## 2024-08-19 RX ADMIN — HALOPERIDOL LACTATE 5 MG: 5 INJECTION, SOLUTION INTRAMUSCULAR at 12:41

## 2024-08-19 RX ADMIN — DIPHENHYDRAMINE HYDROCHLORIDE 50 MG: 50 INJECTION, SOLUTION INTRAMUSCULAR; INTRAVENOUS at 12:41

## 2024-08-19 SDOH — ECONOMIC STABILITY - HOUSING INSECURITY: HOMELESSNESS UNSPECIFIED: Z59.00

## 2024-08-19 ASSESSMENT — FIBROSIS 4 INDEX: FIB4 SCORE: 0.87

## 2024-08-19 NOTE — CONSULTS
Pt denies SI/HI. After receiving IM medications and allowed to rest, pt is much clearer. Pt is disorganized and delusional at baseline, but she is able to state that she can go to a homeless shelter tonight. Food and clothing provided.

## 2024-08-19 NOTE — ED NOTES
Pt agitated and rambling, refusing to participate in care. Pt medicated per MAR. Security at bedside.

## 2024-08-19 NOTE — ED NOTES
Pt BIB , to [facility], pt released from penitentiary and placed on legal hold for inability to care for self and not eating.  Pt not answer questions appropriately.  ERP to see.
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
## 2024-08-19 NOTE — ED NOTES
Pt report given to oncoming sitter. Pt resting on gurney, NAD noted, respirations even and unlabored. 1:1 sitter in direct observation of pt.

## 2024-08-22 ENCOUNTER — HOSPITAL ENCOUNTER (EMERGENCY)
Facility: MEDICAL CENTER | Age: 39
End: 2024-08-22
Attending: EMERGENCY MEDICINE
Payer: MEDICAID

## 2024-08-22 VITALS
HEIGHT: 67 IN | OXYGEN SATURATION: 94 % | TEMPERATURE: 97.2 F | BODY MASS INDEX: 23.54 KG/M2 | RESPIRATION RATE: 16 BRPM | DIASTOLIC BLOOD PRESSURE: 82 MMHG | WEIGHT: 150 LBS | SYSTOLIC BLOOD PRESSURE: 115 MMHG | HEART RATE: 92 BPM

## 2024-08-22 DIAGNOSIS — F29 PSYCHOSIS, UNSPECIFIED PSYCHOSIS TYPE (HCC): ICD-10-CM

## 2024-08-22 PROCEDURE — 99284 EMERGENCY DEPT VISIT MOD MDM: CPT

## 2024-08-22 PROCEDURE — A9270 NON-COVERED ITEM OR SERVICE: HCPCS | Mod: UD | Performed by: EMERGENCY MEDICINE

## 2024-08-22 PROCEDURE — 700102 HCHG RX REV CODE 250 W/ 637 OVERRIDE(OP): Mod: UD | Performed by: EMERGENCY MEDICINE

## 2024-08-22 RX ORDER — ZIPRASIDONE HYDROCHLORIDE 60 MG/1
60 CAPSULE ORAL ONCE
Status: COMPLETED | OUTPATIENT
Start: 2024-08-22 | End: 2024-08-22

## 2024-08-22 RX ADMIN — ZIPRASIDONE HYDROCHLORIDE 60 MG: 60 CAPSULE ORAL at 13:30

## 2024-08-22 ASSESSMENT — FIBROSIS 4 INDEX: FIB4 SCORE: 0.41
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
## 2024-08-22 NOTE — ED NOTES
Alert team RN at bedside to assist with medication administration. Patient took geodon and then threw hot coffee at Alert team RN. Security at bedside.   Charge RN aware

## 2024-08-22 NOTE — ED NOTES
Patient out of ED in custody with [employer]. Patient arrived with no belongings in possession. Patient out of ED with [employer]

## 2024-08-22 NOTE — ED PROVIDER NOTES
"ED Provider Note    CHIEF COMPLAINT  Chief Complaint   Patient presents with    Psych Eval     Patient found naked outside of the [facility] acting erratic. Patient placed on L2K by MOST team with [employer]     EXTERNAL RECORDS REVIEWED  Patient has had a few visits over the last month for reoccurring psychosis. She had a CT-head on 06/2024 that was normal. History of Methamphetamine abuse. She has been treated with Risperidone and Geodon in the past.     HPI/ROS  LIMITATION TO HISTORY   Select: Altered mental status / Confusion and Behavior  OUTSIDE HISTORIAN(S):  None    Mally Multani is a 39 y.o. female who presents to the Emergency Department for psychiatric evaluation. Patient was found naked outside of [facility] [place] acting erratic and subsequently placed on an L2K by MOST team with [employer] due to inability to care for self. At this time she is requesting food. States that she has not been taking her medications. She denies any pain and denies recent alcohol or drug use.       PAST MEDICAL HISTORY  Past Medical History:   Diagnosis Date    Psychiatric disorder         SURGICAL HISTORY  Past Surgical History:   Procedure Laterality Date    NO PERTINENT PAST SURGICAL HISTORY          FAMILY HISTORY  None noted.     SOCIAL HISTORY   reports that she has never smoked. She has never used smokeless tobacco. She reports current alcohol use. She reports current drug use.    CURRENT MEDICATIONS  Previous Medications    HYDROXYZINE PAMOATE (VISTARIL) 50 MG CAP    Take 1 Capsule by mouth every four hours as needed for Anxiety.    RISPERIDONE (RISPERDAL) 2 MG TAB    Take 1 Tablet by mouth every evening for 30 days.    RISPERIDONE (RISPERDAL) 3 MG TAB    Take 3 mg by mouth at bedtime.       ALLERGIES  Patient has no known allergies.    PHYSICAL EXAM  /82   Pulse 92   Temp 36.2 °C (97.2 °F) (Temporal)   Resp 16   Ht 1.702 m (5' 7\")   Wt 68 kg (150 lb)   SpO2 94%      Constitutional: Disheveled. Nontoxic appearing. Alert. "   HENT: Normocephalic, Atraumatic.  Moist mucous membranes.    Neck: Supple, full range of motion  Musculoskeletal: Atraumatic. No obvious deformities noted.   Skin: Warm, Dry.  No erythema, No rash.   Neurologic: Alert and oriented x3. Moving all extremities spontaneously without focal deficits.  Psychiatric: Tangential rambling speech and anxious appearing. Unable to answer questions regarding suicidal ideation and homicidal ideation.       DIAGNOSTIC STUDIES / PROCEDURES          COURSE & MEDICAL DECISION MAKING    12:59 PM - Patient seen and examined at bedside. Patient is a 39-year-old female presenting for psychosis. Discussed plan of care, including labs. The patient will be medicated with Geodon 60mg PO. Ordered for POC breathalyzer, urine drug screen, and HCG qualitative UR to evaluate her symptoms.     1:34 PM - I was informed by ER staff the patient threw coffee at staff. Police were called, and patient will now be arrested. Patient is stable for discharge with police.  Medically cleared for discharge    ASSESSMENT, COURSE AND PLAN  Care Narrative: Patient with history of recurrent psychosis and methamphetamine use in the past presents with psychosis found naked [place] a casino.  She is alert with reassuring vitals on arrival.  She was placed on legal hold due to inability to care for self.  No reported suicidal or homicidal ideation.  Considered labs and imaging however no evidence of trauma on exam or medical complaints.  Patient was seen by behavioral health team with plan to give medications followed by reassessment however she threw hot coffee at staff therefore police were called and will take patient into custody.  She does not have any acute medical needs and will be medically cleared for discharge to correction.  Remains on legal hold at time of discharge.      ADDITIONAL PROBLEM LIST  Problem #1: Acute recurrent psychosis - medically cleared for discharge to correction      DISPOSITION AND  DISCUSSIONS    Discussion of management with other [facility] or appropriate source(s): Behavioral Health        Escalation of care considered, and ultimately not performed:Laboratory analysis and diagnostic imaging    Barriers to care at this time, including but not limited to: Patient does not have established PCP, Patient is homeless, and Patient lacks financial resources.     The patient will return for new or worsening symptoms and is stable at the time of discharge.    DISPOSITION:  Patient will be discharged in police custody in stable condition.    FOLLOW UP:  [facility], Emergency Dept  [facility] [facility] [id number] [phone]    If symptoms worsen    FINAL DIAGNOSIS  1. Psychosis, unspecified psychosis type (HCC)        The note accurately reflects work and decisions made by me.  Tahira Ocasio M.D.  8/22/2024  10:45 PM     Preston WISE (Kvng), am scribing for, and in the presence of, Tahira Ocasio M.D..    Electronically signed by: Preston Amaro), 8/22/2024    Tahira WISE M.D. personally performed the services described in this documentation, as scribed by Preston Ruiz in my presence, and it is both accurate and complete.

## 2024-08-22 NOTE — ED TRIAGE NOTES
"Chief Complaint   Patient presents with    Psych Eval     Patient found naked outside of the [facility] acting erratic. Patient placed on L2K by MOST team with [employer]       40 yo female to triage for above complaint. Patient refusing vitals and to put hospital wristband on and refusing tear away scrubs.  No interventions per EMS    Pt is alert and oriented, speaking in full sentences, follows commands and responds appropriately to questions.     Patient placed back in lobby and educated on triage process. Asked to inform RN of any changes.    /82   Pulse 92   Temp 36.2 °C (97.2 °F) (Temporal)   Resp 16   Ht 1.702 m (5' 7\")   Wt 68 kg (150 lb)   SpO2 94%   BMI 23.49 kg/m²     "

## 2025-02-12 PROCEDURE — 99283 EMERGENCY DEPT VISIT LOW MDM: CPT

## 2025-02-12 ASSESSMENT — FIBROSIS 4 INDEX: FIB4 SCORE: 0.86

## 2025-02-13 ENCOUNTER — HOSPITAL ENCOUNTER (EMERGENCY)
Facility: MEDICAL CENTER | Age: 40
End: 2025-02-13
Payer: MEDICAID

## 2025-02-13 ENCOUNTER — HOSPITAL ENCOUNTER (EMERGENCY)
Facility: MEDICAL CENTER | Age: 40
End: 2025-02-13
Attending: EMERGENCY MEDICINE
Payer: MEDICAID

## 2025-02-13 VITALS
HEART RATE: 102 BPM | DIASTOLIC BLOOD PRESSURE: 76 MMHG | OXYGEN SATURATION: 97 % | RESPIRATION RATE: 18 BRPM | SYSTOLIC BLOOD PRESSURE: 112 MMHG | TEMPERATURE: 96.4 F

## 2025-02-13 VITALS
RESPIRATION RATE: 16 BRPM | WEIGHT: 168.21 LBS | BODY MASS INDEX: 26.4 KG/M2 | DIASTOLIC BLOOD PRESSURE: 78 MMHG | HEIGHT: 67 IN | OXYGEN SATURATION: 97 % | HEART RATE: 92 BPM | TEMPERATURE: 97.3 F | SYSTOLIC BLOOD PRESSURE: 122 MMHG

## 2025-02-13 DIAGNOSIS — F20.9 SCHIZOPHRENIA, UNSPECIFIED TYPE (HCC): ICD-10-CM

## 2025-02-13 DIAGNOSIS — F20.9 SCHIZOPHRENIA, UNSPECIFIED TYPE (HCC): Primary | ICD-10-CM

## 2025-02-13 PROCEDURE — 700102 HCHG RX REV CODE 250 W/ 637 OVERRIDE(OP): Mod: UD | Performed by: EMERGENCY MEDICINE

## 2025-02-13 PROCEDURE — A9270 NON-COVERED ITEM OR SERVICE: HCPCS | Mod: UD | Performed by: EMERGENCY MEDICINE

## 2025-02-13 RX ORDER — RISPERIDONE 3 MG/1
3 TABLET ORAL
Qty: 60 TABLET | Refills: 0 | Status: SHIPPED | OUTPATIENT
Start: 2025-02-13

## 2025-02-13 RX ADMIN — RISPERIDONE 3 MG: 1 TABLET, FILM COATED ORAL at 01:44

## 2025-02-13 NOTE — ED PROVIDER NOTES
"ED Provider Note    CHIEF COMPLAINT  Chief Complaint   Patient presents with    Psych Eval     \"The [employer] told me to come here for a better way of life\". Pt speaking gibberish throughout triage. Denies pain.        EXTERNAL RECORDS REVIEWED  [facility] yesterday with similar presentation    HPI/ROS  LIMITATION TO HISTORY   Mild psychosis  OUTSIDE HISTORIAN(S):  None    Tierney Mayer is a 39 y.o. female with schizophrenia who presents to our emergency department tonight with varying complaints.  Initially she told me very clearly that she is here because she needs the sleep.  However with further questioning her thought process was disorganized and speech became mumbled.  She is talking about a  and there is no one at the bedside.  She was able to tell me that she takes risperidone and has not taken it for a few days.  She denies any illicit drug use.    PAST MEDICAL HISTORY   has a past medical history of Hypertension, Psychiatric disorder, Psychiatric disorder, and Schizophrenia (HCC).    SURGICAL HISTORY   has a past surgical history that includes pelviscopy (2/22/2011) and incision and drainage general (2/22/2011).    FAMILY HISTORY  History reviewed. No pertinent family history.    SOCIAL HISTORY  Social History     Tobacco Use    Smoking status: Every Day     Current packs/day: 1.00     Types: Cigarettes    Smokeless tobacco: Never   Vaping Use    Vaping status: Never Used   Substance and Sexual Activity    Alcohol use: Yes     Comment: denies    Drug use: Not on file     Comment: Marijuana, meth    Sexual activity: Not on file       CURRENT MEDICATIONS  Home Medications       Reviewed by Allyson Lu R.N. (Registered Nurse) on 02/12/25 at 2203  Med List Status: Partial     Medication Last Dose Status   hydrOXYzine pamoate (VISTARIL) 50 MG Cap  Active   risperiDONE (RISPERDAL) 3 MG Tab  Active                    ALLERGIES  Allergies   Allergen Reactions    Clonazepam Unspecified    " " Pt reports getting a stroke.  Per historical: Twitches and paranoia.    Paliperidone Unspecified     Twitches and paranoia. On risperidone as outpatient with no issues     Invega Fidencioenna [Paliperidone Palmitate]      On risperidone outpatient without issues.     Olanzapine      Pt reports worsening and threatening voices.       PHYSICAL EXAM  VITAL SIGNS: /78   Pulse 92   Temp 36.3 °C (97.3 °F) (Temporal)   Resp 16   Ht 1.702 m (5' 7\")   Wt 76.3 kg (168 lb 3.4 oz)   SpO2 97%   BMI 26.35 kg/m²    Physical Exam  Vitals and nursing note reviewed.   Constitutional:       Comments: Well-nourished 39-year-old woman sitting on bed resting comfortably.   HENT:      Head: Normocephalic and atraumatic.      Mouth/Throat:      Mouth: Mucous membranes are moist.   Eyes:      Extraocular Movements: Extraocular movements intact.      Pupils: Pupils are equal, round, and reactive to light.   Cardiovascular:      Rate and Rhythm: Normal rate and regular rhythm.   Pulmonary:      Effort: Pulmonary effort is normal.   Abdominal:      General: There is no distension.      Tenderness: There is no abdominal tenderness. There is no guarding.   Musculoskeletal:      Cervical back: Normal range of motion. No rigidity.   Neurological:      Mental Status: She is alert.      Comments: Oriented to place and person.  Not exactly oriented to time of day besides his nighttime.  She intermittently has clear speech without and becomes mumbled and I am unable to understand words.  She was all extremities with normal strength.   Psychiatric:      Comments: Disorganized.  Possibly hallucinating about  being here in the emergency department with her.  Denies any suicidal thoughts.               Radiologist interpretation:  No orders to display       COURSE & MEDICAL DECISION MAKING    ASSESSMENT, COURSE AND PLAN  Care Narrative: This is a 39-year-old woman with history of schizophrenia now presenting to emergency department.  Her " she is disorganized.  She initially said she is here to sleep but then had mentioned at triage that she came to the ER because O told her to.  She has obvious delusions and possibly hallucinating that a  of hers is here at bedside with us.  She has not been taking her risperidone for a few days.  Plan to give a dose of this now and reevaluate.  She is not in any distress.  She is not suicidal.  If there is significant improvement after risperidone I anticipate she will be able to be discharged rather than put on a involuntary hold.    3:59 AM  She has received medication.  She has not slept at all.  However, she is more conversational.  She is able to tolerate eating a sandwich.  She is oriented and requesting go to the local homeless shelter.  I think this is reasonable.  I will provide a refill for her antipsychotics.  There is no indication for involuntary hold or transfer to [facility] at this time.         PROBLEMS MANAGED  #Schizophrenia   -With mild psychosis.  Acute on chronic problem    DISPOSITION AND DISCUSSIONS      Decision tools and prescription drugs considered including, but not limited to: Discussed compliance with Risperdal    FINAL DIAGNOSIS  1. Schizophrenia, unspecified type (HCC) Active   2. Schizophrenia, unspecified type (HCC)         Electronically signed by: Ag Lewis II, M.D., 2/13/2025 1:10 AM

## 2025-02-13 NOTE — ED NOTES
Pt pacing around ER lobby, staff continuously redirecting pt and reeducating pt on triage process. Pt continuing to pace.

## 2025-02-13 NOTE — ED NOTES
Pt ambulatory out of the ER with a steady gait, refuses to wait for taxi voucher and discharge instructions.

## 2025-02-13 NOTE — ED NOTES
Pt began walking down the [address], stated she was going to go home. RN asked if ERP could speak with her first. Pt stating she would like a sandwich. Pt ambulatory back to bed and given a sandwich. ERP notified. Pt now denying wanting to leave.

## 2025-02-13 NOTE — DISCHARGE PLANNING
"Alert team:    Alert team met with pt after she presented to the ED for a psychiatric evaluation stating, \"[employer] told me to come here for a better way of life.\" Pt was speaking gibberish during triage, but when this writer met with her she was A&Ox3; disoriented to situations; states she came to the ED for food. Denies SI/HI/hallucinations and contracts for safety. Pt requesting to go to a shelter/[facility]. Taxi voucher can be provided at time of discharge if needed. Findings discussed with ERP who agrees pt is safe to discharge to self at this time. Updated RN.  "

## 2025-02-13 NOTE — ED TRIAGE NOTES
"Chief Complaint   Patient presents with    Psych Eval     \"[facility] [place] to come here for a better way of life\". Pt speaking gibberish throughout triage. Denies pain.       Patient ambulatory to triage for above complaint. Equal and unlabored respirations. Patient educated on triage process and encouraged to notify staff if condition worsens. Patient returned to the lobby in stable condition.     "

## 2025-02-13 NOTE — ED NOTES
Pt back to green [address] [place] [address]. Pt difficult to understand, constantly mumbling. States she's here for insomnia.

## 2025-02-13 NOTE — ED NOTES
PT did not want to be seen and wanted a ride to [facility]. Talked with  and PT provided a ride to [facility] through [facility]. PT signed AMA form, will discharge PT from system.

## 2025-07-27 NOTE — ED PROVIDER NOTES
"ER Provider Note    Scribed for Diego Núñez M.d. by Yannick Ashraf. 7/14/2024  5:46 AM    Primary Care Provider: No primary care provider on file.    CHIEF COMPLAINT   Chief Complaint   Patient presents with    Psych Eval     Pt is hyper verbal in triage when asked about the complaint, but is redirectable  Pt said she wants bed pan  Unable to obtain good information from the pt  Alert, but is not answering orientation question       HPI/ROS  LIMITATION TO HISTORY   Select: Intoxication  OUTSIDE HISTORIAN(S):  None    Mally Multani is a 39 y.o. female who presents to the ED complaining of a need for rest. The patient denies taking any meth but does have a history of methamphetamine abuse. The patient denies ever taking medications in the past but does have a history of taking Risperdal nightly. The patient is uncooperative in answering questions.  States that she does not like me.  States that she wants a bedpan and something to eat.  Is quite demanding    PAST MEDICAL HISTORY  Past Medical History:   Diagnosis Date    Psychiatric disorder        SURGICAL HISTORY  Past Surgical History:   Procedure Laterality Date    NO PERTINENT PAST SURGICAL HISTORY         FAMILY HISTORY  History reviewed. No pertinent family history.    SOCIAL HISTORY   reports that she has never smoked. She does not have any smokeless tobacco history on file. She reports current alcohol use. She reports current drug use.    CURRENT MEDICATIONS  Previous Medications    HYDROXYZINE PAMOATE (VISTARIL) 50 MG CAP    Take 50 mg by mouth every four hours as needed for Anxiety.    RISPERIDONE (RISPERDAL) 3 MG TAB    Take 3 mg by mouth at bedtime.       ALLERGIES  Patient has no known allergies.    PHYSICAL EXAM  BP 90/56   Pulse 80   Temp 36.4 °C (97.6 °F) (Temporal)   Resp 18   Ht 1.702 m (5' 7\")   Wt 68 kg (150 lb)   SpO2 99%   BMI 23.49 kg/m²   Constitutional: Well developed, Well nourished, No acute distress, Non-toxic appearance.   HENT: " Normocephalic, Atraumatic, Bilateral external ears normal, Oropharynx is clear mucous membranes are moist. No oral exudates or nasal discharge. Bruxism.  Eyes: Pupils are equal round and reactive, EOMI, Conjunctiva normal, No discharge.   Neck: Normal range of motion, No tenderness, Supple, No stridor. No meningismus.  Lymphatic: No lymphadenopathy noted.   Cardiovascular: Regular rate and rhythm without murmur rub or gallop.  Thorax & Lungs: Clear breath sounds bilaterally without wheezes, rhonchi or rales. There is no chest wall tenderness.   Abdomen: Soft non-tender non-distended. There is no rebound or guarding. No organomegaly is appreciated. Bowel sounds are normal.  Skin: Normal without rash.   Back: No CVA or spinal tenderness.   Extremities: Intact distal pulses, No edema, No tenderness, No cyanosis, No clubbing. Capillary refill is less than 2 seconds.  Musculoskeletal: Good range of motion in all major joints. No tenderness to palpation or major deformities noted.   Neurologic: Alert & oriented x 3, Normal motor function, Normal sensory function, No focal deficits noted. Reflexes are normal.  Psychiatric: Tangential thought. There is no suicidal ideation or patient reported hallucinations.       COURSE & MEDICAL DECISION MAKING     ASSESSMENT, COURSE AND PLAN    Care Narrative:   5:52 AM - Patient presents to the ED with meth intoxication.  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  The presents with tangential thought and is uncooperative in answering questions. Patient will be treated with risperidone 2 mg PO.  I do not think she is holdable as she does not have any suicidal or homicidal ideation and is demonstrating some functional capacity    At 6:10 AM she agreed to take Risperdal and then was offered a meal  6:22 AM - Patient will be discharged with instructions to stop using meth.      DISPOSITION AND DISCUSSIONS  Barriers to  care at this time, including but not limited to: Patient is homeless.     FINAL DIANGOSIS  1. Psychosis, unspecified psychosis type (HCC)    2. Methamphetamine abuse (HCC)               The note accurately reflects work and decisions made by me.  Diego Núñez M.D.  7/14/2024  6:32 AM     No